# Patient Record
Sex: MALE | Race: WHITE | NOT HISPANIC OR LATINO | Employment: OTHER | URBAN - METROPOLITAN AREA
[De-identification: names, ages, dates, MRNs, and addresses within clinical notes are randomized per-mention and may not be internally consistent; named-entity substitution may affect disease eponyms.]

---

## 2018-07-12 ENCOUNTER — DOCUMENTATION ONLY (OUTPATIENT)
Dept: CARDIOLOGY | Facility: CLINIC | Age: 68
End: 2018-07-12

## 2018-07-12 DIAGNOSIS — T82.03XA PARAVALVULAR LEAK OF PROSTHETIC HEART VALVE, INITIAL ENCOUNTER: ICD-10-CM

## 2018-07-12 NOTE — ASSESSMENT & PLAN NOTE
TRACY from Dr. Santoro [place] reviewed. Large anterior PVL present anteriorly in close proximity to LVOT. Will schedule for PVL closure and clear LVOT prior to release.

## 2018-07-16 DIAGNOSIS — T82.03XA PARAVALVULAR LEAK OF PROSTHETIC HEART VALVE, INITIAL ENCOUNTER: Primary | ICD-10-CM

## 2018-07-17 NOTE — PROGRESS NOTES
Subjective:    Patient ID:  Yusuf Mcdonald is a 67 y.o. y.o. male who presents for evaluation for Paravalvular leak with Mechanical MR. PMhx of Severe MR d/t restriction of posterior leaflet s/p Mechanical MVR/MAZE/GEE excision '2015 ([facility]) on Coumadin, NICMP LVef 40% (Tachycardia mediated), Obesity, Hypertension, HLD, Permanent Afib s/p DCCV 2014 (Dr. Casillas). Who presents for evalution of paravalvular leak from his MVR. Mr. Mcdonald was seen in cardiology for routine follow up, and was found to have PVL on TRACY. Prior to his MVR and Post MVR, he has had no HF symptoms, no Chest pain, SOB, N/V/D, PND, Orthopnea, LE edema, no urinary or bowel complaint, Denies any lightheadedness and dizziness. Down 10 lbs intentionally. Patient is an , very active, denies any smoking, only social EtOH. Has trip planned to [facility] 8/4/2018 hunting vacation with family.     Coumadin 1.5mg x6 days then 1mg x1 day a week, most recent INR 3.1.    Follows up with Dr. Larsen (Primary Cardiology), Completed TRACY 7/6/18 with / Fe ROSE at [facility], summary Bileaflet mechanical MV with moderate to severe anterior perivalvular mitral regurgitation. Mild to moderate dilated right ventricular cavity with reduced systolic function. Mild Right and severe Left atrial enlargement. Left atrial appendage appearance with surgical exclusion w/o residual communication.       HPI    Review of Systems   Constitutional: Negative for chills, fever, malaise/fatigue and weight loss.   Respiratory: Negative for cough, sputum production and shortness of breath.    Cardiovascular: Negative for chest pain, palpitations, orthopnea, claudication, leg swelling and PND.   Gastrointestinal: Negative for abdominal pain, diarrhea, nausea and vomiting.   Genitourinary: Negative for dysuria and urgency.        Objective:     BP (!) 167/110 (BP Location: Right arm, Patient Position: Sitting, BP Method:  "Large (Automatic))   Pulse 88   Ht 5' 8.5" (1.74 m)   Wt 99.7 kg (219 lb 12.8 oz)   SpO2 95%   BMI 32.93 kg/m²     Physical Exam   Constitutional: He is oriented to person, place, and time. No distress.   HENT:   Head: Normocephalic and atraumatic.   Neck: Normal range of motion. Neck supple. JVD present.   Cardiovascular: Normal rate, regular rhythm and intact distal pulses.  Exam reveals no gallop and no friction rub.    Murmur heard.   Systolic murmur is present with a grade of 3/6   Crisp mechanical click   Pulmonary/Chest: Effort normal and breath sounds normal. No respiratory distress. He has no wheezes. He has no rales.   Abdominal: Soft. Bowel sounds are normal. He exhibits no distension. There is no tenderness.   Musculoskeletal: Normal range of motion.   Neurological: He is alert and oriented to person, place, and time.   Skin: Skin is warm and dry. He is not diaphoretic. No erythema.   Nursing note and vitals reviewed.      Labs:     Lab Results   Component Value Date     12/30/2011    K 3.7 12/30/2011     12/30/2011    CO2 32 (H) 12/30/2011    BUN 16 12/30/2011    CREATININE 1.0 12/30/2011    ANIONGAP 9.0 12/30/2011     Lab Results   Component Value Date    HGBA1C 5.2 12/30/2011     No results found for: BNP, BNPTRIAGEBLO    Lab Results   Component Value Date    WBC 7.81 12/30/2011    HGB 16.0 12/30/2011    HCT 47.5 12/30/2011     12/30/2011    GRAN 4.6 12/30/2011    GRAN 58.9 12/30/2011     Lab Results   Component Value Date    CHOL 219 (H) 12/30/2011    HDL 39 (L) 12/30/2011    LDLCALC 136.0 12/30/2011    TRIG 219 (H) 12/30/2011       Meds     Current Outpatient Prescriptions:     acetaminophen (TYLENOL) 500 MG tablet, Take 500 mg by mouth., Disp: , Rfl:     ascorbic acid, vitamin C, (VITAMIN C) 500 MG tablet, Take 500 mg by mouth., Disp: , Rfl:     aspirin 81 MG Chew, Take 81 mg by mouth., Disp: , Rfl:     cetirizine (ZYRTEC) 10 MG tablet, Take 10 mg by mouth., Disp: , Rfl: "     coenzyme Q10 100 mg capsule, Take 100 mg by mouth., Disp: , Rfl:     digoxin (LANOXIN) 250 mcg tablet, , Disp: , Rfl:     diltiaZEM (CARDIZEM CD) 120 MG Cp24, Take 120 mg by mouth., Disp: , Rfl:     fish,bora,flax oils-om3,6,9no1 1,200 mg Cap, Take 1,200 mg by mouth., Disp: , Rfl:     fluticasone (FLONASE) 50 mcg/actuation nasal spray, 50 Bottles by Nasal route., Disp: , Rfl:     furosemide (LASIX) 40 MG tablet, , Disp: , Rfl:     JANTOVEN 5 mg tablet, , Disp: , Rfl:     lisinopril (PRINIVIL,ZESTRIL) 20 MG tablet, Take 20 mg by mouth., Disp: , Rfl:     metoprolol succinate (TOPROL-XL) 50 MG 24 hr tablet, , Disp: , Rfl:     multivitamin capsule, Take by mouth., Disp: , Rfl:       Assessment & Plan:     Paravalvular leak (prosthetic valve)  Severe MR s/p Mechanical MVR, MAZE and GEE excision 2015.   TRACY images reviewed with Dr. Boyd and Dr. Tejeda  Patient is symptomatic with elevated JVD, LE edema, dyspnea on exertion tested in clinic  Fluoroscopy completed in clinic to further evaluate his  Mechanical MVR which appears to be functioning well.   - Repeat TTE today with structural specific windows   - Cath lab request completed with orderset  - Right femoral access   - Consent in chart   - Routine hemolysis work up with CBC, CMP, INR, Retic, LDH  - Continue Coumadin for now, INR today, he can continue coumadin, no need to bridge perioperative       Abscess of skin of neck  - Need to see PCP / ED, would benefit from I&D with short course of PO ABX  - Cannot precede with the procedure with acute infection       Signed:  Kelly Zapata M.D.  Page # [phone]  Cardiovascular Fellow PGY-IV  [facility]     I have personally taken the history and examined this patient. I have discussed and agree with the resident's findings and plan as documented in the resident's note. Anterior mitral paravalvular leak, NYHA II symptoms, elevated PAP's, chamber enlargement, haptoglobin unknown referred for  PVL closure. Fluoroscopy demonstrated good leaflet mobility. Left posterior neck with abscess. REC: AB's, abscess drainage today, appt made, needs lab, TTE today and schedule procedure post 7 to 10 days of AB's and resolution of drainage. Discussed patient and TRACY with Dr. Boyd.   Yusuf Tejeda

## 2018-07-18 ENCOUNTER — HOSPITAL ENCOUNTER (OUTPATIENT)
Dept: CARDIOLOGY | Facility: CLINIC | Age: 68
Discharge: HOME | End: 2018-07-18
Attending: INTERNAL MEDICINE
Payer: COMMERCIAL

## 2018-07-18 ENCOUNTER — OFFICE VISIT (OUTPATIENT)
Dept: INTERNAL MEDICINE | Facility: CLINIC | Age: 68
End: 2018-07-18
Payer: COMMERCIAL

## 2018-07-18 ENCOUNTER — INITIAL CONSULT (OUTPATIENT)
Dept: CARDIOLOGY | Facility: CLINIC | Age: 68
End: 2018-07-18
Payer: COMMERCIAL

## 2018-07-18 ENCOUNTER — LAB VISIT (OUTPATIENT)
Dept: LAB | Facility: HOSPITAL | Age: 68
End: 2018-07-18
Payer: COMMERCIAL

## 2018-07-18 VITALS
BODY MASS INDEX: 32.44 KG/M2 | HEART RATE: 98 BPM | HEIGHT: 69 IN | DIASTOLIC BLOOD PRESSURE: 75 MMHG | SYSTOLIC BLOOD PRESSURE: 140 MMHG | WEIGHT: 219 LBS

## 2018-07-18 VITALS
HEART RATE: 88 BPM | HEIGHT: 69 IN | OXYGEN SATURATION: 95 % | DIASTOLIC BLOOD PRESSURE: 110 MMHG | WEIGHT: 219.81 LBS | BODY MASS INDEX: 32.56 KG/M2 | SYSTOLIC BLOOD PRESSURE: 167 MMHG

## 2018-07-18 DIAGNOSIS — I10 HYPERTENSION, UNSPECIFIED TYPE: ICD-10-CM

## 2018-07-18 DIAGNOSIS — T82.03XA PARAVALVULAR LEAK OF PROSTHETIC HEART VALVE, INITIAL ENCOUNTER: Primary | ICD-10-CM

## 2018-07-18 DIAGNOSIS — L02.12 BOIL OF NECK: Primary | ICD-10-CM

## 2018-07-18 DIAGNOSIS — I27.20 PULMONARY HYPERTENSION: ICD-10-CM

## 2018-07-18 DIAGNOSIS — T82.03XA PARAVALVULAR LEAK OF PROSTHETIC HEART VALVE, INITIAL ENCOUNTER: ICD-10-CM

## 2018-07-18 DIAGNOSIS — Z79.01 ANTICOAGULATION GOAL OF INR 2.5 TO 3.5: ICD-10-CM

## 2018-07-18 DIAGNOSIS — I48.21 PERMANENT ATRIAL FIBRILLATION: ICD-10-CM

## 2018-07-18 DIAGNOSIS — Z51.81 ANTICOAGULATION GOAL OF INR 2.5 TO 3.5: ICD-10-CM

## 2018-07-18 DIAGNOSIS — L02.11 ABSCESS OF SKIN OF NECK: ICD-10-CM

## 2018-07-18 PROBLEM — I27.22 PULMONARY HYPERTENSION DUE TO MITRAL VALVE DISEASE: Status: ACTIVE | Noted: 2018-07-18

## 2018-07-18 PROBLEM — I05.9 PULMONARY HYPERTENSION DUE TO MITRAL VALVE DISEASE: Status: ACTIVE | Noted: 2018-07-18

## 2018-07-18 PROBLEM — E78.49 OTHER HYPERLIPIDEMIA: Status: ACTIVE | Noted: 2018-06-19

## 2018-07-18 PROBLEM — Z98.890 S/P MVR (MITRAL VALVE REPAIR): Status: ACTIVE | Noted: 2018-05-24

## 2018-07-18 PROBLEM — I48.20 CHRONIC A-FIB: Status: ACTIVE | Noted: 2018-05-24

## 2018-07-18 LAB
ALBUMIN SERPL BCP-MCNC: 4 G/DL
ALP SERPL-CCNC: 156 U/L
ALT SERPL W/O P-5'-P-CCNC: 25 U/L
ANION GAP SERPL CALC-SCNC: 10 MMOL/L
AST SERPL-CCNC: 41 U/L
BASOPHILS # BLD AUTO: 0.07 K/UL
BASOPHILS NFR BLD: 0.8 %
BILIRUB SERPL-MCNC: 2.6 MG/DL
BUN SERPL-MCNC: 17 MG/DL
CALCIUM SERPL-MCNC: 9.5 MG/DL
CHLORIDE SERPL-SCNC: 100 MMOL/L
CO2 SERPL-SCNC: 28 MMOL/L
CREAT SERPL-MCNC: 1.3 MG/DL
DIFFERENTIAL METHOD: ABNORMAL
EOSINOPHIL # BLD AUTO: 0.4 K/UL
EOSINOPHIL NFR BLD: 4.2 %
ERYTHROCYTE [DISTWIDTH] IN BLOOD BY AUTOMATED COUNT: 14 %
EST. GFR  (AFRICAN AMERICAN): >60 ML/MIN/1.73 M^2
EST. GFR  (NON AFRICAN AMERICAN): 56.9 ML/MIN/1.73 M^2
GLUCOSE SERPL-MCNC: 121 MG/DL
HCT VFR BLD AUTO: 40.7 %
HGB BLD-MCNC: 13.8 G/DL
IMM GRANULOCYTES # BLD AUTO: 0.02 K/UL
IMM GRANULOCYTES NFR BLD AUTO: 0.2 %
LDH SERPL L TO P-CCNC: 472 U/L
LYMPHOCYTES # BLD AUTO: 1.3 K/UL
LYMPHOCYTES NFR BLD: 14.7 %
MCH RBC QN AUTO: 32.2 PG
MCHC RBC AUTO-ENTMCNC: 33.9 G/DL
MCV RBC AUTO: 95 FL
MONOCYTES # BLD AUTO: 0.7 K/UL
MONOCYTES NFR BLD: 7.2 %
NEUTROPHILS # BLD AUTO: 6.5 K/UL
NEUTROPHILS NFR BLD: 72.9 %
NRBC BLD-RTO: 0 /100 WBC
PLATELET # BLD AUTO: 195 K/UL
PMV BLD AUTO: 12 FL
POTASSIUM SERPL-SCNC: 2.8 MMOL/L
PROT SERPL-MCNC: 7.2 G/DL
RBC # BLD AUTO: 4.29 M/UL
RETICS/RBC NFR AUTO: 1.5 %
SODIUM SERPL-SCNC: 138 MMOL/L
WBC # BLD AUTO: 8.97 K/UL

## 2018-07-18 PROCEDURE — 83615 LACTATE (LD) (LDH) ENZYME: CPT

## 2018-07-18 PROCEDURE — 85025 COMPLETE CBC W/AUTO DIFF WBC: CPT

## 2018-07-18 PROCEDURE — 76000 FLUOROSCOPY <1 HR PHYS/QHP: CPT | Mod: 26,,, | Performed by: INTERNAL MEDICINE

## 2018-07-18 PROCEDURE — 99999 PR PBB SHADOW E&M-EST. PATIENT-LVL III: CPT | Mod: PBBFAC,,, | Performed by: INTERNAL MEDICINE

## 2018-07-18 PROCEDURE — 99201 PR OFFICE/OUTPT VISIT,NEW,LEVL I: CPT | Mod: S$GLB,,, | Performed by: INTERNAL MEDICINE

## 2018-07-18 PROCEDURE — 36415 COLL VENOUS BLD VENIPUNCTURE: CPT

## 2018-07-18 PROCEDURE — 85045 AUTOMATED RETICULOCYTE COUNT: CPT

## 2018-07-18 PROCEDURE — 99999 PR PBB SHADOW E&M-EST. PATIENT-LVL IV: CPT | Mod: PBBFAC,,, | Performed by: INTERNAL MEDICINE

## 2018-07-18 PROCEDURE — 80053 COMPREHEN METABOLIC PANEL: CPT

## 2018-07-18 PROCEDURE — 99205 OFFICE O/P NEW HI 60 MIN: CPT | Mod: S$GLB,,, | Performed by: INTERNAL MEDICINE

## 2018-07-18 PROCEDURE — 93306 TTE W/DOPPLER COMPLETE: CPT | Mod: S$GLB,,, | Performed by: INTERNAL MEDICINE

## 2018-07-18 PROCEDURE — 76000 FLUOROSCOPY <1 HR PHYS/QHP: CPT

## 2018-07-18 RX ORDER — CETIRIZINE HYDROCHLORIDE 10 MG/1
10 TABLET ORAL
COMMUNITY

## 2018-07-18 RX ORDER — WARFARIN SODIUM 5 MG/1
TABLET ORAL
COMMUNITY
Start: 2018-06-04

## 2018-07-18 RX ORDER — METOPROLOL SUCCINATE 50 MG/1
TABLET, EXTENDED RELEASE ORAL
COMMUNITY
Start: 2018-06-04

## 2018-07-18 RX ORDER — ASCORBIC ACID 500 MG
500 TABLET ORAL
COMMUNITY
End: 2018-11-13

## 2018-07-18 RX ORDER — DIGOXIN 250 MCG
TABLET ORAL
COMMUNITY
Start: 2018-06-21

## 2018-07-18 RX ORDER — NAPROXEN SODIUM 220 MG/1
81 TABLET, FILM COATED ORAL
Status: ON HOLD | COMMUNITY
End: 2018-10-11 | Stop reason: HOSPADM

## 2018-07-18 RX ORDER — DILTIAZEM HYDROCHLORIDE 120 MG/1
120 CAPSULE, COATED, EXTENDED RELEASE ORAL 2 TIMES DAILY
COMMUNITY
Start: 2018-05-26

## 2018-07-18 RX ORDER — SODIUM CHLORIDE 9 MG/ML
3 INJECTION, SOLUTION INTRAVENOUS CONTINUOUS
Status: CANCELLED | OUTPATIENT
Start: 2018-07-18 | End: 2018-07-18

## 2018-07-18 RX ORDER — LISINOPRIL 20 MG/1
20 TABLET ORAL
COMMUNITY

## 2018-07-18 RX ORDER — CEPHALEXIN 500 MG/1
500 CAPSULE ORAL EVERY 8 HOURS
Qty: 30 CAPSULE | Refills: 0 | Status: SHIPPED | OUTPATIENT
Start: 2018-07-18 | End: 2018-07-28

## 2018-07-18 RX ORDER — FUROSEMIDE 40 MG/1
TABLET ORAL
Status: ON HOLD | COMMUNITY
Start: 2018-07-12 | End: 2022-01-01

## 2018-07-18 RX ORDER — FLUTICASONE PROPIONATE 50 MCG
50 SPRAY, SUSPENSION (ML) NASAL
COMMUNITY
Start: 2018-06-04

## 2018-07-18 RX ORDER — ACETAMINOPHEN 500 MG
500 TABLET ORAL
COMMUNITY

## 2018-07-18 RX ORDER — EPINEPHRINE 0.22MG
100 AEROSOL WITH ADAPTER (ML) INHALATION
COMMUNITY

## 2018-07-18 RX ORDER — DIPHENHYDRAMINE HCL 25 MG
50 CAPSULE ORAL ONCE
Status: CANCELLED | OUTPATIENT
Start: 2018-07-18 | End: 2018-07-18

## 2018-07-18 NOTE — ASSESSMENT & PLAN NOTE
Severe MR s/p Mechanical MVR, MAZE and GEE excision 2015.   TRACY images reviewed with Dr. Boyd and Dr. Tejeda  Patient is symptomatic with elevated JVD, LE edema, dyspnea on exertion tested in clinic  Fluoroscopy completed in clinic to further evaluate his  Mechanical MVR which appears to be functioning well.   - Repeat TTE today with structural specific windows   - Cath lab request completed with orderset  - Right femoral access   - Consent in chart   - Routine hemolysis work up with CBC, CMP, INR, Retic, LDH  - Continue Coumadin for now, INR today, he can continue coumadin, no need to bridge perioperative

## 2018-07-18 NOTE — PROGRESS NOTES
Yusuf Mcdonald presents today to urgent care for:  Recurrent Skin Infections (back of neck )      Pt. Noted a boil on his posterior left neck 4 days ago.  He hasn't done anything to it but it started draining 2 days ago.  Mild pain and discomfort has been noted.  He is covering with a bandaide. No other associated symptoms.         Past medical, social, family and surgical history was reviewed and updated today as needed. See encounter for details.     Review of Systems   All other systems reviewed and are negative.      Vitals:    07/18/18 1031   BP: (!) 140/75   Pulse: 98   Body mass index is 32.81 kg/m².   Physical Exam   Constitutional: He appears well-developed and well-nourished.   HENT:   Head: Normocephalic and atraumatic.   Neck:       Pulmonary/Chest: Effort normal.   Lymphadenopathy:     He has no cervical adenopathy.   Neurological: He is alert.        Assessment/plan:   1. Boil of neck  - cephALEXin (KEFLEX) 500 MG capsule; Take 1 capsule (500 mg total) by mouth every 8 (eight) hours. for 10 days  Dispense: 30 capsule; Refill: 0  Since this is draining, there should be no reason to have pt. See surgery at this time.  Warm compresses TID or more.  If worsens or fails to improve pt will need to see someone who does proceedures to have this lanced and packed.       Follow-up if symptoms worsen or fail to improve.  All risks and benefits of medications discussed with the patient today in detail. Pt. Voiced understanding and was provided with patient educational materials regarding these medications and encouraged to read this material and call the office with any questions or concerns.

## 2018-07-18 NOTE — ASSESSMENT & PLAN NOTE
- Need to see PCP / ED, would benefit from I&D with short course of PO ABX  - Cannot precede with the procedure with acute infection

## 2018-07-18 NOTE — PATIENT INSTRUCTIONS
Understanding Carbuncles    A carbuncle is a painful boil under the skin. It happens when a group of hair follicles become infected. Follicles are the tiny holes from which hair grows out of your skin.  How to say it  Caprice   What causes carbuncles?  A carbuncle is caused by an infection with the bacteria Staphylococcus aureus. They are common on areas of the body where friction and sweat occur. They usually appear on the back of the neck, back, and thighs. This type of infection can also happen when the skin is injured, such as by a cut or bug bite.  The bacteria that causes carbuncles can spread easily from person to person. People at higher risk for boils are those with diabetes or a weak immune system. Drug users who use needles are also more likely to get them.  Symptoms of carbuncles  A carbuncle starts as a small painful bump. But it can grow quickly. It may become:  · Red  · Swollen  · Tender  Carbuncles may ooze pus. They may also cause fever and a general feeling of illness.  Treatment for carbuncles  A carbuncle may heal on its own in a few weeks. But the pus within it needs to come out first. Treatment options include:  · Warm compress. Putting a warm, wet washcloth on the boil will help the pus drain out. You should never try to pop a boil. That can cause the infection to spread.  · Surgical drainage. If the boil doesnt drain on its own, your healthcare provider may need to cut into it.  · Antibiotics. In some cases, oral antibiotics may be prescribed to fight the infection, especially if the carbuncle returns. You will likely have to take the medicine for 5 to 7 days. You may need to take it longer for a severe case.  · Good hygiene. Proper handwashing can prevent boils from spreading and coming back. Also wash things that have been in contact with the carbuncle in hot water. This includes items such as clothing and towels.  Complications of carbuncles  The main complication of a carbuncle  is the spreading of the infection. The bacteria can infect the heart and bone. It can also lead to septic shock, an infection of the entire body.  When to call your healthcare provider  Call your healthcare provider right away if you have any of these:  · Fever of 100.4°F (38°C) or higher, or as directed  · Redness, swelling, or fluid leaking from a carbuncle that gets worse  · Pain that gets worse  · Symptoms that dont get better, or get worse  · New symptoms   Date Last Reviewed: 5/1/2016 © 2000-2017 [employer]. [address] [place] [address]. All rights reserved. This information is not intended as a substitute for professional medical care. Always follow your healthcare professional's instructions.

## 2018-07-18 NOTE — LETTER
July 18, 2018      Sherif Santoro MD  [address]  [address]  [facility] [address]           [facility]  [address]  [facility] [address]  Phone: [phone]          Patient: Yusuf Mcdonald   MR Number: [id number]   YOB: 1950   Date of Visit: 7/18/2018       Dear Dr. Sherif Santoro:    Thank you for referring Yusuf Mcdonald to me for evaluation. Attached you will find relevant portions of my assessment and plan of care.    If you have questions, please do not hesitate to call me. I look forward to following Yusuf Mcdonald along with you.    Sincerely,    Yusuf Tejeda MD    Enclosure  CC:  No Recipients    If you would like to receive this communication electronically, please contact [email or web address] or [phone] to request more information on [facility] access.    For providers and/or their staff who would like to refer a patient to [facility], please contact us through our one-stop-shop provider referral line, Nedra Pan, at [phone].    If you feel you have received this communication in error or would no longer like to receive these types of communications, please e-mail [email or web address]

## 2018-07-19 ENCOUNTER — DOCUMENTATION ONLY (OUTPATIENT)
Dept: CARDIAC CATH/INVASIVE PROCEDURES | Facility: HOSPITAL | Age: 68
End: 2018-07-19

## 2018-07-19 ENCOUNTER — TELEPHONE (OUTPATIENT)
Dept: CARDIAC CATH/INVASIVE PROCEDURES | Facility: HOSPITAL | Age: 68
End: 2018-07-19

## 2018-07-19 DIAGNOSIS — E87.6 HYPOKALEMIA: ICD-10-CM

## 2018-07-19 RX ORDER — POTASSIUM CHLORIDE 20 MEQ/1
20 TABLET, EXTENDED RELEASE ORAL DAILY
Qty: 30 TABLET | Refills: 0 | Status: ON HOLD | OUTPATIENT
Start: 2018-07-19 | End: 2022-01-01

## 2018-07-23 DIAGNOSIS — E87.6 HYPOKALEMIA: Primary | ICD-10-CM

## 2018-07-24 ENCOUNTER — LAB VISIT (OUTPATIENT)
Dept: LAB | Facility: HOSPITAL | Age: 68
End: 2018-07-24
Attending: STUDENT IN AN ORGANIZED HEALTH CARE EDUCATION/TRAINING PROGRAM
Payer: COMMERCIAL

## 2018-07-24 DIAGNOSIS — T82.03XA PARAVALVULAR LEAK OF PROSTHETIC HEART VALVE, INITIAL ENCOUNTER: ICD-10-CM

## 2018-07-24 DIAGNOSIS — E87.6 HYPOKALEMIA: ICD-10-CM

## 2018-07-24 LAB
AORTIC VALVE REGURGITATION: ABNORMAL
ESTIMATED PA SYSTOLIC PRESSURE: 88.62
MITRAL VALVE REGURGITATION: ABNORMAL
RETIRED EF AND QEF - SEE NOTES: 50 (ref 55–65)
TRICUSPID VALVE REGURGITATION: ABNORMAL

## 2018-07-24 PROCEDURE — 83010 ASSAY OF HAPTOGLOBIN QUANT: CPT

## 2018-07-24 PROCEDURE — 80053 COMPREHEN METABOLIC PANEL: CPT

## 2018-07-24 PROCEDURE — 36415 COLL VENOUS BLD VENIPUNCTURE: CPT | Mod: PO

## 2018-07-25 LAB
ALBUMIN SERPL BCP-MCNC: 4.1 G/DL
ALP SERPL-CCNC: 162 U/L
ALT SERPL W/O P-5'-P-CCNC: 26 U/L
ANION GAP SERPL CALC-SCNC: 10 MMOL/L
AST SERPL-CCNC: 43 U/L
BILIRUB SERPL-MCNC: 1.9 MG/DL
BUN SERPL-MCNC: 20 MG/DL
CALCIUM SERPL-MCNC: 10.2 MG/DL
CHLORIDE SERPL-SCNC: 103 MMOL/L
CO2 SERPL-SCNC: 30 MMOL/L
CREAT SERPL-MCNC: 1.3 MG/DL
EST. GFR  (AFRICAN AMERICAN): >60 ML/MIN/1.73 M^2
EST. GFR  (NON AFRICAN AMERICAN): 56.9 ML/MIN/1.73 M^2
GLUCOSE SERPL-MCNC: 54 MG/DL
HAPTOGLOB SERPL-MCNC: <10 MG/DL
POTASSIUM SERPL-SCNC: 3.6 MMOL/L
PROT SERPL-MCNC: 7.6 G/DL
SODIUM SERPL-SCNC: 143 MMOL/L

## 2018-07-30 ENCOUNTER — TELEPHONE (OUTPATIENT)
Dept: CARDIOLOGY | Facility: CLINIC | Age: 68
End: 2018-07-30

## 2018-07-30 DIAGNOSIS — I05.9 MITRAL VALVE DISEASE: Primary | ICD-10-CM

## 2018-07-30 NOTE — TELEPHONE ENCOUNTER
Patient is scheduled for mitral PVL closure on 8/22/18.  He called stating that he has an infected tooth and needs a root canal bur wants to go hunting in [place] first.  Will set him up to see Dr Tejeda the day before to see if we can proceed.  Instructed per Dr Tejeda to remain on potassium until he sees him the day before.

## 2018-10-09 ENCOUNTER — ANESTHESIA EVENT (OUTPATIENT)
Dept: MEDSURG UNIT | Facility: HOSPITAL | Age: 68
End: 2018-10-09
Payer: COMMERCIAL

## 2018-10-09 ENCOUNTER — OFFICE VISIT (OUTPATIENT)
Dept: CARDIOLOGY | Facility: CLINIC | Age: 68
End: 2018-10-09
Payer: COMMERCIAL

## 2018-10-09 ENCOUNTER — LAB VISIT (OUTPATIENT)
Dept: LAB | Facility: HOSPITAL | Age: 68
End: 2018-10-09
Payer: COMMERCIAL

## 2018-10-09 VITALS
DIASTOLIC BLOOD PRESSURE: 100 MMHG | WEIGHT: 214.75 LBS | HEIGHT: 69 IN | SYSTOLIC BLOOD PRESSURE: 157 MMHG | HEART RATE: 92 BPM | BODY MASS INDEX: 31.81 KG/M2 | OXYGEN SATURATION: 94 %

## 2018-10-09 DIAGNOSIS — I48.20 CHRONIC A-FIB: ICD-10-CM

## 2018-10-09 DIAGNOSIS — Z98.890 S/P MVR (MITRAL VALVE REPAIR): ICD-10-CM

## 2018-10-09 DIAGNOSIS — I10 ESSENTIAL HYPERTENSION: ICD-10-CM

## 2018-10-09 DIAGNOSIS — T82.03XA PARAVALVULAR LEAK OF PROSTHETIC HEART VALVE, INITIAL ENCOUNTER: Primary | ICD-10-CM

## 2018-10-09 DIAGNOSIS — I05.9 MITRAL VALVE DISEASE: ICD-10-CM

## 2018-10-09 LAB
ANION GAP SERPL CALC-SCNC: 10 MMOL/L
APTT BLDCRRT: 31 SEC
BUN SERPL-MCNC: 19 MG/DL
CALCIUM SERPL-MCNC: 9.9 MG/DL
CHLORIDE SERPL-SCNC: 101 MMOL/L
CO2 SERPL-SCNC: 28 MMOL/L
CREAT SERPL-MCNC: 1.3 MG/DL
ERYTHROCYTE [DISTWIDTH] IN BLOOD BY AUTOMATED COUNT: 14.6 %
EST. GFR  (AFRICAN AMERICAN): >60 ML/MIN/1.73 M^2
EST. GFR  (NON AFRICAN AMERICAN): 56.9 ML/MIN/1.73 M^2
GLUCOSE SERPL-MCNC: 87 MG/DL
HCT VFR BLD AUTO: 40.3 %
HGB BLD-MCNC: 12.9 G/DL
INR PPP: 2.4
MCH RBC QN AUTO: 31.2 PG
MCHC RBC AUTO-ENTMCNC: 32 G/DL
MCV RBC AUTO: 97 FL
PLATELET # BLD AUTO: 207 K/UL
PMV BLD AUTO: 12.4 FL
POTASSIUM SERPL-SCNC: 3.3 MMOL/L
PROTHROMBIN TIME: 23.1 SEC
RBC # BLD AUTO: 4.14 M/UL
SODIUM SERPL-SCNC: 139 MMOL/L
WBC # BLD AUTO: 9.85 K/UL

## 2018-10-09 PROCEDURE — 99215 OFFICE O/P EST HI 40 MIN: CPT | Mod: 57,S$GLB,, | Performed by: INTERNAL MEDICINE

## 2018-10-09 PROCEDURE — 80048 BASIC METABOLIC PNL TOTAL CA: CPT

## 2018-10-09 PROCEDURE — 36415 COLL VENOUS BLD VENIPUNCTURE: CPT

## 2018-10-09 PROCEDURE — 85027 COMPLETE CBC AUTOMATED: CPT

## 2018-10-09 PROCEDURE — 1101F PT FALLS ASSESS-DOCD LE1/YR: CPT | Mod: CPTII,S$GLB,, | Performed by: INTERNAL MEDICINE

## 2018-10-09 PROCEDURE — 85610 PROTHROMBIN TIME: CPT

## 2018-10-09 PROCEDURE — 85730 THROMBOPLASTIN TIME PARTIAL: CPT

## 2018-10-09 PROCEDURE — 99999 PR PBB SHADOW E&M-EST. PATIENT-LVL III: CPT | Mod: PBBFAC,,, | Performed by: INTERNAL MEDICINE

## 2018-10-09 NOTE — ASSESSMENT & PLAN NOTE
Severe MR with NYHA Class II symptoms  - plan for paravalvular leak repair 10/10/18 with TRACY and flouro guidance  Mitral Valve Paravalvular Leak Repair  1. Access: R CFA  2. Antithrombotic therapy: continue on warfarin  3. Patient was educated abut the pathophysiology and natural history of severe paravalvular leak and was educated about the treatment options including surgical and paravalvular leak repair. She agrees to be full code for the forseable future and to undergo workup for treatment of severe paravalvular leak.   4. The risks, benefits, and alternatives of paravalvular leak repair were discussed with the patient. All questions were answered and informed consent was obtained. I had a detailed discussion with the patient regarding risk of stroke, MI, bleeding access site complications including limb loss, allergy, kidney failure including dialysis and death.  5. The patient understands the risks and benefits and wishes to go ahead with the procedure.  6. All patient's questions were answered

## 2018-10-09 NOTE — ASSESSMENT & PLAN NOTE
Severe mitral valvular regurgitation per TRACY, plan for repair as above  - continue AC w/ warfarin, INR 2.4

## 2018-10-09 NOTE — H&P (VIEW-ONLY)
Subjective:    Patient ID:  Yusuf Mcdonald is a 67 y.o. y.o. male who presents for evaluation for Paravalvular leak with Mechanical MR. PMhx of Severe MR d/t restriction of posterior leaflet s/p Mechanical MVR/MAZE/GEE excision '2015 ([facility]) on Coumadin, NICMP LVef 40% (Tachycardia mediated), Obesity, Hypertension, HLD, Permanent Afib s/p DCCV 2014 (Dr. Casillas). Who presents for f/u of paravalvular leak from his MVR. Mr. Mcdonald was seen in cardiology for routine follow up, and was found to have PVL on TRACY. Prior to his MVR and Post MVR, he has had no HF symptoms, no Chest pain, SOB, N/V/D, PND, Orthopnea, LE edema, no urinary or bowel complaint, Denies any lightheadedness and dizziness. Down 10 lbs intentionally. Patient is an , very active, denies any smoking, only social EtOH. Has trip planned to [facility] 8/4/2018 hunting vacation with family.  There have been no changes since his last appointment.    Coumadin 1.5mg x6 days then 1mg x1 day a week, most recent INR 3.1.    Follows up with Dr. Larsen (Primary Cardiology), Completed TRACY 7/6/18 with / Fe ROSE at [facility], summary Bileaflet mechanical MV with moderate to severe anterior perivalvular mitral regurgitation. Mild to moderate dilated right ventricular cavity with reduced systolic function. Mild Right and severe Left atrial enlargement. Left atrial appendage appearance with surgical exclusion w/o residual communication.       Review of Systems   Constitutional: Negative for chills, fever, malaise/fatigue and weight loss.   Respiratory: Negative for cough, sputum production and shortness of breath.    Cardiovascular: Positive for dyspnea on exertion. Negative for chest pain, palpitations, orthopnea, claudication, leg swelling and PND.   Gastrointestinal: Negative for abdominal pain, diarrhea, nausea and vomiting.   Genitourinary: Negative for dysuria and urgency.        Objective:     BP  "(!) 157/100 (BP Location: Left arm, Patient Position: Sitting, BP Method: Large (Automatic))   Pulse 92   Ht 5' 8.5" (1.74 m)   Wt 97.4 kg (214 lb 11.7 oz)   SpO2 (!) 94%   BMI 32.17 kg/m²     Physical Exam   Constitutional: He is oriented to person, place, and time. No distress.   HENT:   Head: Normocephalic and atraumatic.   Neck: Normal range of motion. Neck supple. JVD present.   Cardiovascular: Normal rate, regular rhythm and intact distal pulses. Exam reveals no gallop and no friction rub.   Murmur heard.   Systolic murmur is present with a grade of 3/6.  Crisp mechanical click   Pulmonary/Chest: Effort normal and breath sounds normal. No respiratory distress. He has no wheezes. He has no rales.   Abdominal: Soft. Bowel sounds are normal. He exhibits no distension. There is no tenderness.   Musculoskeletal: Normal range of motion.   Neurological: He is alert and oriented to person, place, and time.   Skin: Skin is warm and dry. He is not diaphoretic. No erythema.   Nursing note and vitals reviewed.      Labs:     Lab Results   Component Value Date     07/24/2018    K 3.6 07/24/2018     07/24/2018    CO2 30 (H) 07/24/2018    BUN 20 07/24/2018    CREATININE 1.3 07/24/2018    ANIONGAP 10 07/24/2018     Lab Results   Component Value Date    HGBA1C 5.2 12/30/2011     No results found for: BNP, BNPTRIAGEBLO    Lab Results   Component Value Date    WBC 9.85 10/09/2018    HGB 12.9 (L) 10/09/2018    HCT 40.3 10/09/2018     10/09/2018    GRAN 6.5 07/18/2018    GRAN 72.9 07/18/2018     Lab Results   Component Value Date    CHOL 219 (H) 12/30/2011    HDL 39 (L) 12/30/2011    LDLCALC 136.0 12/30/2011    TRIG 219 (H) 12/30/2011       Meds     Current Outpatient Medications:     acetaminophen (TYLENOL) 500 MG tablet, Take 500 mg by mouth., Disp: , Rfl:     ascorbic acid, vitamin C, (VITAMIN C) 500 MG tablet, Take 500 mg by mouth., Disp: , Rfl:     aspirin 81 MG Chew, Take 81 mg by mouth., Disp: , " Rfl:     cetirizine (ZYRTEC) 10 MG tablet, Take 10 mg by mouth., Disp: , Rfl:     coenzyme Q10 100 mg capsule, Take 100 mg by mouth., Disp: , Rfl:     digoxin (LANOXIN) 250 mcg tablet, , Disp: , Rfl:     diltiaZEM (CARDIZEM CD) 120 MG Cp24, Take 120 mg by mouth., Disp: , Rfl:     fish,bora,flax oils-om3,6,9no1 1,200 mg Cap, Take 1,200 mg by mouth., Disp: , Rfl:     fluticasone (FLONASE) 50 mcg/actuation nasal spray, 50 Bottles by Nasal route., Disp: , Rfl:     furosemide (LASIX) 40 MG tablet, , Disp: , Rfl:     JANTOVEN 5 mg tablet, , Disp: , Rfl:     lisinopril (PRINIVIL,ZESTRIL) 20 MG tablet, Take 20 mg by mouth., Disp: , Rfl:     metoprolol succinate (TOPROL-XL) 50 MG 24 hr tablet, , Disp: , Rfl:     multivitamin capsule, Take by mouth., Disp: , Rfl:     potassium chloride SA (K-DUR,KLOR-CON) 20 MEQ tablet, Take 1 tablet (20 mEq total) by mouth once daily., Disp: 30 tablet, Rfl: 0      Assessment & Plan:     Paravalvular leak (prosthetic valve)  Severe MR with NYHA Class II symptoms  - plan for paravalvular leak repair 10/10/18 with TRACY and flouro guidance  Mitral Valve Paravalvular Leak Repair  1. Access: R CFA  2. Antithrombotic therapy: continue on warfarin  3. Patient was educated abut the pathophysiology and natural history of severe paravalvular leak and was educated about the treatment options including surgical and paravalvular leak repair. She agrees to be full code for the forseable future and to undergo workup for treatment of severe paravalvular leak.   4. The risks, benefits, and alternatives of paravalvular leak repair were discussed with the patient. All questions were answered and informed consent was obtained. I had a detailed discussion with the patient regarding risk of stroke, MI, bleeding access site complications including limb loss, allergy, kidney failure including dialysis and death.  5. The patient understands the risks and benefits and wishes to go ahead with the  procedure.  6. All patient's questions were answered      S/P MVR (mitral valve repair)  Severe mitral valvular regurgitation per TRACY, plan for repair as above  - continue AC w/ warfarin, INR 2.4     Chronic a-fib  Remains in A. Fib per auscultation.  - continue current medical therapy    Essential hypertension  Uncontrolled, goal SBP < 140 mmHg  - Continue current medical therapy, may adjust following procedure      Signed:    Dimas Espinal M.D.  Interventional Cardiology      I have personally taken the history and examined this patient. I have discussed and agree with the resident's findings and plan as documented in the resident's note.    I have personally taken the history and examined this patient. I have discussed and agree with the resident's findings and plan as documented in the resident's note. PVL, posterior in location, reviewed with Mo. Continue anticoagulation and proceed with pvl closure.   Yusuf Tejeda

## 2018-10-09 NOTE — PROGRESS NOTES
Subjective:    Patient ID:  Yusuf Mcdonald is a 67 y.o. y.o. male who presents for evaluation for Paravalvular leak with Mechanical MR. PMhx of Severe MR d/t restriction of posterior leaflet s/p Mechanical MVR/MAZE/GEE excision '2015 ([facility]) on Coumadin, NICMP LVef 40% (Tachycardia mediated), Obesity, Hypertension, HLD, Permanent Afib s/p DCCV 2014 (Dr. Casillas). Who presents for f/u of paravalvular leak from his MVR. Mr. Mcdonald was seen in cardiology for routine follow up, and was found to have PVL on TRACY. Prior to his MVR and Post MVR, he has had no HF symptoms, no Chest pain, SOB, N/V/D, PND, Orthopnea, LE edema, no urinary or bowel complaint, Denies any lightheadedness and dizziness. Down 10 lbs intentionally. Patient is an , very active, denies any smoking, only social EtOH. Has trip planned to [facility] 8/4/2018 hunting vacation with family.  There have been no changes since his last appointment.    Coumadin 1.5mg x6 days then 1mg x1 day a week, most recent INR 3.1.    Follows up with Dr. Larsen (Primary Cardiology), Completed TRACY 7/6/18 with / Fe ROSE at [facility], summary Bileaflet mechanical MV with moderate to severe anterior perivalvular mitral regurgitation. Mild to moderate dilated right ventricular cavity with reduced systolic function. Mild Right and severe Left atrial enlargement. Left atrial appendage appearance with surgical exclusion w/o residual communication.       Review of Systems   Constitutional: Negative for chills, fever, malaise/fatigue and weight loss.   Respiratory: Negative for cough, sputum production and shortness of breath.    Cardiovascular: Positive for dyspnea on exertion. Negative for chest pain, palpitations, orthopnea, claudication, leg swelling and PND.   Gastrointestinal: Negative for abdominal pain, diarrhea, nausea and vomiting.   Genitourinary: Negative for dysuria and urgency.        Objective:     BP  "(!) 157/100 (BP Location: Left arm, Patient Position: Sitting, BP Method: Large (Automatic))   Pulse 92   Ht 5' 8.5" (1.74 m)   Wt 97.4 kg (214 lb 11.7 oz)   SpO2 (!) 94%   BMI 32.17 kg/m²     Physical Exam   Constitutional: He is oriented to person, place, and time. No distress.   HENT:   Head: Normocephalic and atraumatic.   Neck: Normal range of motion. Neck supple. JVD present.   Cardiovascular: Normal rate, regular rhythm and intact distal pulses. Exam reveals no gallop and no friction rub.   Murmur heard.   Systolic murmur is present with a grade of 3/6.  Crisp mechanical click   Pulmonary/Chest: Effort normal and breath sounds normal. No respiratory distress. He has no wheezes. He has no rales.   Abdominal: Soft. Bowel sounds are normal. He exhibits no distension. There is no tenderness.   Musculoskeletal: Normal range of motion.   Neurological: He is alert and oriented to person, place, and time.   Skin: Skin is warm and dry. He is not diaphoretic. No erythema.   Nursing note and vitals reviewed.      Labs:     Lab Results   Component Value Date     07/24/2018    K 3.6 07/24/2018     07/24/2018    CO2 30 (H) 07/24/2018    BUN 20 07/24/2018    CREATININE 1.3 07/24/2018    ANIONGAP 10 07/24/2018     Lab Results   Component Value Date    HGBA1C 5.2 12/30/2011     No results found for: BNP, BNPTRIAGEBLO    Lab Results   Component Value Date    WBC 9.85 10/09/2018    HGB 12.9 (L) 10/09/2018    HCT 40.3 10/09/2018     10/09/2018    GRAN 6.5 07/18/2018    GRAN 72.9 07/18/2018     Lab Results   Component Value Date    CHOL 219 (H) 12/30/2011    HDL 39 (L) 12/30/2011    LDLCALC 136.0 12/30/2011    TRIG 219 (H) 12/30/2011       Meds     Current Outpatient Medications:     acetaminophen (TYLENOL) 500 MG tablet, Take 500 mg by mouth., Disp: , Rfl:     ascorbic acid, vitamin C, (VITAMIN C) 500 MG tablet, Take 500 mg by mouth., Disp: , Rfl:     aspirin 81 MG Chew, Take 81 mg by mouth., Disp: , " Rfl:     cetirizine (ZYRTEC) 10 MG tablet, Take 10 mg by mouth., Disp: , Rfl:     coenzyme Q10 100 mg capsule, Take 100 mg by mouth., Disp: , Rfl:     digoxin (LANOXIN) 250 mcg tablet, , Disp: , Rfl:     diltiaZEM (CARDIZEM CD) 120 MG Cp24, Take 120 mg by mouth., Disp: , Rfl:     fish,bora,flax oils-om3,6,9no1 1,200 mg Cap, Take 1,200 mg by mouth., Disp: , Rfl:     fluticasone (FLONASE) 50 mcg/actuation nasal spray, 50 Bottles by Nasal route., Disp: , Rfl:     furosemide (LASIX) 40 MG tablet, , Disp: , Rfl:     JANTOVEN 5 mg tablet, , Disp: , Rfl:     lisinopril (PRINIVIL,ZESTRIL) 20 MG tablet, Take 20 mg by mouth., Disp: , Rfl:     metoprolol succinate (TOPROL-XL) 50 MG 24 hr tablet, , Disp: , Rfl:     multivitamin capsule, Take by mouth., Disp: , Rfl:     potassium chloride SA (K-DUR,KLOR-CON) 20 MEQ tablet, Take 1 tablet (20 mEq total) by mouth once daily., Disp: 30 tablet, Rfl: 0      Assessment & Plan:     Paravalvular leak (prosthetic valve)  Severe MR with NYHA Class II symptoms  - plan for paravalvular leak repair 10/10/18 with TRACY and flouro guidance  Mitral Valve Paravalvular Leak Repair  1. Access: R CFA  2. Antithrombotic therapy: continue on warfarin  3. Patient was educated abut the pathophysiology and natural history of severe paravalvular leak and was educated about the treatment options including surgical and paravalvular leak repair. She agrees to be full code for the forseable future and to undergo workup for treatment of severe paravalvular leak.   4. The risks, benefits, and alternatives of paravalvular leak repair were discussed with the patient. All questions were answered and informed consent was obtained. I had a detailed discussion with the patient regarding risk of stroke, MI, bleeding access site complications including limb loss, allergy, kidney failure including dialysis and death.  5. The patient understands the risks and benefits and wishes to go ahead with the  procedure.  6. All patient's questions were answered      S/P MVR (mitral valve repair)  Severe mitral valvular regurgitation per TRACY, plan for repair as above  - continue AC w/ warfarin, INR 2.4     Chronic a-fib  Remains in A. Fib per auscultation.  - continue current medical therapy    Essential hypertension  Uncontrolled, goal SBP < 140 mmHg  - Continue current medical therapy, may adjust following procedure      Signed:    Dimas Espinal M.D.  Interventional Cardiology      I have personally taken the history and examined this patient. I have discussed and agree with the resident's findings and plan as documented in the resident's note.    I have personally taken the history and examined this patient. I have discussed and agree with the resident's findings and plan as documented in the resident's note. PVL, posterior in location, reviewed with Mo. Continue anticoagulation and proceed with pvl closure.   Yusuf Tejeda

## 2018-10-09 NOTE — ANESTHESIA PREPROCEDURE EVALUATION
10/09/2018  Pre-operative evaluation for Procedure(s) (LRB):  REPLACEMENT, MITRAL VALVE (Right)    Yusuf Mcdonald is a 67 y.o. male with PMHx of PMhx HTN, HLD,  Afib (s/p DCCV 2014), NICMP LVef 40% (Tachycardia mediated), pulmonary HTN (PA systolic at 89 mmHg) and severe MR (d/t restriction of posterior leaflet s/p Mechanical MVR/MAZE/GEE excision in 2015, on Coumadin) who presents for above procedure 2/2 paravalvular leak from his MVR.    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      Current Outpatient Medications on File Prior to Encounter   Medication Sig Dispense Refill    acetaminophen (TYLENOL) 500 MG tablet Take 500 mg by mouth.      ascorbic acid, vitamin C, (VITAMIN C) 500 MG tablet Take 500 mg by mouth.      aspirin 81 MG Chew Take 81 mg by mouth.      cetirizine (ZYRTEC) 10 MG tablet Take 10 mg by mouth.      coenzyme Q10 100 mg capsule Take 100 mg by mouth.      digoxin (LANOXIN) 250 mcg tablet       diltiaZEM (CARDIZEM CD) 120 MG Cp24 Take 120 mg by mouth.      fish,bora,flax oils-om3,6,9no1 1,200 mg Cap Take 1,200 mg by mouth.      fluticasone (FLONASE) 50 mcg/actuation nasal spray 50 Bottles by Nasal route.      furosemide (LASIX) 40 MG tablet       JANTOVEN 5 mg tablet       lisinopril (PRINIVIL,ZESTRIL) 20 MG tablet Take 20 mg by mouth.      metoprolol succinate (TOPROL-XL) 50 MG 24 hr tablet       multivitamin  "capsule Take by mouth.         No past surgical history on file.    Social History     Socioeconomic History    Marital status: Single     Spouse name: Not on file    Number of children: Not on file    Years of education: Not on file    Highest education level: Not on file   Social Needs    Financial resource strain: Not on file    Food insecurity - worry: Not on file    Food insecurity - inability: Not on file    Transportation needs - medical: Not on file    Transportation needs - non-medical: Not on file   Occupational History    Not on file   Tobacco Use    Smoking status: Never Smoker    Smokeless tobacco: Never Used   Substance and Sexual Activity    Alcohol use: Yes     Alcohol/week: 1.2 oz     Types: 1 Glasses of wine, 1 Shots of liquor per week     Comment: occ.    Drug use: Not on file    Sexual activity: Not on file   Other Topics Concern    Not on file   Social History Narrative    Not on file         Vital Signs Range (Last 24H):  Pulse:  [92]   BP: (157-185)/(100-109)   SpO2:  [94 %]       CBC:   Recent Labs      10/09/18   0855   WBC  9.85   RBC  4.14*   HGB  12.9*   HCT  40.3   PLT  207   MCV  97   MCH  31.2*   MCHC  32.0       CMP:   Recent Labs      10/09/18   0855   NA  139   K  3.3*   CL  101   CO2  28   BUN  19   CREATININE  1.3   GLU  87   CALCIUM  9.9       INR  Recent Labs      10/09/18   0855   INR  2.4*   APTT  31.0       Last 3 sets of Vitals    Vitals - 1 value per visit 7/18/2018 10/9/2018 10/10/2018   SYSTOLIC 140 157 144   DIASTOLIC 75 100 88   PULSE 98 92 78   TEMPERATURE - - 98.3   RESPIRATIONS - - 16   SPO2 - 94 93   Weight (lb) 219 214.73 207   Weight (kg) 99.338 97.4 93.895   HEIGHT 5' 8.5" 5' 8.5" 5' 8.5"   BODY MASS INDEX 32.81 32.17 31.02   VISIT REPORT - - -   Pain Score  4 0 -         Diagnostic Studies:    2D Echo:  July 2018  CONCLUSIONS     1 - Low normal to mildly depressed left ventricular systolic function (EF 50-55%).     2 - Right ventricular " enlargement with moderately depressed systolic function.     3 - Mildly enlarged ascending aorta.     4 - Biatrial enlargement.     5 - No wall motion abnormalities.     6 - Pulmonary hypertension. The estimated PA systolic pressure is 89 mmHg.     7 - Moderate aortic regurgitation.     8 - Bileaflet Mitral valve prosthesis.     9 - Severe paravalvular mitral regurgitation.     10 - Moderate to severe tricuspid regurgitation.     11 - Moderate pulmonic regurgitation.     12 - Increased central venous pressure.       Anesthesia Evaluation    I have reviewed the Patient Summary Reports.     I have reviewed the Medications.     Review of Systems  Anesthesia Hx:  No problems with previous Anesthesia  History of prior surgery of interest to airway management or planning: Denies Family Hx of Anesthesia complications.   Denies Personal Hx of Anesthesia complications.   Hematology/Oncology:  Hematology Normal   Oncology Normal     EENT/Dental:EENT/Dental Normal   Cardiovascular:   Hypertension Valvular problems/Murmurs, MR Dysrhythmias atrial fibrillation CHF hyperlipidemia    Pulmonary:  Pulmonary Normal    Renal/:  Renal/ Normal     Hepatic/GI:  Hepatic/GI Normal    Neurological:  Neurology Normal    Endocrine:  Endocrine Normal        Physical Exam  General:  Well nourished, Obesity    Airway/Jaw/Neck:  Airway Findings: Mouth Opening: Normal Tongue: Normal  General Airway Assessment: Adult  Mallampati: III  Improves to II with phonation.     Eyes/Ears/Nose:  EYES/EARS/NOSE FINDINGS: Normal    Chest/Lungs:  Chest/Lungs Findings: Clear to auscultation     Heart/Vascular:  Heart Findings: Rate: Normal        Mental Status:  Mental Status Findings:  Cooperative, Alert and Oriented         Anesthesia Plan  Type of Anesthesia, risks & benefits discussed:  Anesthesia Type:  general, MAC  Patient's Preference: same   Intra-op Monitoring Plan: standard ASA monitors and arterial line  Intra-op Monitoring Plan Comments:   Post  Op Pain Control Plan: per primary service following discharge from PACU and IV/PO Opioids PRN  Post Op Pain Control Plan Comments:   Induction:   IV  Beta Blocker:  Patient is on a Beta-Blocker and has received one dose within the past 24 hours (No further documentation required).       Informed Consent: Patient understands risks and agrees with Anesthesia plan.  Questions answered. Anesthesia consent signed with patient.  ASA Score: 4     Day of Surgery Review of History & Physical:    H&P update referred to the provider.         Ready For Surgery From Anesthesia Perspective.

## 2018-10-09 NOTE — ASSESSMENT & PLAN NOTE
Uncontrolled, goal SBP < 140 mmHg  - Continue current medical therapy, may adjust following procedure

## 2018-10-10 ENCOUNTER — ANESTHESIA (OUTPATIENT)
Dept: MEDSURG UNIT | Facility: HOSPITAL | Age: 68
End: 2018-10-10
Payer: COMMERCIAL

## 2018-10-10 ENCOUNTER — HOSPITAL ENCOUNTER (OUTPATIENT)
Facility: HOSPITAL | Age: 68
LOS: 1 days | Discharge: HOME | End: 2018-10-11
Attending: INTERNAL MEDICINE | Admitting: INTERNAL MEDICINE
Payer: COMMERCIAL

## 2018-10-10 DIAGNOSIS — T82.03XA PARAVALVULAR LEAK OF PROSTHETIC HEART VALVE, INITIAL ENCOUNTER: ICD-10-CM

## 2018-10-10 DIAGNOSIS — I05.9 RHEUMATIC MITRAL VALVE DISEASE: ICD-10-CM

## 2018-10-10 DIAGNOSIS — T82.03XA PARAVALVULAR LEAK (PROSTHETIC VALVE): Primary | ICD-10-CM

## 2018-10-10 DIAGNOSIS — Z98.890 S/P MVR (MITRAL VALVE REPAIR): ICD-10-CM

## 2018-10-10 LAB
ABO + RH BLD: NORMAL
ANION GAP SERPL CALC-SCNC: 13 MMOL/L
AORTIC VALVE REGURGITATION: ABNORMAL
BASOPHILS # BLD AUTO: 0.12 K/UL
BASOPHILS NFR BLD: 1.5 %
BLD GP AB SCN CELLS X3 SERPL QL: NORMAL
BUN SERPL-MCNC: 22 MG/DL
CALCIUM SERPL-MCNC: 10 MG/DL
CHLORIDE SERPL-SCNC: 103 MMOL/L
CO2 SERPL-SCNC: 25 MMOL/L
CREAT SERPL-MCNC: 1.3 MG/DL
DIFFERENTIAL METHOD: ABNORMAL
EOSINOPHIL # BLD AUTO: 1.2 K/UL
EOSINOPHIL NFR BLD: 14.1 %
ERYTHROCYTE [DISTWIDTH] IN BLOOD BY AUTOMATED COUNT: 14.7 %
EST. GFR  (AFRICAN AMERICAN): >60 ML/MIN/1.73 M^2
EST. GFR  (NON AFRICAN AMERICAN): 56.9 ML/MIN/1.73 M^2
ESTIMATED PA SYSTOLIC PRESSURE: 5.2
GLUCOSE SERPL-MCNC: 96 MG/DL
HCT VFR BLD AUTO: 40 %
HGB BLD-MCNC: 12.9 G/DL
IMM GRANULOCYTES # BLD AUTO: 0.02 K/UL
IMM GRANULOCYTES NFR BLD AUTO: 0.2 %
INR PPP: 2.7
LYMPHOCYTES # BLD AUTO: 1.4 K/UL
LYMPHOCYTES NFR BLD: 16.9 %
MCH RBC QN AUTO: 31.2 PG
MCHC RBC AUTO-ENTMCNC: 32.3 G/DL
MCV RBC AUTO: 97 FL
MONOCYTES # BLD AUTO: 0.8 K/UL
MONOCYTES NFR BLD: 9.4 %
NEUTROPHILS # BLD AUTO: 4.7 K/UL
NEUTROPHILS NFR BLD: 57.9 %
NRBC BLD-RTO: 0 /100 WBC
PLATELET # BLD AUTO: 232 K/UL
PMV BLD AUTO: 12.1 FL
POTASSIUM SERPL-SCNC: 3.1 MMOL/L
PROTHROMBIN TIME: 26.4 SEC
RBC # BLD AUTO: 4.13 M/UL
SODIUM SERPL-SCNC: 141 MMOL/L
TRICUSPID VALVE REGURGITATION: ABNORMAL
WBC # BLD AUTO: 8.16 K/UL

## 2018-10-10 PROCEDURE — 36415 COLL VENOUS BLD VENIPUNCTURE: CPT

## 2018-10-10 PROCEDURE — 25000003 PHARM REV CODE 250: Performed by: STUDENT IN AN ORGANIZED HEALTH CARE EDUCATION/TRAINING PROGRAM

## 2018-10-10 PROCEDURE — 37000008 HC ANESTHESIA 1ST 15 MINUTES: Performed by: INTERNAL MEDICINE

## 2018-10-10 PROCEDURE — 93320 DOPPLER ECHO COMPLETE: CPT

## 2018-10-10 PROCEDURE — 86850 RBC ANTIBODY SCREEN: CPT

## 2018-10-10 PROCEDURE — G0269 OCCLUSIVE DEVICE IN VEIN ART: HCPCS

## 2018-10-10 PROCEDURE — 93355 ECHO TRANSESOPHAGEAL (TEE): CPT | Mod: ,,, | Performed by: INTERNAL MEDICINE

## 2018-10-10 PROCEDURE — 27000221 HC OXYGEN, UP TO 24 HOURS

## 2018-10-10 PROCEDURE — 92987 REVISION OF MITRAL VALVE: CPT | Mod: 58,,, | Performed by: INTERNAL MEDICINE

## 2018-10-10 PROCEDURE — 92987 REVISION OF MITRAL VALVE: CPT

## 2018-10-10 PROCEDURE — 63600175 PHARM REV CODE 636 W HCPCS: Performed by: STUDENT IN AN ORGANIZED HEALTH CARE EDUCATION/TRAINING PROGRAM

## 2018-10-10 PROCEDURE — 27000191 HC C-V MONITORING

## 2018-10-10 PROCEDURE — 37000009 HC ANESTHESIA EA ADD 15 MINS: Performed by: INTERNAL MEDICINE

## 2018-10-10 PROCEDURE — A4216 STERILE WATER/SALINE, 10 ML: HCPCS | Performed by: STUDENT IN AN ORGANIZED HEALTH CARE EDUCATION/TRAINING PROGRAM

## 2018-10-10 PROCEDURE — D9220A PRA ANESTHESIA: Mod: ,,, | Performed by: ANESTHESIOLOGY

## 2018-10-10 PROCEDURE — 25000003 PHARM REV CODE 250

## 2018-10-10 PROCEDURE — 80048 BASIC METABOLIC PNL TOTAL CA: CPT

## 2018-10-10 PROCEDURE — 36620 INSERTION CATHETER ARTERY: CPT | Mod: 59,,, | Performed by: ANESTHESIOLOGY

## 2018-10-10 PROCEDURE — 85610 PROTHROMBIN TIME: CPT

## 2018-10-10 PROCEDURE — 93452 LEFT HRT CATH W/VENTRCLGRPHY: CPT | Mod: 26,58,51, | Performed by: INTERNAL MEDICINE

## 2018-10-10 PROCEDURE — 85025 COMPLETE CBC W/AUTO DIFF WBC: CPT

## 2018-10-10 PROCEDURE — 63600175 PHARM REV CODE 636 W HCPCS

## 2018-10-10 RX ORDER — CEFAZOLIN SODIUM 1 G/3ML
2 INJECTION, POWDER, FOR SOLUTION INTRAMUSCULAR; INTRAVENOUS
Status: COMPLETED | OUTPATIENT
Start: 2018-10-10 | End: 2018-10-11

## 2018-10-10 RX ORDER — DIGOXIN 250 MCG
0.25 TABLET ORAL DAILY
Status: DISCONTINUED | OUTPATIENT
Start: 2018-10-10 | End: 2018-10-10

## 2018-10-10 RX ORDER — CEFAZOLIN SODIUM 1 G/3ML
INJECTION, POWDER, FOR SOLUTION INTRAMUSCULAR; INTRAVENOUS
Status: DISCONTINUED | OUTPATIENT
Start: 2018-10-10 | End: 2018-10-10

## 2018-10-10 RX ORDER — SODIUM CHLORIDE 0.9 % (FLUSH) 0.9 %
3 SYRINGE (ML) INJECTION
Status: DISCONTINUED | OUTPATIENT
Start: 2018-10-10 | End: 2018-10-11 | Stop reason: HOSPADM

## 2018-10-10 RX ORDER — HYDROMORPHONE HYDROCHLORIDE 1 MG/ML
0.2 INJECTION, SOLUTION INTRAMUSCULAR; INTRAVENOUS; SUBCUTANEOUS EVERY 5 MIN PRN
Status: DISCONTINUED | OUTPATIENT
Start: 2018-10-10 | End: 2018-10-11 | Stop reason: HOSPADM

## 2018-10-10 RX ORDER — NAPROXEN SODIUM 220 MG/1
81 TABLET, FILM COATED ORAL DAILY
Status: DISCONTINUED | OUTPATIENT
Start: 2018-10-10 | End: 2018-10-10

## 2018-10-10 RX ORDER — DIGOXIN 250 MCG
0.25 TABLET ORAL NIGHTLY
Status: DISCONTINUED | OUTPATIENT
Start: 2018-10-10 | End: 2018-10-11 | Stop reason: HOSPADM

## 2018-10-10 RX ORDER — CLOPIDOGREL 300 MG/1
300 TABLET, FILM COATED ORAL ONCE
Status: COMPLETED | OUTPATIENT
Start: 2018-10-10 | End: 2018-10-10

## 2018-10-10 RX ORDER — SODIUM CHLORIDE 9 MG/ML
3 INJECTION, SOLUTION INTRAVENOUS CONTINUOUS
Status: ACTIVE | OUTPATIENT
Start: 2018-10-10 | End: 2018-10-10

## 2018-10-10 RX ORDER — DILTIAZEM HYDROCHLORIDE 120 MG/1
120 CAPSULE, COATED, EXTENDED RELEASE ORAL NIGHTLY
Status: DISCONTINUED | OUTPATIENT
Start: 2018-10-10 | End: 2018-10-11 | Stop reason: HOSPADM

## 2018-10-10 RX ORDER — LISINOPRIL 5 MG/1
20 TABLET ORAL DAILY
Status: DISCONTINUED | OUTPATIENT
Start: 2018-10-10 | End: 2018-10-11 | Stop reason: HOSPADM

## 2018-10-10 RX ORDER — SODIUM CHLORIDE 9 MG/ML
INJECTION, SOLUTION INTRAVENOUS CONTINUOUS PRN
Status: DISCONTINUED | OUTPATIENT
Start: 2018-10-10 | End: 2018-10-10

## 2018-10-10 RX ORDER — FENTANYL CITRATE 50 UG/ML
INJECTION, SOLUTION INTRAMUSCULAR; INTRAVENOUS
Status: DISCONTINUED | OUTPATIENT
Start: 2018-10-10 | End: 2018-10-10

## 2018-10-10 RX ORDER — POTASSIUM CHLORIDE 20 MEQ/1
40 TABLET, EXTENDED RELEASE ORAL ONCE
Status: COMPLETED | OUTPATIENT
Start: 2018-10-10 | End: 2018-10-10

## 2018-10-10 RX ORDER — MIDAZOLAM HYDROCHLORIDE 1 MG/ML
INJECTION, SOLUTION INTRAMUSCULAR; INTRAVENOUS
Status: DISCONTINUED | OUTPATIENT
Start: 2018-10-10 | End: 2018-10-10

## 2018-10-10 RX ORDER — PROPOFOL 10 MG/ML
VIAL (ML) INTRAVENOUS
Status: DISCONTINUED | OUTPATIENT
Start: 2018-10-10 | End: 2018-10-10

## 2018-10-10 RX ORDER — DILTIAZEM HYDROCHLORIDE 120 MG/1
120 CAPSULE, COATED, EXTENDED RELEASE ORAL DAILY
Status: DISCONTINUED | OUTPATIENT
Start: 2018-10-10 | End: 2018-10-10

## 2018-10-10 RX ORDER — WARFARIN SODIUM 5 MG/1
5 TABLET ORAL DAILY
Status: DISCONTINUED | OUTPATIENT
Start: 2018-10-10 | End: 2018-10-11 | Stop reason: HOSPADM

## 2018-10-10 RX ORDER — DEXMEDETOMIDINE HYDROCHLORIDE 4 UG/ML
INJECTION, SOLUTION INTRAVENOUS
Status: DISPENSED
Start: 2018-10-10 | End: 2018-10-10

## 2018-10-10 RX ORDER — DIPHENHYDRAMINE HCL 50 MG
50 CAPSULE ORAL ONCE
Status: COMPLETED | OUTPATIENT
Start: 2018-10-10 | End: 2018-10-10

## 2018-10-10 RX ORDER — HEPARIN SODIUM 1000 [USP'U]/ML
INJECTION, SOLUTION INTRAVENOUS; SUBCUTANEOUS
Status: DISCONTINUED | OUTPATIENT
Start: 2018-10-10 | End: 2018-10-10

## 2018-10-10 RX ORDER — ACETAMINOPHEN 325 MG/1
650 TABLET ORAL EVERY 6 HOURS PRN
Status: DISCONTINUED | OUTPATIENT
Start: 2018-10-10 | End: 2018-10-11 | Stop reason: HOSPADM

## 2018-10-10 RX ADMIN — ACETAMINOPHEN 650 MG: 325 TABLET ORAL at 05:10

## 2018-10-10 RX ADMIN — SODIUM CHLORIDE, SODIUM GLUCONATE, SODIUM ACETATE, POTASSIUM CHLORIDE, MAGNESIUM CHLORIDE, SODIUM PHOSPHATE, DIBASIC, AND POTASSIUM PHOSPHATE: .53; .5; .37; .037; .03; .012; .00082 INJECTION, SOLUTION INTRAVENOUS at 08:10

## 2018-10-10 RX ADMIN — DIPHENHYDRAMINE HYDROCHLORIDE 50 MG: 50 CAPSULE ORAL at 06:10

## 2018-10-10 RX ADMIN — PROPOFOL 30 MG: 10 INJECTION, EMULSION INTRAVENOUS at 08:10

## 2018-10-10 RX ADMIN — POTASSIUM CHLORIDE 40 MEQ: 1500 TABLET, EXTENDED RELEASE ORAL at 11:10

## 2018-10-10 RX ADMIN — SODIUM CHLORIDE: 0.9 INJECTION, SOLUTION INTRAVENOUS at 08:10

## 2018-10-10 RX ADMIN — CLOPIDOGREL BISULFATE 300 MG: 300 TABLET, FILM COATED ORAL at 11:10

## 2018-10-10 RX ADMIN — PROPOFOL 20 MG: 10 INJECTION, EMULSION INTRAVENOUS at 08:10

## 2018-10-10 RX ADMIN — HEPARIN SODIUM 8000 UNITS: 1000 INJECTION INTRAVENOUS; SUBCUTANEOUS at 08:10

## 2018-10-10 RX ADMIN — MIDAZOLAM 2 MG: 1 INJECTION INTRAMUSCULAR; INTRAVENOUS at 08:10

## 2018-10-10 RX ADMIN — REMIFENTANIL HYDROCHLORIDE 0.05 MCG/KG/MIN: 1 INJECTION, POWDER, LYOPHILIZED, FOR SOLUTION INTRAVENOUS at 08:10

## 2018-10-10 RX ADMIN — FENTANYL CITRATE 50 MCG: 50 INJECTION, SOLUTION INTRAMUSCULAR; INTRAVENOUS at 08:10

## 2018-10-10 RX ADMIN — WARFARIN SODIUM 5 MG: 5 TABLET ORAL at 04:10

## 2018-10-10 RX ADMIN — DEXMEDETOMIDINE HYDROCHLORIDE 1 MCG/KG/HR: 100 INJECTION, SOLUTION, CONCENTRATE INTRAVENOUS at 08:10

## 2018-10-10 RX ADMIN — CEFAZOLIN 2 G: 330 INJECTION, POWDER, FOR SOLUTION INTRAMUSCULAR; INTRAVENOUS at 08:10

## 2018-10-10 RX ADMIN — SODIUM CHLORIDE 3 ML/KG/HR: 0.9 INJECTION, SOLUTION INTRAVENOUS at 06:10

## 2018-10-10 RX ADMIN — CEFAZOLIN 2 G: 1 INJECTION, POWDER, FOR SOLUTION INTRAMUSCULAR; INTRAVENOUS at 04:10

## 2018-10-10 NOTE — TRANSFER OF CARE
"Anesthesia Transfer of Care Note    Patient: Yusuf Mcdonald    Procedure(s) Performed: Procedure(s) (LRB):  REPLACEMENT, MITRAL VALVE (Right)    Patient location: PACU    Anesthesia Type: MAC    Transport from OR: Transported from OR on 6-10 L/min O2 by face mask with adequate spontaneous ventilation    Post pain: adequate analgesia    Post assessment: no apparent anesthetic complications    Post vital signs: stable    Level of consciousness: awake and alert    Nausea/Vomiting: no nausea/vomiting    Complications: none    Transfer of care protocol was followed      Last vitals:   Visit Vitals  BP (!) 144/88 (BP Location: Left arm, Patient Position: Lying)   Pulse 78   Temp 36.8 °C (98.3 °F) (Oral)   Resp 16   Ht 5' 8.5" (1.74 m)   Wt 93.9 kg (207 lb)   SpO2 (!) 93%   BMI 31.02 kg/m²     "

## 2018-10-10 NOTE — CONSULTS
[facility]  Cardiology  Consult Note    Patient Name: Yusuf Mcdonald  MRN: [id number]  Admission Date: 10/10/2018  Hospital Length of Stay: 0 days  Code Status: No Order   Attending Provider: Yusuf Tejeda MD   Consulting Provider: Stuart Magallon MD  Primary Care Physician: Primary Doctor No  Principal Problem:Paravalvular leak (prosthetic valve)    Patient information was obtained from patient, past medical records and ER records.       HPI:       TRANSESOPHAGEAL ECHOCARDIOGRAPHY   PRE-PROCEDURE NOTE    10/10/2018    HPI:     Yusuf Mcdonald is a 67 y.o. man with PMHx of Mechanical MVR 2/2 severe MR (with resection of the posterior leaflet), s/p MAZE, NICM 40%, HTN, HLD, afib here for outpatient PVL closure with Dr. Boyd and Dr. Tejeda today. Patient's Mechanical replacement occurred at [place]. Patient had a TRACY done at OSH, which showed an anterior paravavular leak of the bileaflet MV. Most recent 2D echo showed LVEF of 50-55%, moderately reduced right ventricular function, biatrial enlargement, moderate AR, moderate-severe TR and Moderate [place]. MALLY was 164. The patient is currently on warfarin    Dysphagia or odynophagia:  No  Liver Disease, esophageal disease, or known varices:  No  Upper GI Bleeding: No  Snoring:  Yes  Sleep Apnea:  No  Prior neck surgery or radiation:  No  History of anesthetic difficulties:  No  Family history of anesthetic difficulties:  No  Last oral intake:  12 hours ago  Able to move neck in all directions:  Yes    Mallampati: 2  ASA: 3              Past Medical History:   Diagnosis Date    A-fib     Hypertension        Past Surgical History:   Procedure Laterality Date    CARDIOVERSION      MITRAL VALVE REPLACEMENT  2015    mechanical       Review of patient's allergies indicates:   Allergen Reactions    Neomycin-bacitracnzn-polymyxnb     Neosporin (neomycin-polymyx) Rash       No current facility-administered medications on file prior to  encounter.      Current Outpatient Medications on File Prior to Encounter   Medication Sig    ascorbic acid, vitamin C, (VITAMIN C) 500 MG tablet Take 500 mg by mouth.    aspirin 81 MG Chew Take 81 mg by mouth.    cetirizine (ZYRTEC) 10 MG tablet Take 10 mg by mouth.    coenzyme Q10 100 mg capsule Take 100 mg by mouth.    digoxin (LANOXIN) 250 mcg tablet     diltiaZEM (CARDIZEM CD) 120 MG Cp24 Take 120 mg by mouth.    fish,bora,flax oils-om3,6,9no1 1,200 mg Cap Take 1,200 mg by mouth.    fluticasone (FLONASE) 50 mcg/actuation nasal spray 50 Bottles by Nasal route.    furosemide (LASIX) 40 MG tablet     lisinopril (PRINIVIL,ZESTRIL) 20 MG tablet Take 20 mg by mouth.    metoprolol succinate (TOPROL-XL) 50 MG 24 hr tablet     multivitamin capsule Take by mouth.    acetaminophen (TYLENOL) 500 MG tablet Take 500 mg by mouth.    JANTOVEN 5 mg tablet      Family History     None        Tobacco Use    Smoking status: Never Smoker    Smokeless tobacco: Never Used   Substance and Sexual Activity    Alcohol use: Yes     Alcohol/week: 1.2 oz     Types: 1 Glasses of wine, 1 Shots of liquor per week     Comment: occ.    Drug use: No    Sexual activity: Not on file     Review of Systems   All other systems reviewed and are negative.    Objective:     Vital Signs (Most Recent):  Temp: 98.3 °F (36.8 °C) (10/10/18 0615)  Pulse: 78 (10/10/18 0615)  Resp: 16 (10/10/18 0615)  BP: (!) 144/88 (10/10/18 0621)  SpO2: (!) 93 % (10/10/18 0615) Vital Signs (24h Range):  Temp:  [98.3 °F (36.8 °C)] 98.3 °F (36.8 °C)  Pulse:  [78-92] 78  Resp:  [16] 16  SpO2:  [93 %-94 %] 93 %  BP: (144-185)/() 144/88     Weight: 93.9 kg (207 lb)  Body mass index is 31.02 kg/m².    SpO2: (!) 93 %  O2 Device (Oxygen Therapy): room air    No intake or output data in the 24 hours ending 10/10/18 0722    Lines/Drains/Airways     Peripheral Intravenous Line                 Peripheral IV - Single Lumen 10/10/18 0620 Left Antecubital less than 1  day         Peripheral IV - Single Lumen 10/10/18 0620 Left Forearm less than 1 day                Physical Exam   Constitutional: He is oriented to person, place, and time. He appears well-developed and well-nourished.   HENT:   Head: Normocephalic and atraumatic.   Eyes: EOM are normal. Pupils are equal, round, and reactive to light.   Neck: Normal range of motion. Neck supple. No JVD present.   Cardiovascular: Normal rate and intact distal pulses.   Murmur (3/6 holosystolic mumur heard loudest in the mid-clavicular region) heard.  afib   Pulmonary/Chest: Effort normal and breath sounds normal.   Abdominal: Soft. Bowel sounds are normal.   Musculoskeletal: Normal range of motion. He exhibits no edema.   Neurological: He is alert and oriented to person, place, and time.       Significant Labs:   CMP   Recent Labs   Lab  10/09/18   0855  10/10/18   0555   NA  139  141   K  3.3*  3.1*   CL  101  103   CO2  28  25   GLU  87  96   BUN  19  22   CREATININE  1.3  1.3   CALCIUM  9.9  10.0   ANIONGAP  10  13   ESTGFRAFRICA  >60.0  >60.0   EGFRNONAA  56.9*  56.9*   , CBC   Recent Labs   Lab  10/09/18   0855  10/10/18   0555   WBC  9.85  8.16   HGB  12.9*  12.9*   HCT  40.3  40.0   PLT  207  232    and All pertinent lab results from the last 24 hours have been reviewed.    Significant Imaging: Echocardiogram:   2D echo with color flow doppler:   Results for orders placed or performed during the hospital encounter of 07/18/18   2D Echo w/ Color Flow Doppler   Result Value Ref Range    EF 50 55 - 65    Mitral Valve Regurgitation SEVERE (A)     Aortic Valve Regurgitation MODERATE (A)     Est. PA Systolic Pressure 88.62 (A)     Tricuspid Valve Regurgitation MODERATE TO SEVERE (A)     and X-Ray: CXR: X-Ray Chest 1 View (CXR): No results found for this visit on 10/10/18.    Assessment and Plan:     * Paravalvular leak (prosthetic valve)    PLAN:  1. TRACY for evaluation of Paravalvular leak pre-operatively and assistance with  closing PVL.     -The risks, benefits & alternatives of the procedure were explained to the patient.    -The risks of transesophageal echo include but are not limited to:  Dental trauma, esophageal trauma/perforation, bleeding, laryngospasm/brochospasm, aspiration, sore throat/hoarseness, & dislodgement of the endotracheal tube/nasogastric tube (where applicable).    -The risks of moderate sedation include hypotension, respiratory depression, arrhythmias, bronchospasm, & death.    -Informed consent was obtained & the patient is agreeable to proceed with the procedure.    I will discuss with the attending physician. Attending addendum is to follow.     Further recommendations per attending addendum    Stuart Magallon MD, PGY-5  Cardiology Fellow            VTE Risk Mitigation (From admission, onward)    None          Stuart Magallon MD  Cardiology   [facility]

## 2018-10-10 NOTE — NURSING TRANSFER
Nursing Transfer Note      10/10/2018     Transfer To: ep pacu 3 to 315    Transfer via stretcher    Transfer with cardiac monitoring, 2l nc     Transported by aftab,rhina    Medicines sent: none. Needs all po meds if not taken this am    Chart send with patient: Yes    Notified: daughter    Patient reassessed at: 10/10/18 1030. Next due 1100    Upon arrival to floor: cardiac monitor applied, patient oriented to room, call bell in reach and bed in lowest position

## 2018-10-10 NOTE — PLAN OF CARE
Problem: Patient Care Overview  Goal: Plan of Care Review  Outcome: Ongoing (interventions implemented as appropriate)  Pt transferred from recovery via stretcher.  Vss.  r groin site remains natasha.  0 bleed,  0 hematoma.  Post op orders and assessment initiated.  Pt aao, tolerating po.  Pt in no acute distress and verbalizes no complaints. Will continue to monitor.  Call bell within reach.

## 2018-10-10 NOTE — HPI
TRANSESOPHAGEAL ECHOCARDIOGRAPHY   PRE-PROCEDURE NOTE    10/10/2018    HPI:     Yusuf Mcdonald is a 67 y.o. man with PMHx of Mechanical MVR 2/2 severe MR (with resection of the posterior leaflet), s/p MAZE, NICM 40%, HTN, HLD, afib here for outpatient PVL closure with Dr. Boyd and Dr. Tejeda today. Patient's Mechanical replacement occurred at [facility]. Patient had a TRACY done at OSH, which showed an anterior paravavular leak of the bileaflet MV. Most recent 2D echo showed LVEF of 50-55%, moderately reduced right ventricular function, biatrial enlargement, moderate AR, moderate-severe TR and Moderate [place]. MALLY was 164. The patient is currently on warfarin    Dysphagia or odynophagia:  No  Liver Disease, esophageal disease, or known varices:  No  Upper GI Bleeding: No  Snoring:  Yes  Sleep Apnea:  No  Prior neck surgery or radiation:  No  History of anesthetic difficulties:  No  Family history of anesthetic difficulties:  No  Last oral intake:  12 hours ago  Able to move neck in all directions:  Yes    Mallampati: 2  ASA: 3

## 2018-10-10 NOTE — PLAN OF CARE
Vss. afib hr noted at 47. No sob or pain noted.  Pt remains on 2l nc sats 94% on room air.  Right groin with guaze/trans. Film noted. No hematoma or drainage. Palpable pulses.  Pt with no discomfort, due to void.  See flowsheet for full assessment. Pt's daughter in waiting room. sscu rn to bring to new room once pt arrival to sscu.  See flowsheet for full assessment.

## 2018-10-10 NOTE — PROGRESS NOTES
Pt's daughter updated over phone by ep pacu rn. Verbalizes understanding. Will be able to see pt in room once pt released from anesthesia.

## 2018-10-10 NOTE — SUBJECTIVE & OBJECTIVE
Past Medical History:   Diagnosis Date    A-fib     Hypertension        Past Surgical History:   Procedure Laterality Date    CARDIOVERSION      MITRAL VALVE REPLACEMENT  2015    mechanical       Review of patient's allergies indicates:   Allergen Reactions    Neomycin-bacitracnzn-polymyxnb     Neosporin (neomycin-polymyx) Rash       No current facility-administered medications on file prior to encounter.      Current Outpatient Medications on File Prior to Encounter   Medication Sig    ascorbic acid, vitamin C, (VITAMIN C) 500 MG tablet Take 500 mg by mouth.    aspirin 81 MG Chew Take 81 mg by mouth.    cetirizine (ZYRTEC) 10 MG tablet Take 10 mg by mouth.    coenzyme Q10 100 mg capsule Take 100 mg by mouth.    digoxin (LANOXIN) 250 mcg tablet     diltiaZEM (CARDIZEM CD) 120 MG Cp24 Take 120 mg by mouth.    fish,bora,flax oils-om3,6,9no1 1,200 mg Cap Take 1,200 mg by mouth.    fluticasone (FLONASE) 50 mcg/actuation nasal spray 50 Bottles by Nasal route.    furosemide (LASIX) 40 MG tablet     lisinopril (PRINIVIL,ZESTRIL) 20 MG tablet Take 20 mg by mouth.    metoprolol succinate (TOPROL-XL) 50 MG 24 hr tablet     multivitamin capsule Take by mouth.    acetaminophen (TYLENOL) 500 MG tablet Take 500 mg by mouth.    JANTOVEN 5 mg tablet      Family History     None        Tobacco Use    Smoking status: Never Smoker    Smokeless tobacco: Never Used   Substance and Sexual Activity    Alcohol use: Yes     Alcohol/week: 1.2 oz     Types: 1 Glasses of wine, 1 Shots of liquor per week     Comment: occ.    Drug use: No    Sexual activity: Not on file     Review of Systems   All other systems reviewed and are negative.    Objective:     Vital Signs (Most Recent):  Temp: 98.3 °F (36.8 °C) (10/10/18 0615)  Pulse: 78 (10/10/18 0615)  Resp: 16 (10/10/18 0615)  BP: (!) 144/88 (10/10/18 0621)  SpO2: (!) 93 % (10/10/18 0615) Vital Signs (24h Range):  Temp:  [98.3 °F (36.8 °C)] 98.3 °F (36.8 °C)  Pulse:   [78-92] 78  Resp:  [16] 16  SpO2:  [93 %-94 %] 93 %  BP: (144-185)/() 144/88     Weight: 93.9 kg (207 lb)  Body mass index is 31.02 kg/m².    SpO2: (!) 93 %  O2 Device (Oxygen Therapy): room air    No intake or output data in the 24 hours ending 10/10/18 0722    Lines/Drains/Airways     Peripheral Intravenous Line                 Peripheral IV - Single Lumen 10/10/18 0620 Left Antecubital less than 1 day         Peripheral IV - Single Lumen 10/10/18 0620 Left Forearm less than 1 day                Physical Exam   Constitutional: He is oriented to person, place, and time. He appears well-developed and well-nourished.   HENT:   Head: Normocephalic and atraumatic.   Eyes: EOM are normal. Pupils are equal, round, and reactive to light.   Neck: Normal range of motion. Neck supple. No JVD present.   Cardiovascular: Normal rate and intact distal pulses.   Murmur (3/6 holosystolic mumur heard loudest in the mid-clavicular region) heard.  afib   Pulmonary/Chest: Effort normal and breath sounds normal.   Abdominal: Soft. Bowel sounds are normal.   Musculoskeletal: Normal range of motion. He exhibits no edema.   Neurological: He is alert and oriented to person, place, and time.       Significant Labs:   CMP   Recent Labs   Lab  10/09/18   0855  10/10/18   0555   NA  139  141   K  3.3*  3.1*   CL  101  103   CO2  28  25   GLU  87  96   BUN  19  22   CREATININE  1.3  1.3   CALCIUM  9.9  10.0   ANIONGAP  10  13   ESTGFRAFRICA  >60.0  >60.0   EGFRNONAA  56.9*  56.9*   , CBC   Recent Labs   Lab  10/09/18   0855  10/10/18   0555   WBC  9.85  8.16   HGB  12.9*  12.9*   HCT  40.3  40.0   PLT  207  232    and All pertinent lab results from the last 24 hours have been reviewed.    Significant Imaging: Echocardiogram:   2D echo with color flow doppler:   Results for orders placed or performed during the hospital encounter of 07/18/18   2D Echo w/ Color Flow Doppler   Result Value Ref Range    EF 50 55 - 65    Mitral Valve  Regurgitation SEVERE (A)     Aortic Valve Regurgitation MODERATE (A)     Est. PA Systolic Pressure 88.62 (A)     Tricuspid Valve Regurgitation MODERATE TO SEVERE (A)     and X-Ray: CXR: X-Ray Chest 1 View (CXR): No results found for this visit on 10/10/18.

## 2018-10-10 NOTE — ASSESSMENT & PLAN NOTE
PLAN:  1. TRACY for evaluation of Paravalvular leak pre-operatively and assistance with closing PVL.     -The risks, benefits & alternatives of the procedure were explained to the patient.    -The risks of transesophageal echo include but are not limited to:  Dental trauma, esophageal trauma/perforation, bleeding, laryngospasm/brochospasm, aspiration, sore throat/hoarseness, & dislodgement of the endotracheal tube/nasogastric tube (where applicable).    -The risks of moderate sedation include hypotension, respiratory depression, arrhythmias, bronchospasm, & death.    -Informed consent was obtained & the patient is agreeable to proceed with the procedure.    I will discuss with the attending physician. Attending addendum is to follow.     Further recommendations per attending addendum    Stuart Magallon MD, PGY-5  Cardiology Fellow

## 2018-10-10 NOTE — INTERVAL H&P NOTE
The patient has been examined and the H&P has been reviewed:    I concur with the findings and no changes have occurred since H&P was written.    Anesthesia/Surgery risks, benefits and alternative options discussed and understood by patient/family.      Active Hospital Problems    Diagnosis  POA    *Paravalvular leak (prosthetic valve) [T82.03XA]  Yes     S/p Mechanical MVR, MAZE and GEE excision 2015        Resolved Hospital Problems   No resolved problems to display.

## 2018-10-10 NOTE — ANESTHESIA POSTPROCEDURE EVALUATION
"Anesthesia Post Evaluation    Patient: Yusuf Mcdonald    Procedure(s) Performed: Procedure(s) (LRB):  REPLACEMENT, MITRAL VALVE (Right)    Final Anesthesia Type: general  Patient location during evaluation: Cath Lab  Patient participation: Yes- Able to Participate  Level of consciousness: awake and alert  Post-procedure vital signs: reviewed and stable  Pain management: adequate  Airway patency: patent  PONV status at discharge: No PONV  Anesthetic complications: no      Cardiovascular status: blood pressure returned to baseline  Respiratory status: unassisted, spontaneous ventilation and room air  Hydration status: euvolemic          Visit Vitals  /69 (BP Location: Right arm, Patient Position: Lying)   Pulse (!) 49   Temp 36.3 °C (97.4 °F) (Oral)   Resp 18   Ht 5' 8.5" (1.74 m)   Wt 93.9 kg (207 lb)   SpO2 (!) 92%   BMI 31.02 kg/m²       Pain/Jeni Score: Pain Assessment Performed: Yes (10/10/2018 11:30 AM)  Presence of Pain: denies (10/10/2018 11:30 AM)  Pain Rating Prior to Med Admin: 0 (10/10/2018 10:30 AM)  Jeni Score: 9 (10/10/2018 10:30 AM)        "

## 2018-10-10 NOTE — ANESTHESIA PROCEDURE NOTES
Arterial    Diagnosis: Intraoperative Hemodynamic Monitoring    Patient location during procedure: done in OR  Procedure start time: 10/10/2018 8:16 AM  Timeout: 10/10/2018 8:15 AM  Procedure end time: 10/10/2018 8:20 AM  Staffing  Anesthesiologist: LAURA Skaggs MD  Resident/CRNA: Camilo Hawley MD  Performed: resident/CRNA   Anesthesiologist was present at the time of the procedure.  Preanesthetic Checklist  Completed: patient identified, site marked, surgical consent, pre-op evaluation, timeout performed, IV checked, risks and benefits discussed, monitors and equipment checked and anesthesia consent givenArterial  Skin Prep: chlorhexidine gluconate  Local Infiltration: lidocaine  Orientation: right  Location: radial  Catheter Size: 20 G  Catheter placement by Anatomical landmarks. Heme positive aspiration all ports.Insertion Attempts: 1  Assessment  Dressing: secured with tape and tegaderm  Patient: Tolerated well

## 2018-10-10 NOTE — PROGRESS NOTES
"    Post Cath Note  Referring Physician: Yusuf Tejeda MD  Procedure: REPLACEMENT, MITRAL VALVE (Right)       Access: Right CFA      See full report for further details    Intervention:   S/p 12mm ABP2 to anterior mitral paravalvular leak    Closure device: Perclosure    Post Cath Exam:   BP (!) 144/88 (BP Location: Left arm, Patient Position: Lying)   Pulse 78   Temp 98.3 °F (36.8 °C) (Oral)   Resp 16   Ht 5' 8.5" (1.74 m)   Wt 93.9 kg (207 lb)   SpO2 (!) 93%   BMI 31.02 kg/m²   No unusual pain, hematoma, thrill or bruit at vascular access site.  Distal pulse present without signs of ischemia.    Recommendations:   - Routine post-cath care  - Load plavix 300mg now  - Continue coumadin  - Continue Plavix 75mg daily x 6 months then can switch back to ASA/coumadin  - For the next 6 months will need SBE prophylaxis for any procedure  - 24 hours of IV antibiotics now    Signed:  Nader Thomas DO  Cardiology Fellow          "

## 2018-10-11 VITALS
HEIGHT: 69 IN | HEART RATE: 74 BPM | WEIGHT: 207 LBS | OXYGEN SATURATION: 92 % | TEMPERATURE: 98 F | BODY MASS INDEX: 30.66 KG/M2 | DIASTOLIC BLOOD PRESSURE: 89 MMHG | SYSTOLIC BLOOD PRESSURE: 190 MMHG | RESPIRATION RATE: 20 BRPM

## 2018-10-11 LAB
POC ACTIVATED CLOTTING TIME K: 169 SEC (ref 74–137)
POC ACTIVATED CLOTTING TIME K: 252 SEC (ref 74–137)
SAMPLE: ABNORMAL
SAMPLE: ABNORMAL

## 2018-10-11 PROCEDURE — 63600175 PHARM REV CODE 636 W HCPCS: Performed by: STUDENT IN AN ORGANIZED HEALTH CARE EDUCATION/TRAINING PROGRAM

## 2018-10-11 PROCEDURE — 25000003 PHARM REV CODE 250

## 2018-10-11 RX ORDER — CLOPIDOGREL BISULFATE 75 MG/1
75 TABLET ORAL DAILY
Status: DISCONTINUED | OUTPATIENT
Start: 2018-10-11 | End: 2018-10-11 | Stop reason: HOSPADM

## 2018-10-11 RX ORDER — CLOPIDOGREL BISULFATE 75 MG/1
75 TABLET ORAL DAILY
Qty: 30 TABLET | Refills: 5 | Status: SHIPPED | OUTPATIENT
Start: 2018-10-11 | End: 2019-04-10 | Stop reason: SDUPTHER

## 2018-10-11 RX ADMIN — ACETAMINOPHEN 650 MG: 325 TABLET ORAL at 12:10

## 2018-10-11 RX ADMIN — CEFAZOLIN 2 G: 1 INJECTION, POWDER, FOR SOLUTION INTRAMUSCULAR; INTRAVENOUS at 12:10

## 2018-10-11 NOTE — PLAN OF CARE
Problem: Patient Care Overview  Goal: Plan of Care Review  Outcome: Ongoing (interventions implemented as appropriate)  AM assessment completed at this time. NAD noted. Pt resting quietly in bed. Family at bedside. Patient denies needs or complaints. Right groin drsg cdi. Will continue to monitor.

## 2018-10-11 NOTE — PROGRESS NOTES
Patient with heart rate in the high 40's to low 50's Afib on telemetry. Diltiazem and digoxin doses due tonight. Notified Dr. Gustafson. Order received to hold both meds tonight and monitor.

## 2018-10-11 NOTE — NURSING
Patient discharged per MD orders. Instructions given on medications, wound care, activity, signs of infection, when to call MD, and follow up appointments. Pt verbalized understanding.  Patient AAOx3, VSS, no c/o pain or discomfort at this time. Telemetry and PIV removed. Patient awaiting transport for discharge.

## 2018-10-11 NOTE — DISCHARGE SUMMARY
Discharge Summary  Interventional Cardiology      Admit Date: 10/10/2018    Discharge Date:  10/11/2018    Attending Physician: Yusuf Tejeda MD    Discharge Physician: Nader Thomas DO    Principal Diagnoses: Paravalvular leak (prosthetic valve)    Indication for Admission: PVL closure    Discharged Condition: Good    Hospital Course:   Patient presented for outpatient closure of PVL on mechanical mitral valve. Patient tolerated procedure well and was on IV antibiotics for 24 hours after. Remainder of hospital course was uncomplicated.    Outpatient Plan:  - Follow-up appointment as scheduled with Dr. Tejeda  - Medication changes/ additions include: Start Plavix 75mg daily for 6 months   - SBE ppx for 6 months post procedure    Diet: Cardiac diet    Activity: Activity instructions provided, Wound care instructions provided    Disposition: Home or Self Care    Discharge Medications:      Medication List      START taking these medications    clopidogrel 75 mg tablet  Commonly known as:  PLAVIX  Take 1 tablet (75 mg total) by mouth once daily.        CONTINUE taking these medications    acetaminophen 500 MG tablet  Commonly known as:  TYLENOL     ascorbic acid (vitamin C) 500 MG tablet  Commonly known as:  VITAMIN C     cetirizine 10 MG tablet  Commonly known as:  ZYRTEC     coenzyme Q10 100 mg capsule     digoxin 250 mcg tablet  Commonly known as:  LANOXIN     diltiaZEM 120 MG Cp24  Commonly known as:  CARDIZEM CD     fish,bora,flax oils-om3,6,9no1 1,200 mg Cap     fluticasone 50 mcg/actuation nasal spray  Commonly known as:  FLONASE     furosemide 40 MG tablet  Commonly known as:  LASIX     JANTOVEN 5 MG tablet  Generic drug:  warfarin     lisinopril 20 MG tablet  Commonly known as:  PRINIVIL,ZESTRIL     metoprolol succinate 50 MG 24 hr tablet  Commonly known as:  TOPROL-XL     multivitamin capsule     potassium chloride SA 20 MEQ tablet  Commonly known as:  K-DUR,KLOR-CON  Take 1 tablet (20 mEq total) by mouth  once daily.        STOP taking these medications    aspirin 81 MG Chew           Where to Get Your Medications      These medications were sent to [id number] - [place] - [address]  [address] [place] [address]    Phone:  [phone]   · clopidogrel 75 mg tablet       Follow Up:  Follow-up Information     Yusuf Tejeda MD In 2 months.    Specialties:  Cardiology, INTERVENTIONAL CARDIOLOGY  Contact information:  [facility] [address]  [facility] [address] [phone]

## 2018-10-11 NOTE — NURSING
Dr Thomas notified of /89. Dr thomas states ok for discharge with current medications. No new orders.

## 2018-10-11 NOTE — DISCHARGE INSTRUCTIONS
1. Do not strain or lift anything greater than 5 lb for 1 week.  2. Do not drive or operate any dangerous machinery for 24 hours.   3. Keep the dressing on, clean, and dry for 24 hours.   4. After 24 hours, the dressing may be removed and a shower is allowed.   5. Clean the area with mild soap and water and then cover it with a bandage.   6. Once the skin has healed, bathing in a tub or swimming is allowed.   7. Inspect the groin site daily and report to the physician any swelling at the site that   cannot be controlled with manual pressure for 10 minutes, unusual pain at the   access site or affected extremity, unusual swelling at the access site, or signs or   symptoms of infection such as redness, pain, or fever.     Call 911 if you have:   Bleeding from the puncture site that you cannot stop by doing the following:   Relax and lie down right away. Keep your leg flat and apply firm pressure to the site using your fingers and a gauze pad. Keep the pressure on for 20 minutes. Continue this until the bleeding stops. This may take awhile. When bleeding stops, cover the site with a sterile bandage and keep your leg still as much as possible.

## 2018-10-11 NOTE — PLAN OF CARE
"10/10/2018  8:57 PM    Paged due to patient being bradycardic, without symptoms    /73   Pulse (!) 50   Temp 97.4 °F (36.3 °C) (Oral)   Resp 16   Ht 5' 8.5" (1.74 m)   Wt 93.9 kg (207 lb)   SpO2 96%   BMI 31.02 kg/m²     Assessment/Plan:  Anesthesia-associated bradycardia -- will hold tonight's dose of diltiazem and digoxin and monitor closely.    Discussed with interventional fellow.    Shaq Gustafson   "

## 2018-11-13 ENCOUNTER — OFFICE VISIT (OUTPATIENT)
Dept: CARDIOLOGY | Facility: CLINIC | Age: 68
End: 2018-11-13
Payer: COMMERCIAL

## 2018-11-13 ENCOUNTER — HOSPITAL ENCOUNTER (OUTPATIENT)
Dept: CARDIOLOGY | Facility: CLINIC | Age: 68
Discharge: HOME | End: 2018-11-13
Attending: STUDENT IN AN ORGANIZED HEALTH CARE EDUCATION/TRAINING PROGRAM
Payer: COMMERCIAL

## 2018-11-13 VITALS
BODY MASS INDEX: 31.37 KG/M2 | HEART RATE: 78 BPM | DIASTOLIC BLOOD PRESSURE: 98 MMHG | HEIGHT: 69 IN | WEIGHT: 207 LBS | WEIGHT: 213.88 LBS | SYSTOLIC BLOOD PRESSURE: 190 MMHG | HEIGHT: 68 IN | HEART RATE: 87 BPM | BODY MASS INDEX: 31.68 KG/M2 | SYSTOLIC BLOOD PRESSURE: 160 MMHG | OXYGEN SATURATION: 93 % | DIASTOLIC BLOOD PRESSURE: 89 MMHG

## 2018-11-13 DIAGNOSIS — T82.03XD PARAVALVULAR LEAK OF PROSTHETIC HEART VALVE, SUBSEQUENT ENCOUNTER: Primary | ICD-10-CM

## 2018-11-13 DIAGNOSIS — T82.03XA PARAVALVULAR LEAK (PROSTHETIC VALVE): ICD-10-CM

## 2018-11-13 DIAGNOSIS — Z98.890 S/P MVR (MITRAL VALVE REPAIR): ICD-10-CM

## 2018-11-13 LAB
ASCENDING AORTA: 4.2 CM
AV MEAN GRADIENT: 4.43 MMHG
AV PEAK GRADIENT: 9.86 MMHG
AV VALVE AREA: 3.14 CM2
BSA FOR ECHO PROCEDURE: 2.12 M2
CV ECHO LV RWT: 0.43 CM
DOP CALC AO PEAK VEL: 1.57 M/S
DOP CALC AO VTI: 24.12 CM
DOP CALC LVOT AREA: 5.81 CM2
DOP CALC LVOT DIAMETER: 2.72 CM
DOP CALC LVOT STROKE VOLUME: 75.79 CM3
DOP CALCLVOT PEAK VEL VTI: 13.05 CM
ECHO LV POSTERIOR WALL: 1.3 CM (ref 0.6–1.1)
FRACTIONAL SHORTENING: 25 % (ref 28–44)
INTERVENTRICULAR SEPTUM: 1.12 CM (ref 0.6–1.1)
LA MAJOR: 6.76 CM
LA MINOR: 6.98 CM
LA WIDTH: 5.98 CM
LEFT ATRIUM SIZE: 6.43 CM
LEFT ATRIUM VOLUME INDEX: 105.9 ML/M2
LEFT ATRIUM VOLUME: 224.48 CM3
LEFT INTERNAL DIMENSION IN SYSTOLE: 4.53 CM (ref 2.1–4)
LEFT VENTRICLE DIASTOLIC VOLUME INDEX: 85.99 ML/M2
LEFT VENTRICLE DIASTOLIC VOLUME: 182.29 ML
LEFT VENTRICLE MASS INDEX: 151 G/M2
LEFT VENTRICLE SYSTOLIC VOLUME INDEX: 44.4 ML/M2
LEFT VENTRICLE SYSTOLIC VOLUME: 94.08 ML
LEFT VENTRICULAR INTERNAL DIMENSION IN DIASTOLE: 6.03 CM (ref 3.5–6)
LEFT VENTRICULAR MASS: 320.17 G
PISA TR MAX VEL: 4.3 M/S
RA MAJOR: 5.9 CM
RA PRESSURE: 15 MMHG
RA WIDTH: 4.16 CM
RIGHT VENTRICULAR END-DIASTOLIC DIMENSION: 4.43 CM
RV TISSUE DOPPLER FREE WALL SYSTOLIC VELOCITY 1 (APICAL 4 CHAMBER VIEW): 7.77 M/S
SINUS: 4.14 CM
STJ: 4 CM
TDI LATERAL: 0.09
TDI SEPTAL: 0.09
TDI: 0.09
TR MAX PG: 73.96 MMHG
TRICUSPID ANNULAR PLANE SYSTOLIC EXCURSION: 1.15 CM
TV REST PULMONARY ARTERY PRESSURE: 88.96 MMHG

## 2018-11-13 PROCEDURE — 99024 POSTOP FOLLOW-UP VISIT: CPT | Mod: S$GLB,,, | Performed by: INTERNAL MEDICINE

## 2018-11-13 PROCEDURE — 99999 PR PBB SHADOW E&M-EST. PATIENT-LVL IV: CPT | Mod: PBBFAC,,, | Performed by: INTERNAL MEDICINE

## 2018-11-13 PROCEDURE — 93306 TTE W/DOPPLER COMPLETE: CPT | Mod: S$GLB,,, | Performed by: INTERNAL MEDICINE

## 2018-11-13 PROCEDURE — 1101F PT FALLS ASSESS-DOCD LE1/YR: CPT | Mod: CPTII,S$GLB,, | Performed by: INTERNAL MEDICINE

## 2018-11-13 RX ORDER — NAPROXEN SODIUM 220 MG/1
81 TABLET, FILM COATED ORAL
COMMUNITY
End: 2019-05-14

## 2018-11-13 RX ORDER — ENOXAPARIN SODIUM 100 MG/ML
INJECTION SUBCUTANEOUS
COMMUNITY
Start: 2018-08-16 | End: 2019-05-14

## 2018-11-13 RX ORDER — DOFETILIDE 0.25 MG/1
250 CAPSULE ORAL
COMMUNITY
End: 2019-05-14

## 2018-11-13 RX ORDER — ASCORBATE CALCIUM 500 MG
500 TABLET ORAL
COMMUNITY
End: 2019-05-14

## 2018-11-13 NOTE — Clinical Note
Plan has changed from what I told you and the patient as the labs have now returned. Pleas schedule patient to follow up with me in 6 months. Kellieas

## 2018-11-13 NOTE — PROGRESS NOTES
Subjective:   Patient ID:  Yusuf Mcdonald is a 68 y.o. male who presents as a follow after a PVL repair.      HPI: Yusuf Mcdonald is a 68 y.o. y.o. male with permanent AF, obesity, HTN, HLD, NICM, severe MR s/p mechanical MVR/MAZE/GEE excision (2015 at [facility] in [place]) complicated by PVL who presents as a follow up after PVL closure on 10/10/18.     Prior to his MVR and Post MVR, he has had no HF symptoms, chest pain, SOB, N/V/D, PND, orthopnea, LE edema, LHC or LOC. He has been taking warfarin for his mechanical MV. Follows up with Dr. Larsen (primary cardiologst). He had a TRACY on 7/6/18 with Dr. Fe ROSE at [facility] which had shown bileaflet mechanical MV with moderate to severe anterior paravalvular mitral regurgitation. Mild to moderate dilated right ventricular cavity with reduced systolic function, mild right and severe left atrial enlargement. Left atrial appendage surgical exclusion w/o residual communication. Due to hemodynamic derangements related to the PVL he underwent closure on 10/10/18. Since his procedure he feels well and reports improved endurance with physical activity. He is exercising (weight lifting, cardio with high intensity training, fencing). Denies smoking or recreational drug use. Drinks alcohol socially.     Had a TTE done today which showed mild-moderate MR. Details shown below. Compliant with his medications. Did not have labs drawn to check for hemolysis.    TTE (11/13/18):  · The left ventricle cavity is mildly dilated.  · Left ventricle ejection fraction is mildly decreased at 45%  · Left atrium is severely dilated.  · Right atrium is moderately dilated.  · There is a mechanical mitral valve prosthesis. presence of amplatzer plug to close Pravalvular leak. There is mild to moderate MR seen, but given severe pulmonary HTN suspect there is greater MR that is seen (limited by shadowing, consider TRACY if clinically indicated). MV VTI/LVOT VTI>  3.5 is also suggestive of significant regurgitation.  · Mild-to-moderate mitral regurgitation- possibly paravalvular.  · Moderate aortic regurgitation.  · Mild tricuspid regurgitation.  · Moderate tricuspid stenosis.  · No pericardial effusion.  · Elevated central venous pressure (15 mm Hg).  · The estimated PA systolic pressure is 88.96 mm Hg  · Pulmonary hypertension present.  · Right ventricular cavity size is mildly dilated.  · Left ventricular diastolic dysfunction.  · RV systolic function is low normal.  · Pulmonic valve shows trace regurgitation.    TRACY (10/10/18):  CONCLUSIONS     1 - Left atrial appendage is absent or surgically clipped.     2 - Left atrium is enlarged with spontaneous echo contrast.     3 - Biatrial enlargement.     4 - Normal left ventricular systolic function.     5 - Mild to moderate aortic regurgitation.     6 - Moderate tricuspid regurgitation.     7 - Upper limit of normal ascending aorta.     8 - Mitral valve prosthesis.     9 - Anterior mitral PVL successfully closed with 12mm  2 plug and  small residual PVL.       Past Medical History:   Diagnosis Date    A-fib     Hypertension        Past Surgical History:   Procedure Laterality Date    CARDIOVERSION      MITRAL VALVE REPLACEMENT  2015    mechanical    MITRAL VALVE REPLACEMENT Right 10/10/2018    Procedure: REPLACEMENT, MITRAL VALVE;  Surgeon: Yusuf Tejeda MD;  Location: [facility] CATH LAB;  Service: Cardiology;  Laterality: Right;    REPLACEMENT, MITRAL VALVE Right 10/10/2018    Performed by Yusuf Tejeda MD at [facility] CATH LAB       Social History     Socioeconomic History    Marital status: Single     Spouse name: None    Number of children: None    Years of education: None    Highest education level: None   Social Needs    Financial resource strain: None    Food insecurity - worry: None    Food insecurity - inability: None    Transportation needs - medical: None    Transportation needs - non-medical: None    Occupational History    None   Tobacco Use    Smoking status: Never Smoker    Smokeless tobacco: Never Used   Substance and Sexual Activity    Alcohol use: Yes     Alcohol/week: 1.2 oz     Types: 1 Glasses of wine, 1 Shots of liquor per week     Comment: occ.    Drug use: No    Sexual activity: None   Other Topics Concern    None   Social History Narrative    None       History reviewed. No pertinent family history.       Medication List           Accurate as of 11/13/18 12:24 PM. If you have any questions, ask your nurse or doctor.               CONTINUE taking these medications    acetaminophen 500 MG tablet  Commonly known as:  TYLENOL     ascorbate calcium 500 mg Tab     aspirin 81 MG Chew     cetirizine 10 MG tablet  Commonly known as:  ZYRTEC     clopidogrel 75 mg tablet  Commonly known as:  PLAVIX  Take 1 tablet (75 mg total) by mouth once daily.     coenzyme Q10 100 mg capsule     digoxin 250 mcg tablet  Commonly known as:  LANOXIN     diltiaZEM 120 MG Cp24  Commonly known as:  CARDIZEM CD     dofetilide 250 MCG Cap  Commonly known as:  TIKOSYN     fish,bora,flax oils-om3,6,9no1 1,200 mg Cap     fluticasone 50 mcg/actuation nasal spray  Commonly known as:  FLONASE     furosemide 40 MG tablet  Commonly known as:  LASIX     JANTOVEN 5 MG tablet  Generic drug:  warfarin     lisinopril 20 MG tablet  Commonly known as:  PRINIVIL,ZESTRIL     LOVENOX 100 mg/mL Syrg  Generic drug:  enoxaparin     metoprolol succinate 50 MG 24 hr tablet  Commonly known as:  TOPROL-XL     multivitamin capsule     potassium chloride SA 20 MEQ tablet  Commonly known as:  K-DUR,KLOR-CON  Take 1 tablet (20 mEq total) by mouth once daily.        STOP taking these medications    ascorbic acid (vitamin C) 500 MG tablet  Commonly known as:  VITAMIN C  Stopped by:  Yusuf Tejeda MD            ROS  Constitution: Negative for fever, chills, weight loss or gain.   HENT: Negative for sore throat, rhinorrhea, or headache.  Eyes:  "Negative for blurred or double vision.   Cardiovascular: See above  Pulmonary: Negative for SOB   Gastrointestinal: Negative for abdominal pain, nausea, vomiting, or diarrhea.   : Negative for dysuria.   Neurological: Negative for focal weakness or sensory changes.    BP (!) 160/98 (BP Location: Right arm, Patient Position: Sitting, BP Method: Large (Automatic))   Pulse 87   Ht 5' 8.5" (1.74 m)   Wt 97 kg (213 lb 13.5 oz)   SpO2 (!) 93%   BMI 32.04 kg/m²     Objective:   Physical Exam  Constitutional: NAD, conversant  HEENT: Sclera anicteric, PERRLA, EOMI  Neck: No JVD, no carotid bruits  CV: Irregularly irregular at a normal rate, mechanical click audible, normal S1/S2, No Pericardial rub  Pulm: CTAB with no wheezes, rales, or rhonchi  GI: Abdomen soft, NTND, +BS  Extremities: No LE edema, warm and well perfused, No cyanosis, No clubbing  Skin: No ecchymosis, erythema, or ulcers  Psych: AOx3, appropriate affect  Neuro: CNII-XII intact, no focal deficits      Lab Results   Component Value Date     10/10/2018    K 3.1 (L) 10/10/2018     10/10/2018    CO2 25 10/10/2018    BUN 22 10/10/2018    CREATININE 1.3 10/10/2018    GLU 96 10/10/2018    HGBA1C 5.2 12/30/2011    AST 43 (H) 07/24/2018    ALT 26 07/24/2018    ALBUMIN 4.1 07/24/2018    PROT 7.6 07/24/2018    BILITOT 1.9 (H) 07/24/2018    WBC 8.44 11/13/2018    HGB 11.8 (L) 11/13/2018    HCT 36.8 (L) 11/13/2018    MCV 99 (H) 11/13/2018     11/13/2018    INR 2.7 (H) 10/10/2018    TSH 1.552 12/30/2011    CHOL 219 (H) 12/30/2011    HDL 39 (L) 12/30/2011    LDLCALC 136.0 12/30/2011    TRIG 219 (H) 12/30/2011       Assessment:     1. Paravalvular leak of prosthetic heart valve, subsequent encounter    2. S/P MVR (mitral valve repair)        Plan:     Diagnoses and all orders for this visit:    Paravalvular leak (prosthetic valve)  - TTE with significant improvement - mild PVL and mild MR  - s/p paravalvular leak repair 10/10/18  - check labs - " CBC, Retic, LDH, Haptoglobin  - RTC PRN  - F/U with primary cardiologist      Thank you for allowing me to participate in this patient's care. Please do not hesitate to contact me with any questions or concerns.    Rolando Bryant MD  Cardiology Fellow  Pager: [phone]  11/13/2018 12:24 PM    I have personally taken the history and examined this patient. I have discussed and agree with the resident's findings and plan as documented in the resident's note. LDH, retic and haptoglobin are not improved though smx have improved and MR is moderate on ECHO. Will repeat labs and echo in 6 months.   Yusuf Tejeda

## 2018-11-13 NOTE — ASSESSMENT & PLAN NOTE
- TTE with significant improvement - no PVL and mild MR  - s/p paravalvular leak repair 10/10/18  - check labs - CBC, Retic, LDH, Haptoglobin  - RTC PRN  - F/U with primary cardiologist

## 2018-11-16 DIAGNOSIS — T82.03XD PARAVALVULAR LEAK (PROSTHETIC VALVE), SUBSEQUENT ENCOUNTER: Primary | ICD-10-CM

## 2019-04-10 DIAGNOSIS — T82.03XD PARAVALVULAR LEAK (PROSTHETIC VALVE), SUBSEQUENT ENCOUNTER: Primary | ICD-10-CM

## 2019-04-10 RX ORDER — CLOPIDOGREL BISULFATE 75 MG/1
75 TABLET ORAL DAILY
Qty: 30 TABLET | Refills: 12 | Status: SHIPPED | OUTPATIENT
Start: 2019-04-10 | End: 2020-05-03

## 2019-05-14 ENCOUNTER — LAB VISIT (OUTPATIENT)
Dept: LAB | Facility: HOSPITAL | Age: 69
End: 2019-05-14
Attending: INTERNAL MEDICINE
Payer: COMMERCIAL

## 2019-05-14 ENCOUNTER — OFFICE VISIT (OUTPATIENT)
Dept: CARDIOLOGY | Facility: CLINIC | Age: 69
End: 2019-05-14
Payer: COMMERCIAL

## 2019-05-14 VITALS
SYSTOLIC BLOOD PRESSURE: 175 MMHG | BODY MASS INDEX: 31.25 KG/M2 | DIASTOLIC BLOOD PRESSURE: 100 MMHG | OXYGEN SATURATION: 95 % | HEIGHT: 69 IN | WEIGHT: 211 LBS | HEART RATE: 76 BPM

## 2019-05-14 DIAGNOSIS — I10 ESSENTIAL HYPERTENSION: ICD-10-CM

## 2019-05-14 DIAGNOSIS — T82.03XD PARAVALVULAR LEAK OF PROSTHETIC HEART VALVE, SUBSEQUENT ENCOUNTER: Primary | ICD-10-CM

## 2019-05-14 DIAGNOSIS — T82.03XD PARAVALVULAR LEAK (PROSTHETIC VALVE), SUBSEQUENT ENCOUNTER: ICD-10-CM

## 2019-05-14 DIAGNOSIS — Z98.890 S/P MVR (MITRAL VALVE REPAIR): ICD-10-CM

## 2019-05-14 DIAGNOSIS — I48.20 CHRONIC A-FIB: ICD-10-CM

## 2019-05-14 LAB
ALBUMIN SERPL BCP-MCNC: 4.1 G/DL (ref 3.5–5.2)
ALP SERPL-CCNC: 217 U/L (ref 55–135)
ALT SERPL W/O P-5'-P-CCNC: 18 U/L (ref 10–44)
ANION GAP SERPL CALC-SCNC: 11 MMOL/L (ref 8–16)
AST SERPL-CCNC: 48 U/L (ref 10–40)
BILIRUB DIRECT SERPL-MCNC: 1.1 MG/DL (ref 0.1–0.3)
BILIRUB SERPL-MCNC: 2.1 MG/DL (ref 0.1–1)
BUN SERPL-MCNC: 18 MG/DL (ref 8–23)
CALCIUM SERPL-MCNC: 9.9 MG/DL (ref 8.7–10.5)
CHLORIDE SERPL-SCNC: 103 MMOL/L (ref 95–110)
CO2 SERPL-SCNC: 29 MMOL/L (ref 23–29)
CREAT SERPL-MCNC: 1.3 MG/DL (ref 0.5–1.4)
EST. GFR  (AFRICAN AMERICAN): >60 ML/MIN/1.73 M^2
EST. GFR  (NON AFRICAN AMERICAN): 56.5 ML/MIN/1.73 M^2
GLUCOSE SERPL-MCNC: 91 MG/DL (ref 70–110)
HAPTOGLOB SERPL-MCNC: <10 MG/DL (ref 30–250)
LDH SERPL L TO P-CCNC: 898 U/L (ref 110–260)
POTASSIUM SERPL-SCNC: 3.5 MMOL/L (ref 3.5–5.1)
PROT SERPL-MCNC: 7.6 G/DL (ref 6–8.4)
RETICS/RBC NFR AUTO: 1.8 % (ref 0.4–2)
SODIUM SERPL-SCNC: 143 MMOL/L (ref 136–145)

## 2019-05-14 PROCEDURE — 99215 PR OFFICE/OUTPT VISIT, EST, LEVL V, 40-54 MIN: ICD-10-PCS | Mod: S$GLB,,, | Performed by: INTERNAL MEDICINE

## 2019-05-14 PROCEDURE — 99999 PR PBB SHADOW E&M-EST. PATIENT-LVL III: CPT | Mod: PBBFAC,,, | Performed by: INTERNAL MEDICINE

## 2019-05-14 PROCEDURE — 80048 BASIC METABOLIC PNL TOTAL CA: CPT

## 2019-05-14 PROCEDURE — 99215 OFFICE O/P EST HI 40 MIN: CPT | Mod: S$GLB,,, | Performed by: INTERNAL MEDICINE

## 2019-05-14 PROCEDURE — 36415 COLL VENOUS BLD VENIPUNCTURE: CPT

## 2019-05-14 PROCEDURE — 99999 PR PBB SHADOW E&M-EST. PATIENT-LVL III: ICD-10-PCS | Mod: PBBFAC,,, | Performed by: INTERNAL MEDICINE

## 2019-05-14 PROCEDURE — 85045 AUTOMATED RETICULOCYTE COUNT: CPT

## 2019-05-14 PROCEDURE — 83010 ASSAY OF HAPTOGLOBIN QUANT: CPT

## 2019-05-14 PROCEDURE — 83615 LACTATE (LD) (LDH) ENZYME: CPT

## 2019-05-14 PROCEDURE — 3077F PR MOST RECENT SYSTOLIC BLOOD PRESSURE >= 140 MM HG: ICD-10-PCS | Mod: CPTII,S$GLB,, | Performed by: INTERNAL MEDICINE

## 2019-05-14 PROCEDURE — 3080F PR MOST RECENT DIASTOLIC BLOOD PRESSURE >= 90 MM HG: ICD-10-PCS | Mod: CPTII,S$GLB,, | Performed by: INTERNAL MEDICINE

## 2019-05-14 PROCEDURE — 3077F SYST BP >= 140 MM HG: CPT | Mod: CPTII,S$GLB,, | Performed by: INTERNAL MEDICINE

## 2019-05-14 PROCEDURE — 1101F PR PT FALLS ASSESS DOC 0-1 FALLS W/OUT INJ PAST YR: ICD-10-PCS | Mod: CPTII,S$GLB,, | Performed by: INTERNAL MEDICINE

## 2019-05-14 PROCEDURE — 3080F DIAST BP >= 90 MM HG: CPT | Mod: CPTII,S$GLB,, | Performed by: INTERNAL MEDICINE

## 2019-05-14 PROCEDURE — 1101F PT FALLS ASSESS-DOCD LE1/YR: CPT | Mod: CPTII,S$GLB,, | Performed by: INTERNAL MEDICINE

## 2019-05-14 PROCEDURE — 80076 HEPATIC FUNCTION PANEL: CPT

## 2019-05-14 NOTE — PROGRESS NOTES
Interventional Cardiology Clinic Note  Reason for Visit: MVR perivalvular leak s/p closure  Primary Cardiologists: Dr. Santoro and Dr. Willis    HPI:   Mr. Yusuf Mcdonald is a 67 y/o gentleman with a PMHx permanent AF, obesity, HTN, HLD, NICM, severe MR s/p mechanical MVR/MAZE/GEE excision (2015 at [facility] in [place]) complicated by PVL who presents as a follow up after PVL closure on 10/10/18.    Patient was seen 1 month after his PVL closure last year and at the time had markedly improved symptoms with improved functional capacity. His labs in 11/2018 still showed hemolysis. Since then he has continued to show improvement. He had turf toe a couple months ago where he wasn't as active, caused him to become deconditioned. But sicne then he has been exercising again, fences 2-3 times per week and resistance training 2-3 times/week at the gym. He has no functional limitation at this time.    Denies chest pain, SOB, syncope, claudication, PND or orthopnea. Has occasional LE edema. Did not take his B/P pills this AM.    Last saw Dr. Willis in 4/2018.    ROS:    Constitution: Negative for fever, chills, weight loss or gain.   HENT: Negative for sore throat, rhinorrhea, or headache.  Eyes: Negative for blurred or double vision.   Cardiovascular: See above  Pulmonary: Negative for SOB   Gastrointestinal: Negative for abdominal pain, nausea, vomiting, or diarrhea.   : Negative for dysuria.   Neurological: Negative for focal weakness or sensory changes.  PMH:     Past Medical History:   Diagnosis Date    A-fib     Hypertension      Past Surgical History:   Procedure Laterality Date    CARDIOVERSION      MITRAL VALVE REPLACEMENT  2015    mechanical    REPLACEMENT, MITRAL VALVE Right 10/10/2018    Performed by Yusuf Tejeda MD at [facility] CATH LAB     Allergies:     Review of patient's allergies indicates:   Allergen Reactions    Neomycin-bacitracnzn-polymyxnb     Benzalkonium chloride Rash    Neosporin  "(neomycin-polymyx) Rash     Medications:     Current Outpatient Medications on File Prior to Visit   Medication Sig Dispense Refill    acetaminophen (TYLENOL) 500 MG tablet Take 500 mg by mouth.      cetirizine (ZYRTEC) 10 MG tablet Take 10 mg by mouth.      clopidogrel (PLAVIX) 75 mg tablet Take 1 tablet (75 mg total) by mouth once daily. 30 tablet 12    coenzyme Q10 100 mg capsule Take 100 mg by mouth.      digoxin (LANOXIN) 250 mcg tablet       diltiaZEM (CARDIZEM CD) 120 MG Cp24 Take 120 mg by mouth.      fish,bora,flax oils-om3,6,9no1 1,200 mg Cap Take 1,200 mg by mouth.      fluticasone (FLONASE) 50 mcg/actuation nasal spray 50 Bottles by Nasal route.      furosemide (LASIX) 40 MG tablet       JANTOVEN 5 mg tablet       lisinopril (PRINIVIL,ZESTRIL) 20 MG tablet Take 20 mg by mouth.      metoprolol succinate (TOPROL-XL) 50 MG 24 hr tablet       multivitamin capsule Take by mouth.      potassium chloride SA (K-DUR,KLOR-CON) 20 MEQ tablet Take 1 tablet (20 mEq total) by mouth once daily. 30 tablet 0    [DISCONTINUED] ascorbate calcium 500 mg Tab Take 500 mg by mouth.      [DISCONTINUED] aspirin 81 MG Chew Take 81 mg by mouth.      [DISCONTINUED] dofetilide (TIKOSYN) 250 MCG Cap Take 250 mcg by mouth.      [DISCONTINUED] enoxaparin (LOVENOX) 100 mg/mL Syrg Inject 100 mg subcutaneous in abdoman twice daily, rotating injection site       No current facility-administered medications on file prior to visit.      Social History:     Social History     Tobacco Use    Smoking status: Never Smoker    Smokeless tobacco: Never Used   Substance Use Topics    Alcohol use: Yes     Alcohol/week: 1.2 oz     Types: 1 Glasses of wine, 1 Shots of liquor per week     Comment: occ.     Family History:   History reviewed. No pertinent family history.  Physical Exam:   BP (!) 175/100 (BP Location: Left arm, Patient Position: Sitting, BP Method: Large (Automatic))   Pulse 76   Ht 5' 8.5" (1.74 m)   Wt 95.7 kg " (210 lb 15.7 oz)   SpO2 95%   BMI 31.61 kg/m²      Constitutional: NAD, conversant  HEENT: Sclera anicteric, PERRLA, EOMI  Neck: No JVD, no carotid bruits  CV: Irregularly irregular, 2/6 holosystolic murmur heard best at apex, normal S1/S2  Pulm: CTAB, no wheezes, rales, or ronchi  GI: Abdomen soft, NTND, +BS  Extremities: 1+ LE edema, warm and well perfused  Skin: No ecchymosis, erythema, or ulcers  Psych: AOx3, appropriate affect  Neuro: No gross deficits    Labs:     Lab Results   Component Value Date     10/10/2018    K 3.1 (L) 10/10/2018     10/10/2018    CO2 25 10/10/2018    BUN 22 10/10/2018    CREATININE 1.3 10/10/2018    ANIONGAP 13 10/10/2018     Lab Results   Component Value Date    HGBA1C 5.2 12/30/2011     No results found for: BNP, BNPTRIAGEBLO Lab Results   Component Value Date    WBC 8.44 11/13/2018    HGB 11.8 (L) 11/13/2018    HCT 36.8 (L) 11/13/2018     11/13/2018    GRAN 5.9 11/13/2018    GRAN 69.3 11/13/2018     Lab Results   Component Value Date    CHOL 219 (H) 12/30/2011    HDL 39 (L) 12/30/2011    LDLCALC 136.0 12/30/2011    TRIG 219 (H) 12/30/2011            Assessment:     1. Paravalvular leak of prosthetic heart valve, subsequent encounter    2. S/P MVR (mitral valve repair)    3. Chronic a-fib    4. Essential hypertension      Plan:     Paravalvular leak (prosthetic valve)  - Mechanical MVR, MAZE and GEE excision 2015, s/p PVL closure in 10/2018  - Had hemolysis per labs in 11/2018 post-PVL closure, but improvement in symptoms  - Currently still has good functional capacity with continued improvement of symptoms  - Hemolysis labs in process this AM    Chronic a-fib  - On Coumadin for stroke PPx    Essential hypertension  - On lisinopril and Toprol XL  - B/P elevated in clinic today, but did not take HTN meds this AM    Signed:  Junior Lemons MD  Cardiology Fellow, PGY-6  Pager: [phone]  5/14/2019 9:58 AM    I have personally taken the history and examined this  patient. I have discussed and agree with the resident's findings and plan as documented in the resident's note.    Yusuf Tejeda MD

## 2019-05-14 NOTE — ASSESSMENT & PLAN NOTE
- Mechanical MVR, MAZE and GEE excision 2015, s/p PVL closure in 10/2018  - Had hemolysis per labs in 11/2018 post-PVL closure, but improvement in symptoms  - Currently still has good functional capacity with continued improvement of symptoms  - Hemolysis labs in process this AM

## 2020-05-01 DIAGNOSIS — T82.03XD PARAVALVULAR LEAK (PROSTHETIC VALVE), SUBSEQUENT ENCOUNTER: ICD-10-CM

## 2020-05-03 RX ORDER — CLOPIDOGREL BISULFATE 75 MG/1
TABLET ORAL
Qty: 30 TABLET | Refills: 11 | Status: SHIPPED | OUTPATIENT
Start: 2020-05-03 | End: 2021-04-10

## 2021-04-28 ENCOUNTER — PATIENT MESSAGE (OUTPATIENT)
Dept: RESEARCH | Facility: HOSPITAL | Age: 71
End: 2021-04-28

## 2022-01-01 ENCOUNTER — RESEARCH ENCOUNTER (OUTPATIENT)
Dept: RESEARCH | Facility: HOSPITAL | Age: 72
End: 2022-01-01
Payer: COMMERCIAL

## 2022-01-01 ENCOUNTER — DOCUMENTATION ONLY (OUTPATIENT)
Dept: CARDIOLOGY | Facility: CLINIC | Age: 72
End: 2022-01-01
Payer: COMMERCIAL

## 2022-01-01 ENCOUNTER — PATIENT MESSAGE (OUTPATIENT)
Dept: CARDIOLOGY | Facility: CLINIC | Age: 72
End: 2022-01-01
Payer: COMMERCIAL

## 2022-01-01 ENCOUNTER — OFFICE VISIT (OUTPATIENT)
Dept: CARDIOLOGY | Facility: CLINIC | Age: 72
End: 2022-01-01
Payer: COMMERCIAL

## 2022-01-01 ENCOUNTER — ANESTHESIA EVENT (OUTPATIENT)
Dept: MEDSURG UNIT | Facility: HOSPITAL | Age: 72
DRG: 319 | End: 2022-01-01
Payer: COMMERCIAL

## 2022-01-01 ENCOUNTER — ANESTHESIA (OUTPATIENT)
Dept: MEDSURG UNIT | Facility: HOSPITAL | Age: 72
DRG: 319 | End: 2022-01-01
Payer: COMMERCIAL

## 2022-01-01 ENCOUNTER — HOSPITAL ENCOUNTER (INPATIENT)
Facility: HOSPITAL | Age: 72
LOS: 9 days | DRG: 319 | End: 2022-08-19
Attending: INTERNAL MEDICINE | Admitting: INTERNAL MEDICINE
Payer: COMMERCIAL

## 2022-01-01 VITALS
TEMPERATURE: 96 F | HEART RATE: 93 BPM | OXYGEN SATURATION: 80 % | DIASTOLIC BLOOD PRESSURE: 57 MMHG | WEIGHT: 197 LBS | SYSTOLIC BLOOD PRESSURE: 95 MMHG | RESPIRATION RATE: 19 BRPM | HEIGHT: 68 IN | BODY MASS INDEX: 29.86 KG/M2

## 2022-01-01 VITALS
WEIGHT: 203.94 LBS | HEART RATE: 103 BPM | SYSTOLIC BLOOD PRESSURE: 92 MMHG | DIASTOLIC BLOOD PRESSURE: 60 MMHG | BODY MASS INDEX: 30.21 KG/M2 | OXYGEN SATURATION: 97 % | HEIGHT: 69 IN

## 2022-01-01 DIAGNOSIS — Z98.890 STATUS POST LEFT HEART CATHETERIZATION: ICD-10-CM

## 2022-01-01 DIAGNOSIS — I27.22 PULMONARY HYPERTENSION DUE TO MITRAL VALVE DISEASE: ICD-10-CM

## 2022-01-01 DIAGNOSIS — I10 ESSENTIAL HYPERTENSION: ICD-10-CM

## 2022-01-01 DIAGNOSIS — T14.8XXA BRUISE: ICD-10-CM

## 2022-01-01 DIAGNOSIS — T82.03XD PARAVALVULAR LEAK OF PROSTHETIC HEART VALVE, SUBSEQUENT ENCOUNTER: Primary | ICD-10-CM

## 2022-01-01 DIAGNOSIS — N18.4 CKD (CHRONIC KIDNEY DISEASE) STAGE 4, GFR 15-29 ML/MIN: ICD-10-CM

## 2022-01-01 DIAGNOSIS — T82.03XA PARAVALVULAR LEAK OF PROSTHETIC HEART VALVE, INITIAL ENCOUNTER: Primary | ICD-10-CM

## 2022-01-01 DIAGNOSIS — E78.49 OTHER HYPERLIPIDEMIA: ICD-10-CM

## 2022-01-01 DIAGNOSIS — Z95.4 S/P MITRAL VALVE REPLACEMENT WITH METALLIC VALVE: ICD-10-CM

## 2022-01-01 DIAGNOSIS — I34.0 SEVERE MITRAL REGURGITATION: ICD-10-CM

## 2022-01-01 DIAGNOSIS — Z98.890 S/P MVR (MITRAL VALVE REPAIR): ICD-10-CM

## 2022-01-01 DIAGNOSIS — N17.9 AKI (ACUTE KIDNEY INJURY): ICD-10-CM

## 2022-01-01 DIAGNOSIS — I05.9 PULMONARY HYPERTENSION DUE TO MITRAL VALVE DISEASE: ICD-10-CM

## 2022-01-01 DIAGNOSIS — R57.9 SHOCK: ICD-10-CM

## 2022-01-01 DIAGNOSIS — Z91.89 AT RISK FOR LONG QT SYNDROME: ICD-10-CM

## 2022-01-01 DIAGNOSIS — T82.03XA PARAVALVULAR LEAK (PROSTHETIC VALVE): ICD-10-CM

## 2022-01-01 DIAGNOSIS — T82.03XA PARAVALVULAR LEAK OF PROSTHETIC HEART VALVE, INITIAL ENCOUNTER: ICD-10-CM

## 2022-01-01 DIAGNOSIS — I48.20 CHRONIC A-FIB: ICD-10-CM

## 2022-01-01 LAB
ABO + RH BLD: NORMAL
ABO + RH BLD: NORMAL
ACANTHOCYTES BLD QL SMEAR: PRESENT
ACANTHOCYTES BLD QL SMEAR: PRESENT
ALBUMIN SERPL BCP-MCNC: 1.1 G/DL (ref 3.5–5.2)
ALBUMIN SERPL BCP-MCNC: 1.1 G/DL (ref 3.5–5.2)
ALBUMIN SERPL BCP-MCNC: 1.2 G/DL (ref 3.5–5.2)
ALBUMIN SERPL BCP-MCNC: 1.3 G/DL (ref 3.5–5.2)
ALBUMIN SERPL BCP-MCNC: 1.4 G/DL (ref 3.5–5.2)
ALBUMIN SERPL BCP-MCNC: 1.5 G/DL (ref 3.5–5.2)
ALBUMIN SERPL BCP-MCNC: 1.6 G/DL (ref 3.5–5.2)
ALLENS TEST: ABNORMAL
ALLENS TEST: NORMAL
ALLENS TEST: NORMAL
ALP SERPL-CCNC: 100 U/L (ref 55–135)
ALP SERPL-CCNC: 101 U/L (ref 55–135)
ALP SERPL-CCNC: 101 U/L (ref 55–135)
ALP SERPL-CCNC: 114 U/L (ref 55–135)
ALP SERPL-CCNC: 116 U/L (ref 55–135)
ALP SERPL-CCNC: 125 U/L (ref 55–135)
ALP SERPL-CCNC: 142 U/L (ref 55–135)
ALP SERPL-CCNC: 148 U/L (ref 55–135)
ALP SERPL-CCNC: 153 U/L (ref 55–135)
ALP SERPL-CCNC: 154 U/L (ref 55–135)
ALP SERPL-CCNC: 164 U/L (ref 55–135)
ALP SERPL-CCNC: 176 U/L (ref 55–135)
ALT SERPL W/O P-5'-P-CCNC: 10 U/L (ref 10–44)
ALT SERPL W/O P-5'-P-CCNC: 11 U/L (ref 10–44)
ALT SERPL W/O P-5'-P-CCNC: 12 U/L (ref 10–44)
ALT SERPL W/O P-5'-P-CCNC: 14 U/L (ref 10–44)
ALT SERPL W/O P-5'-P-CCNC: 16 U/L (ref 10–44)
ALT SERPL W/O P-5'-P-CCNC: 19 U/L (ref 10–44)
ALT SERPL W/O P-5'-P-CCNC: 22 U/L (ref 10–44)
ALT SERPL W/O P-5'-P-CCNC: 35 U/L (ref 10–44)
ALT SERPL W/O P-5'-P-CCNC: 40 U/L (ref 10–44)
ALT SERPL W/O P-5'-P-CCNC: 9 U/L (ref 10–44)
AMORPH CRY UR QL COMP ASSIST: ABNORMAL
ANION GAP SERPL CALC-SCNC: 10 MMOL/L (ref 8–16)
ANION GAP SERPL CALC-SCNC: 11 MMOL/L (ref 8–16)
ANION GAP SERPL CALC-SCNC: 12 MMOL/L (ref 8–16)
ANION GAP SERPL CALC-SCNC: 13 MMOL/L (ref 8–16)
ANION GAP SERPL CALC-SCNC: 14 MMOL/L (ref 8–16)
ANION GAP SERPL CALC-SCNC: 17 MMOL/L (ref 8–16)
ANION GAP SERPL CALC-SCNC: 23 MMOL/L (ref 8–16)
ANION GAP SERPL CALC-SCNC: 7 MMOL/L (ref 8–16)
ANION GAP SERPL CALC-SCNC: 9 MMOL/L (ref 8–16)
ANISOCYTOSIS BLD QL SMEAR: ABNORMAL
ANISOCYTOSIS BLD QL SMEAR: SLIGHT
APTT BLDCRRT: 30 SEC (ref 21–32)
APTT BLDCRRT: 31.3 SEC (ref 21–32)
APTT BLDCRRT: 35.2 SEC (ref 21–32)
APTT BLDCRRT: 37.2 SEC (ref 21–32)
APTT BLDCRRT: 38.5 SEC (ref 21–32)
APTT BLDCRRT: 38.6 SEC (ref 21–32)
APTT BLDCRRT: 40.5 SEC (ref 21–32)
APTT BLDCRRT: 41.3 SEC (ref 21–32)
APTT BLDCRRT: 43.9 SEC (ref 21–32)
APTT BLDCRRT: 46.1 SEC (ref 21–32)
APTT BLDCRRT: 46.2 SEC (ref 21–32)
APTT BLDCRRT: 47.4 SEC (ref 21–32)
APTT BLDCRRT: 52.1 SEC (ref 21–32)
APTT BLDCRRT: 55.5 SEC (ref 21–32)
APTT BLDCRRT: 61.8 SEC (ref 21–32)
APTT BLDCRRT: 61.9 SEC (ref 21–32)
APTT BLDCRRT: >150 SEC (ref 21–32)
APTT BLDCRRT: >150 SEC (ref 21–32)
APTT BLDCRRT: NORMAL SEC (ref 21–32)
ASCENDING AORTA: 3.6 CM
AST SERPL-CCNC: 142 U/L (ref 10–40)
AST SERPL-CCNC: 169 U/L (ref 10–40)
AST SERPL-CCNC: 171 U/L (ref 10–40)
AST SERPL-CCNC: 174 U/L (ref 10–40)
AST SERPL-CCNC: 180 U/L (ref 10–40)
AST SERPL-CCNC: 185 U/L (ref 10–40)
AST SERPL-CCNC: 185 U/L (ref 10–40)
AST SERPL-CCNC: 213 U/L (ref 10–40)
AST SERPL-CCNC: 229 U/L (ref 10–40)
AST SERPL-CCNC: 50 U/L (ref 10–40)
AST SERPL-CCNC: 75 U/L (ref 10–40)
AST SERPL-CCNC: ABNORMAL U/L (ref 10–40)
AV INDEX (PROSTH): 0.7
AV MEAN GRADIENT: 4 MMHG
AV PEAK GRADIENT: 8 MMHG
AV VALVE AREA: 2.81 CM2
AV VELOCITY RATIO: 0.61
BACTERIA #/AREA URNS AUTO: ABNORMAL /HPF
BACTERIA BLD CULT: NORMAL
BACTERIA SPEC AEROBE CULT: NORMAL
BACTERIA SPEC AEROBE CULT: NORMAL
BASO STIPL BLD QL SMEAR: ABNORMAL
BASOPHILS # BLD AUTO: 0.02 K/UL (ref 0–0.2)
BASOPHILS # BLD AUTO: 0.03 K/UL (ref 0–0.2)
BASOPHILS # BLD AUTO: 0.04 K/UL (ref 0–0.2)
BASOPHILS # BLD AUTO: 0.06 K/UL (ref 0–0.2)
BASOPHILS # BLD AUTO: 0.08 K/UL (ref 0–0.2)
BASOPHILS # BLD AUTO: 0.08 K/UL (ref 0–0.2)
BASOPHILS # BLD AUTO: 0.12 K/UL (ref 0–0.2)
BASOPHILS # BLD AUTO: ABNORMAL K/UL (ref 0–0.2)
BASOPHILS NFR BLD: 0 % (ref 0–1.9)
BASOPHILS NFR BLD: 0.1 % (ref 0–1.9)
BASOPHILS NFR BLD: 0.2 % (ref 0–1.9)
BASOPHILS NFR BLD: 0.3 % (ref 0–1.9)
BASOPHILS NFR BLD: 0.4 % (ref 0–1.9)
BASOPHILS NFR BLD: 0.5 % (ref 0–1.9)
BASOPHILS NFR BLD: 0.5 % (ref 0–1.9)
BASOPHILS NFR BLD: 1 % (ref 0–1.9)
BILIRUB SERPL-MCNC: 0.8 MG/DL (ref 0.1–1)
BILIRUB SERPL-MCNC: 1.2 MG/DL (ref 0.1–1)
BILIRUB SERPL-MCNC: 1.4 MG/DL (ref 0.1–1)
BILIRUB SERPL-MCNC: 1.5 MG/DL (ref 0.1–1)
BILIRUB SERPL-MCNC: 1.6 MG/DL (ref 0.1–1)
BILIRUB SERPL-MCNC: 1.8 MG/DL (ref 0.1–1)
BILIRUB SERPL-MCNC: 2.2 MG/DL (ref 0.1–1)
BILIRUB SERPL-MCNC: 2.9 MG/DL (ref 0.1–1)
BILIRUB SERPL-MCNC: 3.3 MG/DL (ref 0.1–1)
BILIRUB SERPL-MCNC: 3.7 MG/DL (ref 0.1–1)
BILIRUB SERPL-MCNC: 4.5 MG/DL (ref 0.1–1)
BILIRUB SERPL-MCNC: 5.1 MG/DL (ref 0.1–1)
BILIRUB UR QL STRIP: NEGATIVE
BLD GP AB SCN CELLS X3 SERPL QL: NORMAL
BLD GP AB SCN CELLS X3 SERPL QL: NORMAL
BLD PROD TYP BPU: NORMAL
BLD PROD TYP BPU: NORMAL
BLOOD UNIT EXPIRATION DATE: NORMAL
BLOOD UNIT EXPIRATION DATE: NORMAL
BLOOD UNIT TYPE CODE: 5100
BLOOD UNIT TYPE CODE: 5100
BLOOD UNIT TYPE: NORMAL
BLOOD UNIT TYPE: NORMAL
BSA FOR ECHO PROCEDURE: 2.07 M2
BSA FOR ECHO PROCEDURE: 2.07 M2
BUN SERPL-MCNC: 100 MG/DL (ref 8–23)
BUN SERPL-MCNC: 104 MG/DL (ref 8–23)
BUN SERPL-MCNC: 114 MG/DL (ref 8–23)
BUN SERPL-MCNC: 116 MG/DL (ref 8–23)
BUN SERPL-MCNC: 17 MG/DL (ref 8–23)
BUN SERPL-MCNC: 24 MG/DL (ref 8–23)
BUN SERPL-MCNC: 26 MG/DL (ref 8–23)
BUN SERPL-MCNC: 26 MG/DL (ref 8–23)
BUN SERPL-MCNC: 28 MG/DL (ref 8–23)
BUN SERPL-MCNC: 31 MG/DL (ref 8–23)
BUN SERPL-MCNC: 34 MG/DL (ref 8–23)
BUN SERPL-MCNC: 39 MG/DL (ref 8–23)
BUN SERPL-MCNC: 39 MG/DL (ref 8–23)
BUN SERPL-MCNC: 41 MG/DL (ref 8–23)
BUN SERPL-MCNC: 62 MG/DL (ref 8–23)
BUN SERPL-MCNC: 67 MG/DL (ref 8–23)
BUN SERPL-MCNC: 85 MG/DL (ref 8–23)
BUN SERPL-MCNC: 86 MG/DL (ref 8–23)
BUN SERPL-MCNC: 86 MG/DL (ref 8–23)
BUN SERPL-MCNC: 93 MG/DL (ref 8–23)
BUN SERPL-MCNC: 94 MG/DL (ref 8–23)
BUN SERPL-MCNC: 97 MG/DL (ref 8–23)
BURR CELLS BLD QL SMEAR: ABNORMAL
C DIFF GDH STL QL: NEGATIVE
C DIFF TOX A+B STL QL IA: NEGATIVE
CA-I BLDV-SCNC: 0.98 MMOL/L (ref 1.06–1.42)
CA-I BLDV-SCNC: 1.07 MMOL/L (ref 1.06–1.42)
CALCIUM SERPL-MCNC: 6.7 MG/DL (ref 8.7–10.5)
CALCIUM SERPL-MCNC: 7 MG/DL (ref 8.7–10.5)
CALCIUM SERPL-MCNC: 7 MG/DL (ref 8.7–10.5)
CALCIUM SERPL-MCNC: 7.1 MG/DL (ref 8.7–10.5)
CALCIUM SERPL-MCNC: 7.3 MG/DL (ref 8.7–10.5)
CALCIUM SERPL-MCNC: 7.3 MG/DL (ref 8.7–10.5)
CALCIUM SERPL-MCNC: 7.4 MG/DL (ref 8.7–10.5)
CALCIUM SERPL-MCNC: 7.4 MG/DL (ref 8.7–10.5)
CALCIUM SERPL-MCNC: 7.5 MG/DL (ref 8.7–10.5)
CALCIUM SERPL-MCNC: 7.6 MG/DL (ref 8.7–10.5)
CALCIUM SERPL-MCNC: 7.8 MG/DL (ref 8.7–10.5)
CALCIUM SERPL-MCNC: 7.9 MG/DL (ref 8.7–10.5)
CALCIUM SERPL-MCNC: 7.9 MG/DL (ref 8.7–10.5)
CALCIUM SERPL-MCNC: 8 MG/DL (ref 8.7–10.5)
CALCIUM SERPL-MCNC: 8.1 MG/DL (ref 8.7–10.5)
CALCIUM SERPL-MCNC: 8.2 MG/DL (ref 8.7–10.5)
CALCIUM SERPL-MCNC: 8.3 MG/DL (ref 8.7–10.5)
CALCIUM SERPL-MCNC: 8.6 MG/DL (ref 8.7–10.5)
CHLORIDE SERPL-SCNC: 100 MMOL/L (ref 95–110)
CHLORIDE SERPL-SCNC: 100 MMOL/L (ref 95–110)
CHLORIDE SERPL-SCNC: 101 MMOL/L (ref 95–110)
CHLORIDE SERPL-SCNC: 102 MMOL/L (ref 95–110)
CHLORIDE SERPL-SCNC: 103 MMOL/L (ref 95–110)
CHLORIDE SERPL-SCNC: 104 MMOL/L (ref 95–110)
CHLORIDE SERPL-SCNC: 92 MMOL/L (ref 95–110)
CHLORIDE SERPL-SCNC: 92 MMOL/L (ref 95–110)
CHLORIDE SERPL-SCNC: 93 MMOL/L (ref 95–110)
CHLORIDE SERPL-SCNC: 93 MMOL/L (ref 95–110)
CHLORIDE SERPL-SCNC: 94 MMOL/L (ref 95–110)
CHLORIDE SERPL-SCNC: 95 MMOL/L (ref 95–110)
CHLORIDE SERPL-SCNC: 96 MMOL/L (ref 95–110)
CHLORIDE SERPL-SCNC: 96 MMOL/L (ref 95–110)
CHLORIDE SERPL-SCNC: 98 MMOL/L (ref 95–110)
CK SERPL-CCNC: 1064 U/L (ref 20–200)
CLARITY UR REFRACT.AUTO: ABNORMAL
CO2 SERPL-SCNC: 11 MMOL/L (ref 23–29)
CO2 SERPL-SCNC: 15 MMOL/L (ref 23–29)
CO2 SERPL-SCNC: 16 MMOL/L (ref 23–29)
CO2 SERPL-SCNC: 17 MMOL/L (ref 23–29)
CO2 SERPL-SCNC: 17 MMOL/L (ref 23–29)
CO2 SERPL-SCNC: 18 MMOL/L (ref 23–29)
CO2 SERPL-SCNC: 19 MMOL/L (ref 23–29)
CO2 SERPL-SCNC: 20 MMOL/L (ref 23–29)
CO2 SERPL-SCNC: 21 MMOL/L (ref 23–29)
CO2 SERPL-SCNC: 21 MMOL/L (ref 23–29)
CO2 SERPL-SCNC: 22 MMOL/L (ref 23–29)
CO2 SERPL-SCNC: 9 MMOL/L (ref 23–29)
CODING SYSTEM: NORMAL
CODING SYSTEM: NORMAL
COLOR UR AUTO: ABNORMAL
CREAT SERPL-MCNC: 1 MG/DL (ref 0.5–1.4)
CREAT SERPL-MCNC: 1.5 MG/DL (ref 0.5–1.4)
CREAT SERPL-MCNC: 1.5 MG/DL (ref 0.5–1.4)
CREAT SERPL-MCNC: 1.7 MG/DL (ref 0.5–1.4)
CREAT SERPL-MCNC: 1.7 MG/DL (ref 0.5–1.4)
CREAT SERPL-MCNC: 1.8 MG/DL (ref 0.5–1.4)
CREAT SERPL-MCNC: 2.2 MG/DL (ref 0.5–1.4)
CREAT SERPL-MCNC: 2.3 MG/DL (ref 0.5–1.4)
CREAT SERPL-MCNC: 2.3 MG/DL (ref 0.5–1.4)
CREAT SERPL-MCNC: 2.5 MG/DL (ref 0.5–1.4)
CREAT SERPL-MCNC: 2.7 MG/DL (ref 0.5–1.4)
CREAT SERPL-MCNC: 3.1 MG/DL (ref 0.5–1.4)
CREAT SERPL-MCNC: 3.2 MG/DL (ref 0.5–1.4)
CREAT SERPL-MCNC: 3.5 MG/DL (ref 0.5–1.4)
CREAT SERPL-MCNC: 3.5 MG/DL (ref 0.5–1.4)
CREAT SERPL-MCNC: 3.9 MG/DL (ref 0.5–1.4)
CREAT SERPL-MCNC: 4.2 MG/DL (ref 0.5–1.4)
CREAT SERPL-MCNC: 4.2 MG/DL (ref 0.5–1.4)
CREAT SERPL-MCNC: 4.3 MG/DL (ref 0.5–1.4)
CREAT SERPL-MCNC: 4.4 MG/DL (ref 0.5–1.4)
CREAT UR-MCNC: 67 MG/DL (ref 23–375)
CV ECHO LV RWT: 0.29 CM
CV ECHO LV RWT: 0.48 CM
DACRYOCYTES BLD QL SMEAR: ABNORMAL
DACRYOCYTES BLD QL SMEAR: ABNORMAL
DELSYS: ABNORMAL
DELSYS: NORMAL
DELSYS: NORMAL
DIFFERENTIAL METHOD: ABNORMAL
DISPENSE STATUS: NORMAL
DISPENSE STATUS: NORMAL
DOHLE BOD BLD QL SMEAR: PRESENT
DOP CALC AO PEAK VEL: 1.45 M/S
DOP CALC AO VTI: 16.69 CM
DOP CALC LVOT AREA: 4 CM2
DOP CALC LVOT DIAMETER: 2.26 CM
DOP CALC LVOT PEAK VEL: 0.89 M/S
DOP CALC LVOT STROKE VOLUME: 46.83 CM3
DOP CALC MV VTI: 34.91 CM
DOP CALC MV VTI: 41.97 CM
DOP CALC RVOT PEAK VEL: 0.69 M/S
DOP CALC RVOT VTI: 7.52 CM
DOP CALCLVOT PEAK VEL VTI: 11.68 CM
E WAVE DECELERATION TIME: 224.02 MSEC
E WAVE DECELERATION TIME: 409.69 MSEC
E/A RATIO: 1.38
E/E' RATIO: 19.44 M/S
ECHO LV POSTERIOR WALL: 0.82 CM (ref 0.6–1.1)
ECHO LV POSTERIOR WALL: 0.96 CM (ref 0.6–1.1)
EJECTION FRACTION: 45 %
EJECTION FRACTION: 55 %
EOSINOPHIL # BLD AUTO: 0 K/UL (ref 0–0.5)
EOSINOPHIL # BLD AUTO: 0.1 K/UL (ref 0–0.5)
EOSINOPHIL # BLD AUTO: ABNORMAL K/UL (ref 0–0.5)
EOSINOPHIL NFR BLD: 0 % (ref 0–8)
EOSINOPHIL NFR BLD: 0.1 % (ref 0–8)
EOSINOPHIL NFR BLD: 0.1 % (ref 0–8)
EOSINOPHIL NFR BLD: 0.2 % (ref 0–8)
EOSINOPHIL NFR BLD: 0.6 % (ref 0–8)
EOSINOPHIL NFR BLD: 0.6 % (ref 0–8)
EOSINOPHIL NFR BLD: 1 % (ref 0–8)
EP: 5
ERYTHROCYTE [DISTWIDTH] IN BLOOD BY AUTOMATED COUNT: 13.9 % (ref 11.5–14.5)
ERYTHROCYTE [DISTWIDTH] IN BLOOD BY AUTOMATED COUNT: 13.9 % (ref 11.5–14.5)
ERYTHROCYTE [DISTWIDTH] IN BLOOD BY AUTOMATED COUNT: 14.2 % (ref 11.5–14.5)
ERYTHROCYTE [DISTWIDTH] IN BLOOD BY AUTOMATED COUNT: 14.3 % (ref 11.5–14.5)
ERYTHROCYTE [DISTWIDTH] IN BLOOD BY AUTOMATED COUNT: 14.4 % (ref 11.5–14.5)
ERYTHROCYTE [DISTWIDTH] IN BLOOD BY AUTOMATED COUNT: 14.5 % (ref 11.5–14.5)
ERYTHROCYTE [DISTWIDTH] IN BLOOD BY AUTOMATED COUNT: 16.5 % (ref 11.5–14.5)
ERYTHROCYTE [DISTWIDTH] IN BLOOD BY AUTOMATED COUNT: 16.5 % (ref 11.5–14.5)
ERYTHROCYTE [DISTWIDTH] IN BLOOD BY AUTOMATED COUNT: 17.8 % (ref 11.5–14.5)
ERYTHROCYTE [DISTWIDTH] IN BLOOD BY AUTOMATED COUNT: 18.5 % (ref 11.5–14.5)
ERYTHROCYTE [DISTWIDTH] IN BLOOD BY AUTOMATED COUNT: 18.6 % (ref 11.5–14.5)
ERYTHROCYTE [DISTWIDTH] IN BLOOD BY AUTOMATED COUNT: 19.2 % (ref 11.5–14.5)
ERYTHROCYTE [DISTWIDTH] IN BLOOD BY AUTOMATED COUNT: 21.6 % (ref 11.5–14.5)
ERYTHROCYTE [DISTWIDTH] IN BLOOD BY AUTOMATED COUNT: 26.4 % (ref 11.5–14.5)
ERYTHROCYTE [DISTWIDTH] IN BLOOD BY AUTOMATED COUNT: 29.2 % (ref 11.5–14.5)
ERYTHROCYTE [DISTWIDTH] IN BLOOD BY AUTOMATED COUNT: 29.6 % (ref 11.5–14.5)
ERYTHROCYTE [DISTWIDTH] IN BLOOD BY AUTOMATED COUNT: 35 % (ref 11.5–14.5)
ERYTHROCYTE [DISTWIDTH] IN BLOOD BY AUTOMATED COUNT: 36 % (ref 11.5–14.5)
ERYTHROCYTE [SEDIMENTATION RATE] IN BLOOD BY WESTERGREN METHOD: 15 MM/H
ERYTHROCYTE [SEDIMENTATION RATE] IN BLOOD BY WESTERGREN METHOD: 20 MM/H
ERYTHROCYTE [SEDIMENTATION RATE] IN BLOOD BY WESTERGREN METHOD: 26 MM/H
ERYTHROCYTE [SEDIMENTATION RATE] IN BLOOD BY WESTERGREN METHOD: 30 MM/H
EST. GFR  (NO RACE VARIABLE): 13.7 ML/MIN/1.73 M^2
EST. GFR  (NO RACE VARIABLE): 14.1 ML/MIN/1.73 M^2
EST. GFR  (NO RACE VARIABLE): 14.5 ML/MIN/1.73 M^2
EST. GFR  (NO RACE VARIABLE): 14.5 ML/MIN/1.73 M^2
EST. GFR  (NO RACE VARIABLE): 15.8 ML/MIN/1.73 M^2
EST. GFR  (NO RACE VARIABLE): 18 ML/MIN/1.73 M^2
EST. GFR  (NO RACE VARIABLE): 18 ML/MIN/1.73 M^2
EST. GFR  (NO RACE VARIABLE): 20.1 ML/MIN/1.73 M^2
EST. GFR  (NO RACE VARIABLE): 20.8 ML/MIN/1.73 M^2
EST. GFR  (NO RACE VARIABLE): 24.6 ML/MIN/1.73 M^2
EST. GFR  (NO RACE VARIABLE): 27 ML/MIN/1.73 M^2
EST. GFR  (NO RACE VARIABLE): 29.8 ML/MIN/1.73 M^2
EST. GFR  (NO RACE VARIABLE): 29.8 ML/MIN/1.73 M^2
EST. GFR  (NO RACE VARIABLE): 31.4 ML/MIN/1.73 M^2
EST. GFR  (NO RACE VARIABLE): 40 ML/MIN/1.73 M^2
EST. GFR  (NO RACE VARIABLE): 42.8 ML/MIN/1.73 M^2
EST. GFR  (NO RACE VARIABLE): 42.8 ML/MIN/1.73 M^2
EST. GFR  (NO RACE VARIABLE): 49.8 ML/MIN/1.73 M^2
EST. GFR  (NO RACE VARIABLE): 49.8 ML/MIN/1.73 M^2
EST. GFR  (NO RACE VARIABLE): >60 ML/MIN/1.73 M^2
FIBRINOGEN PPP-MCNC: 339 MG/DL (ref 182–400)
FIBRINOGEN PPP-MCNC: 395 MG/DL (ref 182–400)
FIO2: 10
FIO2: 100
FIO2: 40
FIO2: 60
FIO2: 80
FLOW: 7
FRACTIONAL SHORTENING: 33 % (ref 28–44)
FRACTIONAL SHORTENING: 34 % (ref 28–44)
GIANT PLATELETS BLD QL SMEAR: PRESENT
GLUCOSE SERPL-MCNC: 108 MG/DL (ref 70–110)
GLUCOSE SERPL-MCNC: 120 MG/DL (ref 70–110)
GLUCOSE SERPL-MCNC: 132 MG/DL (ref 70–110)
GLUCOSE SERPL-MCNC: 133 MG/DL (ref 70–110)
GLUCOSE SERPL-MCNC: 134 MG/DL (ref 70–110)
GLUCOSE SERPL-MCNC: 135 MG/DL (ref 70–110)
GLUCOSE SERPL-MCNC: 137 MG/DL (ref 70–110)
GLUCOSE SERPL-MCNC: 141 MG/DL (ref 70–110)
GLUCOSE SERPL-MCNC: 141 MG/DL (ref 70–110)
GLUCOSE SERPL-MCNC: 144 MG/DL (ref 70–110)
GLUCOSE SERPL-MCNC: 145 MG/DL (ref 70–110)
GLUCOSE SERPL-MCNC: 146 MG/DL (ref 70–110)
GLUCOSE SERPL-MCNC: 147 MG/DL (ref 70–110)
GLUCOSE SERPL-MCNC: 149 MG/DL (ref 70–110)
GLUCOSE SERPL-MCNC: 153 MG/DL (ref 70–110)
GLUCOSE SERPL-MCNC: 155 MG/DL (ref 70–110)
GLUCOSE SERPL-MCNC: 160 MG/DL (ref 70–110)
GLUCOSE SERPL-MCNC: 202 MG/DL (ref 70–110)
GLUCOSE SERPL-MCNC: 223 MG/DL (ref 70–110)
GLUCOSE SERPL-MCNC: 249 MG/DL (ref 70–110)
GLUCOSE UR QL STRIP: NEGATIVE
GRAM STN SPEC: NORMAL
HAPTOGLOB SERPL-MCNC: <10 MG/DL (ref 30–250)
HCO3 UR-SCNC: 12.1 MMOL/L (ref 24–28)
HCO3 UR-SCNC: 12.4 MMOL/L (ref 24–28)
HCO3 UR-SCNC: 12.4 MMOL/L (ref 24–28)
HCO3 UR-SCNC: 13 MMOL/L (ref 24–28)
HCO3 UR-SCNC: 13.4 MMOL/L (ref 24–28)
HCO3 UR-SCNC: 13.5 MMOL/L (ref 24–28)
HCO3 UR-SCNC: 14.1 MMOL/L (ref 24–28)
HCO3 UR-SCNC: 16.4 MMOL/L (ref 24–28)
HCO3 UR-SCNC: 20.3 MMOL/L (ref 24–28)
HCO3 UR-SCNC: 20.7 MMOL/L (ref 24–28)
HCO3 UR-SCNC: 20.7 MMOL/L (ref 24–28)
HCO3 UR-SCNC: 20.8 MMOL/L (ref 24–28)
HCO3 UR-SCNC: 20.9 MMOL/L (ref 24–28)
HCO3 UR-SCNC: 21 MMOL/L (ref 24–28)
HCO3 UR-SCNC: 21.1 MMOL/L (ref 24–28)
HCO3 UR-SCNC: 21.2 MMOL/L (ref 24–28)
HCO3 UR-SCNC: 21.4 MMOL/L (ref 24–28)
HCO3 UR-SCNC: 21.4 MMOL/L (ref 24–28)
HCO3 UR-SCNC: 21.5 MMOL/L (ref 24–28)
HCO3 UR-SCNC: 22.1 MMOL/L (ref 24–28)
HCO3 UR-SCNC: 22.1 MMOL/L (ref 24–28)
HCO3 UR-SCNC: 22.4 MMOL/L (ref 24–28)
HCO3 UR-SCNC: 22.9 MMOL/L (ref 24–28)
HCO3 UR-SCNC: 22.9 MMOL/L (ref 24–28)
HCO3 UR-SCNC: 23.1 MMOL/L (ref 24–28)
HCO3 UR-SCNC: 23.2 MMOL/L (ref 24–28)
HCO3 UR-SCNC: 23.2 MMOL/L (ref 24–28)
HCO3 UR-SCNC: 23.4 MMOL/L (ref 24–28)
HCO3 UR-SCNC: 23.5 MMOL/L (ref 24–28)
HCO3 UR-SCNC: 23.7 MMOL/L (ref 24–28)
HCO3 UR-SCNC: 23.7 MMOL/L (ref 24–28)
HCO3 UR-SCNC: 23.8 MMOL/L (ref 24–28)
HCO3 UR-SCNC: 23.8 MMOL/L (ref 24–28)
HCO3 UR-SCNC: 24.2 MMOL/L (ref 24–28)
HCO3 UR-SCNC: 24.3 MMOL/L (ref 24–28)
HCO3 UR-SCNC: 24.3 MMOL/L (ref 24–28)
HCO3 UR-SCNC: 24.4 MMOL/L (ref 24–28)
HCO3 UR-SCNC: 24.8 MMOL/L (ref 24–28)
HCO3 UR-SCNC: 9.1 MMOL/L (ref 24–28)
HCT VFR BLD AUTO: 22.7 % (ref 40–54)
HCT VFR BLD AUTO: 23 % (ref 40–54)
HCT VFR BLD AUTO: 23.5 % (ref 40–54)
HCT VFR BLD AUTO: 25.1 % (ref 40–54)
HCT VFR BLD AUTO: 25.2 % (ref 40–54)
HCT VFR BLD AUTO: 25.2 % (ref 40–54)
HCT VFR BLD AUTO: 25.5 % (ref 40–54)
HCT VFR BLD AUTO: 25.8 % (ref 40–54)
HCT VFR BLD AUTO: 27.3 % (ref 40–54)
HCT VFR BLD AUTO: 27.3 % (ref 40–54)
HCT VFR BLD AUTO: 28.2 % (ref 40–54)
HCT VFR BLD AUTO: 28.2 % (ref 40–54)
HCT VFR BLD AUTO: 31.2 % (ref 40–54)
HCT VFR BLD AUTO: 32.6 % (ref 40–54)
HCT VFR BLD AUTO: 33.8 % (ref 40–54)
HCT VFR BLD AUTO: 34 % (ref 40–54)
HCT VFR BLD AUTO: 34.1 % (ref 40–54)
HCT VFR BLD AUTO: 34.2 % (ref 40–54)
HCT VFR BLD CALC: 24 %PCV (ref 36–54)
HCT VFR BLD CALC: 25 %PCV (ref 36–54)
HCT VFR BLD CALC: 31 %PCV (ref 36–54)
HGB BLD-MCNC: 10.8 G/DL (ref 14–18)
HGB BLD-MCNC: 11 G/DL (ref 14–18)
HGB BLD-MCNC: 11.2 G/DL (ref 14–18)
HGB BLD-MCNC: 11.3 G/DL (ref 14–18)
HGB BLD-MCNC: 11.4 G/DL (ref 14–18)
HGB BLD-MCNC: 11.4 G/DL (ref 14–18)
HGB BLD-MCNC: 7.6 G/DL (ref 14–18)
HGB BLD-MCNC: 7.9 G/DL (ref 14–18)
HGB BLD-MCNC: 7.9 G/DL (ref 14–18)
HGB BLD-MCNC: 8.3 G/DL (ref 14–18)
HGB BLD-MCNC: 8.4 G/DL (ref 14–18)
HGB BLD-MCNC: 8.6 G/DL (ref 14–18)
HGB BLD-MCNC: 9.1 G/DL (ref 14–18)
HGB BLD-MCNC: 9.3 G/DL (ref 14–18)
HGB BLD-MCNC: 9.7 G/DL (ref 14–18)
HGB BLD-MCNC: 9.7 G/DL (ref 14–18)
HGB BLD-MCNC: 9.9 G/DL (ref 14–18)
HGB BLD-MCNC: 9.9 G/DL (ref 14–18)
HGB UR QL STRIP: ABNORMAL
HOWELL-JOLLY BOD BLD QL SMEAR: ABNORMAL
HR MV ECHO: 133 BPM
HYALINE CASTS UR QL AUTO: 0 /LPF
HYPOCHROMIA BLD QL SMEAR: ABNORMAL
IMM GRANULOCYTES # BLD AUTO: 0.08 K/UL (ref 0–0.04)
IMM GRANULOCYTES # BLD AUTO: 0.09 K/UL (ref 0–0.04)
IMM GRANULOCYTES # BLD AUTO: 0.11 K/UL (ref 0–0.04)
IMM GRANULOCYTES # BLD AUTO: 0.22 K/UL (ref 0–0.04)
IMM GRANULOCYTES # BLD AUTO: 0.24 K/UL (ref 0–0.04)
IMM GRANULOCYTES # BLD AUTO: 0.28 K/UL (ref 0–0.04)
IMM GRANULOCYTES # BLD AUTO: 0.32 K/UL (ref 0–0.04)
IMM GRANULOCYTES # BLD AUTO: 0.32 K/UL (ref 0–0.04)
IMM GRANULOCYTES # BLD AUTO: 0.8 K/UL (ref 0–0.04)
IMM GRANULOCYTES # BLD AUTO: 1.08 K/UL (ref 0–0.04)
IMM GRANULOCYTES # BLD AUTO: 1.18 K/UL (ref 0–0.04)
IMM GRANULOCYTES # BLD AUTO: 1.19 K/UL (ref 0–0.04)
IMM GRANULOCYTES # BLD AUTO: ABNORMAL K/UL (ref 0–0.04)
IMM GRANULOCYTES NFR BLD AUTO: 0.4 % (ref 0–0.5)
IMM GRANULOCYTES NFR BLD AUTO: 0.5 % (ref 0–0.5)
IMM GRANULOCYTES NFR BLD AUTO: 0.6 % (ref 0–0.5)
IMM GRANULOCYTES NFR BLD AUTO: 1 % (ref 0–0.5)
IMM GRANULOCYTES NFR BLD AUTO: 1.1 % (ref 0–0.5)
IMM GRANULOCYTES NFR BLD AUTO: 1.2 % (ref 0–0.5)
IMM GRANULOCYTES NFR BLD AUTO: 1.3 % (ref 0–0.5)
IMM GRANULOCYTES NFR BLD AUTO: 1.3 % (ref 0–0.5)
IMM GRANULOCYTES NFR BLD AUTO: 3.1 % (ref 0–0.5)
IMM GRANULOCYTES NFR BLD AUTO: 3.5 % (ref 0–0.5)
IMM GRANULOCYTES NFR BLD AUTO: 4.2 % (ref 0–0.5)
IMM GRANULOCYTES NFR BLD AUTO: 4.3 % (ref 0–0.5)
IMM GRANULOCYTES NFR BLD AUTO: ABNORMAL % (ref 0–0.5)
INR PPP: 1.1 (ref 0.8–1.2)
INR PPP: 1.1 (ref 0.8–1.2)
INR PPP: 1.2 (ref 0.8–1.2)
INR PPP: 1.2 (ref 0.8–1.2)
INR PPP: 1.3 (ref 0.8–1.2)
INR PPP: 1.4 (ref 0.8–1.2)
INR PPP: 1.4 (ref 0.8–1.2)
INR PPP: 1.8 (ref 0.8–1.2)
INR PPP: 2 (ref 0.8–1.2)
INR PPP: NORMAL (ref 0.8–1.2)
INTERVENTRICULAR SEPTUM: 0.87 CM (ref 0.6–1.1)
INTERVENTRICULAR SEPTUM: 1.05 CM (ref 0.6–1.1)
IP: 10
KETONES UR QL STRIP: NEGATIVE
LA MAJOR: 8.99 CM
LA MINOR: 9.1 CM
LA WIDTH: 5.17 CM
LACTATE SERPL-SCNC: 2.6 MMOL/L (ref 0.5–2.2)
LACTATE SERPL-SCNC: >12 MMOL/L (ref 0.5–2.2)
LDH SERPL L TO P-CCNC: 0.35 MMOL/L (ref 0.36–1.25)
LDH SERPL L TO P-CCNC: 0.74 MMOL/L (ref 0.36–1.25)
LDH SERPL L TO P-CCNC: 10.64 MMOL/L (ref 0.36–1.25)
LDH SERPL L TO P-CCNC: 11.13 MMOL/L (ref 0.5–2.2)
LDH SERPL L TO P-CCNC: 11.57 MMOL/L (ref 0.5–2.2)
LDH SERPL L TO P-CCNC: 2.07 MMOL/L (ref 0.5–2.2)
LDH SERPL L TO P-CCNC: 2.45 MMOL/L (ref 0.36–1.25)
LDH SERPL L TO P-CCNC: 2.6 MMOL/L (ref 0.5–2.2)
LDH SERPL L TO P-CCNC: 2780 U/L (ref 110–260)
LDH SERPL L TO P-CCNC: 3.17 MMOL/L (ref 0.5–2.2)
LDH SERPL L TO P-CCNC: 3.49 MMOL/L (ref 0.5–2.2)
LDH SERPL L TO P-CCNC: 3.87 MMOL/L (ref 0.5–2.2)
LDH SERPL L TO P-CCNC: 3.92 MMOL/L (ref 0.5–2.2)
LDH SERPL L TO P-CCNC: 3061 U/L (ref 110–260)
LDH SERPL L TO P-CCNC: 3537 U/L (ref 110–260)
LDH SERPL L TO P-CCNC: 4.09 MMOL/L (ref 0.5–2.2)
LDH SERPL L TO P-CCNC: 516 U/L (ref 110–260)
LEFT ATRIUM SIZE: 6.4 CM
LEFT ATRIUM SIZE: 6.78 CM
LEFT ATRIUM VOLUME INDEX MOD: 77.8 ML/M2
LEFT ATRIUM VOLUME INDEX: 125.3 ML/M2
LEFT ATRIUM VOLUME MOD: 157.89 CM3
LEFT ATRIUM VOLUME: 254.38 CM3
LEFT INTERNAL DIMENSION IN SYSTOLE: 2.65 CM (ref 2.1–4)
LEFT INTERNAL DIMENSION IN SYSTOLE: 3.71 CM (ref 2.1–4)
LEFT VENTRICLE DIASTOLIC VOLUME INDEX: 33.75 ML/M2
LEFT VENTRICLE DIASTOLIC VOLUME INDEX: 76.87 ML/M2
LEFT VENTRICLE DIASTOLIC VOLUME: 156.04 ML
LEFT VENTRICLE DIASTOLIC VOLUME: 68.52 ML
LEFT VENTRICLE MASS INDEX: 62 G/M2
LEFT VENTRICLE MASS INDEX: 88 G/M2
LEFT VENTRICLE SYSTOLIC VOLUME INDEX: 12.7 ML/M2
LEFT VENTRICLE SYSTOLIC VOLUME INDEX: 28.8 ML/M2
LEFT VENTRICLE SYSTOLIC VOLUME: 25.77 ML
LEFT VENTRICLE SYSTOLIC VOLUME: 58.52 ML
LEFT VENTRICULAR INTERNAL DIMENSION IN DIASTOLE: 3.96 CM (ref 3.5–6)
LEFT VENTRICULAR INTERNAL DIMENSION IN DIASTOLE: 5.64 CM (ref 3.5–6)
LEFT VENTRICULAR MASS: 125.96 G
LEFT VENTRICULAR MASS: 179 G
LEUKOCYTE ESTERASE UR QL STRIP: ABNORMAL
LV LATERAL E/E' RATIO: 13.46 M/S
LV SEPTAL E/E' RATIO: 35 M/S
LYMPHOCYTES # BLD AUTO: 0.2 K/UL (ref 1–4.8)
LYMPHOCYTES # BLD AUTO: 0.3 K/UL (ref 1–4.8)
LYMPHOCYTES # BLD AUTO: 0.4 K/UL (ref 1–4.8)
LYMPHOCYTES # BLD AUTO: 0.6 K/UL (ref 1–4.8)
LYMPHOCYTES # BLD AUTO: 0.8 K/UL (ref 1–4.8)
LYMPHOCYTES # BLD AUTO: 0.8 K/UL (ref 1–4.8)
LYMPHOCYTES # BLD AUTO: ABNORMAL K/UL (ref 1–4.8)
LYMPHOCYTES NFR BLD: 0 % (ref 18–48)
LYMPHOCYTES NFR BLD: 1 % (ref 18–48)
LYMPHOCYTES NFR BLD: 1.1 % (ref 18–48)
LYMPHOCYTES NFR BLD: 1.2 % (ref 18–48)
LYMPHOCYTES NFR BLD: 1.3 % (ref 18–48)
LYMPHOCYTES NFR BLD: 1.4 % (ref 18–48)
LYMPHOCYTES NFR BLD: 1.4 % (ref 18–48)
LYMPHOCYTES NFR BLD: 1.8 % (ref 18–48)
LYMPHOCYTES NFR BLD: 1.9 % (ref 18–48)
LYMPHOCYTES NFR BLD: 2 % (ref 18–48)
LYMPHOCYTES NFR BLD: 2.4 % (ref 18–48)
LYMPHOCYTES NFR BLD: 3 % (ref 18–48)
LYMPHOCYTES NFR BLD: 7 % (ref 18–48)
MAGNESIUM SERPL-MCNC: 1.8 MG/DL (ref 1.6–2.6)
MAGNESIUM SERPL-MCNC: 1.9 MG/DL (ref 1.6–2.6)
MAGNESIUM SERPL-MCNC: 2 MG/DL (ref 1.6–2.6)
MAGNESIUM SERPL-MCNC: 2.1 MG/DL (ref 1.6–2.6)
MAGNESIUM SERPL-MCNC: 2.2 MG/DL (ref 1.6–2.6)
MAGNESIUM SERPL-MCNC: 2.3 MG/DL (ref 1.6–2.6)
MAGNESIUM SERPL-MCNC: 2.3 MG/DL (ref 1.6–2.6)
MAGNESIUM SERPL-MCNC: 2.4 MG/DL (ref 1.6–2.6)
MAP: 7
MCH RBC QN AUTO: 28.8 PG (ref 27–31)
MCH RBC QN AUTO: 29.2 PG (ref 27–31)
MCH RBC QN AUTO: 29.2 PG (ref 27–31)
MCH RBC QN AUTO: 29.9 PG (ref 27–31)
MCH RBC QN AUTO: 29.9 PG (ref 27–31)
MCH RBC QN AUTO: 30.1 PG (ref 27–31)
MCH RBC QN AUTO: 30.2 PG (ref 27–31)
MCH RBC QN AUTO: 30.4 PG (ref 27–31)
MCH RBC QN AUTO: 30.9 PG (ref 27–31)
MCH RBC QN AUTO: 31.3 PG (ref 27–31)
MCH RBC QN AUTO: 31.3 PG (ref 27–31)
MCH RBC QN AUTO: 31.4 PG (ref 27–31)
MCH RBC QN AUTO: 31.5 PG (ref 27–31)
MCH RBC QN AUTO: 31.8 PG (ref 27–31)
MCH RBC QN AUTO: 31.9 PG (ref 27–31)
MCH RBC QN AUTO: 31.9 PG (ref 27–31)
MCH RBC QN AUTO: 32 PG (ref 27–31)
MCH RBC QN AUTO: 32.5 PG (ref 27–31)
MCHC RBC AUTO-ENTMCNC: 32.3 G/DL (ref 32–36)
MCHC RBC AUTO-ENTMCNC: 32.9 G/DL (ref 32–36)
MCHC RBC AUTO-ENTMCNC: 32.9 G/DL (ref 32–36)
MCHC RBC AUTO-ENTMCNC: 33.3 G/DL (ref 32–36)
MCHC RBC AUTO-ENTMCNC: 33.3 G/DL (ref 32–36)
MCHC RBC AUTO-ENTMCNC: 33.4 G/DL (ref 32–36)
MCHC RBC AUTO-ENTMCNC: 33.4 G/DL (ref 32–36)
MCHC RBC AUTO-ENTMCNC: 33.5 G/DL (ref 32–36)
MCHC RBC AUTO-ENTMCNC: 33.7 G/DL (ref 32–36)
MCHC RBC AUTO-ENTMCNC: 34.3 G/DL (ref 32–36)
MCHC RBC AUTO-ENTMCNC: 34.6 G/DL (ref 32–36)
MCHC RBC AUTO-ENTMCNC: 34.8 G/DL (ref 32–36)
MCHC RBC AUTO-ENTMCNC: 35.1 G/DL (ref 32–36)
MCHC RBC AUTO-ENTMCNC: 35.1 G/DL (ref 32–36)
MCHC RBC AUTO-ENTMCNC: 35.5 G/DL (ref 32–36)
MCHC RBC AUTO-ENTMCNC: 35.5 G/DL (ref 32–36)
MCHC RBC AUTO-ENTMCNC: 36.1 G/DL (ref 32–36)
MCHC RBC AUTO-ENTMCNC: 36.5 G/DL (ref 32–36)
MCV RBC AUTO: 83 FL (ref 82–98)
MCV RBC AUTO: 83 FL (ref 82–98)
MCV RBC AUTO: 85 FL (ref 82–98)
MCV RBC AUTO: 87 FL (ref 82–98)
MCV RBC AUTO: 88 FL (ref 82–98)
MCV RBC AUTO: 89 FL (ref 82–98)
MCV RBC AUTO: 93 FL (ref 82–98)
MCV RBC AUTO: 93 FL (ref 82–98)
MCV RBC AUTO: 94 FL (ref 82–98)
MCV RBC AUTO: 95 FL (ref 82–98)
MCV RBC AUTO: 96 FL (ref 82–98)
MCV RBC AUTO: 96 FL (ref 82–98)
MCV RBC AUTO: 97 FL (ref 82–98)
METAMYELOCYTES NFR BLD MANUAL: 2 %
METAMYELOCYTES NFR BLD MANUAL: 3 %
METAMYELOCYTES NFR BLD MANUAL: 4 %
METHEMOGLOBIN: 1.9 % (ref 0–3)
METHEMOGLOBIN: 1.9 % (ref 0–3)
MICROSCOPIC COMMENT: ABNORMAL
MIN VOL: 10.4
MIN VOL: 10.5
MIN VOL: 11.7
MIN VOL: 13.1
MODE: ABNORMAL
MODE: NORMAL
MODE: NORMAL
MONOCYTES # BLD AUTO: 1.4 K/UL (ref 0.3–1)
MONOCYTES # BLD AUTO: 1.6 K/UL (ref 0.3–1)
MONOCYTES # BLD AUTO: 1.8 K/UL (ref 0.3–1)
MONOCYTES # BLD AUTO: 1.9 K/UL (ref 0.3–1)
MONOCYTES # BLD AUTO: 2.1 K/UL (ref 0.3–1)
MONOCYTES # BLD AUTO: 2.4 K/UL (ref 0.3–1)
MONOCYTES # BLD AUTO: 2.4 K/UL (ref 0.3–1)
MONOCYTES # BLD AUTO: 2.5 K/UL (ref 0.3–1)
MONOCYTES # BLD AUTO: 3.1 K/UL (ref 0.3–1)
MONOCYTES # BLD AUTO: 3.2 K/UL (ref 0.3–1)
MONOCYTES # BLD AUTO: 3.3 K/UL (ref 0.3–1)
MONOCYTES # BLD AUTO: 3.3 K/UL (ref 0.3–1)
MONOCYTES # BLD AUTO: ABNORMAL K/UL (ref 0.3–1)
MONOCYTES NFR BLD: 10 % (ref 4–15)
MONOCYTES NFR BLD: 10.2 % (ref 4–15)
MONOCYTES NFR BLD: 13.2 % (ref 4–15)
MONOCYTES NFR BLD: 17.2 % (ref 4–15)
MONOCYTES NFR BLD: 4 % (ref 4–15)
MONOCYTES NFR BLD: 5 % (ref 4–15)
MONOCYTES NFR BLD: 5 % (ref 4–15)
MONOCYTES NFR BLD: 6 % (ref 4–15)
MONOCYTES NFR BLD: 6.5 % (ref 4–15)
MONOCYTES NFR BLD: 6.6 % (ref 4–15)
MONOCYTES NFR BLD: 8 % (ref 4–15)
MONOCYTES NFR BLD: 8.2 % (ref 4–15)
MONOCYTES NFR BLD: 8.6 % (ref 4–15)
MONOCYTES NFR BLD: 8.6 % (ref 4–15)
MONOCYTES NFR BLD: 9.1 % (ref 4–15)
MONOCYTES NFR BLD: 9.7 % (ref 4–15)
MV MEAN GRADIENT: 1 MMHG
MV MEAN GRADIENT: 9 MMHG
MV PEAK A VEL: 1.27 M/S
MV PEAK E VEL: 1.75 M/S
MV PEAK GRADIENT: 19 MMHG
MV PEAK GRADIENT: 24 MMHG
MV STENOSIS PRESSURE HALF TIME: 118.81 MS
MV STENOSIS PRESSURE HALF TIME: 64.97 MS
MV VALVE AREA BY CONTINUITY EQUATION: 1.12 CM2
MV VALVE AREA P 1/2 METHOD: 1.85 CM2
MV VALVE AREA P 1/2 METHOD: 3.39 CM2
MYELOCYTES NFR BLD MANUAL: 1 %
MYELOCYTES NFR BLD MANUAL: 2 %
MYELOCYTES NFR BLD MANUAL: 3 %
MYELOCYTES NFR BLD MANUAL: 6 %
MYELOCYTES NFR BLD MANUAL: 6 %
NEUTROPHILS # BLD AUTO: 14.6 K/UL (ref 1.8–7.7)
NEUTROPHILS # BLD AUTO: 15.4 K/UL (ref 1.8–7.7)
NEUTROPHILS # BLD AUTO: 16.6 K/UL (ref 1.8–7.7)
NEUTROPHILS # BLD AUTO: 19.8 K/UL (ref 1.8–7.7)
NEUTROPHILS # BLD AUTO: 20.1 K/UL (ref 1.8–7.7)
NEUTROPHILS # BLD AUTO: 20.3 K/UL (ref 1.8–7.7)
NEUTROPHILS # BLD AUTO: 20.3 K/UL (ref 1.8–7.7)
NEUTROPHILS # BLD AUTO: 21.2 K/UL (ref 1.8–7.7)
NEUTROPHILS # BLD AUTO: 22.4 K/UL (ref 1.8–7.7)
NEUTROPHILS # BLD AUTO: 23.3 K/UL (ref 1.8–7.7)
NEUTROPHILS # BLD AUTO: 24 K/UL (ref 1.8–7.7)
NEUTROPHILS # BLD AUTO: 26.2 K/UL (ref 1.8–7.7)
NEUTROPHILS NFR BLD: 63 % (ref 38–73)
NEUTROPHILS NFR BLD: 71 % (ref 38–73)
NEUTROPHILS NFR BLD: 80.3 % (ref 38–73)
NEUTROPHILS NFR BLD: 81.4 % (ref 38–73)
NEUTROPHILS NFR BLD: 81.4 % (ref 38–73)
NEUTROPHILS NFR BLD: 83 % (ref 38–73)
NEUTROPHILS NFR BLD: 84 % (ref 38–73)
NEUTROPHILS NFR BLD: 84.4 % (ref 38–73)
NEUTROPHILS NFR BLD: 84.7 % (ref 38–73)
NEUTROPHILS NFR BLD: 85 % (ref 38–73)
NEUTROPHILS NFR BLD: 85.1 % (ref 38–73)
NEUTROPHILS NFR BLD: 85.6 % (ref 38–73)
NEUTROPHILS NFR BLD: 87 % (ref 38–73)
NEUTROPHILS NFR BLD: 88.5 % (ref 38–73)
NEUTROPHILS NFR BLD: 88.9 % (ref 38–73)
NEUTROPHILS NFR BLD: 89.5 % (ref 38–73)
NEUTROPHILS NFR BLD: 90.5 % (ref 38–73)
NEUTROPHILS NFR BLD: 95 % (ref 38–73)
NEUTS BAND NFR BLD MANUAL: 11 %
NEUTS BAND NFR BLD MANUAL: 2 %
NEUTS BAND NFR BLD MANUAL: 2 %
NEUTS BAND NFR BLD MANUAL: 4 %
NEUTS BAND NFR BLD MANUAL: 9 %
NITRITE UR QL STRIP: NEGATIVE
NRBC BLD-RTO: 0 /100 WBC
NRBC BLD-RTO: 1 /100 WBC
NRBC BLD-RTO: 19 /100 WBC
NRBC BLD-RTO: 2 /100 WBC
NRBC BLD-RTO: 23 /100 WBC
NRBC BLD-RTO: 39 /100 WBC
NRBC BLD-RTO: 4 /100 WBC
NRBC BLD-RTO: 40 /100 WBC
NRBC BLD-RTO: 8 /100 WBC
OB PNL STL: NEGATIVE
OVALOCYTES BLD QL SMEAR: ABNORMAL
PAPPENHEIMER BOD BLD QL SMEAR: PRESENT
PATH REV BLD -IMP: NORMAL
PCO2 BLDA: 15.6 MMHG (ref 35–45)
PCO2 BLDA: 26.3 MMHG (ref 35–45)
PCO2 BLDA: 30.1 MMHG (ref 35–45)
PCO2 BLDA: 30.3 MMHG (ref 35–45)
PCO2 BLDA: 30.8 MMHG (ref 35–45)
PCO2 BLDA: 30.9 MMHG (ref 35–45)
PCO2 BLDA: 31 MMHG (ref 35–45)
PCO2 BLDA: 31 MMHG (ref 35–45)
PCO2 BLDA: 32 MMHG (ref 35–45)
PCO2 BLDA: 32.8 MMHG (ref 35–45)
PCO2 BLDA: 32.8 MMHG (ref 35–45)
PCO2 BLDA: 33.1 MMHG (ref 35–45)
PCO2 BLDA: 33.9 MMHG (ref 35–45)
PCO2 BLDA: 34 MMHG (ref 35–45)
PCO2 BLDA: 34.4 MMHG (ref 35–45)
PCO2 BLDA: 34.6 MMHG (ref 35–45)
PCO2 BLDA: 35.2 MMHG (ref 35–45)
PCO2 BLDA: 35.4 MMHG (ref 35–45)
PCO2 BLDA: 35.5 MMHG (ref 35–45)
PCO2 BLDA: 35.6 MMHG (ref 35–45)
PCO2 BLDA: 35.7 MMHG (ref 35–45)
PCO2 BLDA: 35.9 MMHG (ref 35–45)
PCO2 BLDA: 36.4 MMHG (ref 35–45)
PCO2 BLDA: 36.5 MMHG (ref 35–45)
PCO2 BLDA: 36.6 MMHG (ref 35–45)
PCO2 BLDA: 37.2 MMHG (ref 35–45)
PCO2 BLDA: 37.5 MMHG (ref 35–45)
PCO2 BLDA: 39.2 MMHG (ref 35–45)
PCO2 BLDA: 39.3 MMHG (ref 35–45)
PCO2 BLDA: 40 MMHG (ref 35–45)
PCO2 BLDA: 40.6 MMHG (ref 35–45)
PCO2 BLDA: 41.7 MMHG (ref 35–45)
PCO2 BLDA: 41.8 MMHG (ref 35–45)
PCO2 BLDA: 44.4 MMHG (ref 35–45)
PCO2 BLDA: 46 MMHG (ref 35–45)
PCO2 BLDA: 46.5 MMHG (ref 35–45)
PCO2 BLDA: 46.9 MMHG (ref 35–45)
PCO2 BLDA: 48.7 MMHG (ref 35–45)
PCO2 BLDA: 50.8 MMHG (ref 35–45)
PCO2 BLDA: 52.3 MMHG (ref 35–45)
PCO2 BLDA: 52.3 MMHG (ref 35–45)
PEEP: 5
PEEP: 8
PH SMN: 7.11 [PH] (ref 7.35–7.45)
PH SMN: 7.15 [PH] (ref 7.35–7.45)
PH SMN: 7.15 [PH] (ref 7.35–7.45)
PH SMN: 7.18 [PH] (ref 7.35–7.45)
PH SMN: 7.2 [PH] (ref 7.35–7.45)
PH SMN: 7.21 [PH] (ref 7.35–7.45)
PH SMN: 7.24 [PH] (ref 7.35–7.45)
PH SMN: 7.26 [PH] (ref 7.35–7.45)
PH SMN: 7.27 [PH] (ref 7.35–7.45)
PH SMN: 7.31 [PH] (ref 7.35–7.45)
PH SMN: 7.32 [PH] (ref 7.35–7.45)
PH SMN: 7.33 [PH] (ref 7.35–7.45)
PH SMN: 7.33 [PH] (ref 7.35–7.45)
PH SMN: 7.36 [PH] (ref 7.35–7.45)
PH SMN: 7.36 [PH] (ref 7.35–7.45)
PH SMN: 7.37 [PH] (ref 7.35–7.45)
PH SMN: 7.38 [PH] (ref 7.35–7.45)
PH SMN: 7.38 [PH] (ref 7.35–7.45)
PH SMN: 7.39 [PH] (ref 7.35–7.45)
PH SMN: 7.4 [PH] (ref 7.35–7.45)
PH SMN: 7.41 [PH] (ref 7.35–7.45)
PH SMN: 7.43 [PH] (ref 7.35–7.45)
PH SMN: 7.44 [PH] (ref 7.35–7.45)
PH SMN: 7.45 [PH] (ref 7.35–7.45)
PH SMN: 7.46 [PH] (ref 7.35–7.45)
PH SMN: 7.49 [PH] (ref 7.35–7.45)
PH UR STRIP: 6 [PH] (ref 5–8)
PHOSPHATE SERPL-MCNC: 2.1 MG/DL (ref 2.7–4.5)
PHOSPHATE SERPL-MCNC: 2.4 MG/DL (ref 2.7–4.5)
PHOSPHATE SERPL-MCNC: 2.5 MG/DL (ref 2.7–4.5)
PHOSPHATE SERPL-MCNC: 2.7 MG/DL (ref 2.7–4.5)
PHOSPHATE SERPL-MCNC: 3.6 MG/DL (ref 2.7–4.5)
PHOSPHATE SERPL-MCNC: 4 MG/DL (ref 2.7–4.5)
PHOSPHATE SERPL-MCNC: 4 MG/DL (ref 2.7–4.5)
PHOSPHATE SERPL-MCNC: 4.1 MG/DL (ref 2.7–4.5)
PHOSPHATE SERPL-MCNC: 4.2 MG/DL (ref 2.7–4.5)
PHOSPHATE SERPL-MCNC: 4.6 MG/DL (ref 2.7–4.5)
PHOSPHATE SERPL-MCNC: 5 MG/DL (ref 2.7–4.5)
PHOSPHATE SERPL-MCNC: 5.4 MG/DL (ref 2.7–4.5)
PHOSPHATE SERPL-MCNC: 5.6 MG/DL (ref 2.7–4.5)
PHOSPHATE SERPL-MCNC: 5.7 MG/DL (ref 2.7–4.5)
PHOSPHATE SERPL-MCNC: 5.7 MG/DL (ref 2.7–4.5)
PHOSPHATE SERPL-MCNC: 6.2 MG/DL (ref 2.7–4.5)
PHOSPHATE SERPL-MCNC: 6.2 MG/DL (ref 2.7–4.5)
PHOSPHATE SERPL-MCNC: 6.5 MG/DL (ref 2.7–4.5)
PHOSPHATE SERPL-MCNC: 9.4 MG/DL (ref 2.7–4.5)
PIP: 16
PIP: 19
PISA MRMAX VEL: 0.04 M/S
PISA TR MAX VEL: 3.99 M/S
PLATELET # BLD AUTO: 115 K/UL (ref 150–450)
PLATELET # BLD AUTO: 135 K/UL (ref 150–450)
PLATELET # BLD AUTO: 162 K/UL (ref 150–450)
PLATELET # BLD AUTO: 189 K/UL (ref 150–450)
PLATELET # BLD AUTO: 191 K/UL (ref 150–450)
PLATELET # BLD AUTO: 216 K/UL (ref 150–450)
PLATELET # BLD AUTO: 226 K/UL (ref 150–450)
PLATELET # BLD AUTO: 226 K/UL (ref 150–450)
PLATELET # BLD AUTO: 283 K/UL (ref 150–450)
PLATELET # BLD AUTO: 285 K/UL (ref 150–450)
PLATELET # BLD AUTO: 312 K/UL (ref 150–450)
PLATELET # BLD AUTO: 328 K/UL (ref 150–450)
PLATELET # BLD AUTO: 333 K/UL (ref 150–450)
PLATELET # BLD AUTO: 349 K/UL (ref 150–450)
PLATELET # BLD AUTO: 81 K/UL (ref 150–450)
PLATELET # BLD AUTO: 87 K/UL (ref 150–450)
PLATELET # BLD AUTO: 92 K/UL (ref 150–450)
PLATELET # BLD AUTO: 93 K/UL (ref 150–450)
PLATELET BLD QL SMEAR: ABNORMAL
PMV BLD AUTO: 12.4 FL (ref 9.2–12.9)
PMV BLD AUTO: 12.5 FL (ref 9.2–12.9)
PMV BLD AUTO: 12.6 FL (ref 9.2–12.9)
PMV BLD AUTO: 12.8 FL (ref 9.2–12.9)
PMV BLD AUTO: 12.8 FL (ref 9.2–12.9)
PMV BLD AUTO: 13.3 FL (ref 9.2–12.9)
PMV BLD AUTO: ABNORMAL FL (ref 9.2–12.9)
PO2 BLDA: 100 MMHG (ref 80–100)
PO2 BLDA: 123 MMHG (ref 80–100)
PO2 BLDA: 178 MMHG (ref 80–100)
PO2 BLDA: 186 MMHG (ref 80–100)
PO2 BLDA: 193 MMHG (ref 80–100)
PO2 BLDA: 21 MMHG (ref 40–60)
PO2 BLDA: 215 MMHG (ref 80–100)
PO2 BLDA: 26 MMHG (ref 40–60)
PO2 BLDA: 26 MMHG (ref 40–60)
PO2 BLDA: 27 MMHG (ref 40–60)
PO2 BLDA: 27 MMHG (ref 40–60)
PO2 BLDA: 28 MMHG (ref 40–60)
PO2 BLDA: 29 MMHG (ref 40–60)
PO2 BLDA: 29 MMHG (ref 40–60)
PO2 BLDA: 31 MMHG (ref 40–60)
PO2 BLDA: 32 MMHG (ref 40–60)
PO2 BLDA: 33 MMHG (ref 40–60)
PO2 BLDA: 34 MMHG (ref 40–60)
PO2 BLDA: 35 MMHG (ref 40–60)
PO2 BLDA: 36 MMHG (ref 40–60)
PO2 BLDA: 37 MMHG (ref 40–60)
PO2 BLDA: 38 MMHG (ref 40–60)
PO2 BLDA: 39 MMHG (ref 40–60)
PO2 BLDA: 39 MMHG (ref 40–60)
PO2 BLDA: 415 MMHG (ref 80–100)
PO2 BLDA: 42 MMHG (ref 80–100)
PO2 BLDA: 45 MMHG (ref 40–60)
PO2 BLDA: 45 MMHG (ref 40–60)
PO2 BLDA: 47 MMHG (ref 40–60)
PO2 BLDA: 49 MMHG (ref 40–60)
PO2 BLDA: 50 MMHG (ref 40–60)
PO2 BLDA: 53 MMHG (ref 40–60)
PO2 BLDA: 78 MMHG (ref 80–100)
PO2 BLDA: 82 MMHG (ref 80–100)
PO2 BLDA: 83 MMHG (ref 80–100)
PO2 BLDA: 92 MMHG (ref 80–100)
PO2 BLDA: 98 MMHG (ref 80–100)
POC BE: -1 MMOL/L
POC BE: -13 MMOL/L
POC BE: -15 MMOL/L
POC BE: -15 MMOL/L
POC BE: -16 MMOL/L
POC BE: -2 MMOL/L
POC BE: -3 MMOL/L
POC BE: -4 MMOL/L
POC BE: -5 MMOL/L
POC BE: -5 MMOL/L
POC BE: -8 MMOL/L
POC BE: 0 MMOL/L
POC BE: 1 MMOL/L
POC IONIZED CALCIUM: 1.19 MMOL/L (ref 1.06–1.42)
POC IONIZED CALCIUM: 1.21 MMOL/L (ref 1.06–1.42)
POC IONIZED CALCIUM: 1.25 MMOL/L (ref 1.06–1.42)
POC SATURATED O2: 100 % (ref 95–100)
POC SATURATED O2: 24 % (ref 95–100)
POC SATURATED O2: 38 % (ref 95–100)
POC SATURATED O2: 50 % (ref 95–100)
POC SATURATED O2: 51 % (ref 95–100)
POC SATURATED O2: 52 % (ref 95–100)
POC SATURATED O2: 53 % (ref 95–100)
POC SATURATED O2: 55 % (ref 95–100)
POC SATURATED O2: 56 % (ref 95–100)
POC SATURATED O2: 56 % (ref 95–100)
POC SATURATED O2: 59 % (ref 95–100)
POC SATURATED O2: 60 % (ref 95–100)
POC SATURATED O2: 60 % (ref 95–100)
POC SATURATED O2: 61 % (ref 95–100)
POC SATURATED O2: 64 % (ref 95–100)
POC SATURATED O2: 65 % (ref 95–100)
POC SATURATED O2: 68 % (ref 95–100)
POC SATURATED O2: 69 % (ref 95–100)
POC SATURATED O2: 69 % (ref 95–100)
POC SATURATED O2: 71 % (ref 95–100)
POC SATURATED O2: 73 % (ref 95–100)
POC SATURATED O2: 74 % (ref 95–100)
POC SATURATED O2: 75 % (ref 95–100)
POC SATURATED O2: 77 % (ref 95–100)
POC SATURATED O2: 79 % (ref 95–100)
POC SATURATED O2: 96 % (ref 95–100)
POC SATURATED O2: 96 % (ref 95–100)
POC SATURATED O2: 97 % (ref 95–100)
POC SATURATED O2: 98 % (ref 95–100)
POC SATURATED O2: 99 % (ref 95–100)
POC SATURATED O2: 99 % (ref 95–100)
POC TCO2: 10 MMOL/L (ref 23–27)
POC TCO2: 13 MMOL/L (ref 23–27)
POC TCO2: 13 MMOL/L (ref 23–27)
POC TCO2: 13 MMOL/L (ref 24–29)
POC TCO2: 14 MMOL/L (ref 23–27)
POC TCO2: 15 MMOL/L (ref 24–29)
POC TCO2: 17 MMOL/L (ref 23–27)
POC TCO2: 21 MMOL/L (ref 24–29)
POC TCO2: 22 MMOL/L (ref 23–27)
POC TCO2: 22 MMOL/L (ref 24–29)
POC TCO2: 23 MMOL/L (ref 23–27)
POC TCO2: 23 MMOL/L (ref 24–29)
POC TCO2: 23 MMOL/L (ref 24–29)
POC TCO2: 24 MMOL/L (ref 23–27)
POC TCO2: 24 MMOL/L (ref 24–29)
POC TCO2: 25 MMOL/L (ref 23–27)
POC TCO2: 25 MMOL/L (ref 24–29)
POC TCO2: 26 MMOL/L (ref 24–29)
POC TCO2: 26 MMOL/L (ref 24–29)
POCT GLUCOSE: 129 MG/DL (ref 70–110)
POCT GLUCOSE: 130 MG/DL (ref 70–110)
POCT GLUCOSE: 144 MG/DL (ref 70–110)
POCT GLUCOSE: 148 MG/DL (ref 70–110)
POCT GLUCOSE: 149 MG/DL (ref 70–110)
POCT GLUCOSE: 149 MG/DL (ref 70–110)
POCT GLUCOSE: 152 MG/DL (ref 70–110)
POCT GLUCOSE: 153 MG/DL (ref 70–110)
POCT GLUCOSE: 154 MG/DL (ref 70–110)
POCT GLUCOSE: 156 MG/DL (ref 70–110)
POCT GLUCOSE: 158 MG/DL (ref 70–110)
POCT GLUCOSE: 163 MG/DL (ref 70–110)
POCT GLUCOSE: 165 MG/DL (ref 70–110)
POCT GLUCOSE: 167 MG/DL (ref 70–110)
POCT GLUCOSE: 187 MG/DL (ref 70–110)
POCT GLUCOSE: 189 MG/DL (ref 70–110)
POCT GLUCOSE: 204 MG/DL (ref 70–110)
POCT GLUCOSE: 226 MG/DL (ref 70–110)
POIKILOCYTOSIS BLD QL SMEAR: ABNORMAL
POIKILOCYTOSIS BLD QL SMEAR: SLIGHT
POLYCHROMASIA BLD QL SMEAR: ABNORMAL
POTASSIUM BLD-SCNC: 3.4 MMOL/L (ref 3.5–5.1)
POTASSIUM BLD-SCNC: 4.6 MMOL/L (ref 3.5–5.1)
POTASSIUM BLD-SCNC: 4.7 MMOL/L (ref 3.5–5.1)
POTASSIUM SERPL-SCNC: 3.6 MMOL/L (ref 3.5–5.1)
POTASSIUM SERPL-SCNC: 3.7 MMOL/L (ref 3.5–5.1)
POTASSIUM SERPL-SCNC: 4 MMOL/L (ref 3.5–5.1)
POTASSIUM SERPL-SCNC: 4.3 MMOL/L (ref 3.5–5.1)
POTASSIUM SERPL-SCNC: 4.4 MMOL/L (ref 3.5–5.1)
POTASSIUM SERPL-SCNC: 4.5 MMOL/L (ref 3.5–5.1)
POTASSIUM SERPL-SCNC: 4.5 MMOL/L (ref 3.5–5.1)
POTASSIUM SERPL-SCNC: 4.6 MMOL/L (ref 3.5–5.1)
POTASSIUM SERPL-SCNC: 4.7 MMOL/L (ref 3.5–5.1)
POTASSIUM SERPL-SCNC: 4.8 MMOL/L (ref 3.5–5.1)
POTASSIUM SERPL-SCNC: 5.1 MMOL/L (ref 3.5–5.1)
PROCALCITONIN SERPL IA-MCNC: 5 NG/ML
PROMYELOCYTES NFR BLD MANUAL: 1 %
PROMYELOCYTES NFR BLD MANUAL: 1 %
PROT SERPL-MCNC: 3.3 G/DL (ref 6–8.4)
PROT SERPL-MCNC: 3.4 G/DL (ref 6–8.4)
PROT SERPL-MCNC: 3.8 G/DL (ref 6–8.4)
PROT SERPL-MCNC: 3.8 G/DL (ref 6–8.4)
PROT SERPL-MCNC: 3.9 G/DL (ref 6–8.4)
PROT SERPL-MCNC: 4 G/DL (ref 6–8.4)
PROT SERPL-MCNC: 4 G/DL (ref 6–8.4)
PROT SERPL-MCNC: 4.1 G/DL (ref 6–8.4)
PROT SERPL-MCNC: 4.1 G/DL (ref 6–8.4)
PROT SERPL-MCNC: 4.2 G/DL (ref 6–8.4)
PROT SERPL-MCNC: 4.2 G/DL (ref 6–8.4)
PROT SERPL-MCNC: 4.6 G/DL (ref 6–8.4)
PROT UR QL STRIP: ABNORMAL
PROTHROMBIN TIME: 11.8 SEC (ref 9–12.5)
PROTHROMBIN TIME: 11.9 SEC (ref 9–12.5)
PROTHROMBIN TIME: 11.9 SEC (ref 9–12.5)
PROTHROMBIN TIME: 12.9 SEC (ref 9–12.5)
PROTHROMBIN TIME: 13.5 SEC (ref 9–12.5)
PROTHROMBIN TIME: 13.9 SEC (ref 9–12.5)
PROTHROMBIN TIME: 14.2 SEC (ref 9–12.5)
PROTHROMBIN TIME: 18.1 SEC (ref 9–12.5)
PROTHROMBIN TIME: 20.3 SEC (ref 9–12.5)
PROTHROMBIN TIME: NORMAL SEC (ref 9–12.5)
PS: 5
PV MEAN GRADIENT: 0.9 MMHG
PV PEAK VELOCITY: 1.06 CM/S
RA MAJOR: 7.19 CM
RA PRESSURE: 15 MMHG
RA WIDTH: 7.11 CM
RBC # BLD AUTO: 2.54 M/UL (ref 4.6–6.2)
RBC # BLD AUTO: 2.56 M/UL (ref 4.6–6.2)
RBC # BLD AUTO: 2.62 M/UL (ref 4.6–6.2)
RBC # BLD AUTO: 2.88 M/UL (ref 4.6–6.2)
RBC # BLD AUTO: 2.88 M/UL (ref 4.6–6.2)
RBC # BLD AUTO: 2.95 M/UL (ref 4.6–6.2)
RBC # BLD AUTO: 3.04 M/UL (ref 4.6–6.2)
RBC # BLD AUTO: 3.06 M/UL (ref 4.6–6.2)
RBC # BLD AUTO: 3.08 M/UL (ref 4.6–6.2)
RBC # BLD AUTO: 3.16 M/UL (ref 4.6–6.2)
RBC # BLD AUTO: 3.16 M/UL (ref 4.6–6.2)
RBC # BLD AUTO: 3.22 M/UL (ref 4.6–6.2)
RBC # BLD AUTO: 3.32 M/UL (ref 4.6–6.2)
RBC # BLD AUTO: 3.5 M/UL (ref 4.6–6.2)
RBC # BLD AUTO: 3.51 M/UL (ref 4.6–6.2)
RBC # BLD AUTO: 3.53 M/UL (ref 4.6–6.2)
RBC # BLD AUTO: 3.57 M/UL (ref 4.6–6.2)
RBC # BLD AUTO: 3.59 M/UL (ref 4.6–6.2)
RBC #/AREA URNS AUTO: 18 /HPF (ref 0–4)
RETICS/RBC NFR AUTO: 2.9 % (ref 0.4–2)
RETICS/RBC NFR AUTO: 3.9 % (ref 0.4–2)
RIGHT ATRIAL AREA: 36 CM2
RIGHT VENTRICULAR END-DIASTOLIC DIMENSION: 6.36 CM
SAMPLE: ABNORMAL
SAMPLE: NORMAL
SAMPLE: NORMAL
SARS-COV-2 RDRP RESP QL NAA+PROBE: NEGATIVE
SCHISTOCYTES BLD QL SMEAR: ABNORMAL
SCHISTOCYTES BLD QL SMEAR: PRESENT
SINUS: 4.22 CM
SITE: ABNORMAL
SITE: NORMAL
SITE: NORMAL
SMUDGE CELLS BLD QL SMEAR: PRESENT
SODIUM BLD-SCNC: 125 MMOL/L (ref 136–145)
SODIUM BLD-SCNC: 126 MMOL/L (ref 136–145)
SODIUM BLD-SCNC: 131 MMOL/L (ref 136–145)
SODIUM SERPL-SCNC: 122 MMOL/L (ref 136–145)
SODIUM SERPL-SCNC: 123 MMOL/L (ref 136–145)
SODIUM SERPL-SCNC: 124 MMOL/L (ref 136–145)
SODIUM SERPL-SCNC: 125 MMOL/L (ref 136–145)
SODIUM SERPL-SCNC: 126 MMOL/L (ref 136–145)
SODIUM SERPL-SCNC: 128 MMOL/L (ref 136–145)
SODIUM SERPL-SCNC: 129 MMOL/L (ref 136–145)
SODIUM SERPL-SCNC: 130 MMOL/L (ref 136–145)
SODIUM SERPL-SCNC: 130 MMOL/L (ref 136–145)
SODIUM SERPL-SCNC: 131 MMOL/L (ref 136–145)
SODIUM SERPL-SCNC: 132 MMOL/L (ref 136–145)
SODIUM SERPL-SCNC: 132 MMOL/L (ref 136–145)
SODIUM SERPL-SCNC: 133 MMOL/L (ref 136–145)
SODIUM SERPL-SCNC: 133 MMOL/L (ref 136–145)
SODIUM UR-SCNC: 16 MMOL/L (ref 20–250)
SP GR UR STRIP: 1.01 (ref 1–1.03)
SP02: 97
SPHEROCYTES BLD QL SMEAR: ABNORMAL
SPONT RATE: 16
SPONT RATE: 20
STJ: 3.66 CM
TDI LATERAL: 0.13 M/S
TDI SEPTAL: 0.05 M/S
TDI: 0.09 M/S
TOXIC GRANULES BLD QL SMEAR: PRESENT
TR MAX PG: 64 MMHG
TRANS ERYTHROCYTES VOL PATIENT: NORMAL ML
TRANS ERYTHROCYTES VOL PATIENT: NORMAL ML
TRICUSPID ANNULAR PLANE SYSTOLIC EXCURSION: 0.86 CM
TV REST PULMONARY ARTERY PRESSURE: 79 MMHG
URN SPEC COLLECT METH UR: ABNORMAL
UUN UR-MCNC: 450 MG/DL (ref 140–1050)
VANCOMYCIN SERPL-MCNC: 18.4 UG/ML
VANCOMYCIN SERPL-MCNC: 20 UG/ML
VANCOMYCIN SERPL-MCNC: 21.5 UG/ML
VANCOMYCIN SERPL-MCNC: 26.3 UG/ML
VOL: 615
VT: 375
VT: 378
VT: 500
VT: 535
WBC # BLD AUTO: 18.11 K/UL (ref 3.9–12.7)
WBC # BLD AUTO: 18.22 K/UL (ref 3.9–12.7)
WBC # BLD AUTO: 18.74 K/UL (ref 3.9–12.7)
WBC # BLD AUTO: 22.15 K/UL (ref 3.9–12.7)
WBC # BLD AUTO: 22.25 K/UL (ref 3.9–12.7)
WBC # BLD AUTO: 23.72 K/UL (ref 3.9–12.7)
WBC # BLD AUTO: 24.95 K/UL (ref 3.9–12.7)
WBC # BLD AUTO: 24.95 K/UL (ref 3.9–12.7)
WBC # BLD AUTO: 25.19 K/UL (ref 3.9–12.7)
WBC # BLD AUTO: 25.71 K/UL (ref 3.9–12.7)
WBC # BLD AUTO: 27.34 K/UL (ref 3.9–12.7)
WBC # BLD AUTO: 28.03 K/UL (ref 3.9–12.7)
WBC # BLD AUTO: 29.24 K/UL (ref 3.9–12.7)
WBC # BLD AUTO: 30.96 K/UL (ref 3.9–12.7)
WBC # BLD AUTO: 38.86 K/UL (ref 3.9–12.7)
WBC # BLD AUTO: 39.95 K/UL (ref 3.9–12.7)
WBC # BLD AUTO: 40.63 K/UL (ref 3.9–12.7)
WBC # BLD AUTO: 43.65 K/UL (ref 3.9–12.7)
WBC #/AREA URNS AUTO: 0 /HPF (ref 0–5)
WBC TOXIC VACUOLES BLD QL SMEAR: PRESENT

## 2022-01-01 PROCEDURE — 20000000 HC ICU ROOM

## 2022-01-01 PROCEDURE — 80069 RENAL FUNCTION PANEL: CPT | Mod: 91 | Performed by: INTERNAL MEDICINE

## 2022-01-01 PROCEDURE — 87449 NOS EACH ORGANISM AG IA: CPT | Performed by: STUDENT IN AN ORGANIZED HEALTH CARE EDUCATION/TRAINING PROGRAM

## 2022-01-01 PROCEDURE — 84295 ASSAY OF SERUM SODIUM: CPT

## 2022-01-01 PROCEDURE — 99499 NO LOS: ICD-10-PCS | Mod: ,,, | Performed by: INTERNAL MEDICINE

## 2022-01-01 PROCEDURE — 63600175 PHARM REV CODE 636 W HCPCS: Performed by: INTERNAL MEDICINE

## 2022-01-01 PROCEDURE — 83735 ASSAY OF MAGNESIUM: CPT | Mod: 91 | Performed by: INTERNAL MEDICINE

## 2022-01-01 PROCEDURE — 1159F MED LIST DOCD IN RCRD: CPT | Mod: CPTII,S$GLB,, | Performed by: INTERNAL MEDICINE

## 2022-01-01 PROCEDURE — 85730 THROMBOPLASTIN TIME PARTIAL: CPT | Performed by: STUDENT IN AN ORGANIZED HEALTH CARE EDUCATION/TRAINING PROGRAM

## 2022-01-01 PROCEDURE — 25000003 PHARM REV CODE 250: Performed by: INTERNAL MEDICINE

## 2022-01-01 PROCEDURE — 63600175 PHARM REV CODE 636 W HCPCS: Performed by: STUDENT IN AN ORGANIZED HEALTH CARE EDUCATION/TRAINING PROGRAM

## 2022-01-01 PROCEDURE — 94761 N-INVAS EAR/PLS OXIMETRY MLT: CPT

## 2022-01-01 PROCEDURE — 85027 COMPLETE CBC AUTOMATED: CPT | Performed by: STUDENT IN AN ORGANIZED HEALTH CARE EDUCATION/TRAINING PROGRAM

## 2022-01-01 PROCEDURE — 85045 AUTOMATED RETICULOCYTE COUNT: CPT | Performed by: INTERNAL MEDICINE

## 2022-01-01 PROCEDURE — 80202 ASSAY OF VANCOMYCIN: CPT | Performed by: INTERNAL MEDICINE

## 2022-01-01 PROCEDURE — C1894 INTRO/SHEATH, NON-LASER: HCPCS | Performed by: INTERNAL MEDICINE

## 2022-01-01 PROCEDURE — 93590 PERQ TRANSCATH CLS MITRAL: CPT | Performed by: INTERNAL MEDICINE

## 2022-01-01 PROCEDURE — 25000003 PHARM REV CODE 250: Performed by: STUDENT IN AN ORGANIZED HEALTH CARE EDUCATION/TRAINING PROGRAM

## 2022-01-01 PROCEDURE — 37799 UNLISTED PX VASCULAR SURGERY: CPT

## 2022-01-01 PROCEDURE — 84100 ASSAY OF PHOSPHORUS: CPT | Performed by: STUDENT IN AN ORGANIZED HEALTH CARE EDUCATION/TRAINING PROGRAM

## 2022-01-01 PROCEDURE — 84100 ASSAY OF PHOSPHORUS: CPT | Mod: 91 | Performed by: INTERNAL MEDICINE

## 2022-01-01 PROCEDURE — 27000221 HC OXYGEN, UP TO 24 HOURS

## 2022-01-01 PROCEDURE — 85060 BLOOD SMEAR INTERPRETATION: CPT | Mod: ,,, | Performed by: PATHOLOGY

## 2022-01-01 PROCEDURE — 99233 PR SUBSEQUENT HOSPITAL CARE,LEVL III: ICD-10-PCS | Mod: ,,, | Performed by: INTERNAL MEDICINE

## 2022-01-01 PROCEDURE — 90945 PR DIALYSIS, NOT HEMO, 1 EVAL: ICD-10-PCS | Mod: ,,, | Performed by: INTERNAL MEDICINE

## 2022-01-01 PROCEDURE — 85060 PATHOLOGIST REVIEW: ICD-10-PCS | Mod: ,,, | Performed by: PATHOLOGY

## 2022-01-01 PROCEDURE — 1125F PR PAIN SEVERITY QUANTIFIED, PAIN PRESENT: ICD-10-PCS | Mod: CPTII,S$GLB,, | Performed by: INTERNAL MEDICINE

## 2022-01-01 PROCEDURE — C2617 STENT, NON-COR, TEM W/O DEL: HCPCS | Performed by: INTERNAL MEDICINE

## 2022-01-01 PROCEDURE — 83735 ASSAY OF MAGNESIUM: CPT | Performed by: STUDENT IN AN ORGANIZED HEALTH CARE EDUCATION/TRAINING PROGRAM

## 2022-01-01 PROCEDURE — 99900026 HC AIRWAY MAINTENANCE (STAT)

## 2022-01-01 PROCEDURE — 37000008 HC ANESTHESIA 1ST 15 MINUTES: Performed by: INTERNAL MEDICINE

## 2022-01-01 PROCEDURE — 25000003 PHARM REV CODE 250

## 2022-01-01 PROCEDURE — 80053 COMPREHEN METABOLIC PANEL: CPT | Performed by: STUDENT IN AN ORGANIZED HEALTH CARE EDUCATION/TRAINING PROGRAM

## 2022-01-01 PROCEDURE — 63600175 PHARM REV CODE 636 W HCPCS

## 2022-01-01 PROCEDURE — C1766 INTRO/SHEATH,STRBLE,NON-PEEL: HCPCS | Performed by: INTERNAL MEDICINE

## 2022-01-01 PROCEDURE — 4010F ACE/ARB THERAPY RXD/TAKEN: CPT | Mod: CPTII,S$GLB,, | Performed by: INTERNAL MEDICINE

## 2022-01-01 PROCEDURE — 82330 ASSAY OF CALCIUM: CPT | Performed by: INTERNAL MEDICINE

## 2022-01-01 PROCEDURE — 1159F PR MEDICATION LIST DOCUMENTED IN MEDICAL RECORD: ICD-10-PCS | Mod: CPTII,95,, | Performed by: INTERNAL MEDICINE

## 2022-01-01 PROCEDURE — C1769 GUIDE WIRE: HCPCS | Performed by: INTERNAL MEDICINE

## 2022-01-01 PROCEDURE — 83735 ASSAY OF MAGNESIUM: CPT | Mod: 91 | Performed by: STUDENT IN AN ORGANIZED HEALTH CARE EDUCATION/TRAINING PROGRAM

## 2022-01-01 PROCEDURE — 84300 ASSAY OF URINE SODIUM: CPT | Performed by: STUDENT IN AN ORGANIZED HEALTH CARE EDUCATION/TRAINING PROGRAM

## 2022-01-01 PROCEDURE — 99222 PR INITIAL HOSPITAL CARE,LEVL II: ICD-10-PCS | Mod: ,,, | Performed by: EMERGENCY MEDICINE

## 2022-01-01 PROCEDURE — 86850 RBC ANTIBODY SCREEN: CPT | Performed by: INTERNAL MEDICINE

## 2022-01-01 PROCEDURE — 99233 SBSQ HOSP IP/OBS HIGH 50: CPT | Mod: ,,, | Performed by: INTERNAL MEDICINE

## 2022-01-01 PROCEDURE — 83615 LACTATE (LD) (LDH) ENZYME: CPT | Performed by: INTERNAL MEDICINE

## 2022-01-01 PROCEDURE — 99900017 HC EXTUBATION W/PARAMETERS (STAT)

## 2022-01-01 PROCEDURE — 99223 PR INITIAL HOSPITAL CARE,LEVL III: ICD-10-PCS | Mod: ,,, | Performed by: PHYSICIAN ASSISTANT

## 2022-01-01 PROCEDURE — 82803 BLOOD GASES ANY COMBINATION: CPT

## 2022-01-01 PROCEDURE — 99900035 HC TECH TIME PER 15 MIN (STAT)

## 2022-01-01 PROCEDURE — 86901 BLOOD TYPING SEROLOGIC RH(D): CPT | Performed by: INTERNAL MEDICINE

## 2022-01-01 PROCEDURE — 85730 THROMBOPLASTIN TIME PARTIAL: CPT | Mod: 91 | Performed by: INTERNAL MEDICINE

## 2022-01-01 PROCEDURE — 63600367 HC NITRIC OXIDE PER HOUR

## 2022-01-01 PROCEDURE — 85025 COMPLETE CBC W/AUTO DIFF WBC: CPT | Performed by: STUDENT IN AN ORGANIZED HEALTH CARE EDUCATION/TRAINING PROGRAM

## 2022-01-01 PROCEDURE — 85347 COAGULATION TIME ACTIVATED: CPT

## 2022-01-01 PROCEDURE — 85014 HEMATOCRIT: CPT

## 2022-01-01 PROCEDURE — 90945 DIALYSIS ONE EVALUATION: CPT

## 2022-01-01 PROCEDURE — 27200966 HC CLOSED SUCTION SYSTEM

## 2022-01-01 PROCEDURE — 93590 PERQ TRANSCATH CLS MITRAL: CPT | Mod: ,,, | Performed by: INTERNAL MEDICINE

## 2022-01-01 PROCEDURE — 94002 VENT MGMT INPAT INIT DAY: CPT

## 2022-01-01 PROCEDURE — P9021 RED BLOOD CELLS UNIT: HCPCS | Mod: BL | Performed by: INTERNAL MEDICINE

## 2022-01-01 PROCEDURE — 99223 PR INITIAL HOSPITAL CARE,LEVL III: ICD-10-PCS | Mod: ,,, | Performed by: INTERNAL MEDICINE

## 2022-01-01 PROCEDURE — 94003 VENT MGMT INPAT SUBQ DAY: CPT

## 2022-01-01 PROCEDURE — 4010F PR ACE/ARB THEARPY RXD/TAKEN: ICD-10-PCS | Mod: CPTII,S$GLB,, | Performed by: INTERNAL MEDICINE

## 2022-01-01 PROCEDURE — 99291 PR CRITICAL CARE, E/M 30-74 MINUTES: ICD-10-PCS | Mod: ,,, | Performed by: INTERNAL MEDICINE

## 2022-01-01 PROCEDURE — 63600175 PHARM REV CODE 636 W HCPCS: Mod: JG

## 2022-01-01 PROCEDURE — 80100008 HC CRRT DAILY MAINTENANCE

## 2022-01-01 PROCEDURE — 84132 ASSAY OF SERUM POTASSIUM: CPT

## 2022-01-01 PROCEDURE — 3008F PR BODY MASS INDEX (BMI) DOCUMENTED: ICD-10-PCS | Mod: CPTII,S$GLB,, | Performed by: INTERNAL MEDICINE

## 2022-01-01 PROCEDURE — 82565 ASSAY OF CREATININE: CPT

## 2022-01-01 PROCEDURE — 83605 ASSAY OF LACTIC ACID: CPT

## 2022-01-01 PROCEDURE — 85610 PROTHROMBIN TIME: CPT | Performed by: STUDENT IN AN ORGANIZED HEALTH CARE EDUCATION/TRAINING PROGRAM

## 2022-01-01 PROCEDURE — 99232 PR SUBSEQUENT HOSPITAL CARE,LEVL II: ICD-10-PCS | Mod: ,,, | Performed by: HOSPITALIST

## 2022-01-01 PROCEDURE — 99222 1ST HOSP IP/OBS MODERATE 55: CPT | Mod: ,,, | Performed by: EMERGENCY MEDICINE

## 2022-01-01 PROCEDURE — 93010 ELECTROCARDIOGRAM REPORT: CPT | Mod: ,,, | Performed by: INTERNAL MEDICINE

## 2022-01-01 PROCEDURE — 85025 COMPLETE CBC W/AUTO DIFF WBC: CPT | Performed by: INTERNAL MEDICINE

## 2022-01-01 PROCEDURE — 99204 OFFICE O/P NEW MOD 45 MIN: CPT | Mod: 95,,, | Performed by: INTERNAL MEDICINE

## 2022-01-01 PROCEDURE — 99238 PR HOSPITAL DISCHARGE DAY,<30 MIN: ICD-10-PCS | Mod: ,,, | Performed by: INTERNAL MEDICINE

## 2022-01-01 PROCEDURE — 87070 CULTURE OTHR SPECIMN AEROBIC: CPT | Performed by: STUDENT IN AN ORGANIZED HEALTH CARE EDUCATION/TRAINING PROGRAM

## 2022-01-01 PROCEDURE — 36620 INSERTION CATHETER ARTERY: CPT | Mod: 59,,, | Performed by: STUDENT IN AN ORGANIZED HEALTH CARE EDUCATION/TRAINING PROGRAM

## 2022-01-01 PROCEDURE — 85025 COMPLETE CBC W/AUTO DIFF WBC: CPT | Mod: 91 | Performed by: INTERNAL MEDICINE

## 2022-01-01 PROCEDURE — 90945 DIALYSIS ONE EVALUATION: CPT | Mod: ,,, | Performed by: INTERNAL MEDICINE

## 2022-01-01 PROCEDURE — 99238 HOSP IP/OBS DSCHRG MGMT 30/<: CPT | Mod: ,,, | Performed by: INTERNAL MEDICINE

## 2022-01-01 PROCEDURE — 85007 BL SMEAR W/DIFF WBC COUNT: CPT | Mod: 91 | Performed by: INTERNAL MEDICINE

## 2022-01-01 PROCEDURE — P9047 ALBUMIN (HUMAN), 25%, 50ML: HCPCS | Mod: JG

## 2022-01-01 PROCEDURE — 82330 ASSAY OF CALCIUM: CPT

## 2022-01-01 PROCEDURE — 99223 1ST HOSP IP/OBS HIGH 75: CPT | Mod: ,,, | Performed by: INTERNAL MEDICINE

## 2022-01-01 PROCEDURE — 4010F ACE/ARB THERAPY RXD/TAKEN: CPT | Mod: CPTII,95,, | Performed by: INTERNAL MEDICINE

## 2022-01-01 PROCEDURE — 99499 NO LOS: ICD-10-PCS | Mod: ,,, | Performed by: PEDIATRICS

## 2022-01-01 PROCEDURE — 1160F RVW MEDS BY RX/DR IN RCRD: CPT | Mod: CPTII,95,, | Performed by: INTERNAL MEDICINE

## 2022-01-01 PROCEDURE — C1773 RET DEV, INSERTABLE: HCPCS | Performed by: INTERNAL MEDICINE

## 2022-01-01 PROCEDURE — 85730 THROMBOPLASTIN TIME PARTIAL: CPT | Performed by: INTERNAL MEDICINE

## 2022-01-01 PROCEDURE — 83735 ASSAY OF MAGNESIUM: CPT | Performed by: INTERNAL MEDICINE

## 2022-01-01 PROCEDURE — 99232 SBSQ HOSP IP/OBS MODERATE 35: CPT | Mod: ,,, | Performed by: HOSPITALIST

## 2022-01-01 PROCEDURE — 36430 TRANSFUSION BLD/BLD COMPNT: CPT

## 2022-01-01 PROCEDURE — 94660 CPAP INITIATION&MGMT: CPT

## 2022-01-01 PROCEDURE — 80048 BASIC METABOLIC PNL TOTAL CA: CPT | Mod: XB | Performed by: INTERNAL MEDICINE

## 2022-01-01 PROCEDURE — 85007 BL SMEAR W/DIFF WBC COUNT: CPT | Mod: 91 | Performed by: STUDENT IN AN ORGANIZED HEALTH CARE EDUCATION/TRAINING PROGRAM

## 2022-01-01 PROCEDURE — 3078F DIAST BP <80 MM HG: CPT | Mod: CPTII,S$GLB,, | Performed by: INTERNAL MEDICINE

## 2022-01-01 PROCEDURE — 85045 AUTOMATED RETICULOCYTE COUNT: CPT | Performed by: STUDENT IN AN ORGANIZED HEALTH CARE EDUCATION/TRAINING PROGRAM

## 2022-01-01 PROCEDURE — U0002 COVID-19 LAB TEST NON-CDC: HCPCS | Performed by: STUDENT IN AN ORGANIZED HEALTH CARE EDUCATION/TRAINING PROGRAM

## 2022-01-01 PROCEDURE — 31500 INSERT EMERGENCY AIRWAY: CPT

## 2022-01-01 PROCEDURE — 27000190 HC CPAP FULL FACE MASK W/VALVE

## 2022-01-01 PROCEDURE — 1160F RVW MEDS BY RX/DR IN RCRD: CPT | Mod: CPTII,S$GLB,, | Performed by: INTERNAL MEDICINE

## 2022-01-01 PROCEDURE — 99291 CRITICAL CARE FIRST HOUR: CPT | Mod: ,,, | Performed by: INTERNAL MEDICINE

## 2022-01-01 PROCEDURE — 82550 ASSAY OF CK (CPK): CPT | Performed by: STUDENT IN AN ORGANIZED HEALTH CARE EDUCATION/TRAINING PROGRAM

## 2022-01-01 PROCEDURE — 82800 BLOOD PH: CPT

## 2022-01-01 PROCEDURE — 99214 OFFICE O/P EST MOD 30 MIN: CPT | Mod: S$GLB,,, | Performed by: INTERNAL MEDICINE

## 2022-01-01 PROCEDURE — 94150 VITAL CAPACITY TEST: CPT

## 2022-01-01 PROCEDURE — 51702 INSERT TEMP BLADDER CATH: CPT

## 2022-01-01 PROCEDURE — 83050 HGB METHEMOGLOBIN QUAN: CPT

## 2022-01-01 PROCEDURE — D9220A PRA ANESTHESIA: ICD-10-PCS | Mod: ,,, | Performed by: STUDENT IN AN ORGANIZED HEALTH CARE EDUCATION/TRAINING PROGRAM

## 2022-01-01 PROCEDURE — 99223 1ST HOSP IP/OBS HIGH 75: CPT | Mod: ,,, | Performed by: PHYSICIAN ASSISTANT

## 2022-01-01 PROCEDURE — 80069 RENAL FUNCTION PANEL: CPT | Performed by: INTERNAL MEDICINE

## 2022-01-01 PROCEDURE — 37000009 HC ANESTHESIA EA ADD 15 MINS: Performed by: INTERNAL MEDICINE

## 2022-01-01 PROCEDURE — 80053 COMPREHEN METABOLIC PANEL: CPT | Performed by: INTERNAL MEDICINE

## 2022-01-01 PROCEDURE — 1159F MED LIST DOCD IN RCRD: CPT | Mod: CPTII,95,, | Performed by: INTERNAL MEDICINE

## 2022-01-01 PROCEDURE — 85027 COMPLETE CBC AUTOMATED: CPT | Mod: 91 | Performed by: INTERNAL MEDICINE

## 2022-01-01 PROCEDURE — 80053 COMPREHEN METABOLIC PANEL: CPT | Mod: 91 | Performed by: STUDENT IN AN ORGANIZED HEALTH CARE EDUCATION/TRAINING PROGRAM

## 2022-01-01 PROCEDURE — 80053 COMPREHEN METABOLIC PANEL: CPT | Mod: 91 | Performed by: INTERNAL MEDICINE

## 2022-01-01 PROCEDURE — 99223 1ST HOSP IP/OBS HIGH 75: CPT | Mod: ,,, | Performed by: NURSE PRACTITIONER

## 2022-01-01 PROCEDURE — C1760 CLOSURE DEV, VASC: HCPCS | Performed by: INTERNAL MEDICINE

## 2022-01-01 PROCEDURE — 3008F BODY MASS INDEX DOCD: CPT | Mod: CPTII,S$GLB,, | Performed by: INTERNAL MEDICINE

## 2022-01-01 PROCEDURE — C1817 SEPTAL DEFECT IMP SYS: HCPCS | Performed by: INTERNAL MEDICINE

## 2022-01-01 PROCEDURE — 99214 PR OFFICE/OUTPT VISIT, EST, LEVL IV, 30-39 MIN: ICD-10-PCS | Mod: S$GLB,,, | Performed by: INTERNAL MEDICINE

## 2022-01-01 PROCEDURE — 87205 SMEAR GRAM STAIN: CPT | Performed by: STUDENT IN AN ORGANIZED HEALTH CARE EDUCATION/TRAINING PROGRAM

## 2022-01-01 PROCEDURE — 93005 ELECTROCARDIOGRAM TRACING: CPT

## 2022-01-01 PROCEDURE — 85610 PROTHROMBIN TIME: CPT | Mod: 91 | Performed by: INTERNAL MEDICINE

## 2022-01-01 PROCEDURE — 84100 ASSAY OF PHOSPHORUS: CPT | Mod: 91 | Performed by: STUDENT IN AN ORGANIZED HEALTH CARE EDUCATION/TRAINING PROGRAM

## 2022-01-01 PROCEDURE — 87040 BLOOD CULTURE FOR BACTERIA: CPT | Mod: 59 | Performed by: INTERNAL MEDICINE

## 2022-01-01 PROCEDURE — 82570 ASSAY OF URINE CREATININE: CPT | Performed by: STUDENT IN AN ORGANIZED HEALTH CARE EDUCATION/TRAINING PROGRAM

## 2022-01-01 PROCEDURE — 85384 FIBRINOGEN ACTIVITY: CPT | Performed by: INTERNAL MEDICINE

## 2022-01-01 PROCEDURE — 3078F PR MOST RECENT DIASTOLIC BLOOD PRESSURE < 80 MM HG: ICD-10-PCS | Mod: CPTII,S$GLB,, | Performed by: INTERNAL MEDICINE

## 2022-01-01 PROCEDURE — 99999 PR PBB SHADOW E&M-EST. PATIENT-LVL IV: CPT | Mod: PBBFAC,,, | Performed by: INTERNAL MEDICINE

## 2022-01-01 PROCEDURE — 3074F PR MOST RECENT SYSTOLIC BLOOD PRESSURE < 130 MM HG: ICD-10-PCS | Mod: CPTII,S$GLB,, | Performed by: INTERNAL MEDICINE

## 2022-01-01 PROCEDURE — 85007 BL SMEAR W/DIFF WBC COUNT: CPT | Performed by: STUDENT IN AN ORGANIZED HEALTH CARE EDUCATION/TRAINING PROGRAM

## 2022-01-01 PROCEDURE — 99223 PR INITIAL HOSPITAL CARE,LEVL III: ICD-10-PCS | Mod: ,,, | Performed by: NURSE PRACTITIONER

## 2022-01-01 PROCEDURE — 1160F PR REVIEW ALL MEDS BY PRESCRIBER/CLIN PHARMACIST DOCUMENTED: ICD-10-PCS | Mod: CPTII,S$GLB,, | Performed by: INTERNAL MEDICINE

## 2022-01-01 PROCEDURE — 27000207 HC ISOLATION

## 2022-01-01 PROCEDURE — 99999 PR PBB SHADOW E&M-EST. PATIENT-LVL IV: ICD-10-PCS | Mod: PBBFAC,,, | Performed by: INTERNAL MEDICINE

## 2022-01-01 PROCEDURE — 87040 BLOOD CULTURE FOR BACTERIA: CPT | Mod: 59 | Performed by: STUDENT IN AN ORGANIZED HEALTH CARE EDUCATION/TRAINING PROGRAM

## 2022-01-01 PROCEDURE — 83010 ASSAY OF HAPTOGLOBIN QUANT: CPT | Performed by: INTERNAL MEDICINE

## 2022-01-01 PROCEDURE — 86920 COMPATIBILITY TEST SPIN: CPT | Performed by: INTERNAL MEDICINE

## 2022-01-01 PROCEDURE — 25000003 PHARM REV CODE 250: Performed by: NURSE PRACTITIONER

## 2022-01-01 PROCEDURE — 3074F SYST BP LT 130 MM HG: CPT | Mod: CPTII,S$GLB,, | Performed by: INTERNAL MEDICINE

## 2022-01-01 PROCEDURE — 99499 UNLISTED E&M SERVICE: CPT | Mod: ,,, | Performed by: PEDIATRICS

## 2022-01-01 PROCEDURE — 81001 URINALYSIS AUTO W/SCOPE: CPT | Performed by: INTERNAL MEDICINE

## 2022-01-01 PROCEDURE — 80202 ASSAY OF VANCOMYCIN: CPT | Mod: 91 | Performed by: INTERNAL MEDICINE

## 2022-01-01 PROCEDURE — 84145 PROCALCITONIN (PCT): CPT | Performed by: STUDENT IN AN ORGANIZED HEALTH CARE EDUCATION/TRAINING PROGRAM

## 2022-01-01 PROCEDURE — 85027 COMPLETE CBC AUTOMATED: CPT | Mod: 91 | Performed by: STUDENT IN AN ORGANIZED HEALTH CARE EDUCATION/TRAINING PROGRAM

## 2022-01-01 PROCEDURE — 93590: ICD-10-PCS | Mod: ,,, | Performed by: INTERNAL MEDICINE

## 2022-01-01 PROCEDURE — 36415 COLL VENOUS BLD VENIPUNCTURE: CPT | Performed by: INTERNAL MEDICINE

## 2022-01-01 PROCEDURE — 83605 ASSAY OF LACTIC ACID: CPT | Performed by: INTERNAL MEDICINE

## 2022-01-01 PROCEDURE — 85025 COMPLETE CBC W/AUTO DIFF WBC: CPT | Mod: 91 | Performed by: STUDENT IN AN ORGANIZED HEALTH CARE EDUCATION/TRAINING PROGRAM

## 2022-01-01 PROCEDURE — 84100 ASSAY OF PHOSPHORUS: CPT | Performed by: INTERNAL MEDICINE

## 2022-01-01 PROCEDURE — 27000200 HC HIGH FLOW DEL DISP CIRCUIT

## 2022-01-01 PROCEDURE — 82272 OCCULT BLD FECES 1-3 TESTS: CPT | Performed by: INTERNAL MEDICINE

## 2022-01-01 PROCEDURE — D9220A PRA ANESTHESIA: Mod: ,,, | Performed by: STUDENT IN AN ORGANIZED HEALTH CARE EDUCATION/TRAINING PROGRAM

## 2022-01-01 PROCEDURE — 99499 UNLISTED E&M SERVICE: CPT | Mod: ,,, | Performed by: INTERNAL MEDICINE

## 2022-01-01 PROCEDURE — 1159F PR MEDICATION LIST DOCUMENTED IN MEDICAL RECORD: ICD-10-PCS | Mod: CPTII,S$GLB,, | Performed by: INTERNAL MEDICINE

## 2022-01-01 PROCEDURE — 36620 ARTERIAL: ICD-10-PCS | Mod: 59,,, | Performed by: STUDENT IN AN ORGANIZED HEALTH CARE EDUCATION/TRAINING PROGRAM

## 2022-01-01 PROCEDURE — 1125F AMNT PAIN NOTED PAIN PRSNT: CPT | Mod: CPTII,S$GLB,, | Performed by: INTERNAL MEDICINE

## 2022-01-01 PROCEDURE — 4010F PR ACE/ARB THEARPY RXD/TAKEN: ICD-10-PCS | Mod: CPTII,95,, | Performed by: INTERNAL MEDICINE

## 2022-01-01 PROCEDURE — 36556 INSERT NON-TUNNEL CV CATH: CPT

## 2022-01-01 PROCEDURE — 86900 BLOOD TYPING SEROLOGIC ABO: CPT | Performed by: INTERNAL MEDICINE

## 2022-01-01 PROCEDURE — 93010 EKG 12-LEAD: ICD-10-PCS | Mod: ,,, | Performed by: INTERNAL MEDICINE

## 2022-01-01 PROCEDURE — 1160F PR REVIEW ALL MEDS BY PRESCRIBER/CLIN PHARMACIST DOCUMENTED: ICD-10-PCS | Mod: CPTII,95,, | Performed by: INTERNAL MEDICINE

## 2022-01-01 PROCEDURE — C1887 CATHETER, GUIDING: HCPCS | Performed by: INTERNAL MEDICINE

## 2022-01-01 PROCEDURE — 27201423 OPTIME MED/SURG SUP & DEVICES STERILE SUPPLY: Performed by: INTERNAL MEDICINE

## 2022-01-01 PROCEDURE — 83605 ASSAY OF LACTIC ACID: CPT | Performed by: STUDENT IN AN ORGANIZED HEALTH CARE EDUCATION/TRAINING PROGRAM

## 2022-01-01 PROCEDURE — 27000191 HC C-V MONITORING

## 2022-01-01 PROCEDURE — 99204 PR OFFICE/OUTPT VISIT, NEW, LEVL IV, 45-59 MIN: ICD-10-PCS | Mod: 95,,, | Performed by: INTERNAL MEDICINE

## 2022-01-01 PROCEDURE — 84540 ASSAY OF URINE/UREA-N: CPT | Performed by: STUDENT IN AN ORGANIZED HEALTH CARE EDUCATION/TRAINING PROGRAM

## 2022-01-01 DEVICE — MUSCULAR VSD OCCLUDER
Type: IMPLANTABLE DEVICE | Site: HEART | Status: FUNCTIONAL
Brand: AMPLATZER™

## 2022-01-01 RX ORDER — DILTIAZEM HYDROCHLORIDE 120 MG/1
120 CAPSULE, COATED, EXTENDED RELEASE ORAL 2 TIMES DAILY
Status: DISCONTINUED | OUTPATIENT
Start: 2022-01-01 | End: 2022-01-01

## 2022-01-01 RX ORDER — MAGNESIUM SULFATE HEPTAHYDRATE 40 MG/ML
2 INJECTION, SOLUTION INTRAVENOUS
Status: DISCONTINUED | OUTPATIENT
Start: 2022-01-01 | End: 2022-01-01

## 2022-01-01 RX ORDER — SODIUM CHLORIDE 9 MG/ML
INJECTION, SOLUTION INTRAVENOUS CONTINUOUS
Status: DISCONTINUED | OUTPATIENT
Start: 2022-01-01 | End: 2022-01-01 | Stop reason: HOSPADM

## 2022-01-01 RX ORDER — FAMOTIDINE 20 MG/1
20 TABLET, FILM COATED ORAL DAILY
Status: DISCONTINUED | OUTPATIENT
Start: 2022-08-21 | End: 2022-01-01 | Stop reason: HOSPADM

## 2022-01-01 RX ORDER — FLUDROCORTISONE ACETATE 0.1 MG/1
100 TABLET ORAL DAILY
Status: DISCONTINUED | OUTPATIENT
Start: 2022-01-01 | End: 2022-01-01 | Stop reason: HOSPADM

## 2022-01-01 RX ORDER — CLOPIDOGREL BISULFATE 75 MG/1
75 TABLET ORAL DAILY
Status: DISCONTINUED | OUTPATIENT
Start: 2022-01-01 | End: 2022-01-01 | Stop reason: HOSPADM

## 2022-01-01 RX ORDER — MORPHINE SULFATE 2 MG/ML
1 INJECTION, SOLUTION INTRAMUSCULAR; INTRAVENOUS EVERY 30 MIN PRN
Status: DISCONTINUED | OUTPATIENT
Start: 2022-01-01 | End: 2022-01-01 | Stop reason: HOSPADM

## 2022-01-01 RX ORDER — PROPOFOL 10 MG/ML
INJECTION, EMULSION INTRAVENOUS
Status: DISCONTINUED
Start: 2022-01-01 | End: 2022-01-01 | Stop reason: HOSPADM

## 2022-01-01 RX ORDER — INDOMETHACIN 25 MG/1
50 CAPSULE ORAL ONCE
Status: COMPLETED | OUTPATIENT
Start: 2022-01-01 | End: 2022-01-01

## 2022-01-01 RX ORDER — ROCURONIUM BROMIDE 10 MG/ML
INJECTION, SOLUTION INTRAVENOUS
Status: COMPLETED
Start: 2022-01-01 | End: 2022-01-01

## 2022-01-01 RX ORDER — NOREPINEPHRINE BITARTRATE/D5W 4MG/250ML
PLASTIC BAG, INJECTION (ML) INTRAVENOUS
Status: COMPLETED
Start: 2022-01-01 | End: 2022-01-01

## 2022-01-01 RX ORDER — DIPHENHYDRAMINE HYDROCHLORIDE 50 MG/ML
25 INJECTION INTRAMUSCULAR; INTRAVENOUS ONCE
Status: COMPLETED | OUTPATIENT
Start: 2022-01-01 | End: 2022-01-01

## 2022-01-01 RX ORDER — HYDROCODONE BITARTRATE AND ACETAMINOPHEN 500; 5 MG/1; MG/1
TABLET ORAL
Status: CANCELLED | OUTPATIENT
Start: 2022-01-01

## 2022-01-01 RX ORDER — MAGNESIUM SULFATE HEPTAHYDRATE 40 MG/ML
2 INJECTION, SOLUTION INTRAVENOUS
Status: DISPENSED | OUTPATIENT
Start: 2022-01-01 | End: 2022-01-01

## 2022-01-01 RX ORDER — HEPARIN SOD,PORCINE/0.9 % NACL 1000/500ML
INTRAVENOUS SOLUTION INTRAVENOUS
Status: DISCONTINUED | OUTPATIENT
Start: 2022-01-01 | End: 2022-01-01 | Stop reason: HOSPADM

## 2022-01-01 RX ORDER — ACETAMINOPHEN 325 MG/1
650 TABLET ORAL EVERY 4 HOURS PRN
Status: DISCONTINUED | OUTPATIENT
Start: 2022-01-01 | End: 2022-01-01

## 2022-01-01 RX ORDER — ONDANSETRON 2 MG/ML
INJECTION INTRAMUSCULAR; INTRAVENOUS
Status: DISCONTINUED | OUTPATIENT
Start: 2022-01-01 | End: 2022-01-01

## 2022-01-01 RX ORDER — NOREPINEPHRINE BITARTRATE/D5W 8 MG/250ML
0-3 PLASTIC BAG, INJECTION (ML) INTRAVENOUS CONTINUOUS
Status: DISCONTINUED | OUTPATIENT
Start: 2022-01-01 | End: 2022-01-01

## 2022-01-01 RX ORDER — FAMOTIDINE 20 MG/1
20 TABLET, FILM COATED ORAL DAILY
Status: DISCONTINUED | OUTPATIENT
Start: 2022-01-01 | End: 2022-01-01

## 2022-01-01 RX ORDER — VASOPRESSIN 20 [USP'U]/ML
INJECTION, SOLUTION INTRAMUSCULAR; SUBCUTANEOUS
Status: DISCONTINUED | OUTPATIENT
Start: 2022-01-01 | End: 2022-01-01

## 2022-01-01 RX ORDER — ALBUMIN HUMAN 250 G/1000ML
25 SOLUTION INTRAVENOUS ONCE
Status: COMPLETED | OUTPATIENT
Start: 2022-01-01 | End: 2022-01-01

## 2022-01-01 RX ORDER — DIVALPROEX SODIUM 125 MG/1
125 CAPSULE, COATED PELLETS ORAL NIGHTLY PRN
Status: DISCONTINUED | OUTPATIENT
Start: 2022-01-01 | End: 2022-01-01 | Stop reason: HOSPADM

## 2022-01-01 RX ORDER — SODIUM CHLORIDE 9 MG/ML
INJECTION, SOLUTION INTRAVENOUS CONTINUOUS
Status: DISCONTINUED | OUTPATIENT
Start: 2022-01-01 | End: 2022-01-01

## 2022-01-01 RX ORDER — EPINEPHRINE 0.1 MG/ML
INJECTION INTRAVENOUS CODE/TRAUMA/SEDATION MEDICATION
Status: COMPLETED | OUTPATIENT
Start: 2022-01-01 | End: 2022-01-01

## 2022-01-01 RX ORDER — EPINEPHRINE 1 MG/ML
INJECTION, SOLUTION INTRACARDIAC; INTRAMUSCULAR; INTRAVENOUS; SUBCUTANEOUS
Status: DISPENSED
Start: 2022-01-01 | End: 2022-01-01

## 2022-01-01 RX ORDER — MAGNESIUM SULFATE HEPTAHYDRATE 40 MG/ML
2 INJECTION, SOLUTION INTRAVENOUS
Status: ACTIVE | OUTPATIENT
Start: 2022-01-01 | End: 2022-01-01

## 2022-01-01 RX ORDER — PROPOFOL 10 MG/ML
VIAL (ML) INTRAVENOUS
Status: DISCONTINUED | OUTPATIENT
Start: 2022-01-01 | End: 2022-01-01

## 2022-01-01 RX ORDER — MAGNESIUM SULFATE HEPTAHYDRATE 40 MG/ML
2 INJECTION, SOLUTION INTRAVENOUS ONCE
Status: COMPLETED | OUTPATIENT
Start: 2022-01-01 | End: 2022-01-01

## 2022-01-01 RX ORDER — HYDROXYZINE HYDROCHLORIDE 10 MG/1
10 TABLET, FILM COATED ORAL 2 TIMES DAILY PRN
Status: DISCONTINUED | OUTPATIENT
Start: 2022-01-01 | End: 2022-01-01 | Stop reason: HOSPADM

## 2022-01-01 RX ORDER — ONDANSETRON 8 MG/1
8 TABLET, ORALLY DISINTEGRATING ORAL EVERY 8 HOURS PRN
Status: DISCONTINUED | OUTPATIENT
Start: 2022-01-01 | End: 2022-01-01

## 2022-01-01 RX ORDER — CLOPIDOGREL BISULFATE 75 MG/1
75 TABLET ORAL DAILY
Status: DISCONTINUED | OUTPATIENT
Start: 2022-01-01 | End: 2022-01-01

## 2022-01-01 RX ORDER — SODIUM BICARBONATE 1 MEQ/ML
SYRINGE (ML) INTRAVENOUS CODE/TRAUMA/SEDATION MEDICATION
Status: COMPLETED | OUTPATIENT
Start: 2022-01-01 | End: 2022-01-01

## 2022-01-01 RX ORDER — HYDROCODONE BITARTRATE AND ACETAMINOPHEN 500; 5 MG/1; MG/1
TABLET ORAL CONTINUOUS
Status: DISCONTINUED | OUTPATIENT
Start: 2022-01-01 | End: 2022-01-01 | Stop reason: HOSPADM

## 2022-01-01 RX ORDER — POTASSIUM CHLORIDE 14.9 MG/ML
20 INJECTION INTRAVENOUS ONCE
Status: COMPLETED | OUTPATIENT
Start: 2022-01-01 | End: 2022-01-01

## 2022-01-01 RX ORDER — DEXMEDETOMIDINE HYDROCHLORIDE 4 UG/ML
0-1.4 INJECTION, SOLUTION INTRAVENOUS CONTINUOUS
Status: DISCONTINUED | OUTPATIENT
Start: 2022-01-01 | End: 2022-01-01

## 2022-01-01 RX ORDER — KETAMINE HCL IN 0.9 % NACL 50 MG/5 ML
SYRINGE (ML) INTRAVENOUS
Status: DISCONTINUED | OUTPATIENT
Start: 2022-01-01 | End: 2022-01-01

## 2022-01-01 RX ORDER — LIDOCAINE HYDROCHLORIDE 20 MG/ML
INJECTION, SOLUTION EPIDURAL; INFILTRATION; INTRACAUDAL; PERINEURAL
Status: DISCONTINUED | OUTPATIENT
Start: 2022-01-01 | End: 2022-01-01

## 2022-01-01 RX ORDER — CETIRIZINE HYDROCHLORIDE 10 MG/1
10 TABLET ORAL NIGHTLY
Status: DISCONTINUED | OUTPATIENT
Start: 2022-01-01 | End: 2022-01-01

## 2022-01-01 RX ORDER — PROTAMINE SULFATE 10 MG/ML
INJECTION, SOLUTION INTRAVENOUS
Status: DISCONTINUED | OUTPATIENT
Start: 2022-01-01 | End: 2022-01-01

## 2022-01-01 RX ORDER — HYDROCODONE BITARTRATE AND ACETAMINOPHEN 500; 5 MG/1; MG/1
TABLET ORAL
Status: DISCONTINUED | OUTPATIENT
Start: 2022-01-01 | End: 2022-01-01 | Stop reason: HOSPADM

## 2022-01-01 RX ORDER — TALC
6 POWDER (GRAM) TOPICAL ONCE
Status: COMPLETED | OUTPATIENT
Start: 2022-01-01 | End: 2022-01-01

## 2022-01-01 RX ORDER — TORSEMIDE 20 MG/1
20 TABLET ORAL 2 TIMES DAILY
COMMUNITY
Start: 2022-01-01

## 2022-01-01 RX ORDER — SODIUM CHLORIDE 0.9 % (FLUSH) 0.9 %
10 SYRINGE (ML) INJECTION
Status: DISCONTINUED | OUTPATIENT
Start: 2022-01-01 | End: 2022-01-01 | Stop reason: HOSPADM

## 2022-01-01 RX ORDER — CLOPIDOGREL BISULFATE 75 MG/1
75 TABLET ORAL ONCE
Status: COMPLETED | OUTPATIENT
Start: 2022-01-01 | End: 2022-01-01

## 2022-01-01 RX ORDER — DEXAMETHASONE SODIUM PHOSPHATE 4 MG/ML
INJECTION, SOLUTION INTRA-ARTICULAR; INTRALESIONAL; INTRAMUSCULAR; INTRAVENOUS; SOFT TISSUE
Status: DISCONTINUED | OUTPATIENT
Start: 2022-01-01 | End: 2022-01-01

## 2022-01-01 RX ORDER — MEROPENEM AND SODIUM CHLORIDE 1 G/50ML
1 INJECTION, SOLUTION INTRAVENOUS
Status: DISCONTINUED | OUTPATIENT
Start: 2022-01-01 | End: 2022-01-01

## 2022-01-01 RX ORDER — HYDROCODONE BITARTRATE AND ACETAMINOPHEN 500; 5 MG/1; MG/1
TABLET ORAL CONTINUOUS
Status: DISCONTINUED | OUTPATIENT
Start: 2022-01-01 | End: 2022-01-01

## 2022-01-01 RX ORDER — FUROSEMIDE 10 MG/ML
120 INJECTION INTRAMUSCULAR; INTRAVENOUS ONCE
Status: COMPLETED | OUTPATIENT
Start: 2022-01-01 | End: 2022-01-01

## 2022-01-01 RX ORDER — SODIUM BICARBONATE 42 MG/ML
50 INJECTION, SOLUTION INTRAVENOUS ONCE
Status: DISCONTINUED | OUTPATIENT
Start: 2022-01-01 | End: 2022-01-01

## 2022-01-01 RX ORDER — CALCIUM CHLORIDE INJECTION 100 MG/ML
INJECTION, SOLUTION INTRAVENOUS
Status: DISCONTINUED | OUTPATIENT
Start: 2022-01-01 | End: 2022-01-01

## 2022-01-01 RX ORDER — HEPARIN SODIUM,PORCINE/D5W 25000/250
0-40 INTRAVENOUS SOLUTION INTRAVENOUS CONTINUOUS
Status: DISCONTINUED | OUTPATIENT
Start: 2022-01-01 | End: 2022-01-01 | Stop reason: HOSPADM

## 2022-01-01 RX ORDER — INDOMETHACIN 25 MG/1
CAPSULE ORAL
Status: COMPLETED
Start: 2022-01-01 | End: 2022-01-01

## 2022-01-01 RX ORDER — ACETAMINOPHEN 325 MG/1
650 TABLET ORAL ONCE
Status: DISCONTINUED | OUTPATIENT
Start: 2022-01-01 | End: 2022-01-01

## 2022-01-01 RX ORDER — FUROSEMIDE 10 MG/ML
80 INJECTION INTRAMUSCULAR; INTRAVENOUS ONCE
Status: DISCONTINUED | OUTPATIENT
Start: 2022-01-01 | End: 2022-01-01

## 2022-01-01 RX ORDER — ROCURONIUM BROMIDE 10 MG/ML
INJECTION, SOLUTION INTRAVENOUS
Status: DISCONTINUED | OUTPATIENT
Start: 2022-01-01 | End: 2022-01-01

## 2022-01-01 RX ORDER — VASOPRESSIN 20 [USP'U]/ML
INJECTION, SOLUTION INTRAMUSCULAR; SUBCUTANEOUS
Status: DISPENSED
Start: 2022-01-01 | End: 2022-01-01

## 2022-01-01 RX ORDER — CLOPIDOGREL BISULFATE 75 MG/1
75 TABLET ORAL ONCE
Status: DISCONTINUED | OUTPATIENT
Start: 2022-01-01 | End: 2022-01-01

## 2022-01-01 RX ORDER — ACETAMINOPHEN 325 MG/1
650 TABLET ORAL EVERY 4 HOURS PRN
Status: DISCONTINUED | OUTPATIENT
Start: 2022-01-01 | End: 2022-01-01 | Stop reason: HOSPADM

## 2022-01-01 RX ORDER — CETIRIZINE HYDROCHLORIDE 10 MG/1
10 TABLET ORAL ONCE
Status: COMPLETED | OUTPATIENT
Start: 2022-01-01 | End: 2022-01-01

## 2022-01-01 RX ORDER — FUROSEMIDE 10 MG/ML
60 INJECTION INTRAMUSCULAR; INTRAVENOUS ONCE
Status: COMPLETED | OUTPATIENT
Start: 2022-01-01 | End: 2022-01-01

## 2022-01-01 RX ORDER — FUROSEMIDE 10 MG/ML
120 INJECTION INTRAMUSCULAR; INTRAVENOUS ONCE
Status: DISCONTINUED | OUTPATIENT
Start: 2022-01-01 | End: 2022-01-01

## 2022-01-01 RX ORDER — FENTANYL CITRATE 50 UG/ML
INJECTION, SOLUTION INTRAMUSCULAR; INTRAVENOUS
Status: DISCONTINUED | OUTPATIENT
Start: 2022-01-01 | End: 2022-01-01

## 2022-01-01 RX ORDER — WARFARIN SODIUM 5 MG/1
5 TABLET ORAL DAILY
COMMUNITY

## 2022-01-01 RX ORDER — DEXMEDETOMIDINE HYDROCHLORIDE 4 UG/ML
INJECTION, SOLUTION INTRAVENOUS
Status: COMPLETED
Start: 2022-01-01 | End: 2022-01-01

## 2022-01-01 RX ORDER — PROPOFOL 10 MG/ML
0-50 INJECTION, EMULSION INTRAVENOUS CONTINUOUS
Status: DISCONTINUED | OUTPATIENT
Start: 2022-01-01 | End: 2022-01-01

## 2022-01-01 RX ORDER — BALSAM PERU/CASTOR OIL
OINTMENT (GRAM) TOPICAL 2 TIMES DAILY
Status: DISCONTINUED | OUTPATIENT
Start: 2022-01-01 | End: 2022-01-01 | Stop reason: HOSPADM

## 2022-01-01 RX ORDER — METOPROLOL SUCCINATE 50 MG/1
50 TABLET, EXTENDED RELEASE ORAL DAILY
Status: DISCONTINUED | OUTPATIENT
Start: 2022-01-01 | End: 2022-01-01

## 2022-01-01 RX ORDER — FLUDROCORTISONE ACETATE 0.1 MG/1
100 TABLET ORAL DAILY
Status: DISCONTINUED | OUTPATIENT
Start: 2022-01-01 | End: 2022-01-01

## 2022-01-01 RX ORDER — FENTANYL CITRATE 50 UG/ML
INJECTION, SOLUTION INTRAMUSCULAR; INTRAVENOUS
Status: COMPLETED
Start: 2022-01-01 | End: 2022-01-01

## 2022-01-01 RX ORDER — FENTANYL CITRATE 50 UG/ML
50 INJECTION, SOLUTION INTRAMUSCULAR; INTRAVENOUS
Status: ACTIVE | OUTPATIENT
Start: 2022-01-01 | End: 2022-01-01

## 2022-01-01 RX ORDER — HYDROCODONE BITARTRATE AND ACETAMINOPHEN 500; 5 MG/1; MG/1
TABLET ORAL CONTINUOUS
Status: ACTIVE | OUTPATIENT
Start: 2022-01-01 | End: 2022-01-01

## 2022-01-01 RX ORDER — FENTANYL CITRATE-0.9 % NACL/PF 10 MCG/ML
0-250 PLASTIC BAG, INJECTION (ML) INTRAVENOUS CONTINUOUS
Status: DISCONTINUED | OUTPATIENT
Start: 2022-01-01 | End: 2022-01-01

## 2022-01-01 RX ORDER — HEPARIN SODIUM 1000 [USP'U]/ML
INJECTION, SOLUTION INTRAVENOUS; SUBCUTANEOUS
Status: DISCONTINUED | OUTPATIENT
Start: 2022-01-01 | End: 2022-01-01

## 2022-01-01 RX ORDER — PANTOPRAZOLE SODIUM 40 MG/1
40 TABLET, DELAYED RELEASE ORAL
Status: DISCONTINUED | OUTPATIENT
Start: 2022-01-01 | End: 2022-01-01 | Stop reason: HOSPADM

## 2022-01-01 RX ORDER — CEFEPIME HYDROCHLORIDE 1 G/50ML
2 INJECTION, SOLUTION INTRAVENOUS
Status: DISCONTINUED | OUTPATIENT
Start: 2022-01-01 | End: 2022-01-01

## 2022-01-01 RX ORDER — DIVALPROEX SODIUM 125 MG/1
125 CAPSULE, COATED PELLETS ORAL NIGHTLY
Status: DISCONTINUED | OUTPATIENT
Start: 2022-01-01 | End: 2022-01-01

## 2022-01-01 RX ORDER — FENTANYL CITRATE 50 UG/ML
50 INJECTION, SOLUTION INTRAMUSCULAR; INTRAVENOUS
Status: DISCONTINUED | OUTPATIENT
Start: 2022-01-01 | End: 2022-01-01

## 2022-01-01 RX ORDER — FENTANYL CITRATE-0.9 % NACL/PF 10 MCG/ML
0-250 PLASTIC BAG, INJECTION (ML) INTRAVENOUS CONTINUOUS
Status: DISCONTINUED | OUTPATIENT
Start: 2022-01-01 | End: 2022-01-01 | Stop reason: HOSPADM

## 2022-01-01 RX ORDER — INDOMETHACIN 25 MG/1
150 CAPSULE ORAL ONCE
Status: COMPLETED | OUTPATIENT
Start: 2022-01-01 | End: 2022-01-01

## 2022-01-01 RX ORDER — MEROPENEM AND SODIUM CHLORIDE 1 G/50ML
1 INJECTION, SOLUTION INTRAVENOUS
Status: DISCONTINUED | OUTPATIENT
Start: 2022-01-01 | End: 2022-01-01 | Stop reason: HOSPADM

## 2022-01-01 RX ORDER — CEFAZOLIN SODIUM/WATER 2 G/20 ML
SYRINGE (ML) INTRAVENOUS
Status: DISCONTINUED | OUTPATIENT
Start: 2022-01-01 | End: 2022-01-01

## 2022-01-01 RX ORDER — HYDROXYZINE HYDROCHLORIDE 25 MG/1
25 TABLET, FILM COATED ORAL 3 TIMES DAILY PRN
Status: DISCONTINUED | OUTPATIENT
Start: 2022-01-01 | End: 2022-01-01

## 2022-01-01 RX ORDER — ETOMIDATE 2 MG/ML
INJECTION INTRAVENOUS
Status: COMPLETED
Start: 2022-01-01 | End: 2022-01-01

## 2022-01-01 RX ORDER — FUROSEMIDE 10 MG/ML
INJECTION INTRAMUSCULAR; INTRAVENOUS
Status: DISCONTINUED | OUTPATIENT
Start: 2022-01-01 | End: 2022-01-01

## 2022-01-01 RX ORDER — MAGNESIUM SULFATE HEPTAHYDRATE 40 MG/ML
2 INJECTION, SOLUTION INTRAVENOUS
Status: DISCONTINUED | OUTPATIENT
Start: 2022-01-01 | End: 2022-01-01 | Stop reason: HOSPADM

## 2022-01-01 RX ORDER — PHENYLEPHRINE HCL IN 0.9% NACL 1 MG/10 ML
SYRINGE (ML) INTRAVENOUS
Status: DISPENSED
Start: 2022-01-01 | End: 2022-01-01

## 2022-01-01 RX ORDER — DIPHENHYDRAMINE HCL 50 MG
50 CAPSULE ORAL ONCE
Status: COMPLETED | OUTPATIENT
Start: 2022-01-01 | End: 2022-01-01

## 2022-01-01 RX ORDER — PROPOFOL 10 MG/ML
INJECTION, EMULSION INTRAVENOUS
Status: COMPLETED
Start: 2022-01-01 | End: 2022-01-01

## 2022-01-01 RX ORDER — DIPHENHYDRAMINE HCL 50 MG
50 CAPSULE ORAL ONCE
Status: DISCONTINUED | OUTPATIENT
Start: 2022-01-01 | End: 2022-01-01 | Stop reason: HOSPADM

## 2022-01-01 RX ORDER — SUCCINYLCHOLINE CHLORIDE 20 MG/ML
INJECTION INTRAMUSCULAR; INTRAVENOUS
Status: DISCONTINUED
Start: 2022-01-01 | End: 2022-01-01 | Stop reason: HOSPADM

## 2022-01-01 RX ORDER — SUCCINYLCHOLINE CHLORIDE 20 MG/ML
INJECTION INTRAMUSCULAR; INTRAVENOUS
Status: DISCONTINUED | OUTPATIENT
Start: 2022-01-01 | End: 2022-01-01

## 2022-01-01 RX ADMIN — DILTIAZEM HYDROCHLORIDE 120 MG: 120 CAPSULE, COATED, EXTENDED RELEASE ORAL at 06:08

## 2022-01-01 RX ADMIN — Medication 0.12 MCG/KG/MIN: at 11:08

## 2022-01-01 RX ADMIN — VASOPRESSIN 0.08 UNITS/MIN: 20 INJECTION INTRAVENOUS at 12:08

## 2022-01-01 RX ADMIN — HYDROCORTISONE SODIUM SUCCINATE 100 MG: 100 INJECTION, POWDER, FOR SOLUTION INTRAMUSCULAR; INTRAVENOUS at 03:08

## 2022-01-01 RX ADMIN — ROCURONIUM BROMIDE 20 MG: 10 INJECTION, SOLUTION INTRAVENOUS at 03:08

## 2022-01-01 RX ADMIN — VASOPRESSIN 0.08 UNITS/MIN: 20 INJECTION INTRAVENOUS at 10:08

## 2022-01-01 RX ADMIN — SODIUM CHLORIDE: 9 INJECTION, SOLUTION INTRAVENOUS at 04:08

## 2022-01-01 RX ADMIN — SODIUM CHLORIDE: 0.9 INJECTION, SOLUTION INTRAVENOUS at 03:08

## 2022-01-01 RX ADMIN — VASOPRESSIN 2 UNITS: 20 INJECTION INTRAVENOUS at 10:08

## 2022-01-01 RX ADMIN — AMIODARONE HYDROCHLORIDE 1 MG/MIN: 1.8 INJECTION, SOLUTION INTRAVENOUS at 05:08

## 2022-01-01 RX ADMIN — DIPHENHYDRAMINE HYDROCHLORIDE 25 MG: 50 INJECTION INTRAMUSCULAR; INTRAVENOUS at 12:08

## 2022-01-01 RX ADMIN — Medication 0.16 MCG/KG/MIN: at 07:08

## 2022-01-01 RX ADMIN — FAMOTIDINE 20 MG: 20 TABLET ORAL at 08:08

## 2022-01-01 RX ADMIN — Medication 0.18 MCG/KG/MIN: at 12:08

## 2022-01-01 RX ADMIN — QUETIAPINE FUMARATE 12.5 MG: 25 TABLET, FILM COATED ORAL at 10:08

## 2022-01-01 RX ADMIN — HYDROCORTISONE SODIUM SUCCINATE 100 MG: 100 INJECTION, POWDER, FOR SOLUTION INTRAMUSCULAR; INTRAVENOUS at 06:08

## 2022-01-01 RX ADMIN — VASOPRESSIN 2 UNITS: 20 INJECTION INTRAVENOUS at 03:08

## 2022-01-01 RX ADMIN — MEROPENEM 2 G: 1 INJECTION INTRAVENOUS at 11:08

## 2022-01-01 RX ADMIN — ROCURONIUM BROMIDE 50 MG: 10 INJECTION, SOLUTION INTRAVENOUS at 11:08

## 2022-01-01 RX ADMIN — Medication 2 G: at 11:08

## 2022-01-01 RX ADMIN — Medication 0.18 MCG/KG/MIN: at 05:08

## 2022-01-01 RX ADMIN — FENTANYL CITRATE 50 MCG: 50 INJECTION INTRAMUSCULAR; INTRAVENOUS at 06:08

## 2022-01-01 RX ADMIN — FUROSEMIDE 40 MG: 10 INJECTION, SOLUTION INTRAMUSCULAR; INTRAVENOUS at 04:08

## 2022-01-01 RX ADMIN — HYDROCORTISONE SODIUM SUCCINATE 100 MG: 100 INJECTION, POWDER, FOR SOLUTION INTRAMUSCULAR; INTRAVENOUS at 05:08

## 2022-01-01 RX ADMIN — VASOPRESSIN 0.08 UNITS/MIN: 20 INJECTION INTRAVENOUS at 04:08

## 2022-01-01 RX ADMIN — Medication: at 08:08

## 2022-01-01 RX ADMIN — VASOPRESSIN 0.08 UNITS/MIN: 20 INJECTION INTRAVENOUS at 01:08

## 2022-01-01 RX ADMIN — FAMOTIDINE 20 MG: 20 TABLET ORAL at 09:08

## 2022-01-01 RX ADMIN — HYDROCORTISONE SODIUM SUCCINATE 100 MG: 100 INJECTION, POWDER, FOR SOLUTION INTRAMUSCULAR; INTRAVENOUS at 09:08

## 2022-01-01 RX ADMIN — VASOPRESSIN 0.08 UNITS/MIN: 20 INJECTION INTRAVENOUS at 11:08

## 2022-01-01 RX ADMIN — HYDROXYZINE HYDROCHLORIDE 10 MG: 10 TABLET ORAL at 05:08

## 2022-01-01 RX ADMIN — FLUDROCORTISONE ACETATE 100 MCG: 0.1 TABLET ORAL at 09:08

## 2022-01-01 RX ADMIN — DAPTOMYCIN 715 MG: 350 INJECTION, POWDER, LYOPHILIZED, FOR SOLUTION INTRAVENOUS at 08:08

## 2022-01-01 RX ADMIN — VASOPRESSIN 0.08 UNITS/MIN: 20 INJECTION INTRAVENOUS at 06:08

## 2022-01-01 RX ADMIN — Medication 0.16 MCG/KG/MIN: at 11:08

## 2022-01-01 RX ADMIN — AMIODARONE HYDROCHLORIDE 1 MG/MIN: 1.8 INJECTION, SOLUTION INTRAVENOUS at 04:08

## 2022-01-01 RX ADMIN — Medication 0.18 MCG/KG/MIN: at 08:08

## 2022-01-01 RX ADMIN — FENTANYL CITRATE 50 MCG: 50 INJECTION, SOLUTION INTRAMUSCULAR; INTRAVENOUS at 10:08

## 2022-01-01 RX ADMIN — AMIODARONE HYDROCHLORIDE 1 MG/MIN: 1.8 INJECTION, SOLUTION INTRAVENOUS at 01:08

## 2022-01-01 RX ADMIN — PROPOFOL 15 MCG/KG/MIN: 10 INJECTION, EMULSION INTRAVENOUS at 05:08

## 2022-01-01 RX ADMIN — PROTAMINE SULFATE 50 MG: 10 INJECTION, SOLUTION INTRAVENOUS at 04:08

## 2022-01-01 RX ADMIN — AMIODARONE HYDROCHLORIDE 0.5 MG/MIN: 1.8 INJECTION, SOLUTION INTRAVENOUS at 02:08

## 2022-01-01 RX ADMIN — Medication 2 G: at 03:08

## 2022-01-01 RX ADMIN — FUROSEMIDE 200 MG: 10 INJECTION, SOLUTION INTRAMUSCULAR; INTRAVENOUS at 12:08

## 2022-01-01 RX ADMIN — EPINEPHRINE 0.02 MCG/KG/MIN: 1 INJECTION INTRAMUSCULAR; INTRAVENOUS; SUBCUTANEOUS at 10:08

## 2022-01-01 RX ADMIN — MEROPENEM AND SODIUM CHLORIDE 1 G: 1 INJECTION, SOLUTION INTRAVENOUS at 01:08

## 2022-01-01 RX ADMIN — HEPARIN SODIUM 8000 UNITS: 1000 INJECTION INTRAVENOUS; SUBCUTANEOUS at 11:08

## 2022-01-01 RX ADMIN — PROPOFOL 15 MCG/KG/MIN: 10 INJECTION, EMULSION INTRAVENOUS at 01:08

## 2022-01-01 RX ADMIN — VASOPRESSIN 0.08 UNITS/MIN: 20 INJECTION INTRAVENOUS at 03:08

## 2022-01-01 RX ADMIN — DAPTOMYCIN 715 MG: 350 INJECTION, POWDER, LYOPHILIZED, FOR SOLUTION INTRAVENOUS at 07:08

## 2022-01-01 RX ADMIN — MEROPENEM 2 G: 1 INJECTION INTRAVENOUS at 01:08

## 2022-01-01 RX ADMIN — Medication 0.14 MCG/KG/MIN: at 03:08

## 2022-01-01 RX ADMIN — DEXMEDETOMIDINE HYDROCHLORIDE 1.3 MCG/KG/HR: 4 INJECTION INTRAVENOUS at 02:08

## 2022-01-01 RX ADMIN — AMIODARONE HYDROCHLORIDE 0.5 MG/MIN: 1.8 INJECTION, SOLUTION INTRAVENOUS at 10:08

## 2022-01-01 RX ADMIN — HEPARIN SODIUM 2000 UNITS: 1000 INJECTION INTRAVENOUS; SUBCUTANEOUS at 03:08

## 2022-01-01 RX ADMIN — Medication 50 MG: at 10:08

## 2022-01-01 RX ADMIN — FAMOTIDINE 20 MG: 20 TABLET ORAL at 10:08

## 2022-01-01 RX ADMIN — MEROPENEM 2 G: 1 INJECTION INTRAVENOUS at 12:08

## 2022-01-01 RX ADMIN — VASOPRESSIN 2 UNITS: 20 INJECTION INTRAVENOUS at 01:08

## 2022-01-01 RX ADMIN — SODIUM CHLORIDE: 0.9 INJECTION, SOLUTION INTRAVENOUS at 02:08

## 2022-01-01 RX ADMIN — PROPOFOL 20 MCG/KG/MIN: 10 INJECTION, EMULSION INTRAVENOUS at 04:08

## 2022-01-01 RX ADMIN — NOREPINEPHRINE BITARTRATE 0.04 MCG/KG/MIN: 8 INJECTION, SOLUTION INTRAVENOUS at 06:08

## 2022-01-01 RX ADMIN — NITROGLYCERIN 0.5 INCH: 20 OINTMENT TOPICAL at 02:08

## 2022-01-01 RX ADMIN — DEXMEDETOMIDINE HYDROCHLORIDE 1.4 MCG/KG/HR: 4 INJECTION INTRAVENOUS at 08:08

## 2022-01-01 RX ADMIN — HEPARIN SODIUM 19 UNITS/KG/HR: 5000 INJECTION INTRAVENOUS; SUBCUTANEOUS at 07:08

## 2022-01-01 RX ADMIN — Medication: at 09:08

## 2022-01-01 RX ADMIN — SODIUM CHLORIDE: 9 INJECTION, SOLUTION INTRAVENOUS at 01:08

## 2022-01-01 RX ADMIN — FUROSEMIDE 120 MG: 10 INJECTION, SOLUTION INTRAMUSCULAR; INTRAVENOUS at 07:08

## 2022-01-01 RX ADMIN — AMIODARONE HYDROCHLORIDE 1 MG/MIN: 1.8 INJECTION, SOLUTION INTRAVENOUS at 03:08

## 2022-01-01 RX ADMIN — HEPARIN SODIUM 14 UNITS/KG/HR: 5000 INJECTION INTRAVENOUS; SUBCUTANEOUS at 04:08

## 2022-01-01 RX ADMIN — SODIUM PHOSPHATE, MONOBASIC, MONOHYDRATE 30 MMOL: 276; 142 INJECTION, SOLUTION INTRAVENOUS at 10:08

## 2022-01-01 RX ADMIN — NOREPINEPHRINE BITARTRATE 4 MG: 4 INJECTION, SOLUTION INTRAVENOUS at 05:08

## 2022-01-01 RX ADMIN — MEROPENEM AND SODIUM CHLORIDE 1 G: 1 INJECTION, SOLUTION INTRAVENOUS at 12:08

## 2022-01-01 RX ADMIN — Medication 0.22 MCG/KG/MIN: at 03:08

## 2022-01-01 RX ADMIN — Medication 0.16 MCG/KG/MIN: at 06:08

## 2022-01-01 RX ADMIN — CALCIUM CHLORIDE INJECTION 0.5 G: 100 INJECTION, SOLUTION INTRAVENOUS at 03:08

## 2022-01-01 RX ADMIN — SODIUM CHLORIDE: 0.9 INJECTION, SOLUTION INTRAVENOUS at 09:08

## 2022-01-01 RX ADMIN — HYDROCORTISONE SODIUM SUCCINATE 100 MG: 100 INJECTION, POWDER, FOR SOLUTION INTRAMUSCULAR; INTRAVENOUS at 10:08

## 2022-01-01 RX ADMIN — GLYCOPYRROLATE 0.2 MG: 0.2 INJECTION, SOLUTION INTRAMUSCULAR; INTRAVITREAL at 10:08

## 2022-01-01 RX ADMIN — AMIODARONE HYDROCHLORIDE 150 MG: 1.5 INJECTION, SOLUTION INTRAVENOUS at 02:08

## 2022-01-01 RX ADMIN — ACETAMINOPHEN 650 MG: 325 TABLET ORAL at 11:08

## 2022-01-01 RX ADMIN — Medication 0.18 MCG/KG/MIN: at 01:08

## 2022-01-01 RX ADMIN — Medication 100 MCG/HR: at 04:08

## 2022-01-01 RX ADMIN — HYDROCORTISONE SODIUM SUCCINATE 100 MG: 100 INJECTION, POWDER, FOR SOLUTION INTRAMUSCULAR; INTRAVENOUS at 11:08

## 2022-01-01 RX ADMIN — SODIUM CHLORIDE 500 ML: 0.9 INJECTION, SOLUTION INTRAVENOUS at 08:08

## 2022-01-01 RX ADMIN — EPINEPHRINE 0.06 MCG/KG/MIN: 1 INJECTION INTRAMUSCULAR; INTRAVENOUS; SUBCUTANEOUS at 07:08

## 2022-01-01 RX ADMIN — VASOPRESSIN 0.08 UNITS/MIN: 20 INJECTION INTRAVENOUS at 08:08

## 2022-01-01 RX ADMIN — AMIODARONE HYDROCHLORIDE 0.5 MG/MIN: 1.8 INJECTION, SOLUTION INTRAVENOUS at 05:08

## 2022-01-01 RX ADMIN — LIDOCAINE HYDROCHLORIDE 100 MG: 20 INJECTION, SOLUTION EPIDURAL; INFILTRATION; INTRACAUDAL; PERINEURAL at 03:08

## 2022-01-01 RX ADMIN — FLUDROCORTISONE ACETATE 100 MCG: 0.1 TABLET ORAL at 10:08

## 2022-01-01 RX ADMIN — GUAIFENESIN AND DEXTROMETHORPHAN HYDROBROMIDE 1 TABLET: 600; 30 TABLET, EXTENDED RELEASE ORAL at 10:08

## 2022-01-01 RX ADMIN — SODIUM CHLORIDE: 0.9 INJECTION, SOLUTION INTRAVENOUS at 10:08

## 2022-01-01 RX ADMIN — SODIUM BICARBONATE 150 MEQ: 84 INJECTION, SOLUTION INTRAVENOUS at 09:08

## 2022-01-01 RX ADMIN — HEPARIN SODIUM 14 UNITS/KG/HR: 5000 INJECTION INTRAVENOUS; SUBCUTANEOUS at 05:08

## 2022-01-01 RX ADMIN — AMIODARONE HYDROCHLORIDE 0.5 MG/MIN: 1.8 INJECTION, SOLUTION INTRAVENOUS at 06:08

## 2022-01-01 RX ADMIN — AMIODARONE HYDROCHLORIDE 0.5 MG/MIN: 1.8 INJECTION, SOLUTION INTRAVENOUS at 04:08

## 2022-01-01 RX ADMIN — HYDROCORTISONE SODIUM SUCCINATE 100 MG: 100 INJECTION, POWDER, FOR SOLUTION INTRAMUSCULAR; INTRAVENOUS at 01:08

## 2022-01-01 RX ADMIN — AMIODARONE HYDROCHLORIDE 1 MG/MIN: 50 INJECTION, SOLUTION INTRAVENOUS at 02:08

## 2022-01-01 RX ADMIN — SODIUM CHLORIDE: 0.9 INJECTION, SOLUTION INTRAVENOUS at 12:08

## 2022-01-01 RX ADMIN — AMIODARONE HYDROCHLORIDE 0.5 MG/MIN: 1.8 INJECTION, SOLUTION INTRAVENOUS at 11:08

## 2022-01-01 RX ADMIN — CLOPIDOGREL 75 MG: 75 TABLET, FILM COATED ORAL at 09:08

## 2022-01-01 RX ADMIN — AMIODARONE HYDROCHLORIDE 0.5 MG/MIN: 1.8 INJECTION, SOLUTION INTRAVENOUS at 08:08

## 2022-01-01 RX ADMIN — SODIUM CHLORIDE 200 ML/HR: 0.9 INJECTION, SOLUTION INTRAVENOUS at 01:08

## 2022-01-01 RX ADMIN — FENTANYL CITRATE 50 MCG: 50 INJECTION, SOLUTION INTRAMUSCULAR; INTRAVENOUS at 04:08

## 2022-01-01 RX ADMIN — Medication 0.1 MCG/KG/MIN: at 01:08

## 2022-01-01 RX ADMIN — Medication 0.16 MCG/KG/MIN: at 02:08

## 2022-01-01 RX ADMIN — FUROSEMIDE 200 MG: 10 INJECTION, SOLUTION INTRAMUSCULAR; INTRAVENOUS at 10:08

## 2022-01-01 RX ADMIN — NOREPINEPHRINE BITARTRATE 0.02 MCG/KG/MIN: 1 INJECTION, SOLUTION, CONCENTRATE INTRAVENOUS at 07:08

## 2022-01-01 RX ADMIN — DEXMEDETOMIDINE HYDROCHLORIDE 1.3 MCG/KG/HR: 4 INJECTION INTRAVENOUS at 11:08

## 2022-01-01 RX ADMIN — AZITHROMYCIN MONOHYDRATE 500 MG: 500 INJECTION, POWDER, LYOPHILIZED, FOR SOLUTION INTRAVENOUS at 02:08

## 2022-01-01 RX ADMIN — VASOPRESSIN 2 UNITS: 20 INJECTION INTRAVENOUS at 11:08

## 2022-01-01 RX ADMIN — CEFEPIME HYDROCHLORIDE 2 G: 2 INJECTION, SOLUTION INTRAVENOUS at 09:08

## 2022-01-01 RX ADMIN — PROPOFOL 70 MG: 10 INJECTION, EMULSION INTRAVENOUS at 10:08

## 2022-01-01 RX ADMIN — QUETIAPINE FUMARATE 12.5 MG: 25 TABLET, FILM COATED ORAL at 09:08

## 2022-01-01 RX ADMIN — HEPARIN SODIUM 19 UNITS/KG/HR: 5000 INJECTION INTRAVENOUS; SUBCUTANEOUS at 04:08

## 2022-01-01 RX ADMIN — DEXMEDETOMIDINE HYDROCHLORIDE 1.2 MCG/KG/HR: 4 INJECTION INTRAVENOUS at 01:08

## 2022-01-01 RX ADMIN — DEXMEDETOMIDINE HYDROCHLORIDE 1.4 MCG/KG/HR: 4 INJECTION INTRAVENOUS at 11:08

## 2022-01-01 RX ADMIN — POTASSIUM CHLORIDE 20 MEQ: 14.9 INJECTION, SOLUTION INTRAVENOUS at 08:08

## 2022-01-01 RX ADMIN — SUCCINYLCHOLINE CHLORIDE 120 MG: 20 INJECTION, SOLUTION INTRAMUSCULAR; INTRAVENOUS at 10:08

## 2022-01-01 RX ADMIN — SODIUM CHLORIDE, SODIUM GLUCONATE, SODIUM ACETATE, POTASSIUM CHLORIDE, MAGNESIUM CHLORIDE, SODIUM PHOSPHATE, DIBASIC, AND POTASSIUM PHOSPHATE: .53; .5; .37; .037; .03; .012; .00082 INJECTION, SOLUTION INTRAVENOUS at 10:08

## 2022-01-01 RX ADMIN — NITROGLYCERIN 0.5 INCH: 20 OINTMENT TOPICAL at 05:08

## 2022-01-01 RX ADMIN — PANTOPRAZOLE SODIUM 40 MG: 40 TABLET, DELAYED RELEASE ORAL at 03:08

## 2022-01-01 RX ADMIN — HEPARIN SODIUM 2000 UNITS: 1000 INJECTION INTRAVENOUS; SUBCUTANEOUS at 01:08

## 2022-01-01 RX ADMIN — FLUDROCORTISONE ACETATE 100 MCG: 0.1 TABLET ORAL at 08:08

## 2022-01-01 RX ADMIN — Medication 0.09 MCG/KG/MIN: at 07:08

## 2022-01-01 RX ADMIN — AMIODARONE HYDROCHLORIDE 1 MG/MIN: 1.8 INJECTION, SOLUTION INTRAVENOUS at 07:08

## 2022-01-01 RX ADMIN — Medication 0.26 MCG/KG/MIN: at 05:08

## 2022-01-01 RX ADMIN — MEROPENEM AND SODIUM CHLORIDE 1 G: 1 INJECTION, SOLUTION INTRAVENOUS at 11:08

## 2022-01-01 RX ADMIN — CLOPIDOGREL 75 MG: 75 TABLET, FILM COATED ORAL at 08:08

## 2022-01-01 RX ADMIN — HYDROXYZINE HYDROCHLORIDE 25 MG: 25 TABLET, FILM COATED ORAL at 11:08

## 2022-01-01 RX ADMIN — ACETAMINOPHEN 650 MG: 325 TABLET ORAL at 02:08

## 2022-01-01 RX ADMIN — Medication 0.09 MCG/KG/MIN: at 06:08

## 2022-01-01 RX ADMIN — CLOPIDOGREL 75 MG: 75 TABLET, FILM COATED ORAL at 10:08

## 2022-01-01 RX ADMIN — PROPOFOL 20 MCG/KG/MIN: 10 INJECTION, EMULSION INTRAVENOUS at 03:08

## 2022-01-01 RX ADMIN — VASOPRESSIN 0.08 UNITS/MIN: 20 INJECTION INTRAVENOUS at 07:08

## 2022-01-01 RX ADMIN — SODIUM BICARBONATE 50 MEQ: 84 INJECTION INTRAVENOUS at 06:08

## 2022-01-01 RX ADMIN — FENTANYL CITRATE 50 MCG: 50 INJECTION, SOLUTION INTRAMUSCULAR; INTRAVENOUS at 06:08

## 2022-01-01 RX ADMIN — VASOPRESSIN 0.08 UNITS/MIN: 20 INJECTION INTRAVENOUS at 09:08

## 2022-01-01 RX ADMIN — ROCURONIUM BROMIDE 50 MG: 10 INJECTION INTRAVENOUS at 06:08

## 2022-01-01 RX ADMIN — AZITHROMYCIN MONOHYDRATE 500 MG: 500 INJECTION, POWDER, LYOPHILIZED, FOR SOLUTION INTRAVENOUS at 01:08

## 2022-01-01 RX ADMIN — SODIUM CHLORIDE: 0.9 INJECTION, SOLUTION INTRAVENOUS at 11:08

## 2022-01-01 RX ADMIN — CETIRIZINE HYDROCHLORIDE 10 MG: 10 TABLET, FILM COATED ORAL at 10:08

## 2022-01-01 RX ADMIN — Medication 0.12 MCG/KG/MIN: at 07:08

## 2022-01-01 RX ADMIN — FUROSEMIDE 60 MG: 10 INJECTION, SOLUTION INTRAMUSCULAR; INTRAVENOUS at 01:08

## 2022-01-01 RX ADMIN — VASOPRESSIN 0.08 UNITS/MIN: 20 INJECTION INTRAVENOUS at 05:08

## 2022-01-01 RX ADMIN — Medication 6 MG: at 02:08

## 2022-01-01 RX ADMIN — FLUDROCORTISONE ACETATE 100 MCG: 0.1 TABLET ORAL at 06:08

## 2022-01-01 RX ADMIN — VASOPRESSIN 0.04 UNITS/MIN: 20 INJECTION INTRAVENOUS at 10:08

## 2022-01-01 RX ADMIN — INDOMETHACIN 150 MEQ: 25 CAPSULE ORAL at 09:08

## 2022-01-01 RX ADMIN — EPINEPHRINE 0.02 MCG/KG/MIN: 1 INJECTION INTRAMUSCULAR; INTRAVENOUS; SUBCUTANEOUS at 08:08

## 2022-01-01 RX ADMIN — LIDOCAINE HYDROCHLORIDE 100 MG: 20 INJECTION, SOLUTION EPIDURAL; INFILTRATION; INTRACAUDAL; PERINEURAL at 10:08

## 2022-01-01 RX ADMIN — Medication 0.16 MCG/KG/MIN: at 08:08

## 2022-01-01 RX ADMIN — ONDANSETRON 4 MG: 2 INJECTION INTRAMUSCULAR; INTRAVENOUS at 12:08

## 2022-01-01 RX ADMIN — DEXAMETHASONE SODIUM PHOSPHATE 4 MG: 4 INJECTION, SOLUTION INTRAMUSCULAR; INTRAVENOUS at 12:08

## 2022-01-01 RX ADMIN — VASOPRESSIN 0.04 UNITS/MIN: 20 INJECTION INTRAVENOUS at 07:08

## 2022-01-01 RX ADMIN — PROPOFOL 8 MCG/KG/MIN: 10 INJECTION, EMULSION INTRAVENOUS at 07:08

## 2022-01-01 RX ADMIN — DEXMEDETOMIDINE HYDROCHLORIDE 1.4 MCG/KG/HR: 4 INJECTION INTRAVENOUS at 10:08

## 2022-01-01 RX ADMIN — NOREPINEPHRINE BITARTRATE 0.27 MCG/KG/MIN: 1 INJECTION, SOLUTION, CONCENTRATE INTRAVENOUS at 04:08

## 2022-01-01 RX ADMIN — NITROGLYCERIN 0.5 INCH: 20 OINTMENT TOPICAL at 12:08

## 2022-01-01 RX ADMIN — SODIUM CHLORIDE 500 ML: 0.9 INJECTION, SOLUTION INTRAVENOUS at 06:08

## 2022-01-01 RX ADMIN — CALCIUM CHLORIDE INJECTION 1 G: 100 INJECTION, SOLUTION INTRAVENOUS at 10:08

## 2022-01-01 RX ADMIN — HEPARIN SODIUM 12 UNITS/KG/HR: 5000 INJECTION INTRAVENOUS; SUBCUTANEOUS at 11:08

## 2022-01-01 RX ADMIN — SODIUM CHLORIDE: 9 INJECTION, SOLUTION INTRAVENOUS at 10:08

## 2022-01-01 RX ADMIN — SODIUM CHLORIDE: 0.9 INJECTION, SOLUTION INTRAVENOUS at 08:08

## 2022-01-01 RX ADMIN — GUAIFENESIN AND DEXTROMETHORPHAN HYDROBROMIDE 1 TABLET: 600; 30 TABLET, EXTENDED RELEASE ORAL at 08:08

## 2022-01-01 RX ADMIN — EPINEPHRINE 1 MG: 0.1 INJECTION, SOLUTION ENDOTRACHEAL; INTRACARDIAC; INTRAVENOUS at 04:08

## 2022-01-01 RX ADMIN — ETOMIDATE 10 MG: 2 INJECTION INTRAVENOUS at 04:08

## 2022-01-01 RX ADMIN — DIPHENHYDRAMINE HYDROCHLORIDE 50 MG: 50 CAPSULE ORAL at 08:08

## 2022-01-01 RX ADMIN — HEPARIN SODIUM 12 UNITS/KG/HR: 5000 INJECTION INTRAVENOUS; SUBCUTANEOUS at 09:08

## 2022-01-01 RX ADMIN — VASOPRESSIN 0.04 UNITS/MIN: 20 INJECTION INTRAVENOUS at 06:08

## 2022-01-01 RX ADMIN — CEFEPIME HYDROCHLORIDE 2 G: 2 INJECTION, SOLUTION INTRAVENOUS at 10:08

## 2022-01-01 RX ADMIN — NITROGLYCERIN 0.5 INCH: 20 OINTMENT TOPICAL at 08:08

## 2022-01-01 RX ADMIN — CALCIUM CHLORIDE INJECTION 0.5 G: 100 INJECTION, SOLUTION INTRAVENOUS at 02:08

## 2022-01-01 RX ADMIN — Medication 0.1 MCG/KG/MIN: at 02:08

## 2022-01-01 RX ADMIN — HEPARIN SODIUM 19 UNITS/KG/HR: 5000 INJECTION INTRAVENOUS; SUBCUTANEOUS at 10:08

## 2022-01-01 RX ADMIN — PANTOPRAZOLE SODIUM 40 MG: 40 TABLET, DELAYED RELEASE ORAL at 06:08

## 2022-01-01 RX ADMIN — SODIUM BICARBONATE 50 MEQ: 84 INJECTION, SOLUTION INTRAVENOUS at 04:08

## 2022-01-01 RX ADMIN — VANCOMYCIN HYDROCHLORIDE 1250 MG: 1.25 INJECTION, POWDER, LYOPHILIZED, FOR SOLUTION INTRAVENOUS at 02:08

## 2022-01-01 RX ADMIN — HEPARIN SODIUM 17 UNITS/KG/HR: 5000 INJECTION INTRAVENOUS; SUBCUTANEOUS at 01:08

## 2022-01-01 RX ADMIN — DIVALPROEX SODIUM 125 MG: 125 CAPSULE, COATED PELLETS ORAL at 05:08

## 2022-01-01 RX ADMIN — LIDOCAINE HYDROCHLORIDE 50 MG: 20 INJECTION, SOLUTION EPIDURAL; INFILTRATION; INTRACAUDAL; PERINEURAL at 02:08

## 2022-01-01 RX ADMIN — DEXMEDETOMIDINE HYDROCHLORIDE 0.6 MCG/KG/HR: 4 INJECTION, SOLUTION INTRAVENOUS at 06:08

## 2022-01-01 RX ADMIN — DEXMEDETOMIDINE HYDROCHLORIDE 0.6 MCG/KG/HR: 4 INJECTION INTRAVENOUS at 07:08

## 2022-01-01 RX ADMIN — HEPARIN SODIUM 19 UNITS/KG/HR: 5000 INJECTION INTRAVENOUS; SUBCUTANEOUS at 03:08

## 2022-01-01 RX ADMIN — Medication 0.16 MCG/KG/MIN: at 05:08

## 2022-01-01 RX ADMIN — FUROSEMIDE 60 MG: 10 INJECTION, SOLUTION INTRAMUSCULAR; INTRAVENOUS at 08:08

## 2022-01-01 RX ADMIN — VANCOMYCIN HYDROCHLORIDE 500 MG: 500 INJECTION, POWDER, LYOPHILIZED, FOR SOLUTION INTRAVENOUS at 03:08

## 2022-01-01 RX ADMIN — VANCOMYCIN HYDROCHLORIDE 1750 MG: 500 INJECTION, POWDER, LYOPHILIZED, FOR SOLUTION INTRAVENOUS at 09:08

## 2022-01-01 RX ADMIN — AMIODARONE HYDROCHLORIDE 150 MG: 1.5 INJECTION, SOLUTION INTRAVENOUS at 04:08

## 2022-01-01 RX ADMIN — ROCURONIUM BROMIDE 20 MG: 10 INJECTION, SOLUTION INTRAVENOUS at 12:08

## 2022-01-01 RX ADMIN — VASOPRESSIN 2 UNITS: 20 INJECTION INTRAVENOUS at 02:08

## 2022-01-01 RX ADMIN — Medication 0.22 MCG/KG/MIN: at 10:08

## 2022-01-01 RX ADMIN — AMIODARONE HYDROCHLORIDE 0.5 MG/MIN: 1.8 INJECTION, SOLUTION INTRAVENOUS at 01:08

## 2022-01-01 RX ADMIN — Medication 0.1 MCG/KG/MIN: at 10:08

## 2022-01-01 RX ADMIN — CETIRIZINE HYDROCHLORIDE 10 MG: 10 TABLET, FILM COATED ORAL at 01:08

## 2022-01-01 RX ADMIN — MAGNESIUM SULFATE HEPTAHYDRATE 2 G: 40 INJECTION, SOLUTION INTRAVENOUS at 05:08

## 2022-01-01 RX ADMIN — DEXMEDETOMIDINE HYDROCHLORIDE 1.3 MCG/KG/HR: 4 INJECTION INTRAVENOUS at 07:08

## 2022-01-01 RX ADMIN — VASOPRESSIN 2 UNITS: 20 INJECTION INTRAVENOUS at 12:08

## 2022-01-01 RX ADMIN — NOREPINEPHRINE BITARTRATE 0.82 MCG/KG/MIN: 1 INJECTION, SOLUTION, CONCENTRATE INTRAVENOUS at 09:08

## 2022-01-01 RX ADMIN — HEPARIN SODIUM 2000 UNITS: 1000 INJECTION INTRAVENOUS; SUBCUTANEOUS at 12:08

## 2022-01-01 RX ADMIN — SODIUM CHLORIDE: 0.9 INJECTION, SOLUTION INTRAVENOUS at 07:08

## 2022-01-01 RX ADMIN — ALBUMIN (HUMAN) 25 G: 12.5 SOLUTION INTRAVENOUS at 01:08

## 2022-01-01 RX ADMIN — ROCURONIUM BROMIDE 30 MG: 10 INJECTION, SOLUTION INTRAVENOUS at 01:08

## 2022-01-01 RX ADMIN — DEXMEDETOMIDINE HYDROCHLORIDE 0.6 MCG/KG/HR: 4 INJECTION INTRAVENOUS at 06:08

## 2022-01-01 RX ADMIN — DEXMEDETOMIDINE HYDROCHLORIDE 1.3 MCG/KG/HR: 4 INJECTION INTRAVENOUS at 03:08

## 2022-01-01 RX ADMIN — AMIODARONE HYDROCHLORIDE 1 MG/MIN: 50 INJECTION, SOLUTION INTRAVENOUS at 01:08

## 2022-01-01 RX ADMIN — DEXMEDETOMIDINE HYDROCHLORIDE 1 MCG/KG/HR: 4 INJECTION INTRAVENOUS at 04:08

## 2022-01-01 RX ADMIN — MAGNESIUM SULFATE 2 G: 2 INJECTION INTRAVENOUS at 06:08

## 2022-01-01 RX ADMIN — FENTANYL CITRATE 50 MCG: 50 INJECTION, SOLUTION INTRAMUSCULAR; INTRAVENOUS at 03:08

## 2022-01-01 RX ADMIN — HEPARIN SODIUM 2000 UNITS: 1000 INJECTION INTRAVENOUS; SUBCUTANEOUS at 02:08

## 2022-07-11 NOTE — PROGRESS NOTES
The patient location is: home  The chief complaint leading to consultation is: PVL    Visit type: audiovisual    Face to Face time with patient: 20  45 minutes of total time spent on the encounter, which includes face to face time and non-face to face time preparing to see the patient (eg, review of tests), Obtaining and/or reviewing separately obtained history, Documenting clinical information in the electronic or other health record, Independently interpreting results (not separately reported) and communicating results to the patient/family/caregiver, or Care coordination (not separately reported).         Each patient to whom he or she provides medical services by telemedicine is:  (1) informed of the relationship between the physician and patient and the respective role of any other health care provider with respect to management of the patient; and (2) notified that he or she may decline to receive medical services by telemedicine and may withdraw from such care at any time.    Notes:   Yusuf Mcdonald is a 71 y.o. y.o. male with permanent AF, obesity, HTN, HLD, NICM, severe MR s/p mechanical MVR/MAZE/GEE excision (2015 at [facility] in [place]) referred for PVL closure after failed attempts at closure in [place].     Prior PVL closure in 2018. TRACY reviewed.     Rec:  Labs, clinic visit in person, sign up, Paradigm Trial eval, and schedule procedure.     Yusuf Tejeda

## 2022-07-15 NOTE — PROGRESS NOTES
OUTPATIENT CATHETERIZATION INSTRUCTIONS    You have been scheduled for a procedure in the catheterization lab on Wednesday, August 10, 2022.     Please report to the Cardiology Waiting Area on the Third floor of the hospital and check in at 7:30 AM.   You will then be taken to the SSCU (Short Stay Cardiac Unit) and prepared for your procedure. Please be aware that this is not the time of your procedure but the time you are to arrive. The procedures are scheduled on an hourly basis; however, emergency cases take precedence over all other cases.       1. No solid foods 8 hours prior to your procedure.  You may have clear liquids until the time of your admission which should be 2 hours prior to your procedure.  You are encouraged to drink at least 8 ounces of clear liquids prior to your admission to SSCU.  Patients with gastric emptying issues should be fasting for 6- 8 hours prior to the procedure.  Clear liquids include water, black coffee, clear juices, and performance drinks - no pulp or milk.    2. Heart failure or dialysis patients will be limited to 8 ounces (1 cup) of clear liquids up until 2 hours of the procedure.    3.   You may take your regular morning medications with water. If there are any medications that you should not take you will be instructed to hold them that morning. If you         are diabetic and on Metformin (Glucophage) do not take it the day before, the day of, and for 2 days after your procedure.  4.   If you are on Pradaxa, Eliquis or Coumadin , you can hold them 3 days prior to your procedure.      The procedure will take 1-2 hours to perform. After the procedure, you will return to SSCU on the third floor of the hospital. You will need to lie still (or keep your arm still) for the next 2 to 4 hours to minimize bleeding from the puncture site.  This time is determined by your physician.  Your family may remain in the room with you during this time.       You may be able to be discharged  "home that same afternoon if there is someone to drive you home and there are no complications.  Your doctor will determine, based on your progress, the date and time of your discharge. The results of your procedure will be discussed with you before you are discharged. Any further testing or procedures will be scheduled for you either before you leave or after your discharge..       If you should have any questions, concerns, or need to change the date of your procedure, please call "HEATHER Crocker @ [phone]    Special Instructions:    Continue your current medications.        THE ABOVE INSTRUCTIONS WERE GIVEN TO THE PATIENT VERBALLY AND THEY VERBALIZED UNDERSTANDING.  THEY DO NOT REQUIRE ANY SPECIAL NEEDS AND DO NOT HAVE ANY LEARNING BARRIERS.          Directions for Reporting to Cardiology Waiting Area in the Hospital  If you park in the Parking Garage:  Take elevators to the1st floor of the parking garage.  Continue past the gift shop, coffee shop, and piano.  Take a right and go to the gold elevators. (Elevator B)  Take the elevator to the 3rd floor.  Follow the arrow on the sign on the wall that says Cath Lab Registration/EP Lab Registration.  Follow the long hallway all the way around until you come to a big open area.  This is the registration area.  Check in at Reception Desk.    OR    If family is dropping you off:  Have them drop you off at the front of the Hospital under the green overhang.  Enter through the doors and take a right.  Take the E elevators to the 3rd floor Cardiology Waiting Area.  Check in at the Reception Desk in the waiting room.                "

## 2022-07-20 NOTE — PROGRESS NOTES
PARADIGM  [id number]  PI: Dr. Yusuf Tejeda    Subject: Yusuf Mcdonald    Prescreened subject for the Paradigm trial. Subject DNQ for study; exclusion #2- Subject's PVL's originates from a transcatheter aortic or mitral valve replacement, or from rapid deployment or sutureless surgical valve replacement valves.  Mr. Mcdonald had an MVR repair in 2015, and then a PVL repair in 2018 (AVPII). The PVL repair was a transcatheter repair using the AVPII.     Jessica Hanks/ALICIA

## 2022-08-09 PROBLEM — N17.9 AKI (ACUTE KIDNEY INJURY): Status: ACTIVE | Noted: 2022-01-01

## 2022-08-09 NOTE — ASSESSMENT & PLAN NOTE
Prior mechanical MVR in [place] 2015  Significant PVL on recent TTE and TRACY (in [place])  On Warfarn, but holding for procedure tomorrwo

## 2022-08-09 NOTE — PROGRESS NOTES
Interventional Cardiology Clinic Note  Reason for Visit: PVL (Mitral Valve)    HPI:   Mr Mcdonald is a 70 yo M with PMH MR s/p mechanical MVR 2015 9 (at [facility] in ) c/b PVL s/p MV plug (Dr Tejeda 2018), chronic AF on Warfarin, and HTN presenting for follow-up for MV PVL.    Patient was seen as virtual visit with Dr Tejeda last month. TRACY reviewed at that time and plan made for MV plug given PVL. He continues to have significant fatigue, GEORGE, and leg swelling. No chest pain, palps, orthopnea, PND. Weight has been relatively stable. He continues to take Torsemide BID, Plavix, and Toprol. Not on Lisinopril or Lasix on MAR. On schedule for MV plug for PVL tomorrow with Dr Tejeda.     ROS:    Constitution: Negative for fever, chills, weight loss or gain.   HENT: Negative for sore throat, rhinorrhea, or headache.  Eyes: Negative for blurred or double vision.   Cardiovascular: See above  Pulmonary: Negative for SOB   Gastrointestinal: Negative for abdominal pain, nausea, vomiting, or diarrhea.   : Negative for dysuria.   Neurological: Negative for focal weakness or sensory changes.  PMH:     Past Medical History:   Diagnosis Date    A-fib     Hypertension      Past Surgical History:   Procedure Laterality Date    CARDIOVERSION      MITRAL VALVE REPLACEMENT  2015    mechanical    MITRAL VALVE REPLACEMENT Right 10/10/2018    Procedure: REPLACEMENT, MITRAL VALVE;  Surgeon: Yusuf Tejeda MD;  Location: [facility] CATH LAB;  Service: Cardiology;  Laterality: Right;     Allergies:     Review of patient's allergies indicates:   Allergen Reactions    Neomycin-bacitracnzn-polymyxnb     Benzalkonium chloride Rash    Neosporin (neomycin-polymyx) Rash     Medications:     Current Outpatient Medications on File Prior to Visit   Medication Sig Dispense Refill    acetaminophen (TYLENOL) 500 MG tablet Take 500 mg by mouth.      diltiaZEM (CARDIZEM CD) 120 MG Cp24 Take 120 mg by mouth 2 (two) times a day.      JANTOVEN 5 mg  "tablet       metoprolol succinate (TOPROL-XL) 50 MG 24 hr tablet       torsemide (DEMADEX) 20 MG Tab Take 20 mg by mouth 2 (two) times daily.      cetirizine (ZYRTEC) 10 MG tablet Take 10 mg by mouth.      clopidogreL (PLAVIX) 75 mg tablet TAKE ONE TABLET BY MOUTH EVERY DAY (Patient not taking: Reported on 8/9/2022) 30 tablet 11    coenzyme Q10 100 mg capsule Take 100 mg by mouth.      digoxin (LANOXIN) 250 mcg tablet       fish,bora,flax oils-om3,6,9no1 1,200 mg Cap Take 1,200 mg by mouth.      fluticasone (FLONASE) 50 mcg/actuation nasal spray 50 Bottles by Nasal route.      furosemide (LASIX) 40 MG tablet       lisinopril (PRINIVIL,ZESTRIL) 20 MG tablet Take 20 mg by mouth.      multivitamin capsule Take by mouth.      potassium chloride SA (K-DUR,KLOR-CON) 20 MEQ tablet Take 1 tablet (20 mEq total) by mouth once daily. 30 tablet 0    warfarin (JANTOVEN) 5 MG tablet Take 5 mg by mouth once daily.       No current facility-administered medications on file prior to visit.     Social History:     Social History     Tobacco Use    Smoking status: Never Smoker    Smokeless tobacco: Never Used   Substance Use Topics    Alcohol use: Yes     Alcohol/week: 2.0 standard drinks     Types: 1 Glasses of wine, 1 Shots of liquor per week     Comment: occ.     Family History:   History reviewed. No pertinent family history.  Physical Exam:   BP 92/60 (BP Location: Right arm, Patient Position: Sitting, BP Method: Large (Automatic))   Pulse 103   Ht 5' 8.5" (1.74 m)   Wt 92.5 kg (203 lb 14.8 oz)   SpO2 97%   BMI 30.56 kg/m²        Physical Exam   Constitutional: He does not appear ill. No distress.   HENT:   Mouth/Throat: Mucous membranes are moist.   Cardiovascular: Normal rate, regular rhythm and normal pulses.   Murmur heard.  High-pitched blowing holosystolic murmur is present with a grade of 2/6 at the apex.  Pulses:       Radial pulses are 2+ on the right side and 2+ on the left side.        Femoral " pulses are 2+ on the right side and 2+ on the left side.  Pulmonary/Chest: Effort normal.   Abdominal: Soft.   Musculoskeletal:      Cervical back: Neck supple.      Right lower leg: Edema present.      Left lower leg: Edema present.   Neurological: He is alert.   Skin: Skin is warm.        Labs:     Lab Results   Component Value Date     (L) 08/09/2022    K 4.7 08/09/2022    CL 97 08/09/2022    CO2 26 08/09/2022    BUN 89 (H) 08/09/2022    CREATININE 2.4 (H) 08/09/2022    ANIONGAP 9 08/09/2022     Lab Results   Component Value Date    HGBA1C 5.2 12/30/2011     No results found for: BNP, BNPTRIAGEBLO Lab Results   Component Value Date    WBC 10.29 08/09/2022    HGB 13.2 (L) 08/09/2022    HCT 39.5 (L) 08/09/2022     08/09/2022    GRAN 8.2 (H) 08/09/2022    GRAN 79.4 (H) 08/09/2022     Lab Results   Component Value Date    CHOL 219 (H) 12/30/2011    HDL 39 (L) 12/30/2011    LDLCALC 136.0 12/30/2011    TRIG 219 (H) 12/30/2011          Imaging:   TTE 2018  · The left ventricle cavity is mildly dilated.  · Left ventricle ejection fraction is mildly decreased at 45%  · Left atrium is severely dilated.  · Right atrium is moderately dilated.  · There is a mechanical mitral valve prosthesis. presence of amplatzer plug to close Pravalvular leak. There is mild to moderate MR seen, but given severe pulmonary HTN suspect there is greater MR that is seen (limited by shadowing, consider TRACY if clinically indicated). MV VTI/LVOT VTI> 3.5 is also suggestive of significant regurgitation.  · Mild-to-moderate mitral regurgitation- possibly paravalvular.  · Moderate aortic regurgitation.  · Mild tricuspid regurgitation.  · Moderate stenosis tricuspid stenosis.  · No pericardial effusion.  · Elevated central venous pressure (15 mm Hg).  · The estimated PA systolic pressure is 88.96 mm Hg  · Pulmonary hypertension present.  · Right ventricular cavity size is mildly dilated.  · Left ventricular diastolic dysfunction.  · RV  systolic function is low normal.  · Pulmonic valve shows trace regurgitation.    TRACY reviewed by Dr Tejeda  Assessment:     1. Paravalvular leak of prosthetic heart valve, subsequent encounter     2. Pulmonary hypertension due to mitral valve disease     3. Chronic a-fib     4. Essential hypertension     5. Other hyperlipidemia     6. S/P MVR (mitral valve repair)     7. LJ (acute kidney injury)           Plan:   Paravalvular leak (prosthetic valve)  TRACY from [place] reviewed by Dr Tejeda previously  Plan for TRACY with MV plug/repair given PVL    Essential hypertension  Continue Toprol and Torsemide  Not on Lisinopril    Chronic a-fib  S/p MAZE/GEE ligation 2015 (in [place])  Holding Warfarin for MV repair/plug tomorrow  INR today 2.7    S/P MVR (mitral valve repair)  Prior mechanical MVR in [place] 2015  Significant PVL on recent TTE and TRACY (in [place])  On Warfarn, but holding for procedure tomorrwo    LJ (acute kidney injury)  Cr 2.4 today and baseline closer to 1.3-1.6 on chart review  Continue Torsemide given edema (no orthopnea noted though)  No contrast during PVL repair tomorrow and will use TRACY guidance      Discussed case with Dr Carlton Tejeda MD.    Diego Tracey MD PGY5  Cardiovascular Medicine Fellow  [facility]  Pager: [phone]    Interventional Cardiology Staff  I have personally taken the history and examined this patient. I have discussed and agree with the resident's findings and plan as documented in the resident's note.  71-year-old male who had mitral valve PVL occluded approximately 4 years ago by me.  He has recently extended his paravalvular leak on TRACY performed in [place].  An attempt was made to occlude paravalvular leak but was aborted due to fear of dislodging current device.  Recommend PVL closure as old device is most likely well endothelialized at this 4 year time frame.    Yusuf Tejeda

## 2022-08-09 NOTE — ASSESSMENT & PLAN NOTE
Cr 2.4 today and baseline closer to 1.3-1.6 on chart review  Continue Torsemide given edema (no orthopnea noted though)  No contrast during PVL repair tomorrow and will use TRACY guidance

## 2022-08-09 NOTE — H&P (VIEW-ONLY)
Interventional Cardiology Clinic Note  Reason for Visit: PVL (Mitral Valve)    HPI:   Mr Mcdonald is a 70 yo M with PMH MR s/p mechanical MVR 2015 9 (at [facility] in ) c/b PVL s/p MV plug (Dr Tejeda 2018), chronic AF on Warfarin, and HTN presenting for follow-up for MV PVL.    Patient was seen as virtual visit with Dr Tejeda last month. TRACY reviewed at that time and plan made for MV plug given PVL. He continues to have significant fatigue, GEORGE, and leg swelling. No chest pain, palps, orthopnea, PND. Weight has been relatively stable. He continues to take Torsemide BID, Plavix, and Toprol. Not on Lisinopril or Lasix on MAR. On schedule for MV plug for PVL tomorrow with Dr Tejeda.     ROS:    Constitution: Negative for fever, chills, weight loss or gain.   HENT: Negative for sore throat, rhinorrhea, or headache.  Eyes: Negative for blurred or double vision.   Cardiovascular: See above  Pulmonary: Negative for SOB   Gastrointestinal: Negative for abdominal pain, nausea, vomiting, or diarrhea.   : Negative for dysuria.   Neurological: Negative for focal weakness or sensory changes.  PMH:     Past Medical History:   Diagnosis Date    A-fib     Hypertension      Past Surgical History:   Procedure Laterality Date    CARDIOVERSION      MITRAL VALVE REPLACEMENT  2015    mechanical    MITRAL VALVE REPLACEMENT Right 10/10/2018    Procedure: REPLACEMENT, MITRAL VALVE;  Surgeon: Yusuf Tejeda MD;  Location: [facility] CATH LAB;  Service: Cardiology;  Laterality: Right;     Allergies:     Review of patient's allergies indicates:   Allergen Reactions    Neomycin-bacitracnzn-polymyxnb     Benzalkonium chloride Rash    Neosporin (neomycin-polymyx) Rash     Medications:     Current Outpatient Medications on File Prior to Visit   Medication Sig Dispense Refill    acetaminophen (TYLENOL) 500 MG tablet Take 500 mg by mouth.      diltiaZEM (CARDIZEM CD) 120 MG Cp24 Take 120 mg by mouth 2 (two) times a day.      JANTOVEN 5 mg  "tablet       metoprolol succinate (TOPROL-XL) 50 MG 24 hr tablet       torsemide (DEMADEX) 20 MG Tab Take 20 mg by mouth 2 (two) times daily.      cetirizine (ZYRTEC) 10 MG tablet Take 10 mg by mouth.      clopidogreL (PLAVIX) 75 mg tablet TAKE ONE TABLET BY MOUTH EVERY DAY (Patient not taking: Reported on 8/9/2022) 30 tablet 11    coenzyme Q10 100 mg capsule Take 100 mg by mouth.      digoxin (LANOXIN) 250 mcg tablet       fish,bora,flax oils-om3,6,9no1 1,200 mg Cap Take 1,200 mg by mouth.      fluticasone (FLONASE) 50 mcg/actuation nasal spray 50 Bottles by Nasal route.      furosemide (LASIX) 40 MG tablet       lisinopril (PRINIVIL,ZESTRIL) 20 MG tablet Take 20 mg by mouth.      multivitamin capsule Take by mouth.      potassium chloride SA (K-DUR,KLOR-CON) 20 MEQ tablet Take 1 tablet (20 mEq total) by mouth once daily. 30 tablet 0    warfarin (JANTOVEN) 5 MG tablet Take 5 mg by mouth once daily.       No current facility-administered medications on file prior to visit.     Social History:     Social History     Tobacco Use    Smoking status: Never Smoker    Smokeless tobacco: Never Used   Substance Use Topics    Alcohol use: Yes     Alcohol/week: 2.0 standard drinks     Types: 1 Glasses of wine, 1 Shots of liquor per week     Comment: occ.     Family History:   History reviewed. No pertinent family history.  Physical Exam:   BP 92/60 (BP Location: Right arm, Patient Position: Sitting, BP Method: Large (Automatic))   Pulse 103   Ht 5' 8.5" (1.74 m)   Wt 92.5 kg (203 lb 14.8 oz)   SpO2 97%   BMI 30.56 kg/m²        Physical Exam   Constitutional: He does not appear ill. No distress.   HENT:   Mouth/Throat: Mucous membranes are moist.   Cardiovascular: Normal rate, regular rhythm and normal pulses.   Murmur heard.  High-pitched blowing holosystolic murmur is present with a grade of 2/6 at the apex.  Pulses:       Radial pulses are 2+ on the right side and 2+ on the left side.        Femoral " pulses are 2+ on the right side and 2+ on the left side.  Pulmonary/Chest: Effort normal.   Abdominal: Soft.   Musculoskeletal:      Cervical back: Neck supple.      Right lower leg: Edema present.      Left lower leg: Edema present.   Neurological: He is alert.   Skin: Skin is warm.        Labs:     Lab Results   Component Value Date     (L) 08/09/2022    K 4.7 08/09/2022    CL 97 08/09/2022    CO2 26 08/09/2022    BUN 89 (H) 08/09/2022    CREATININE 2.4 (H) 08/09/2022    ANIONGAP 9 08/09/2022     Lab Results   Component Value Date    HGBA1C 5.2 12/30/2011     No results found for: BNP, BNPTRIAGEBLO Lab Results   Component Value Date    WBC 10.29 08/09/2022    HGB 13.2 (L) 08/09/2022    HCT 39.5 (L) 08/09/2022     08/09/2022    GRAN 8.2 (H) 08/09/2022    GRAN 79.4 (H) 08/09/2022     Lab Results   Component Value Date    CHOL 219 (H) 12/30/2011    HDL 39 (L) 12/30/2011    LDLCALC 136.0 12/30/2011    TRIG 219 (H) 12/30/2011          Imaging:   TTE 2018  · The left ventricle cavity is mildly dilated.  · Left ventricle ejection fraction is mildly decreased at 45%  · Left atrium is severely dilated.  · Right atrium is moderately dilated.  · There is a mechanical mitral valve prosthesis. presence of amplatzer plug to close Pravalvular leak. There is mild to moderate MR seen, but given severe pulmonary HTN suspect there is greater MR that is seen (limited by shadowing, consider TRACY if clinically indicated). MV VTI/LVOT VTI> 3.5 is also suggestive of significant regurgitation.  · Mild-to-moderate mitral regurgitation- possibly paravalvular.  · Moderate aortic regurgitation.  · Mild tricuspid regurgitation.  · Moderate stenosis tricuspid stenosis.  · No pericardial effusion.  · Elevated central venous pressure (15 mm Hg).  · The estimated PA systolic pressure is 88.96 mm Hg  · Pulmonary hypertension present.  · Right ventricular cavity size is mildly dilated.  · Left ventricular diastolic dysfunction.  · RV  systolic function is low normal.  · Pulmonic valve shows trace regurgitation.    TRACY reviewed by Dr Tejeda  Assessment:     1. Paravalvular leak of prosthetic heart valve, subsequent encounter     2. Pulmonary hypertension due to mitral valve disease     3. Chronic a-fib     4. Essential hypertension     5. Other hyperlipidemia     6. S/P MVR (mitral valve repair)     7. LJ (acute kidney injury)           Plan:   Paravalvular leak (prosthetic valve)  TRACY from [place] reviewed by Dr Tejeda previously  Plan for TRACY with MV plug/repair given PVL    Essential hypertension  Continue Toprol and Torsemide  Not on Lisinopril    Chronic a-fib  S/p MAZE/GEE ligation 2015 (in [place])  Holding Warfarin for MV repair/plug tomorrow  INR today 2.7    S/P MVR (mitral valve repair)  Prior mechanical MVR in [place] 2015  Significant PVL on recent TTE and TRACY (in [place])  On Warfarn, but holding for procedure tomorrwo    LJ (acute kidney injury)  Cr 2.4 today and baseline closer to 1.3-1.6 on chart review  Continue Torsemide given edema (no orthopnea noted though)  No contrast during PVL repair tomorrow and will use TRACY guidance      Discussed case with Dr Carlton Tejeda MD.    Diego Tracey MD PGY5  Cardiovascular Medicine Fellow  [facility]  Pager: [phone]    Interventional Cardiology Staff  I have personally taken the history and examined this patient. I have discussed and agree with the resident's findings and plan as documented in the resident's note.  71-year-old male who had mitral valve PVL occluded approximately 4 years ago by me.  He has recently extended his paravalvular leak on TRACY performed in [place].  An attempt was made to occlude paravalvular leak but was aborted due to fear of dislodging current device.  Recommend PVL closure as old device is most likely well endothelialized at this 4 year time frame.    Yusuf Tejeda

## 2022-08-10 PROBLEM — Z95.4 S/P MITRAL VALVE REPLACEMENT WITH METALLIC VALVE: Status: ACTIVE | Noted: 2018-05-24

## 2022-08-10 PROBLEM — N18.4 CKD (CHRONIC KIDNEY DISEASE) STAGE 4, GFR 15-29 ML/MIN: Status: ACTIVE | Noted: 2022-01-01

## 2022-08-10 PROBLEM — I48.91 ON WARFARIN FOR ATRIAL FIBRILLATION: Status: ACTIVE | Noted: 2022-01-01

## 2022-08-10 PROBLEM — Z79.01 ON WARFARIN FOR ATRIAL FIBRILLATION: Status: ACTIVE | Noted: 2022-01-01

## 2022-08-10 NOTE — Clinical Note
The sheath was inserted through the larger sheath in the right femoral artery.  And tip repositioned to LA.

## 2022-08-10 NOTE — ASSESSMENT & PLAN NOTE
Pt with hx of mechanical MVR c/b PVL s/p MV plug in 2018 and now with extension of his paravalvular leak on TRACY. Here today for PLV closure with TRACY guidance  -No absolute contraindications of esophageal stricture, tumor, perforation, laceration,or diverticulum and/or active GI bleed  -The risks, benefits & alternatives of the procedure were explained to the patient.   -The risks of transesophageal echo include but are not limited to:  Dental trauma, esophageal trauma/perforation, bleeding, laryngospasm/brochospasm, aspiration, sore throat/hoarseness, & dislodgement of the endotracheal tube/nasogastric tube (where applicable).    -The risks of ANES monitored sedation include hypotension, respiratory depression, arrhythmias, bronchospasm, & death.    -Informed consent was obtained. The patient is agreeable to proceed with the procedure and all questions and concerns addressed.    Case discussed with an attending in echocardiography lab.    Further recommendations per attending addendum

## 2022-08-10 NOTE — ANESTHESIA PROCEDURE NOTES
Arterial    Diagnosis: Severe MR    Patient location during procedure: done in OR    Staffing  Authorizing Provider: Osman Fitzpatrick MD  Performing Provider: Tea Castellanos MD    Anesthesiologist was present at the time of the procedure.    Preanesthetic Checklist  Completed: patient identified, IV checked, site marked, risks and benefits discussed, surgical consent, monitors and equipment checked, pre-op evaluation, timeout performed and anesthesia consent givenArterial  Skin Prep: chlorhexidine gluconate and isopropyl alcohol  Local Infiltration: lidocaine  Orientation: right  Location: radial    Catheter Size: 20 G  Catheter placement by Ultrasound guidance. Heme positive aspiration all ports.   Vessel Caliber: patent  Needle advanced into vessel with real time Ultrasound guidance.Insertion Attempts: 2  Assessment  Dressing: secured with tape and tegaderm  Patient: Tolerated well

## 2022-08-10 NOTE — EICU
Rounding (Video Assessment):  N/A    Intervention Initiated From:  Bedside    Magan Communicated with Bedside Nurse regarding:  Documentation and time out    Comments: Called to pt's room for documentation and time out prior to central line placement. Dr. Nix at bedside to perform. Time out complete, see bedside procedural flow sheet for details.

## 2022-08-10 NOTE — Clinical Note
The sheath was inserted through the larger sheath in the right femoral artery. (REINSERTED) and tip was repositioned to LA.

## 2022-08-10 NOTE — PLAN OF CARE
Received pt to floor from home accompanied by daughter.  AAO x 4. Denies pain or discomfort. Respirations even and unlabored. No distress noted. Pt stable.  Admit assessment complete. IV x 2 placed.  Pt oriented to room and call bell placed within reach.  Will continue to monitor.

## 2022-08-10 NOTE — PROCEDURES
"Yusuf Mcdonald Jr. is a 71 y.o. male patient.    Temp: 98.4 °F (36.9 °C) (08/10/22 0814)  Pulse: (!) 126 (08/10/22 1745)  Resp: 16 (08/10/22 0814)  BP: 114/81 (08/10/22 1745)  SpO2: (!) 82 % (08/10/22 1745)  Weight: 89.4 kg (197 lb) (08/10/22 0814)  Height: 5' 8.5" (174 cm) (08/10/22 0814)  Mallampati Scale: Class II  ASA Classification: Class 3    Central Line    Date/Time: 8/10/2022 5:30 PM  Performed by: Doron Chavez MD  Authorized by: Doron Chavez MD     Location procedure was performed:  [facility] CARDIAC MEDICAL ICU ([facility])  Assisting Provider:  Benigno Vazquez MD  Pre-operative diagnosis:  Hypotension  Post-operative diagnosis:  Hypotension  Consent Done ?:  Yes  Indications:  Med administration, vascular access and hemodynamic monitoring  Anesthesia:  Local infiltration  Local anesthetic:  Lidocaine 1% without epinephrine  Anesthetic total (ml):  1  Preparation:  Skin prepped with ChloraPrep  Location:  Right internal jugular  Catheter type:  Triple lumen  Catheter size:  7 Fr  Inserted Catheter Length (cm):  16  Ultrasound guidance: Yes    Vessel Caliber:  Medium  Comprressibility:  Normal  Needle advanced into vessel with real time ultrasound guidance.    Guidewire confirmed in vessel.    Steril sheath on probe.    Sterile gel used.  Manometry: Yes    Number of attempts:  1  Securement:  Line sutured, chlorhexidine patch, sterile dressing applied and blood return through all ports  Estimated blood loss (mL):  5  XRay:  Placement verified by x-ray, no pneumothorax on x-ray, tip termination and successful placement  Adverse Events:  None    Please call with questions or concerns.     Dr Shannon was present for the procedure.     Doron Chavez MD  Cardiology PGY5  [facility]  8/10/2022  "

## 2022-08-10 NOTE — Clinical Note
The sheath was inserted through the larger sheath in the right femoral artery.  And tip repositioned to LA

## 2022-08-10 NOTE — TRANSFER OF CARE
"Anesthesia Transfer of Care Note    Patient: Yusuf Mcdonald Jr.    Procedure(s) Performed: Procedure(s) (LRB):  LEAK CLOSURE, PERIVALVULAR (N/A)  ECHOCARDIOGRAM, TRANSESOPHAGEAL (N/A)  Removal, Foreign Body    Patient location: ICU    Anesthesia Type: general    Transport from OR: Transported from OR intubated on 100% O2 by AMBU with assisted ventilation    Post assessment: no apparent anesthetic complications    Vital signs course: on epi and vaso.    Level of consciousness: sedated    Nausea/Vomiting: no nausea/vomiting    Complications: none    Transfer of care protocol was followed      Last vitals:   Visit Vitals  BP 94/64 (BP Location: Left arm, Patient Position: Lying)   Pulse 85   Temp 36.9 °C (98.4 °F) (Temporal)   Resp 16   Ht 5' 8.5" (1.74 m)   Wt 89.4 kg (197 lb)   SpO2 96%   BMI 29.52 kg/m²     "

## 2022-08-10 NOTE — INTERVAL H&P NOTE
The patient has been examined and the H&P has been reviewed:    I concur with the findings and no changes have occurred since H&P was written.    Procedure risks, benefits and alternative options discussed and understood by patient/family.      Patient presenting with daughter this morning for mechanical MV PVL closure. Consented in clinic yesterday and feeling well today. Reviewed TRACY in clinic yesterday and will need TRACY during case today. No additional questions and agreeable to proceed.     --MC PVL Closure + TRACY  - Anti-platelet Therapy: Clopidogrel  - Anticoagulation: Warfarin (holding); INR 2.7 yesterday  - Access: R CFA, L CFA back-up  - Creatinine/CrCl: 2.4 (yesterday)  - Allergies: No shellfish / Iodine allergy  - Pre-Hydration: NS  - Pre-Op Med: Bendaryl 50mg pO   - All patient's questions were answered.  -The risks, benefits and alternatives of the procedure were explained to the patient.   -The risks of coronary angiography include but are not limited to: bleeding, infection, heart rhythm abnormalities, allergic reactions, kidney injury and potential need for dialysis, stroke and death.   - Additionally, pt is aware that non-compliance is likely to result in device clotting with heart attack, heart failure, and/or death  -The risks of moderate sedation include hypotension, respiratory depression, arrhythmias, bronchospasm, and death.   - Informed consent was obtained and the patient is agreeable to proceed with the procedure.      Active Hospital Problems    Diagnosis  POA    LJ (acute kidney injury) [N17.9]  Yes    Paravalvular leak (prosthetic valve) [T82.03XA]  Yes     S/p Mechanical MVR, MAZE and GEE excision 2015        Chronic a-fib [I48.20]  Yes    S/P MVR (mitral valve repair) [Z98.890]  Not Applicable      Resolved Hospital Problems   No resolved problems to display.

## 2022-08-10 NOTE — ASSESSMENT & PLAN NOTE
Cr of 2.4 on admission with baseline of 1.3 prior to admission. Likely prerenal given hypotension postoperatively but also could be cardiorenal given volume status.     Plan:  - Obtain urine electrolytes and creatine.  - Monitor intake/output and daily standing weights.   - Avoid nephrotoxic agents such as NSAIDs, gadolinium and IV radiocontrast.  - Renally dose meds to current GFR.  - Maintain MAP > 65.

## 2022-08-10 NOTE — Clinical Note
The sheath was inserted through the larger sheath in the right femoral vein. (REINSERTED) and tip repositioned to RA.

## 2022-08-10 NOTE — Clinical Note
The sheath was inserted through the larger sheath in the right femoral artery.  And tip repositioned to mitral valve.

## 2022-08-10 NOTE — Clinical Note
The catheter was reinserted into the left atrium.  The 22mm Device was snared and removed from mitral paravalvular leak. Device became lodge in right femoral vein.

## 2022-08-10 NOTE — Clinical Note
The sheath was inserted through the larger sheath in the right femoral artery. And tip was repositioned to LA.

## 2022-08-10 NOTE — SUBJECTIVE & OBJECTIVE
Past Medical History:   Diagnosis Date    A-fib     Anticoagulant long-term use     Hypertension        Past Surgical History:   Procedure Laterality Date    CARDIOVERSION      MITRAL VALVE REPLACEMENT  2015    mechanical    MITRAL VALVE REPLACEMENT Right 10/10/2018    Procedure: REPLACEMENT, MITRAL VALVE;  Surgeon: Yusuf Tejeda MD;  Location: [facility] CATH LAB;  Service: Cardiology;  Laterality: Right;       Review of patient's allergies indicates:   Allergen Reactions    Neomycin-bacitracnzn-polymyxnb     Benzalkonium chloride Rash    Neosporin (neomycin-polymyx) Rash       No current facility-administered medications on file prior to encounter.     Current Outpatient Medications on File Prior to Encounter   Medication Sig    acetaminophen (TYLENOL) 500 MG tablet Take 500 mg by mouth.    cetirizine (ZYRTEC) 10 MG tablet Take 10 mg by mouth.    clopidogreL (PLAVIX) 75 mg tablet TAKE ONE TABLET BY MOUTH EVERY DAY    diltiaZEM (CARDIZEM CD) 120 MG Cp24 Take 120 mg by mouth 2 (two) times a day.    metoprolol succinate (TOPROL-XL) 50 MG 24 hr tablet     coenzyme Q10 100 mg capsule Take 100 mg by mouth.    digoxin (LANOXIN) 250 mcg tablet     fish,bora,flax oils-om3,6,9no1 1,200 mg Cap Take 1,200 mg by mouth.    fluticasone (FLONASE) 50 mcg/actuation nasal spray 50 Bottles by Nasal route.    furosemide (LASIX) 40 MG tablet     JANTOVEN 5 mg tablet     lisinopril (PRINIVIL,ZESTRIL) 20 MG tablet Take 20 mg by mouth.    multivitamin capsule Take by mouth.    potassium chloride SA (K-DUR,KLOR-CON) 20 MEQ tablet Take 1 tablet (20 mEq total) by mouth once daily.     Family History    None       Tobacco Use    Smoking status: Never Smoker    Smokeless tobacco: Never Used   Substance and Sexual Activity    Alcohol use: Yes     Alcohol/week: 2.0 standard drinks     Types: 1 Glasses of wine, 1 Shots of liquor per week     Comment: occ.    Drug use: No    Sexual activity: Not on file     Review of Systems   Constitutional:  Positive for malaise/fatigue. Negative for diaphoresis, weight gain and weight loss.   HENT:  Negative for nosebleeds.    Eyes:  Negative for vision loss in left eye, vision loss in right eye and visual disturbance.   Cardiovascular:  Positive for dyspnea on exertion and leg swelling. Negative for chest pain, claudication, irregular heartbeat, near-syncope, orthopnea, palpitations, paroxysmal nocturnal dyspnea and syncope.   Respiratory:  Positive for shortness of breath. Negative for cough, sleep disturbances due to breathing, snoring and wheezing.    Hematologic/Lymphatic: Negative for bleeding problem. Does not bruise/bleed easily.   Skin:  Negative for poor wound healing and rash.   Musculoskeletal:  Negative for muscle cramps and myalgias.   Gastrointestinal:  Negative for bloating, abdominal pain, diarrhea, heartburn, melena, nausea and vomiting.   Genitourinary:  Negative for hematuria and nocturia.   Neurological:  Negative for brief paralysis, dizziness, headaches, light-headedness, numbness and weakness.   Psychiatric/Behavioral:  Negative for depression.    Allergic/Immunologic: Negative for hives.   Objective:     Vital Signs (Most Recent):  Temp: 98.4 °F (36.9 °C) (08/10/22 0814)  Pulse: 85 (08/10/22 0814)  Resp: 16 (08/10/22 0814)  BP: 94/64 (08/10/22 0815)  SpO2: 96 % (08/10/22 0814) Vital Signs (24h Range):  Temp:  [98.4 °F (36.9 °C)] 98.4 °F (36.9 °C)  Pulse:  [] 85  Resp:  [16] 16  SpO2:  [96 %-97 %] 96 %  BP: (87-94)/(60-64) 94/64     Weight: 89.4 kg (197 lb)  Body mass index is 29.52 kg/m².    SpO2: 96 %       No intake or output data in the 24 hours ending 08/10/22 0841    Lines/Drains/Airways       Peripheral Intravenous Line  Duration                  Peripheral IV - Single Lumen 08/10/22 0812 18 G Right Antecubital <1 day         Peripheral IV - Single Lumen 08/10/22 0812 20 G Left;Posterior Wrist <1 day                    Physical Exam  Vitals and nursing note reviewed.    Constitutional:       Appearance: He is well-developed. He is not diaphoretic.   HENT:      Head: Normocephalic and atraumatic.   Eyes:      Conjunctiva/sclera: Conjunctivae normal.   Neck:      Vascular: No carotid bruit or JVD.   Cardiovascular:      Rate and Rhythm: Normal rate. Rhythm irregular.      Pulses: Normal pulses and intact distal pulses.      Heart sounds: Murmur heard.     No friction rub. No gallop.   Pulmonary:      Effort: Pulmonary effort is normal.      Breath sounds: Normal breath sounds. No rales.   Chest:      Chest wall: No tenderness.   Abdominal:      General: There is no distension.      Palpations: Abdomen is soft. There is no mass.      Tenderness: There is no abdominal tenderness.   Skin:     General: Skin is warm and dry.      Coloration: Skin is not pale.   Neurological:      Mental Status: He is alert and oriented to person, place, and time.       Significant Labs: BMP:   Recent Labs   Lab 08/09/22  1217   GLU 97   *   K 4.7   CL 97   CO2 26   BUN 89*   CREATININE 2.4*   CALCIUM 8.0*   , CBC   Recent Labs   Lab 08/09/22  1217   WBC 10.29   HGB 13.2*   HCT 39.5*      , and INR   Recent Labs   Lab 08/09/22  1217   INR 2.7*

## 2022-08-10 NOTE — ASSESSMENT & PLAN NOTE
Patient is a 72 yo M with chronic AF on warfarin, pHTN, HLD and mechanical MVR c/b PVL s/p MV plug in 2018 and now with extension of his paravalvular leak on TRACY. Here today for PLV closure with TRACY guidance  Patient admitted to CCU for monitoring post procedure.

## 2022-08-10 NOTE — PROGRESS NOTES
Pt arrived to 3084 from cath lab. Pt is intubated on Vaso, Epi, and Precedex on arrival. Pt extremely agitated-Propofol and restraints ordered by Dr Mchugh @ bedside. Emergent Right TLC placed, eICU called and timeout performed, sterile technique maintained throughout procedure per hospital protocol. Temp on arrival 89.8 C-ramsey hugger placed and esophageal probe placed.     Will continue to monitor.

## 2022-08-10 NOTE — Clinical Note
The sheath was inserted through the larger sheath in the right femoral vein.  And tip was repositioned to mitral valve

## 2022-08-10 NOTE — OP NOTE
Brief Operative Note:    : Yusuf Tejeda MD     Referring Physician: Yusfu Tejeda.     All Operators: Surgeon(s):  MD Carlton Cormier MD James S. Jenkins, MD Yokasta Washburn MD Imad Bagh, MD John Dudley, MD Sky Alfredo Jr., MD Diego Tracey MD     Preoperative Diagnosis: Paravalvular leak of prosthetic heart valve, initial encounter [T82.03XA]  Severe mitral regurgitation [I34.0]     Postop Diagnosis: Paravalvular leak of prosthetic heart valve, initial encounter [T82.03XA]  Severe mitral regurgitation [I34.0]    Treatments/Procedures: Procedure(s) (LRB):  LEAK CLOSURE, PERIVALVULAR (N/A)  ECHOCARDIOGRAM, TRANSESOPHAGEAL (N/A)  Removal, Foreign Body    Findings:  Multiple unsuccessful attempts at implanting PVL closure device via R CFA  Successful PVL closure device placed via R CFV and transseptal approach  See catheterization report for full details.    Estimated Blood loss: 20 cc    Specimens removed: No    Recommendations:   - Routine post-cath care as per orders  - Transfer to CICU for further mgmt  - Lasix 80 mg IV x1 and Lasix gtt @ 10 mg/hr  - STAT CBC, lactate in CICU  - Hypotensive, on Epi gtt; wean as tolerated    Diego Tracey

## 2022-08-10 NOTE — HPI
Mr Mcdonald is a 70 yo M with PMH MR s/p mechanical MVR 2015 in [place]) c/b PVL s/p MV plug (Dr Tejeda 2018) on Warfarin, chronic AF s/p GEE ligation, and HTN.  He has recently extended his paravalvular leak on TRACY performed in [place].  An attempt was made to occlude paravalvular leak at outside hospital, but was aborted due to fear of dislodging current device. He continues to have significant fatigue, GEORGE, and leg swelling. He presented for PLV closure with TRACY guidance.  Postprocedure patient was brought to ICU where he was found to be hypotensive and epinephrine was started.  Emergent right IJ triple-lumen catheter was inserted and hemodynamics were obtained which showed high output state with low SVR.  His white count also increased to 23 which was normal in the morning.  He was initially hypothermic with temperature 92° for which active warming was done however later his bike fever 102.2 at 1 am and then again at 2 am.  His creatinine, total bilirubin also went up slightly.  He was later found to be in atrial fibrillation with rapid ventricular rate in the 130s to 140s, home rate control medications were resumed and he was started on amiodarone bolus followed by infusion.  Patient is currently being covered for vancomycin and cefepime for bed very likely looks like a septic shock.         TRACY 6/29/22 ([place])  1.  Severe perivalvular regurgitation of the mechanical mitral valve due   to partial dehiscence   2.  Severely dilated right ventricle with reduced systolic function   3.  Severe pulmonary hypertension with estimated PA pressures of greater   than 90 mmHg     TTE 6/16/22  1. Mildly dilated left ventricle. Normal left ventricular systolic function. LVEF 55 -   65%. Left ventricular diastolic function is indeterminate in this study due to the   presence of mitral valve repair or replacement. Normal wall motion.   2. Moderate to severely dilated right ventricle. Borderline right ventricular systolic    function.   3. A mechanical prosthetic valve is present in the mitral position. Moderate prosthetic   valvular regurgitation is present. Moderate paravalvular mitral regurgitation.   4. Mild to moderate aortic valve regurgitation.   5. Severe tricuspid valve regurgitation. Severe pulmonary hypertension.   6. Mildly dilated aortic root.     Anticoagulant/antiplatelets: Warfarin  ECG: Atrial fibrillation

## 2022-08-10 NOTE — CONSULTS
[facility] Short Stay Cardiac Unit  Cardiology  Consult Note    Patient Name: Yusuf Mcdonald Jr.  MRN: [id number]  Admission Date: 8/10/2022  Hospital Length of Stay: 0 days  Code Status: No Order   Attending Provider: Yusuf Tejeda MD   Consulting Provider: Esther Manley PA-C  Primary Care Physician: Primary Doctor No  Principal Problem:<principal problem not specified>    Patient information was obtained from patient and past medical records.     Consults  Subjective:     Chief Complaint:  Shortness of breath     HPI:   Mr Mcdonald is a 70 yo M with PMH MR s/p mechanical MVR 2015 in [place]) c/b PVL s/p MV plug (Dr Tejeda 2018) on Warfarin, chronic AF s/p GEE ligation, and HTN.  He has recently extended his paravalvular leak on TRACY performed in [place].  An attempt was made to occlude paravalvular leak at outside hospital, but was aborted due to fear of dislodging current device. He continues to have significant fatigue, GEORGE, and leg swelling. He presents today for PLV closure with TRACY guidance.      TRACY 6/29/22 ([place])  1.  Severe perivalvular regurgitation of the mechanical mitral valve due   to partial dehiscence   2.  Severely dilated right ventricle with reduced systolic function   3.  Severe pulmonary hypertension with estimated PA pressures of greater   than 90 mmHg     TTE 6/16/22  1. Mildly dilated left ventricle. Normal left ventricular systolic function. LVEF 55 -   65%. Left ventricular diastolic function is indeterminate in this study due to the   presence of mitral valve repair or replacement. Normal wall motion.   2. Moderate to severely dilated right ventricle. Borderline right ventricular systolic   function.   3. A mechanical prosthetic valve is present in the mitral position. Moderate prosthetic   valvular regurgitation is present. Moderate paravalvular mitral regurgitation.   4. Mild to moderate aortic valve regurgitation.   5. Severe tricuspid valve regurgitation. Severe pulmonary  hypertension.   6. Mildly dilated aortic root.     Anticoagulant/antiplatelets: Warfarin  ECG: Atrial fibrillation     Dysphagia or odynophagia:  No  Liver Disease, esophageal disease, or known varices:  No  Upper GI Bleeding: No  Snoring:  No  Sleep Apnea:  No  Prior neck surgery or radiation:  No  History of anesthetic difficulties:  No  Family history of anesthetic difficulties:  No  Last oral intake: yesterday before midnight  Able to move neck in all directions:  Yes  Platelet count: 357k  INR: 2.7        Past Medical History:   Diagnosis Date    A-fib     Anticoagulant long-term use     Hypertension        Past Surgical History:   Procedure Laterality Date    CARDIOVERSION      MITRAL VALVE REPLACEMENT  2015    mechanical    MITRAL VALVE REPLACEMENT Right 10/10/2018    Procedure: REPLACEMENT, MITRAL VALVE;  Surgeon: Yusuf Tejeda MD;  Location: [facility] CATH LAB;  Service: Cardiology;  Laterality: Right;       Review of patient's allergies indicates:   Allergen Reactions    Neomycin-bacitracnzn-polymyxnb     Benzalkonium chloride Rash    Neosporin (neomycin-polymyx) Rash       No current facility-administered medications on file prior to encounter.     Current Outpatient Medications on File Prior to Encounter   Medication Sig    acetaminophen (TYLENOL) 500 MG tablet Take 500 mg by mouth.    cetirizine (ZYRTEC) 10 MG tablet Take 10 mg by mouth.    clopidogreL (PLAVIX) 75 mg tablet TAKE ONE TABLET BY MOUTH EVERY DAY    diltiaZEM (CARDIZEM CD) 120 MG Cp24 Take 120 mg by mouth 2 (two) times a day.    metoprolol succinate (TOPROL-XL) 50 MG 24 hr tablet     coenzyme Q10 100 mg capsule Take 100 mg by mouth.    digoxin (LANOXIN) 250 mcg tablet     fish,bora,flax oils-om3,6,9no1 1,200 mg Cap Take 1,200 mg by mouth.    fluticasone (FLONASE) 50 mcg/actuation nasal spray 50 Bottles by Nasal route.    furosemide (LASIX) 40 MG tablet     JANTOVEN 5 mg tablet     lisinopril (PRINIVIL,ZESTRIL) 20 MG tablet Take 20 mg by  mouth.    multivitamin capsule Take by mouth.    potassium chloride SA (K-DUR,KLOR-CON) 20 MEQ tablet Take 1 tablet (20 mEq total) by mouth once daily.     Family History    None       Tobacco Use    Smoking status: Never Smoker    Smokeless tobacco: Never Used   Substance and Sexual Activity    Alcohol use: Yes     Alcohol/week: 2.0 standard drinks     Types: 1 Glasses of wine, 1 Shots of liquor per week     Comment: occ.    Drug use: No    Sexual activity: Not on file     Review of Systems   Constitutional: Positive for malaise/fatigue. Negative for diaphoresis, weight gain and weight loss.   HENT:  Negative for nosebleeds.    Eyes:  Negative for vision loss in left eye, vision loss in right eye and visual disturbance.   Cardiovascular:  Positive for dyspnea on exertion and leg swelling. Negative for chest pain, claudication, irregular heartbeat, near-syncope, orthopnea, palpitations, paroxysmal nocturnal dyspnea and syncope.   Respiratory:  Positive for shortness of breath. Negative for cough, sleep disturbances due to breathing, snoring and wheezing.    Hematologic/Lymphatic: Negative for bleeding problem. Does not bruise/bleed easily.   Skin:  Negative for poor wound healing and rash.   Musculoskeletal:  Negative for muscle cramps and myalgias.   Gastrointestinal:  Negative for bloating, abdominal pain, diarrhea, heartburn, melena, nausea and vomiting.   Genitourinary:  Negative for hematuria and nocturia.   Neurological:  Negative for brief paralysis, dizziness, headaches, light-headedness, numbness and weakness.   Psychiatric/Behavioral:  Negative for depression.    Allergic/Immunologic: Negative for hives.   Objective:     Vital Signs (Most Recent):  Temp: 98.4 °F (36.9 °C) (08/10/22 0814)  Pulse: 85 (08/10/22 0814)  Resp: 16 (08/10/22 0814)  BP: 94/64 (08/10/22 0815)  SpO2: 96 % (08/10/22 0814) Vital Signs (24h Range):  Temp:  [98.4 °F (36.9 °C)] 98.4 °F (36.9 °C)  Pulse:  [] 85  Resp:  [16] 16  SpO2:   [96 %-97 %] 96 %  BP: (87-94)/(60-64) 94/64     Weight: 89.4 kg (197 lb)  Body mass index is 29.52 kg/m².    SpO2: 96 %       No intake or output data in the 24 hours ending 08/10/22 0841    Lines/Drains/Airways       Peripheral Intravenous Line  Duration                  Peripheral IV - Single Lumen 08/10/22 0812 18 G Right Antecubital <1 day         Peripheral IV - Single Lumen 08/10/22 0812 20 G Left;Posterior Wrist <1 day                    Physical Exam  Vitals and nursing note reviewed.   Constitutional:       Appearance: He is well-developed. He is not diaphoretic.   HENT:      Head: Normocephalic and atraumatic.   Eyes:      Conjunctiva/sclera: Conjunctivae normal.   Neck:      Vascular: No carotid bruit or JVD.   Cardiovascular:      Rate and Rhythm: Normal rate. Rhythm irregular.      Pulses: Normal pulses and intact distal pulses.      Heart sounds: Murmur heard.     No friction rub. No gallop.   Pulmonary:      Effort: Pulmonary effort is normal.      Breath sounds: Normal breath sounds. No rales.   Chest:      Chest wall: No tenderness.   Abdominal:      General: There is no distension.      Palpations: Abdomen is soft. There is no mass.      Tenderness: There is no abdominal tenderness.   Skin:     General: Skin is warm and dry.      Coloration: Skin is not pale.   Neurological:      Mental Status: He is alert and oriented to person, place, and time.       Significant Labs: BMP:   Recent Labs   Lab 08/09/22  1217   GLU 97   *   K 4.7   CL 97   CO2 26   BUN 89*   CREATININE 2.4*   CALCIUM 8.0*   , CBC   Recent Labs   Lab 08/09/22  1217   WBC 10.29   HGB 13.2*   HCT 39.5*      , and INR   Recent Labs   Lab 08/09/22  1217   INR 2.7*         Assessment and Plan:     Paravalvular leak (prosthetic valve)  Pt with hx of mechanical MVR c/b PVL s/p MV plug in 2018 and now with extension of his paravalvular leak on TRACY. Here today for PLV closure with TRACY guidance  -No absolute contraindications of  esophageal stricture, tumor, perforation, laceration,or diverticulum and/or active GI bleed  -The risks, benefits & alternatives of the procedure were explained to the patient.   -The risks of transesophageal echo include but are not limited to:  Dental trauma, esophageal trauma/perforation, bleeding, laryngospasm/brochospasm, aspiration, sore throat/hoarseness, & dislodgement of the endotracheal tube/nasogastric tube (where applicable).    -The risks of ANES monitored sedation include hypotension, respiratory depression, arrhythmias, bronchospasm, & death.    -Informed consent was obtained. The patient is agreeable to proceed with the procedure and all questions and concerns addressed.    Case discussed with an attending in echocardiography lab.    Further recommendations per attending addendum          VTE Risk Mitigation (From admission, onward)      None            Thank you for your consult. I will sign off. Please contact us if you have any additional questions.    Esther Manley PA-C  Cardiology   [facility] Short Stay Cardiac Unit

## 2022-08-10 NOTE — ANESTHESIA PREPROCEDURE EVALUATION
[facility]  Anesthesia Pre-Operative Evaluation         Patient Name: Yusuf Mcdonald Jr.  YOB: 1950  MRN: [id number]    SUBJECTIVE:     Pre-operative evaluation for Procedure(s) (LRB):  LEAK CLOSURE, PERIVALVULAR (N/A)  ECHOCARDIOGRAM, TRANSESOPHAGEAL (N/A)     08/10/2022    Yusuf Mcdonald Jr. is a 71 y.o. male w/ a significant PMHx of MR s/p mechanical MVR [2015] who has had a persistent perivalvular leak. Attempts at repair unsuccessful at OSH due to fear of dislodging current device.  Patient now presents for the above procedure(s).    Hx significant for HTN, chronic a-fib [on warfarin], severe pulmonary HTN.    TRACY [06/29/22]:   - Mechanical Mitral valve with severe paravalvular regurgitation due to partial dehiscence  - RV severely dilated with mod to severe reduced systolic function  - PA pressure > 90mmHg  - Biatrial enlargement  - Moderate TR    TTE [06/16/22]:  - EF 55%-65%, indeterminate diastolic function  - Mild to mod AI      Prev airway: None documented    Patient Active Problem List   Diagnosis    Paravalvular leak (prosthetic valve)    Pulmonary hypertension due to mitral valve disease    Chronic a-fib    Essential hypertension    Other hyperlipidemia    S/P MVR (mitral valve repair)    Hypokalemia    LJ (acute kidney injury)       Review of patient's allergies indicates:   Allergen Reactions    Neomycin-bacitracnzn-polymyxnb     Benzalkonium chloride Rash    Neosporin (neomycin-polymyx) Rash       Current Inpatient Medications:      No current facility-administered medications on file prior to encounter.     Current Outpatient Medications on File Prior to Encounter   Medication Sig Dispense Refill    acetaminophen (TYLENOL) 500 MG tablet Take 500 mg by mouth.      cetirizine (ZYRTEC) 10 MG tablet Take 10 mg by mouth.      clopidogreL (PLAVIX) 75 mg tablet TAKE ONE TABLET BY MOUTH EVERY DAY (Patient not taking: Reported on 8/9/2022) 30 tablet 11     coenzyme Q10 100 mg capsule Take 100 mg by mouth.      digoxin (LANOXIN) 250 mcg tablet       diltiaZEM (CARDIZEM CD) 120 MG Cp24 Take 120 mg by mouth 2 (two) times a day.      fish,bora,flax oils-om3,6,9no1 1,200 mg Cap Take 1,200 mg by mouth.      fluticasone (FLONASE) 50 mcg/actuation nasal spray 50 Bottles by Nasal route.      furosemide (LASIX) 40 MG tablet       JANTOVEN 5 mg tablet       lisinopril (PRINIVIL,ZESTRIL) 20 MG tablet Take 20 mg by mouth.      metoprolol succinate (TOPROL-XL) 50 MG 24 hr tablet       multivitamin capsule Take by mouth.      potassium chloride SA (K-DUR,KLOR-CON) 20 MEQ tablet Take 1 tablet (20 mEq total) by mouth once daily. 30 tablet 0       Past Surgical History:   Procedure Laterality Date    CARDIOVERSION      MITRAL VALVE REPLACEMENT  2015    mechanical    MITRAL VALVE REPLACEMENT Right 10/10/2018    Procedure: REPLACEMENT, MITRAL VALVE;  Surgeon: Yusuf Tejeda MD;  Location: [facility] CATH LAB;  Service: Cardiology;  Laterality: Right;       OBJECTIVE:     Vital Signs Range (Last 24H):  Pulse:  []   BP: (91-92)/(60)   SpO2:  [97 %]       Significant Labs:  Lab Results   Component Value Date    WBC 10.29 08/09/2022    HGB 13.2 (L) 08/09/2022    HCT 39.5 (L) 08/09/2022     08/09/2022     (L) 08/09/2022    K 4.7 08/09/2022    CL 97 08/09/2022    CREATININE 2.4 (H) 08/09/2022    BUN 89 (H) 08/09/2022    CO2 26 08/09/2022    INR 2.7 (H) 08/09/2022    HGBA1C 5.2 12/30/2011       Diagnostic Studies: No relevant studies.    EKG:   No results found for this or any previous visit.    ASSESSMENT/PLAN:       Pre-op Assessment    I have reviewed the Patient Summary Reports.     I have reviewed the Nursing Notes. I have reviewed the NPO Status.   I have reviewed the Medications.     Review of Systems  Anesthesia Hx:  No problems with previous Anesthesia  Denies Family Hx of Anesthesia complications.   Denies Personal Hx of Anesthesia complications.    Cardiovascular:   Hypertension Valvular problems/Murmurs Dysrhythmias CHF    Pulmonary:   Shortness of breath Pulmonary hypertension   Renal/:   Chronic Renal Disease, CRI        Physical Exam  General: Well nourished, Cooperative, Alert and Oriented    Chest/Lungs:  Normal Respiratory Rate    Heart:  Rate: Normal        Anesthesia Plan  Type of Anesthesia, risks & benefits discussed:    Anesthesia Type: Gen ETT  Intra-op Monitoring Plan: Art Line and Standard ASA Monitors  Post Op Pain Control Plan: multimodal analgesia and IV/PO Opioids PRN  Induction:  IV  Airway Plan: Direct, Post-Induction  Informed Consent: Informed consent signed with the Patient and all parties understand the risks and agree with anesthesia plan.  All questions answered.   ASA Score: 4  Day of Surgery Review of History & Physical: H&P Update referred to the surgeon/provider.    Ready For Surgery From Anesthesia Perspective.     .

## 2022-08-10 NOTE — Clinical Note
The catheter was reinserted into the left atrium. The 14 mm Device was snared and removed from the LA. And snare was removed with device.

## 2022-08-10 NOTE — Clinical Note
The sheath was inserted through the larger sheath in the right femoral vein. (REINSERTED) and tip was repositioned to LA

## 2022-08-10 NOTE — ANESTHESIA PROCEDURE NOTES
Intubation    Date/Time: 8/10/2022 10:51 AM  Performed by: Tea Castellanos MD  Authorized by: Osman Fitzpatrick MD     Intubation:     Induction:  Rapid sequence induction    Intubated:  Postinduction    Mask Ventilation:  N/a    Attempts:  1    Attempted By:  Resident anesthesiologist    Method of Intubation:  Direct    Blade:  Italo 3    Laryngeal View Grade: Grade IIA - cords partially seen      Difficult Airway Encountered?: No      Complications:  None    Airway Device:  Oral endotracheal tube    Airway Device Size:  7.5    Style/Cuff Inflation:  Cuffed    Inflation Amount (mL):  6    Tube secured:  23    Secured at:  The lips    Placement Verified By:  Capnometry    Complicating Factors:  Anterior larynx    Findings Post-Intubation:  BS equal bilateral and atraumatic/condition of teeth unchanged

## 2022-08-10 NOTE — Clinical Note
The catheter was inserted into the left atrium. Previous vascular plug was snared and removed from LA.

## 2022-08-10 NOTE — Clinical Note
The sheath was inserted through the larger sheath in the right femoral artery.  And tip repositioned to mitral valve

## 2022-08-10 NOTE — PROCEDURE NOTE ADDENDUM
Certification of Assistant at Surgery       Surgery Date: 8/10/2022     Participating Surgeons:  Surgeon(s) and Role:  Panel 1:     * Yusuf Tejeda MD - Primary     * Carlton Boyd MD - Assisting     * Sky Alfredo Jr., MD - Assisting     * Nuris Cary MD - Fellow     * Diego Tracey MD - Fellow     * Vaibhav Lofton MD - Fellow     * Sherif Wells MD - Fellow  Panel 2:     * Alexus Bell MD - Primary     * Yokasta Hoyos MD - Fellow    Procedures:  Procedure(s) (LRB):  LEAK CLOSURE, PERIVALVULAR (N/A)  ECHOCARDIOGRAM, TRANSESOPHAGEAL (N/A)  Removal, Foreign Body    Assistant Surgeon's Certification of Necessity:  I understand that section 1842 (b) (6) (d) of the Social Security Act generally prohibits Medicare Part B reasonable charge payment for the services of assistants at surgery in teaching hospitals when qualified residents are available to furnish such services. I certify that the services for which payment is claimed were medically necessary, and that no qualified resident was available to perform the services. I further understand that these services are subject to post-payment review by the Medicare carrier.      Sky Alfredo MD    08/10/2022  4:12 PM

## 2022-08-11 PROBLEM — R31.0 GROSS HEMATURIA: Status: ACTIVE | Noted: 2022-01-01

## 2022-08-11 PROBLEM — R65.21 SEPTIC SHOCK: Status: ACTIVE | Noted: 2022-01-01

## 2022-08-11 PROBLEM — A41.9 SEPTIC SHOCK: Status: ACTIVE | Noted: 2022-01-01

## 2022-08-11 NOTE — CARE UPDATE
Interventional Cardiology Update:    Patient managed overnight in CICU. Febrile to 102 and started on Vanc/Cefepime. CO/CI 10/5 with  as well overnight. BCx NGTD. UA with hematuria and UOP 30 mL/hr overnight. AF with RVR requiring Amio gtt. Now rate controlled with HR  bpm. Lactate peak at 4 and now down-trending.    This morning, patient seen with daughters at bedside. Still on Epi 0.12 and Vaso 0.08. Intubated with FiO2 80%. Hb 11 (13 prior), Cr 2.2 (2.4 prior), INR 2 (2.7 prior), Lactate 2 (peak of 4 overnight). This morning SVO2 68%, CVP 7, CO 6.7, CI 3.7, . Neuro checks stable without focal changes.     Recommendations:  - Start heparin gtt given mechanical MV and subtherapeutic INR  - Continue Plavix for PVL closure device  - Weaning FiO2, trend ABGs for possible SBT later today with hopes of extubating soon  - Check LDH, CK, Haptoglobin for hemolysis given hematuria  - Weaning pressors as tolerated  - Weaning sedation for SATs  - Continue to trend lactate, hemodynamics  - Need to optimize nutrition as much as possible given albumin <2; consider tube feeds    Discussed with CCU service this morning as well.     Greatly appreciate assistance of the CCU service in managing Mr Mcdonald's care. Interventional cardiology will continue to follow.     Diego Tracey MD PGY5  Cardiovascular Medicine Fellow  [facility]  Pager: [phone]

## 2022-08-11 NOTE — PLAN OF CARE
CCU update 2 pm    -high grade temp of 102.2, tylenol given, already on vanc and cefepime, blood cultures obtained    -Afib with RVR, INR 2.0, rate 135-145, EKG obtained, will give amio iv 150x1 followed by gtt    -POC lactic now coming down, 3.1 at this time from 4.0 at 1030 pm

## 2022-08-11 NOTE — SUBJECTIVE & OBJECTIVE
Past Medical History:   Diagnosis Date    A-fib     Anticoagulant long-term use     Hypertension        Past Surgical History:   Procedure Laterality Date    CARDIOVERSION      MITRAL VALVE REPLACEMENT  2015    mechanical    MITRAL VALVE REPLACEMENT Right 10/10/2018    Procedure: REPLACEMENT, MITRAL VALVE;  Surgeon: Yusuf Tejeda MD;  Location: [facility] CATH LAB;  Service: Cardiology;  Laterality: Right;       Review of patient's allergies indicates:   Allergen Reactions    Neomycin-bacitracnzn-polymyxnb     Benzalkonium chloride Rash    Neosporin (neomycin-polymyx) Rash       No current facility-administered medications on file prior to encounter.     Current Outpatient Medications on File Prior to Encounter   Medication Sig    acetaminophen (TYLENOL) 500 MG tablet Take 500 mg by mouth.    cetirizine (ZYRTEC) 10 MG tablet Take 10 mg by mouth.    clopidogreL (PLAVIX) 75 mg tablet TAKE ONE TABLET BY MOUTH EVERY DAY    diltiaZEM (CARDIZEM CD) 120 MG Cp24 Take 120 mg by mouth 2 (two) times a day.    metoprolol succinate (TOPROL-XL) 50 MG 24 hr tablet     coenzyme Q10 100 mg capsule Take 100 mg by mouth.    digoxin (LANOXIN) 250 mcg tablet     fish,bora,flax oils-om3,6,9no1 1,200 mg Cap Take 1,200 mg by mouth.    fluticasone (FLONASE) 50 mcg/actuation nasal spray 50 Bottles by Nasal route.    JANTOVEN 5 mg tablet     lisinopril (PRINIVIL,ZESTRIL) 20 MG tablet Take 20 mg by mouth.    multivitamin capsule Take by mouth.    [DISCONTINUED] furosemide (LASIX) 40 MG tablet      Family History    None       Tobacco Use    Smoking status: Never Smoker    Smokeless tobacco: Never Used   Substance and Sexual Activity    Alcohol use: Yes     Alcohol/week: 2.0 standard drinks     Types: 1 Glasses of wine, 1 Shots of liquor per week     Comment: occ.    Drug use: No    Sexual activity: Not on file     Review of Systems   Reason unable to perform ROS: intubated.   Objective:     Vital Signs (Most Recent):  Temp: (!) 102.2 °F (39 °C)  (08/11/22 0205)  Pulse: (!) 123 (08/11/22 0205)  Resp: (!) 29 (08/11/22 0205)  BP: 93/67 (08/10/22 1901)  SpO2: 99 % (08/11/22 0205)   Vital Signs (24h Range):  Temp:  [88.88 °F (31.6 °C)-102.2 °F (39 °C)] 102.2 °F (39 °C)  Pulse:  [] 123  Resp:  [16-30] 29  SpO2:  [81 %-100 %] 99 %  BP: ()/(54-81) 93/67  Arterial Line BP: ()/(52-74) 97/61     Weight: 89.4 kg (197 lb)  Body mass index is 29.52 kg/m².    SpO2: 99 %  O2 Device (Oxygen Therapy): ventilator      Intake/Output Summary (Last 24 hours) at 8/11/2022 0316  Last data filed at 8/11/2022 0201  Gross per 24 hour   Intake 6414.99 ml   Output 1120 ml   Net 5294.99 ml       Lines/Drains/Airways       Central Venous Catheter Line  Duration             Percutaneous Central Line Insertion/Assessment - Triple Lumen  08/10/22 1722 right internal jugular <1 day              Drain  Duration                  NG/OG Tube 08/10/22 1700 Center mouth <1 day         Urethral Catheter 08/10/22 1718 <1 day              Airway  Duration                  Airway - Non-Surgical 08/10/22 1051 <1 day              Arterial Line  Duration             Arterial Line 08/10/22 1334 Right Radial <1 day              Peripheral Intravenous Line  Duration                  Peripheral IV - Single Lumen 08/10/22 0812 18 G Right Antecubital <1 day         Peripheral IV - Single Lumen 08/10/22 0812 20 G Left;Posterior Wrist <1 day         Peripheral IV - Single Lumen 08/10/22 1700 18 G Left Other <1 day                    Physical Exam  Constitutional:       Comments: Intubated and sedated   HENT:      Head: Normocephalic.      Nose: Nose normal.      Mouth/Throat:      Mouth: Mucous membranes are moist.   Eyes:      Pupils: Pupils are equal, round, and reactive to light.   Cardiovascular:      Rate and Rhythm: Rhythm irregular.      Comments: Afib with RVR in 130s-140s    Pulmonary:      Comments: On vent    Abdominal:      General: Abdomen is flat.      Palpations: Abdomen is soft.    Musculoskeletal:         General: Normal range of motion.   Skin:     General: Skin is warm.   Neurological:      Comments: Intubated and sedated   Psychiatric:      Comments: Intubated and sedated       Significant Labs: ABG:   Recent Labs   Lab 08/10/22  2237 08/11/22  0017 08/11/22  0212   PH 7.261* 7.269* 7.314*   PCO2 50.8* 48.7* 46.9*   HCO3 22.9* 22.4* 23.8*   POCSATURATED 73* 74* 68*   BE -4 -5 -2   , Blood Culture: No results for input(s): LABBLOO in the last 48 hours., BMP:   Recent Labs   Lab 08/09/22  1217 08/10/22  1710 08/10/22  2113   GLU 97 223* 249*   * 133* 133*   K 4.7 3.6 4.0   CL 97 102 101   CO2 26 20* 19*   BUN 89* 85* 86*   CREATININE 2.4* 2.2* 2.3*   CALCIUM 8.0* 8.6* 8.3*   MG  --  2.2 2.2   , CMP   Recent Labs   Lab 08/09/22  1217 08/10/22  1710 08/10/22  2113   * 133* 133*   K 4.7 3.6 4.0   CL 97 102 101   CO2 26 20* 19*   GLU 97 223* 249*   BUN 89* 85* 86*   CREATININE 2.4* 2.2* 2.3*   CALCIUM 8.0* 8.6* 8.3*   PROT 4.2* 3.3* 4.1*   ALBUMIN 1.8* 1.5* 1.5*   BILITOT 0.9 0.8 1.8*   ALKPHOS 190* 153* 154*   AST 59* 50* 75*   ALT 45* 40 35   ANIONGAP 9 11 13   , CBC   Recent Labs   Lab 08/10/22  1710 08/10/22  1716 08/10/22  2113 08/11/22  0013   WBC 23.72*  --  22.15* 22.25*   HGB 11.2*  --  11.4* 11.0*   HCT 34.0*   < > 34.1* 32.6*     --  349 328    < > = values in this interval not displayed.   , and INR   Recent Labs   Lab 08/09/22 1217 08/10/22  1710   INR 2.7* 2.0*       doppler:   Results for orders placed or performed during the hospital encounter of 07/18/18   2D Echo w/ Color Flow Doppler   Result Value Ref Range    EF + QEF 50 55 - 65    Mitral Valve Regurgitation SEVERE (A)     Aortic Valve Regurgitation MODERATE (A)     Est. PA Systolic Pressure 88.62 (A)     Tricuspid Valve Regurgitation MODERATE TO SEVERE (A)     Narrative    Date of Procedure: 07/18/2018        TEST DESCRIPTION   Technical Quality: This is a technically adequate study.     General: The  patient was in an irregularly irregular rhythm throughout the study.     Aorta: The aortic root is normal in size, measuring 3.3 cm at sinotubular junction and 4.2 cm at Sinuses of Valsalva. The proximal ascending aorta is mildly enlarged, measuring 4.0 cm across.     Left Atrium: The left atrial volume index is severely enlarged, measuring 164.64 cc/m2.     Left Ventricle: The left ventricle is normal in size, with an end-diastolic diameter of 5.4 cm, and an end-systolic diameter of 3.7 cm. Wall thickness is upper limit of normal in size, with the septum and the posterior wall each measuring 1.1 cm across.   Relative wall thickness was normal at 0.41, and the LV mass index was increased at 129.9 g/m2 consistent with eccentric left ventricular hypertrophy. There are no regional wall motion abnormalities. Left ventricular systolic function appears low normal   to mildly depressed. Visually estimated ejection fraction is 50-55%. The LV Doppler derived stroke volume equals 63.0 ccs.         Right Atrium: The right atrium is enlarged, measuring 7.6 cm in length and 5.3 cm in width in the apical view.     Right Ventricle: The right ventricle is enlarged measuring 5.9 cm at the base in the apical right ventricle-focused view. Global right ventricular systolic function appears moderately depressed. There is abnormal septal motion consistent with RV pressure   overload. Tricuspid annular plane systolic excursion (TAPSE) is 1.5 cm. Tissue Doppler-derived tricuspid annular peak systolic velocity (S prime) is 9.3 cm/s. The estimated PA systolic pressure is 89 mmHg.     Aortic Valve:  The aortic valve is mildly sclerotic. Additionally, there is moderate aortic regurgitation. There is a pressure half time of 550 msec.     Mitral Valve:  There is a mechanical prosthesis present in the mitral position. The mean gradient obtained across the mitral valve is 8 mmHg. There is severe mitral regurgitation. Mitral prosthesis is partially  dehisced and exhibits rocking motion.    There is large PVL located katherin-laterally.      Tricuspid Valve:  The tricuspid valve is normal in structure. There is moderate to severe tricuspid regurgitation.     Pulmonary Valve:  The pulmonic valve is normal in structure. There is moderate pulmonic regurgitation.     IVC: IVC is enlarged and collapses < 50% with a sniff, suggesting high right atrial pressure of 15 mmHg.     Intracavitary: There is no evidence of pericardial effusion, intracavity mass, thrombi, or vegetation.     Other: If clinically indicated, a TRACY can be performed.         CONCLUSIONS     1 - Low normal to mildly depressed left ventricular systolic function (EF 50-55%).     2 - Right ventricular enlargement with moderately depressed systolic function.     3 - Mildly enlarged ascending aorta.     4 - Biatrial enlargement.     5 - No wall motion abnormalities.     6 - Pulmonary hypertension. The estimated PA systolic pressure is 89 mmHg.     7 - Moderate aortic regurgitation.     8 - Bileaflet Mitral valve prosthesis.     9 - Severe paravalvular mitral regurgitation.     10 - Moderate to severe tricuspid regurgitation.     11 - Moderate pulmonic regurgitation.     12 - Increased central venous pressure.     13 - If clinically indicated, a TRACY can be performed.             This document has been electronically    SIGNED BY: Azeb Mcpherson MD On: 07/24/2018 15:23    This document was originally electronically signed on: 07/18/2018 16:17    This document was last amended on: 07/24/2018 15:22      no

## 2022-08-11 NOTE — CARE UPDATE
CCU update 7 pm    CVP 14  SVO2 79  CI/CO/SVR 5.0/10.4/445    Temp 32.2 via esophageal probe and being warmed  WBC 23 at 5 pm  He did get IV cefazolin 4 g today with decadron 4 mg     Lactate POCT 3.4    Bed side TTE shows IVC 2.2 cm on vent, mild pericardial effusion on subcostal view, no effusion seen on PLAX, PASP 46, MR seen on PLAX    Plan  -IVP lasix 60x1 with IV KCl 20x1  -trend lactic acid q2h  -continue Epi gtt at 0.16 and Vaso gtt 0.04 and maintain MAP>60  -vanc and cefepime (due to low temp, high WBC, low SVR, high output)  -blood cultures x 2  -SVO2, CVP at 9 pm     D/w Dr Shannon

## 2022-08-11 NOTE — ASSESSMENT & PLAN NOTE
-Afib with RVR at 2 am, INR 2.0, rate 135-145, EKG obtained, will give amio iv 150x1 followed by gtt  Resumed home toprol xl and diltiazem so amio can be weaned in AM  holding digoxin for with elevated Cr

## 2022-08-11 NOTE — PROGRESS NOTES
Pharmacokinetic Assessment Follow Up: IV Vancomycin    Vancomycin Regimen Plan:    · Patient received vancomycin 1750mg IV x 1 dose around 21:30h on 08/10/22.  · Obtained a 12 hour post dose level which resulted as 18.4mcg/mL within the desired therapeutic range of 15-20mcg/mL.   · Patient's SCr remains elevated at 2.3mg/dL, patient has a recorded urine output of about 500mL thus far today.   · Redose vancomycin 1250mg (15mg/kg) IV x 1 dose to keep level within target range. Dose around 15:00h to allow for some additional clearance of vancomycin.   · Draw random level tomorrow with AM labs and redose per level and renal function.     Drug levels (last 3 results):  Recent Labs   Lab Result Units 08/11/22  0834   Vancomycin, Random ug/mL 18.4       Pharmacy will continue to follow and monitor vancomycin.    Please contact pharmacy at extension [phone] for questions regarding this assessment.    Thank you for the consult,   Nuris Bird, PharmD, BCPS, BCCP Cardiology Clinical Pharmacy Specialist  EXT [phone]       Patient brief summary:  Yusuf Mcdonald Jr. is a 71 y.o. male initiated on antimicrobial therapy with IV Vancomycin for treatment of sepsis    The patient's current regimen was pulse dosing.     Drug Allergies:   Review of patient's allergies indicates:   Allergen Reactions    Neomycin-bacitracnzn-polymyxnb     Benzalkonium chloride Rash    Neosporin (neomycin-polymyx) Rash       Actual Body Weight:   89.4kg    Renal Function:   Estimated Creatinine Clearance: 32.5 mL/min (A) (based on SCr of 2.3 mg/dL (H)).,     Dialysis Method (if applicable):  NA    CBC (last 72 hours):  Recent Labs   Lab Result Units 08/09/22  1217 08/10/22  1710 08/10/22  2113 08/11/22  0013 08/11/22  0340 08/11/22  0518 08/11/22  0820   WBC K/uL 10.29 23.72* 22.15* 22.25* 18.74* 18.22* 18.11*   Hemoglobin g/dL 13.2* 11.2* 11.4* 11.0* 11.3* 11.4* 10.8*   Hematocrit % 39.5* 34.0* 34.1* 32.6* 33.8* 34.2* 31.2*   Platelets K/uL  357 312 349 328 333 285 283   Gran % % 79.4* 89.5* 90.5* 88.9* 88.5* 84.7* 80.3*   Lymph % % 5.4* 2.4* 1.9* 1.3* 1.1* 1.4* 1.8*   Mono % % 9.1 6.6 6.5 8.6 9.7 13.2 17.2*   Eosinophil % % 4.6 0.0 0.0 0.0 0.0 0.0 0.1   Basophil % % 0.8 0.3 0.1 0.1 0.1 0.2 0.2   Differential Method  Automated Automated Automated Automated Automated Automated Automated       Metabolic Panel (last 72 hours):  Recent Labs   Lab Result Units 08/09/22  1217 08/10/22  1710 08/10/22  2113 08/11/22  0348 08/11/22  0407   Sodium mmol/L 132* 133* 133*  --   --    Sodium, Urine mmol/L  --   --   --   --  16*   Potassium mmol/L 4.7 3.6 4.0  --   --    Chloride mmol/L 97 102 101  --   --    CO2 mmol/L 26 20* 19*  --   --    Glucose mg/dL 97 223* 249*  --   --    Glucose, UA   --   --   --  Negative  --    BUN mg/dL 89* 85* 86*  --   --    Creatinine mg/dL 2.4* 2.2* 2.3*  --   --    Creatinine, Urine mg/dL  --   --   --   --  67.0   Albumin g/dL 1.8* 1.5* 1.5*  --   --    Total Bilirubin mg/dL 0.9 0.8 1.8*  --   --    Alkaline Phosphatase U/L 190* 153* 154*  --   --    AST U/L 59* 50* 75*  --   --    ALT U/L 45* 40 35  --   --    Magnesium mg/dL  --  2.2 2.2  --   --    Phosphorus mg/dL  --  5.0*  --   --   --        Vancomycin Administrations:  vancomycin given in the last 96 hours                   vancomycin 1.75 g in 5 % dextrose 500 mL IVPB (mg) 1,750 mg New Bag 08/10/22 1717                Microbiologic Results:  Microbiology Results (last 7 days)     Procedure Component Value Units Date/Time    Culture, Respiratory with Gram Stain [[id number]] Collected: 08/11/22 0914    Order Status: Completed Specimen: Respiratory from Tracheal Aspirate Updated: 08/11/22 1329     Gram Stain (Respiratory) <10 epithelial cells per low power field.     Gram Stain (Respiratory) Rare WBC's     Gram Stain (Respiratory) No organisms seen    Blood culture [[id number]] Collected: 08/10/22 2000    Order Status: Completed Specimen: Blood from Peripheral, Upper Arm, Left  Updated: 08/11/22 0345     Blood Culture, Routine No Growth to date    Blood culture [[id number]] Collected: 08/10/22 2058    Order Status: Completed Specimen: Blood from Peripheral, Forearm, Left Updated: 08/11/22 0345     Blood Culture, Routine No Growth to date

## 2022-08-11 NOTE — ANESTHESIA POSTPROCEDURE EVALUATION
Anesthesia Post Evaluation    Patient: Yusuf Mcdonald Jr.    Procedure(s) Performed: Procedure(s) (LRB):  LEAK CLOSURE, PERIVALVULAR (N/A)  ECHOCARDIOGRAM, TRANSESOPHAGEAL (N/A)  Removal, Foreign Body    Final Anesthesia Type: general      Patient location during evaluation: ICU  Patient participation: No - Unable to Participate, Sedation  Level of consciousness: sedated  Post-procedure vital signs: reviewed and stable  Pain management: adequate  Airway patency: patent    PONV status at discharge: No PONV  Anesthetic complications: no      Cardiovascular status: hemodynamically stable and stable (On ionotropic support )  Respiratory status: ETT  Hydration status: euvolemic  Follow-up not needed.      Being worked up and covered for septic presentation following PVL closure.     Vitals Value Taken Time   BP 88/58 08/10/22 1946   Temp 38.1 °C (100.58 °F) 08/11/22 0653   Pulse 102 08/11/22 0653   Resp 26 08/11/22 0653   SpO2 98 % 08/11/22 0653   Vitals shown include unvalidated device data.      No case tracking events are documented in the log.      Pain/Jeni Score: Pain Rating Prior to Med Admin: 0 (8/11/2022  2:29 AM)  Jeni Score: 10 (8/10/2022 10:42 AM)

## 2022-08-11 NOTE — PROGRESS NOTES
Pharmacokinetic Initial Assessment: IV Vancomycin    Assessment/Plan:  Will pulse dose patient due to LJ - creatinine improving, currently 2.2 mg/dL  Initiate intravenous vancomycin with loading dose of 1750 mg once with subsequent doses when random concentrations are less than 20 mcg/mL  Desired empiric serum trough concentration is 10 to 20 mcg/mL  Draw vancomycin random level on 8/11 at 2000.  Pharmacy will continue to follow and monitor vancomycin.      Please contact pharmacy at extension [phone] with any questions regarding this assessment.     Thank you for the consult,   Isabel Angel       Patient brief summary:  Yusuf Mcdonald Jr. is a 71 y.o. male initiated on antimicrobial therapy with IV Vancomycin for treatment of suspected bacteremia    Drug Allergies:   Review of patient's allergies indicates:   Allergen Reactions    Neomycin-bacitracnzn-polymyxnb     Benzalkonium chloride Rash    Neosporin (neomycin-polymyx) Rash       Actual Body Weight:   89.4 kg    Renal Function:   Estimated Creatinine Clearance: 34.2 mL/min (A) (based on SCr of 2.2 mg/dL (H)).,     Dialysis Method (if applicable):  N/A    CBC (last 72 hours):  Recent Labs   Lab Result Units 08/09/22  1217 08/10/22  1710   WBC K/uL 10.29 23.72*   Hemoglobin g/dL 13.2* 11.2*   Hematocrit % 39.5* 34.0*   Platelets K/uL 357 312   Gran % % 79.4* 89.5*   Lymph % % 5.4* 2.4*   Mono % % 9.1 6.6   Eosinophil % % 4.6 0.0   Basophil % % 0.8 0.3   Differential Method  Automated Automated       Metabolic Panel (last 72 hours):  Recent Labs   Lab Result Units 08/09/22  1217 08/10/22  1710   Sodium mmol/L 132* 133*   Potassium mmol/L 4.7 3.6   Chloride mmol/L 97 102   CO2 mmol/L 26 20*   Glucose mg/dL 97 223*   BUN mg/dL 89* 85*   Creatinine mg/dL 2.4* 2.2*   Albumin g/dL 1.8* 1.5*   Total Bilirubin mg/dL 0.9 0.8   Alkaline Phosphatase U/L 190* 153*   AST U/L 59* 50*   ALT U/L 45* 40   Magnesium mg/dL  --  2.2   Phosphorus mg/dL  --  5.0*       Drug  levels (last 3 results):  No results for input(s): VANCOMYCINRA, VANCORANDOM, VANCOMYCINPE, VANCOPEAK, VANCOMYCINTR, VANCOTROUGH in the last 72 hours.    Microbiologic Results:  Microbiology Results (last 7 days)     Procedure Component Value Units Date/Time    Blood culture [[id number]] Collected: 08/10/22 2000    Order Status: Sent Specimen: Blood from Peripheral, Upper Arm, Left Updated: 08/10/22 2001    Blood culture [[id number]]     Order Status: Sent Specimen: Blood

## 2022-08-11 NOTE — PLAN OF CARE
[facility]  Initial Discharge Assessment       Primary Care Provider: Primary Doctor No    Admission Diagnosis: Paravalvular leak of prosthetic heart valve, initial encounter [T82.03XA]  Severe mitral regurgitation [I34.0]    Admission Date: 8/10/2022  Expected Discharge Date:     Discharge Barriers Identified: None    Payor: BLUE CROSS BLUE SHIELD / Plan: BCBS BLUE SAVER PPO - HD / Product Type: PPO /     Extended Emergency Contact Information  Primary Emergency Contact: Emperatriz Mcdonald  Address: [address] [address]           [place] [address] [facility]  Home Phone: [phone]  Mobile Phone: [phone]  Relation: Daughter  Secondary Emergency Contact: CHINMAY JUNG  Mobile Phone: [phone]  Relation: Daughter  Preferred language: English   needed? No    Discharge Plan A: Other (TBD)  Discharge Plan B: Skilled Nursing Facility      [id number] [place] [address]  [address] [place] [place] [facility] [address]  Phone: [phone] Fax: [phone]      Initial Assessment (most recent)     Adult Discharge Assessment - 08/11/22 0947        Discharge Assessment    Assessment Type Discharge Planning Assessment     Confirmed/corrected address, phone number and insurance Yes     Confirmed Demographics Correct on Facesheet     Source of Information family     Communicated SCOOTER with patient/caregiver Date not available/Unable to determine     Lives With alone     Do you expect to return to your current living situation? No     Do you have help at home or someone to help you manage your care at home? Yes     Who are your caregiver(s) and their phone number(s)? daughter Emperatriz Mcdonald lives next door     Prior to hospitilization cognitive status: Alert/Oriented     Current cognitive status: Unable to Assess     Walking or Climbing Stairs Difficulty none     Dressing/Bathing Difficulty none     Equipment Currently Used at Home none     Readmission within 30 days? No      Patient currently being followed by outpatient case management? No     Do you currently have service(s) that help you manage your care at home? No     Is the pt/caregiver preference to resume services with current agency No     Do you take prescription medications? Yes     Do you have prescription coverage? Yes     Coverage BCBS     Do you have any problems affording any of your prescribed medications? No     Is the patient taking medications as prescribed? yes     How do you get to doctors appointments? family or friend will provide;car, drives self     Are you on dialysis? No     Do you take coumadin? Yes     Who monitors your labs? monitors at home and calls into coumadin clinic     Discharge Plan A Other   TBD    Discharge Plan B Skilled Nursing Facility     DME Needed Upon Discharge  none     Discharge Plan discussed with: Adult children     Discharge Barriers Identified None               Pt admitted 8/10/2022  7:14 AM    For Paravalvular leak of prosthetic heart valve, initial encounter [T82.03XA]  Severe mitral regurgitation [I34.0]       DPEA completed with pts daughter Nargis Velasco. Pt lives at home alone but daughter Emperatriz Mcdonald lives next door. Pts intubated at time of assessment but per daughter Nargis, pt was independent and able to care for self without any assistance. Plan TBD at time.     Pt is followed cardiologist Dr Cheema.     Pt is monitored at home and called into the coumadin clinic.      ELOY EstradaN, RN   Case Management [phone]

## 2022-08-11 NOTE — CONSULTS
"  [facility] - [facility]  Adult Nutrition  Consult Note    SUMMARY     Recommendations   1. If TF recs warranted, Rec'd Glucerna 1.5 to goal rate @ 60 ml/hr to provide 2160 kcals, 119 g of protein, and 1093 ml of fluid.   2. If pt able to tolerate diet, rec'd diabetic/renal diet- fluid per MD  3. RD following    Goals: Meet 65% of EEN/EPN by RD f/u date  Nutrition Goal Status: goal not met  Communication of RD Recs: other (POC)    Assessment and Plan    Nutrition Problem  Inadequate energy intake    Related to (etiology):   Inability to consume sufficient needs    Signs and Symptoms (as evidenced by):   ~ NPO status     Interventions (treatment strategy):  Collaboration of nutrition care w/ other providers    Nutrition Diagnosis Status:   New        Reason for Assessment  Reason For Assessment: consult  Diagnosis: cardiac disease  Relevant Medical History: HTN, anticogulant  Interdisciplinary Rounds: attended  General Information Comments: RD consulted for wounds. Pt wounds do not meet the requirements for increased protein/energy needs at this time.   Pt intubated/sedated during time of visit. Spoke w/ pt's caregiver during time of visit. Reports that pt's appetite PTA was normal and pt was consuming 2-3 meals per day. Reports UBW of 200 lbs. Weight fluctuations noted, likely d/t fluid. Visual NFPE completed 8/11/22, mild wasting noted, pt does not meet criteria for malnutrition at this time, however at risk if pt does not receive adequate nutrition. RD will monitor. LBM noted-8/9/22  Nutrition Discharge Planning: adequate nutrition intake      Nutrition/Diet History    Spiritual, Cultural Beliefs, Jehovah's witness Practices, Values that Affect Care: no    Anthropometrics    Temp: 100.22 °F (37.9 °C)  Height Method: Stated  Height: 5' 8" (172.7 cm)  Height (inches): 68 in  Weight Method: Stated  Weight: 89.4 kg (197 lb)  Weight (lb): 197 lb  Ideal Body Weight (IBW), Male: 154 lb  % Ideal Body Weight, Male (lb): " 125.48 %  BMI (Calculated): 29.5       Lab/Procedures/Meds    Pertinent Labs Reviewed: reviewed  Pertinent Labs Comments: Sodium 133, BUN 86, Cr 2.3, GFR 29.8, Glucose 249  Pertinent Medications Reviewed: reviewed  Pertinent Medications Comments: -    Estimated/Assessed Needs    Weight Used For Calorie Calculations: 89.4 kg (197 lb 1.5 oz)  Energy Calorie Requirements (kcal): 2174  Energy Need Method: [facility]  Protein Requirements: 107-134 g (1.2-1.5 g/kg)  Weight Used For Protein Calculations: 89.4 kg (197 lb 1.5 oz)        RDA Method (mL): 2174         Nutrition Prescription Ordered    Current Diet Order: NPO status    Evaluation of Received Nutrient/Fluid Intake    I/O: +5.8 L since admit  Energy Calories Required: not meeting needs  Protein Required: not meeting needs  Fluid Required: not meeting needs  Total Fluid Intake (mL/kg): 1 ml or fluid per MD  Tolerance: tolerating  % Intake of Estimated Energy Needs: 0%   Meal Intake: NPO status     Nutrition Risk    Level of Risk/Frequency of Follow-up: low       Monitor and Evaluation    Food and Nutrient Intake: energy intake, food and beverage intake, enteral nutrition intake  Food and Nutrient Adminstration: diet order, enteral and parenteral nutrition administration  Knowledge/Beliefs/Attitudes: food and nutrition knowledge/skill, beliefs and attitudes  Physical Activity and Function: nutrition-related ADLs and IADLs, factors affecting access to physical activity  Anthropometric Measurements: height/length, weight, weight change, body mass index, growth pattern indices/percentile ranks  Biochemical Data, Medical Tests and Procedures: electrolyte and renal panel, gastrointestinal profile, glucose/endocrine profile, inflammatory profile, lipid profile  Nutrition-Focused Physical Findings: overall appearance, extremities, muscles and bones, head and eyes, skin       Nutrition Follow-Up    RD Follow-up?: Yes

## 2022-08-11 NOTE — PROGRESS NOTES
08/11/22 1025   WOCN Assessment   WOCN Total Time (mins) 20   Visit Date 08/11/22   Visit Time 1025   Consult Type New   Intervention assessed;chart review;orders   Teaching on-going        Altered Skin Integrity 08/10/22 1700 Sacral spine Other (comment) Purple or maroon localized area of discolored intact skin or non-intact skin or a blood-filled blister.   Date First Assessed/Time First Assessed: 08/10/22 1700   Altered Skin Integrity Present on Admission: yes  Location: Sacral spine  Is this injury device related?: (c) Other (see comments)  Primary Wound Type: (c) Other (comment)  Description of Altere...   Wound Image    Dressing Appearance Open to air   Drainage Amount None   Appearance Purple   Wound Length (cm) 6 cm   Wound Width (cm) 3 cm   Wound Depth (cm) 0 cm   Wound Volume (cm^3) 0 cm^3   Wound Surface Area (cm^2) 18 cm^2   Dressing Foam   Wound consult performed for lower back/buttocks.  No open area noted.  Recommendation:  Foam drsg for protection, turn every 2 hours.  Tolerated well.  Primary nurse and staff nurse assisted with turn.  Will sign off.  Please re consult if needed.

## 2022-08-11 NOTE — PROGRESS NOTES
Attempted to wean epi infusion patient became hypotensive. Team called to bedside, returned epi to 0.12.  Bedside echo ordered to rule out pericardial effusion. Map 67

## 2022-08-11 NOTE — NURSING
Patient HR sustaining in the 130's. Afib noted on the monitor. Esophageal core temp of 102.2. Physician notified. Orders received.

## 2022-08-11 NOTE — PLAN OF CARE
Recommendations   1. If TF recs warranted, Rec'd Glucerna 1.5 to goal rate @ 60 ml/hr to provide 2160 kcals, 119 g of protein, and 1093 ml of fluid.   2. If pt able to tolerate diet, rec'd diabetic/renal diet- fluid per MD  3. RD following     Goals: Meet 65% of EEN/EPN by RD f/u date  Nutrition Goal Status: goal not met  Communication of RD Recs: other (POC)

## 2022-08-11 NOTE — H&P
[facility]  Cardiology  History and Physical     Patient Name: Yusuf Mcdonald Jr.  MRN: [id number]  Admission Date: 8/10/2022  Code Status: Full Code   Attending Provider: Sacha Shannon MD   Primary Care Physician: Primary Doctor No  Principal Problem:Septic shock    Patient information was obtained from daughters and EMR    Subjective:     Chief Complaint:  Hypotension post procedure    HPI:  Mr Mcdonald is a 72 yo M with PMH MR s/p mechanical MVR 2015 in [place]) c/b PVL s/p MV plug (Dr Tejeda 2018) on Warfarin, chronic AF s/p GEE ligation, and HTN.  He has recently extended his paravalvular leak on TRACY performed in [place].  An attempt was made to occlude paravalvular leak at outside hospital, but was aborted due to fear of dislodging current device. He continues to have significant fatigue, GEORGE, and leg swelling. He presented for PLV closure with TRACY guidance.  Postprocedure patient was brought to ICU where he was found to be hypotensive and epinephrine was started.  Emergent right IJ triple-lumen catheter was inserted and hemodynamics were obtained which showed high output state with low SVR.  His white count also increased to 23 which was normal in the morning.  He was initially hypothermic with temperature 92° for which active warming was done however later his bike fever 102.2 at 1 am and then again at 2 am.  His creatinine, total bilirubin also went up slightly.  He was later found to be in atrial fibrillation with rapid ventricular rate in the 130s to 140s, home rate control medications were resumed and he was started on amiodarone bolus followed by infusion.  Patient is currently being covered for vancomycin and cefepime for bed very likely looks like a septic shock.         TRACY 6/29/22 ([place])  1.  Severe perivalvular regurgitation of the mechanical mitral valve due   to partial dehiscence   2.  Severely dilated right ventricle with reduced systolic function   3.  Severe  pulmonary hypertension with estimated PA pressures of greater   than 90 mmHg     TTE 6/16/22  1. Mildly dilated left ventricle. Normal left ventricular systolic function. LVEF 55 -   65%. Left ventricular diastolic function is indeterminate in this study due to the   presence of mitral valve repair or replacement. Normal wall motion.   2. Moderate to severely dilated right ventricle. Borderline right ventricular systolic   function.   3. A mechanical prosthetic valve is present in the mitral position. Moderate prosthetic   valvular regurgitation is present. Moderate paravalvular mitral regurgitation.   4. Mild to moderate aortic valve regurgitation.   5. Severe tricuspid valve regurgitation. Severe pulmonary hypertension.   6. Mildly dilated aortic root.     Anticoagulant/antiplatelets: Warfarin  ECG: Atrial fibrillation           Past Medical History:   Diagnosis Date    A-fib     Anticoagulant long-term use     Hypertension        Past Surgical History:   Procedure Laterality Date    CARDIOVERSION      MITRAL VALVE REPLACEMENT  2015    mechanical    MITRAL VALVE REPLACEMENT Right 10/10/2018    Procedure: REPLACEMENT, MITRAL VALVE;  Surgeon: Yusuf Tejeda MD;  Location: [facility] CATH LAB;  Service: Cardiology;  Laterality: Right;       Review of patient's allergies indicates:   Allergen Reactions    Neomycin-bacitracnzn-polymyxnb     Benzalkonium chloride Rash    Neosporin (neomycin-polymyx) Rash       No current facility-administered medications on file prior to encounter.     Current Outpatient Medications on File Prior to Encounter   Medication Sig    acetaminophen (TYLENOL) 500 MG tablet Take 500 mg by mouth.    cetirizine (ZYRTEC) 10 MG tablet Take 10 mg by mouth.    clopidogreL (PLAVIX) 75 mg tablet TAKE ONE TABLET BY MOUTH EVERY DAY    diltiaZEM (CARDIZEM CD) 120 MG Cp24 Take 120 mg by mouth 2 (two) times a day.    metoprolol succinate (TOPROL-XL) 50 MG 24 hr tablet     coenzyme Q10 100 mg  capsule Take 100 mg by mouth.    digoxin (LANOXIN) 250 mcg tablet     fish,bora,flax oils-om3,6,9no1 1,200 mg Cap Take 1,200 mg by mouth.    fluticasone (FLONASE) 50 mcg/actuation nasal spray 50 Bottles by Nasal route.    JANTOVEN 5 mg tablet     lisinopril (PRINIVIL,ZESTRIL) 20 MG tablet Take 20 mg by mouth.    multivitamin capsule Take by mouth.    [DISCONTINUED] furosemide (LASIX) 40 MG tablet      Family History    None       Tobacco Use    Smoking status: Never Smoker    Smokeless tobacco: Never Used   Substance and Sexual Activity    Alcohol use: Yes     Alcohol/week: 2.0 standard drinks     Types: 1 Glasses of wine, 1 Shots of liquor per week     Comment: occ.    Drug use: No    Sexual activity: Not on file     Review of Systems   Reason unable to perform ROS: intubated.   Objective:     Vital Signs (Most Recent):  Temp: (!) 102.2 °F (39 °C) (08/11/22 0205)  Pulse: (!) 123 (08/11/22 0205)  Resp: (!) 29 (08/11/22 0205)  BP: 93/67 (08/10/22 1901)  SpO2: 99 % (08/11/22 0205)   Vital Signs (24h Range):  Temp:  [88.88 °F (31.6 °C)-102.2 °F (39 °C)] 102.2 °F (39 °C)  Pulse:  [] 123  Resp:  [16-30] 29  SpO2:  [81 %-100 %] 99 %  BP: ()/(54-81) 93/67  Arterial Line BP: ()/(52-74) 97/61     Weight: 89.4 kg (197 lb)  Body mass index is 29.52 kg/m².    SpO2: 99 %  O2 Device (Oxygen Therapy): ventilator      Intake/Output Summary (Last 24 hours) at 8/11/2022 0316  Last data filed at 8/11/2022 0201  Gross per 24 hour   Intake 6414.99 ml   Output 1120 ml   Net 5294.99 ml       Lines/Drains/Airways       Central Venous Catheter Line  Duration             Percutaneous Central Line Insertion/Assessment - Triple Lumen  08/10/22 1722 right internal jugular <1 day              Drain  Duration                  NG/OG Tube 08/10/22 1700 Center mouth <1 day         Urethral Catheter 08/10/22 1718 <1 day              Airway  Duration                  Airway - Non-Surgical 08/10/22 1051 <1 day               Arterial Line  Duration             Arterial Line 08/10/22 1334 Right Radial <1 day              Peripheral Intravenous Line  Duration                  Peripheral IV - Single Lumen 08/10/22 0812 18 G Right Antecubital <1 day         Peripheral IV - Single Lumen 08/10/22 0812 20 G Left;Posterior Wrist <1 day         Peripheral IV - Single Lumen 08/10/22 1700 18 G Left Other <1 day                    Physical Exam  Constitutional:       Comments: Intubated and sedated   HENT:      Head: Normocephalic.      Nose: Nose normal.      Mouth/Throat:      Mouth: Mucous membranes are moist.   Eyes:      Pupils: Pupils are equal, round, and reactive to light.   Cardiovascular:      Rate and Rhythm: Rhythm irregular.      Comments: Afib with RVR in 130s-140s    Pulmonary:      Comments: On vent    Abdominal:      General: Abdomen is flat.      Palpations: Abdomen is soft.   Musculoskeletal:         General: Normal range of motion.   Skin:     General: Skin is warm.   Neurological:      Comments: Intubated and sedated   Psychiatric:      Comments: Intubated and sedated       Significant Labs: ABG:   Recent Labs   Lab 08/10/22  2237 08/11/22  0017 08/11/22  0212   PH 7.261* 7.269* 7.314*   PCO2 50.8* 48.7* 46.9*   HCO3 22.9* 22.4* 23.8*   POCSATURATED 73* 74* 68*   BE -4 -5 -2   , Blood Culture: No results for input(s): LABBLOO in the last 48 hours., BMP:   Recent Labs   Lab 08/09/22  1217 08/10/22  1710 08/10/22  2113   GLU 97 223* 249*   * 133* 133*   K 4.7 3.6 4.0   CL 97 102 101   CO2 26 20* 19*   BUN 89* 85* 86*   CREATININE 2.4* 2.2* 2.3*   CALCIUM 8.0* 8.6* 8.3*   MG  --  2.2 2.2   , CMP   Recent Labs   Lab 08/09/22  1217 08/10/22  1710 08/10/22  2113   * 133* 133*   K 4.7 3.6 4.0   CL 97 102 101   CO2 26 20* 19*   GLU 97 223* 249*   BUN 89* 85* 86*   CREATININE 2.4* 2.2* 2.3*   CALCIUM 8.0* 8.6* 8.3*   PROT 4.2* 3.3* 4.1*   ALBUMIN 1.8* 1.5* 1.5*   BILITOT 0.9 0.8 1.8*   ALKPHOS 190* 153* 154*   AST 59* 50*  75*   ALT 45* 40 35   ANIONGAP 9 11 13   , CBC   Recent Labs   Lab 08/10/22  1710 08/10/22  1716 08/10/22  2113 08/11/22  0013   WBC 23.72*  --  22.15* 22.25*   HGB 11.2*  --  11.4* 11.0*   HCT 34.0*   < > 34.1* 32.6*     --  349 328    < > = values in this interval not displayed.   , and INR   Recent Labs   Lab 08/09/22  1217 08/10/22  1710   INR 2.7* 2.0*       doppler:   Results for orders placed or performed during the hospital encounter of 07/18/18   2D Echo w/ Color Flow Doppler   Result Value Ref Range    EF + QEF 50 55 - 65    Mitral Valve Regurgitation SEVERE (A)     Aortic Valve Regurgitation MODERATE (A)     Est. PA Systolic Pressure 88.62 (A)     Tricuspid Valve Regurgitation MODERATE TO SEVERE (A)     Narrative    Date of Procedure: 07/18/2018        TEST DESCRIPTION   Technical Quality: This is a technically adequate study.     General: The patient was in an irregularly irregular rhythm throughout the study.     Aorta: The aortic root is normal in size, measuring 3.3 cm at sinotubular junction and 4.2 cm at Sinuses of Valsalva. The proximal ascending aorta is mildly enlarged, measuring 4.0 cm across.     Left Atrium: The left atrial volume index is severely enlarged, measuring 164.64 cc/m2.     Left Ventricle: The left ventricle is normal in size, with an end-diastolic diameter of 5.4 cm, and an end-systolic diameter of 3.7 cm. Wall thickness is upper limit of normal in size, with the septum and the posterior wall each measuring 1.1 cm across.   Relative wall thickness was normal at 0.41, and the LV mass index was increased at 129.9 g/m2 consistent with eccentric left ventricular hypertrophy. There are no regional wall motion abnormalities. Left ventricular systolic function appears low normal   to mildly depressed. Visually estimated ejection fraction is 50-55%. The LV Doppler derived stroke volume equals 63.0 ccs.         Right Atrium: The right atrium is enlarged, measuring 7.6 cm in length  and 5.3 cm in width in the apical view.     Right Ventricle: The right ventricle is enlarged measuring 5.9 cm at the base in the apical right ventricle-focused view. Global right ventricular systolic function appears moderately depressed. There is abnormal septal motion consistent with RV pressure   overload. Tricuspid annular plane systolic excursion (TAPSE) is 1.5 cm. Tissue Doppler-derived tricuspid annular peak systolic velocity (S prime) is 9.3 cm/s. The estimated PA systolic pressure is 89 mmHg.     Aortic Valve:  The aortic valve is mildly sclerotic. Additionally, there is moderate aortic regurgitation. There is a pressure half time of 550 msec.     Mitral Valve:  There is a mechanical prosthesis present in the mitral position. The mean gradient obtained across the mitral valve is 8 mmHg. There is severe mitral regurgitation. Mitral prosthesis is partially dehisced and exhibits rocking motion.    There is large PVL located katherin-laterally.      Tricuspid Valve:  The tricuspid valve is normal in structure. There is moderate to severe tricuspid regurgitation.     Pulmonary Valve:  The pulmonic valve is normal in structure. There is moderate pulmonic regurgitation.     IVC: IVC is enlarged and collapses < 50% with a sniff, suggesting high right atrial pressure of 15 mmHg.     Intracavitary: There is no evidence of pericardial effusion, intracavity mass, thrombi, or vegetation.     Other: If clinically indicated, a TRACY can be performed.         CONCLUSIONS     1 - Low normal to mildly depressed left ventricular systolic function (EF 50-55%).     2 - Right ventricular enlargement with moderately depressed systolic function.     3 - Mildly enlarged ascending aorta.     4 - Biatrial enlargement.     5 - No wall motion abnormalities.     6 - Pulmonary hypertension. The estimated PA systolic pressure is 89 mmHg.     7 - Moderate aortic regurgitation.     8 - Bileaflet Mitral valve prosthesis.     9 - Severe  paravalvular mitral regurgitation.     10 - Moderate to severe tricuspid regurgitation.     11 - Moderate pulmonic regurgitation.     12 - Increased central venous pressure.     13 - If clinically indicated, a TRACY can be performed.             This document has been electronically    SIGNED BY: Azeb Mcpherson MD On: 07/24/2018 15:23    This document was originally electronically signed on: 07/18/2018 16:17    This document was last amended on: 07/24/2018 15:22     Assessment and Plan:     * Septic shock  Temp initially low and later 102.2  White count 23  CI/CO high with low SVR  Likely septic shock  Blood cultures x 2 obtained  Vancomycin and cefepime ordered     Atrial fibrillation with RVR  -Afib with RVR at 2 am, INR 2.0, rate 135-145, EKG obtained, will give amio iv 150x1 followed by gtt  Resumed home toprol xl and diltiazem so amio can be weaned in AM  holding digoxin for with elevated Cr    Paravalvular leak (prosthetic valve)  Patient is a 70 yo M with chronic AF on warfarin, pHTN, HLD and mechanical MVR c/b PVL s/p MV plug in 2018 and now with extension of his paravalvular leak on TRACY. Here today for PLV closure with TRACY guidance  Patient admitted to CCU for monitoring post procedure.             LJ (acute kidney injury)  Cr of 2.4 on admission with baseline of 1.3 prior to admission. Likely prerenal given hypotension postoperatively but also could be cardiorenal given volume status.     Plan:  - Obtain urine electrolytes and creatine.  - Monitor intake/output and daily standing weights.   - Avoid nephrotoxic agents such as NSAIDs, gadolinium and IV radiocontrast.  - Renally dose meds to current GFR.  - Maintain MAP > 65.      Gross hematuria  Hematuria likely from ischemic nephropathy  UA ordered, [facility]        Pelaez Paul Vazquez MD  Cardiology   [facility] - Cardiac Intensive Care

## 2022-08-11 NOTE — PLAN OF CARE
CCU update 9 pm    CVP 13 (from 13 at 7 pm)  SVO2 75 (from 79 at 7 pm)  CI/CO/SVR 4.1/8.6/553    GTTs:  Vaso 0.04 and Epi 0.18    UO: 40 cc in past 1 hour after IVP lasix 60    Lactic acid now 3.9 from 3.4 at 7 pm    Plan  -Increase Vaso to 0.08 due to low SVR, continue Epi  -repeat lactic acid POC at 1030 pm    D/w Dr Shannon  Refer to previous note for more details       Continued Stay Note  [facility]     Patient Name: Ena Upton  MRN: [id number]  Today's Date: 3/17/2022    Admit Date: 3/5/2022     Discharge Plan     Row Name 03/17/22 1042       Plan    Plan [facility] with outpatient HD    Plan Comments Patient has a bed available at [facility] when ready for discharge.  Patient's outpatient HD has been arranged at the [facility], T,T,S at 6:25 am.  Patient will transport to and from dialysis via [facility] arranged by SNF.  [facility]  [phone]  Fax: [phone].    Row Name 03/17/22 0933       Plan    Plan [facility] with outpatient HD    Plan Comments Patient has received outpatient HD chair time at [facility], T,T,S at 6:25 am with start date of Saturday, March 19th.  CYNDY spoke with Fannie, genia for [facility], who is confirming transportation and will advise when they can accept patient.               Discharge Codes    No documentation.                     DUSTIN CampbellW

## 2022-08-11 NOTE — PLAN OF CARE
CICU DAILY GOALS       A: Awake    RASS: Goal - RASS Goal: -2-->light sedation  Actual - RASS (Peace Agitation-Sedation Scale): -3-->moderate sedation   Restraint necessity: Clinical Justification: Removing medical devices, Treatment Interference  B: Breath   SBT: Not attempted   C: Coordinate A & B, analgesics/sedatives   Pain: managed    SAT: Not attempted  D: Delirium   CAM-ICU: Overall CAM-ICU: Negative  E: Early(intubated/ Progressive (non-intubated) Mobility   MOVE Screen: Fail   Activity: Activity Management: Rolling - L1  FAS: Feeding/Nutrition   Diet order: Diet/Nutrition Received: NPO,   Fluid restriction:    T: Thrombus   DVT prophylaxis: VTE Required Core Measure: Provider determined low risk VTE  H: HOB Elevation   Head of Bed (HOB) Positioning: HOB at 15 degrees  U: Ulcer Prophylaxis   GI: yes  G: Glucose control   managed Glycemic Management: blood glucose monitored  S: Skin   Bundle compliance: yes   Bathing/Skin Care: foot care Date: 08/10/2022  B: Bowel Function   no issues   I: Indwelling Catheters   Quiroz necessity:      Urethral Catheter 08/10/22 1718-Reason for Continuing Urinary Catheterization: Critically ill in ICU and requiring hourly monitoring of intake/output   CVC necessity: No   IPAD offered: No  D: De-escalation Antibx   Yes  Plan for the day   CVP- 10,12,10   SVO2 and lactic trended per physician order.    Urinary Output 355 ml. Hematuria noted.    Temp of 102.2. Blood cultures drawn and abx ordered.    Amio gtt initiated due to Afib RVR.    Currently Intubated and sedated.   Family/Goals of care/Code Status   Code Status: Full Code     No acute events throughout day, VS and assessment per flow sheet, patient progressing towards goals as tolerated, plan of care reviewed with Yusuf Mcdonald Jr. and family, all concerns addressed, will continue to monitor.

## 2022-08-11 NOTE — ASSESSMENT & PLAN NOTE
Temp initially low and later 102.2  White count 23  CI/CO high with low SVR  Likely septic shock  Blood cultures x 2 obtained  Vancomycin and cefepime ordered

## 2022-08-12 PROBLEM — D64.9 ANEMIA: Status: ACTIVE | Noted: 2022-01-01

## 2022-08-12 NOTE — ASSESSMENT & PLAN NOTE
Mild residual MR after 10 mm VSD plug placed on TRACY.     - Recommend diuresis for volume overload  - Hopeful for extubation later today and weaning vasopressors  - Continue heparin gtt and Plavix  - Monitor hemolysis labs daily

## 2022-08-12 NOTE — SUBJECTIVE & OBJECTIVE
Interval History: see hospital course.    Review of Systems   Reason unable to perform ROS: intubated.   Objective:     Vital Signs (Most Recent):  Temp: 99.68 °F (37.6 °C) (08/12/22 1221)  Pulse: (!) 122 (08/12/22 1221)  Resp: 18 (08/12/22 1221)  BP: (!) 100/58 (08/12/22 0700)  SpO2: 95 % (08/12/22 1221)   Vital Signs (24h Range):  Temp:  [98.96 °F (37.2 °C)-100.22 °F (37.9 °C)] 99.68 °F (37.6 °C)  Pulse:  [] 122  Resp:  [15-26] 18  SpO2:  [82 %-98 %] 95 %  BP: ()/(58-70) 100/58  Arterial Line BP: ()/(45-82) 106/52     Weight: 89.4 kg (197 lb)  Body mass index is 29.95 kg/m².     SpO2: 95 %  O2 Device (Oxygen Therapy): ventilator      Intake/Output Summary (Last 24 hours) at 8/12/2022 1245  Last data filed at 8/12/2022 1230  Gross per 24 hour   Intake 4684.8 ml   Output 1335 ml   Net 3349.8 ml         Lines/Drains/Airways       Central Venous Catheter Line  Duration             Percutaneous Central Line Insertion/Assessment - Triple Lumen  08/10/22 1722 right internal jugular 1 day              Drain  Duration                  NG/OG Tube 08/10/22 1700 Center mouth 1 day         Urethral Catheter 08/10/22 1718 1 day              Airway  Duration                  Airway - Non-Surgical 08/10/22 1051 2 days              Arterial Line  Duration             Arterial Line 08/10/22 1334 Right Radial 1 day              Peripheral Intravenous Line  Duration                  Peripheral IV - Single Lumen 08/10/22 0812 18 G Right Antecubital 2 days         Peripheral IV - Single Lumen 08/10/22 0812 20 G Left;Posterior Wrist 2 days         Peripheral IV - Single Lumen 08/10/22 1700 18 G Left Other 1 day         Peripheral IV - Single Lumen 08/11/22 1641 18 G Anterior;Distal;Left Upper Arm <1 day                    Physical Exam  Vitals and nursing note reviewed.   Constitutional:       Appearance: He is overweight. He is ill-appearing. He is not toxic-appearing.      Interventions: He is sedated and intubated.       Comments: Intubated and sedated   HENT:      Head: Normocephalic.      Nose: Nose normal.      Mouth/Throat:      Mouth: Mucous membranes are moist.   Eyes:      Pupils: Pupils are equal, round, and reactive to light.   Cardiovascular:      Rate and Rhythm: Tachycardia present. Rhythm irregular.      Comments: Afib with RVR in 130s-140s    Pulmonary:      Effort: He is intubated.      Breath sounds: No wheezing or rales.      Comments: On vent    Abdominal:      General: Abdomen is flat. There is no distension.      Palpations: Abdomen is soft.   Musculoskeletal:         General: Normal range of motion.      Right lower leg: Edema present.      Left lower leg: Edema present.   Skin:     General: Skin is warm.      Coloration: Skin is pale. Skin is not jaundiced.      Findings: Bruising present.   Neurological:      Comments: Intubated and sedated   Psychiatric:      Comments: Intubated and sedated       Significant Labs: CMP   Recent Labs   Lab 08/10/22  2113 08/11/22  1528 08/12/22  0632   * 128* 126*   K 4.0 3.7 4.7    104 98   CO2 19* 17* 19*   * 202* 134*   BUN 86* 86* 94*   CREATININE 2.3* 2.5* 3.1*   CALCIUM 8.3* 6.7* 8.0*   PROT 4.1* 3.4* 4.6*   ALBUMIN 1.5* 1.1* 1.3*   BILITOT 1.8* 1.2* 1.5*   ALKPHOS 154* 101 116   AST 75* 142* SEE COMMENT   ALT 35 19 16   ANIONGAP 13 7* 9     , CBC   Recent Labs   Lab 08/11/22  0518 08/11/22  0820 08/12/22  0753 08/12/22  1132   WBC 18.22* 18.11* 24.95*  24.95*  --    HGB 11.4* 10.8* 9.9*  9.9*  --    HCT 34.2* 31.2* 28.2*  28.2* 25*    283 226  226  --      , INR   Recent Labs   Lab 08/10/22  1710 08/11/22  0820 08/12/22  0321   INR 2.0* 1.8* 1.4*     , and All pertinent lab results from the last 24 hours have been reviewed.    Significant Imaging: Echocardiogram: Transthoracic echo (TTE) complete (Cupid Only):   Results for orders placed or performed during the hospital encounter of 11/13/18   Transthoracic echo (TTE) complete   Result  Value Ref Range    Ascending aorta 4.20 cm    STJ 4.00 cm    AV mean gradient 4.43 mmHg    Ao peak rohan 1.57 m/s    Ao VTI 24.12 cm    IVS 1.12 (A) 0.6 - 1.1 cm    LA size 6.43 cm    Left Atrium Major Axis 6.76 cm    Left Atrium Minor Axis 6.98 cm    LVIDd 6.03 (A) 3.5 - 6.0 cm    LVIDs 4.53 (A) 2.1 - 4.0 cm    LVOT diameter 2.72 cm    LVOT peak VTI 13.05 cm    Posterior Wall 1.30 (A) 0.6 - 1.1 cm    RA Major Axis 5.90 cm    RA Width 4.16 cm    RVDD 4.43 cm    Sinus 4.14 cm    TAPSE 1.15 cm    TR Max Rohan 4.30 m/s    TDI LATERAL 0.09     TDI SEPTAL 0.09     LA WIDTH 5.98 cm    LV Diastolic Volume 182.29 mL    LV Systolic Volume 94.08 mL    RV S' 7.77 m/s    FS 25 %    LA volume 224.48 cm3    LV mass 320.17 g    Left Ventricle Relative Wall Thickness 0.43 cm    AV valve area 3.14 cm2    Mean e' 0.09     LVOT area 5.81 cm2    LVOT stroke volume 75.79 cm3    AV peak gradient 9.86 mmHg    Triscuspid Valve Regurgitation Peak Gradient 73.96 mmHg    BSA 2.12 m2    LV Systolic Volume Index 44.4 mL/m2    LV Diastolic Volume Index 85.99 mL/m2    LA Volume Index 105.9 mL/m2    LV Mass Index 151.0 g/m2    Right Atrial Pressure (from IVC) 15 mmHg    TV rest pulmonary artery pressure 88.96 mmHg    Narrative    · The left ventricle cavity is mildly dilated.  · Left ventricle ejection fraction is mildly decreased at 45%  · Left atrium is severely dilated.  · Right atrium is moderately dilated.  · There is a mechanical mitral valve prosthesis. presence of amplatzer   plug to close Pravalvular leak. There is mild to moderate MR seen, but   given severe pulmonary HTN suspect there is greater MR that is seen   (limited by shadowing, consider TRACY if clinically indicated). MV VTI/LVOT   VTI> 3.5 is also suggestive of significant regurgitation.  · Mild-to-moderate mitral regurgitation- possibly paravalvular.  · Moderate aortic regurgitation.  · Mild tricuspid regurgitation.  · Moderate stenosis tricuspid stenosis.  · No pericardial  effusion.  · Elevated central venous pressure (15 mm Hg).  · The estimated PA systolic pressure is 88.96 mm Hg  · Pulmonary hypertension present.  · Right ventricular cavity size is mildly dilated.  · Left ventricular diastolic dysfunction.  · RV systolic function is low normal.  · Pulmonic valve shows trace regurgitation.

## 2022-08-12 NOTE — PLAN OF CARE
CICU DAILY GOALS       A: Awake    RASS: Goal - RASS Goal: -2-->light sedation  Actual - RASS (Peace Agitation-Sedation Scale): -2-->light sedation   Restraint necessity: Clinical Justification: Removing medical devices  B: Breath   SBT: not attempted  C: Coordinate A & B, analgesics/sedatives   Pain: managed    SAT: Not attempted  D: Delirium   CAM-ICU: Overall CAM-ICU: Positive  E: Early(intubated/ Progressive (non-intubated) Mobility   MOVE Screen: Fail   Activity: Activity Management: Patient unable to perform activities  FAS: Feeding/Nutrition   Diet order: Diet/Nutrition Received: NPO,   Fluid restriction:    T: Thrombus   DVT prophylaxis: VTE Required Core Measure: Provider determined low risk VTE  H: HOB Elevation   Head of Bed (HOB) Positioning: HOB at 20-30 degrees  U: Ulcer Prophylaxis   GI: yes  G: Glucose control   managed Glycemic Management: blood glucose monitored  S: Skin   Bundle compliance: yes   Bathing/Skin Care: bath, partial Date: 08/15/2022  B: Bowel Function   no issues   I: Indwelling Catheters   Quiroz necessity:      Urethral Catheter 08/10/22 1698-Reason for Continuing Urinary Catheterization: Critically ill in ICU and requiring hourly monitoring of intake/output   CVC necessity: No   IPAD offered: No  D: De-escalation Antibx   Yes  Plan for the day   Vent settings- FiO2- 40%, Rate 20, PEEP 5, . No changes made over night. Linens changed.   Family/Goals of care/Code Status   Code Status: Full Code     No acute events throughout day, VS and assessment per flow sheet, patient progressing towards goals as tolerated, plan of care reviewed with Yusuf Mcdonald Jr. and family, all concerns addressed, will continue to monitor.

## 2022-08-12 NOTE — SUBJECTIVE & OBJECTIVE
Interval History: No acute events overnight. Still sedated and intubated on minimal vent settings. On Epi 0.16 and Vaso 0.08. -125 mL/hr overnight, but hematuria still present. Neuro checks stable. Daughters at bedside this morning.     Objective:     Vital Signs (Most Recent):  Temp: 99.86 °F (37.7 °C) (08/12/22 0926)  Pulse: (!) 118 (08/12/22 0926)  Resp: (!) 26 (08/12/22 0926)  BP: (!) 100/58 (08/12/22 0700)  SpO2: 96 % (08/12/22 0926)   Vital Signs (24h Range):  Temp:  [98.96 °F (37.2 °C)-100.94 °F (38.3 °C)] 99.86 °F (37.7 °C)  Pulse:  [] 118  Resp:  [15-43] 26  SpO2:  [82 %-98 %] 96 %  BP: ()/(55-74) 100/58  Arterial Line BP: ()/(3-95) 122/57     Weight: 89.4 kg (197 lb)  Body mass index is 29.95 kg/m².    SpO2: 96 %  O2 Device (Oxygen Therapy): ventilator      Intake/Output Summary (Last 24 hours) at 8/12/2022 0935  Last data filed at 8/12/2022 0929  Gross per 24 hour   Intake 4862.54 ml   Output 1345 ml   Net 3517.54 ml       Lines/Drains/Airways       Central Venous Catheter Line  Duration             Percutaneous Central Line Insertion/Assessment - Triple Lumen  08/10/22 1722 right internal jugular 1 day              Drain  Duration                  NG/OG Tube 08/10/22 1700 Center mouth 1 day         Urethral Catheter 08/10/22 1718 1 day              Airway  Duration                  Airway - Non-Surgical 08/10/22 1051 1 day              Arterial Line  Duration             Arterial Line 08/10/22 1334 Right Radial 1 day              Peripheral Intravenous Line  Duration                  Peripheral IV - Single Lumen 08/10/22 0812 18 G Right Antecubital 2 days         Peripheral IV - Single Lumen 08/10/22 0812 20 G Left;Posterior Wrist 2 days         Peripheral IV - Single Lumen 08/10/22 1700 18 G Left Other 1 day         Peripheral IV - Single Lumen 08/11/22 1641 18 G Anterior;Distal;Left Upper Arm <1 day                    Physical Exam  Constitutional:       Comments: Sedated  elderly male   HENT:      Head: Normocephalic.   Cardiovascular:      Rate and Rhythm: Tachycardia present. Rhythm irregular.      Pulses:           Radial pulses are 2+ on the right side and 2+ on the left side.        Femoral pulses are 2+ on the right side and 2+ on the left side.     Heart sounds: Murmur heard.   Systolic murmur is present.      Comments: S1 mechanical click  Pulmonary:      Comments: Intubated  Abdominal:      Palpations: Abdomen is soft.   Musculoskeletal:      Cervical back: Neck supple.      Right lower leg: Edema present.      Left lower leg: Edema present.      Comments: Cool to touch   Neurological:      Comments: Sedated       Significant Labs: BMP:   Recent Labs   Lab 08/10/22  1710 08/10/22  2113 08/11/22  1528   * 249* 202*   * 133* 128*   K 3.6 4.0 3.7    101 104   CO2 20* 19* 17*   BUN 85* 86* 86*   CREATININE 2.2* 2.3* 2.5*   CALCIUM 8.6* 8.3* 6.7*   MG 2.2 2.2  --    , CMP   Recent Labs   Lab 08/10/22  1710 08/10/22  2113 08/11/22  1528   * 133* 128*   K 3.6 4.0 3.7    101 104   CO2 20* 19* 17*   * 249* 202*   BUN 85* 86* 86*   CREATININE 2.2* 2.3* 2.5*   CALCIUM 8.6* 8.3* 6.7*   PROT 3.3* 4.1* 3.4*   ALBUMIN 1.5* 1.5* 1.1*   BILITOT 0.8 1.8* 1.2*   ALKPHOS 153* 154* 101   AST 50* 75* 142*   ALT 40 35 19   ANIONGAP 11 13 7*   , CBC   Recent Labs   Lab 08/11/22  0518 08/11/22  0820 08/12/22  0753   WBC 18.22* 18.11* 24.95*  24.95*   HGB 11.4* 10.8* 9.9*  9.9*   HCT 34.2* 31.2* 28.2*  28.2*    283 226  226   , INR   Recent Labs   Lab 08/10/22  1710 08/11/22  0820 08/12/22  0321   INR 2.0* 1.8* 1.4*   , Lipid Panel No results for input(s): CHOL, HDL, LDLCALC, TRIG, CHOLHDL in the last 48 hours., and Troponin No results for input(s): TROPONINI in the last 48 hours.    Significant Imaging:   TRACY 8/10/22  Dr. Mcpherson and Dr. Bell present during the procedure  Successful implantation of 10 mm VSD plug after trying several ASD and  VSD plugs (both arotic and transseptal access) with few embolizing to the left atrium and all subsequently retrieved with a snare via transeptal puncture with no pericardial effusion and complication of the procedure  Mild residual MR seen at end of case. Presence of iatrogenic ASD at the end of the procedure.  Preprocedure TRACY  There is a bileaflet mechanical mitral valve prosthesis mean gradient 9 mmHg at  bpm, increased gradients likely result of severe MR. There is severe paravalvular insufficiency present posterir to the prior amplatz plug that appeared unstable with greater movement posteriorly.  The left ventricle is moderately enlarged with low normal systolic function. The estimated ejection fraction is 55%.  There is abnormal septal wall motion consistent with post-operative status.  Moderate right ventricular enlargement with moderately to severely reduced right ventricular systolic function.  Biatrial enlargement.

## 2022-08-12 NOTE — ASSESSMENT & PLAN NOTE
Baseline of 9-11. MCV 89. Concern for chronic hemolytic process given mechanical valve as documented with haptoglobin and LDH altered since replacement in 2018. Stable. Smear reviewed with some fragmented RBCs 5/hpf but PLTs stable and no further concerns for acute hemolytic or consumptive process.    Type and screen  Daily cbc

## 2022-08-12 NOTE — ASSESSMENT & PLAN NOTE
Continue heparin gtt while off warfarin; transition back to warfarin with heparin bridging once extubated and able to take PO.

## 2022-08-12 NOTE — PROGRESS NOTES
[facility] - Cardiac Intensive Care  Cardiology  Progress Note    Patient Name: Yusuf Mcdonald Jr.  MRN: [id number]  Admission Date: 8/10/2022  Hospital Length of Stay: 2 days  Code Status: Full Code   Attending Physician: Mau James MD   Primary Care Physician: Primary Doctor No  Expected Discharge Date:   Principal Problem:Septic shock    Subjective:     Hospital Course:   Patient admitted to CCU for management of shock in the setting of MV paravalvular leak repair completed on 8/10. Patient hypotensive post procedure and CVC placed with hemodynamics concerning for septic shock. Patient was given limited IVF and started on broad spectrum antibiotics with vancomycin and cefepime which was escalated to meropenem and azithromycin given ongoing fevers. CI/CO/SVR 4.1/8.6/553. Lactate improved from 4 down to normal. Patient sedated with propofol, fentanyl and on epinephrine and vasopressin for pressor support. Ventilated at 40% FiO2 with PEEP of 5. LJ likely ischemic ATN vs prerenal given procedure and septic shock.      Interval History: see hospital course.    Review of Systems   Reason unable to perform ROS: intubated.   Objective:     Vital Signs (Most Recent):  Temp: 99.14 °F (37.3 °C) (08/12/22 0736)  Pulse: 109 (08/12/22 0736)  Resp: (!) 23 (08/12/22 0736)  BP: (!) 100/58 (08/12/22 0700)  SpO2: (!) 94 % (08/12/22 0736)   Vital Signs (24h Range):  Temp:  [98.96 °F (37.2 °C)-100.94 °F (38.3 °C)] 99.14 °F (37.3 °C)  Pulse:  [] 109  Resp:  [15-43] 23  SpO2:  [82 %-99 %] 94 %  BP: ()/(55-74) 100/58  Arterial Line BP: ()/(3-95) 110/58     Weight: 89.4 kg (197 lb)  Body mass index is 29.95 kg/m².     SpO2: (!) 94 %  O2 Device (Oxygen Therapy): ventilator      Intake/Output Summary (Last 24 hours) at 8/12/2022 0752  Last data filed at 8/12/2022 0701  Gross per 24 hour   Intake 4860.83 ml   Output 1425 ml   Net 3435.83 ml       Lines/Drains/Airways       Central Venous Catheter Line  Duration              Percutaneous Central Line Insertion/Assessment - Triple Lumen  08/10/22 1722 right internal jugular 1 day              Drain  Duration                  NG/OG Tube 08/10/22 1700 Center mouth 1 day         Urethral Catheter 08/10/22 1718 1 day              Airway  Duration                  Airway - Non-Surgical 08/10/22 1051 1 day              Arterial Line  Duration             Arterial Line 08/10/22 1334 Right Radial 1 day              Peripheral Intravenous Line  Duration                  Peripheral IV - Single Lumen 08/10/22 0812 18 G Right Antecubital 1 day         Peripheral IV - Single Lumen 08/10/22 0812 20 G Left;Posterior Wrist 1 day         Peripheral IV - Single Lumen 08/10/22 1700 18 G Left Other 1 day         Peripheral IV - Single Lumen 08/11/22 1641 18 G Anterior;Distal;Left Upper Arm <1 day                    Physical Exam  Vitals and nursing note reviewed.   Constitutional:       Appearance: He is overweight. He is ill-appearing. He is not toxic-appearing.      Interventions: He is sedated and intubated.      Comments: Intubated and sedated   HENT:      Head: Normocephalic.      Nose: Nose normal.      Mouth/Throat:      Mouth: Mucous membranes are moist.   Eyes:      Pupils: Pupils are equal, round, and reactive to light.   Cardiovascular:      Rate and Rhythm: Tachycardia present. Rhythm irregular.      Comments: Afib with RVR in 130s-140s    Pulmonary:      Effort: He is intubated.      Breath sounds: No wheezing or rales.      Comments: On vent    Abdominal:      General: Abdomen is flat. There is no distension.      Palpations: Abdomen is soft.   Musculoskeletal:         General: Normal range of motion.      Right lower leg: Edema present.      Left lower leg: Edema present.   Skin:     General: Skin is warm.      Coloration: Skin is pale. Skin is not jaundiced.      Findings: Bruising present.   Neurological:      Comments: Intubated and sedated   Psychiatric:      Comments: Intubated and  sedated       Significant Labs: CMP   Recent Labs   Lab 08/10/22  1710 08/10/22  2113 08/11/22  1528   * 133* 128*   K 3.6 4.0 3.7    101 104   CO2 20* 19* 17*   * 249* 202*   BUN 85* 86* 86*   CREATININE 2.2* 2.3* 2.5*   CALCIUM 8.6* 8.3* 6.7*   PROT 3.3* 4.1* 3.4*   ALBUMIN 1.5* 1.5* 1.1*   BILITOT 0.8 1.8* 1.2*   ALKPHOS 153* 154* 101   AST 50* 75* 142*   ALT 40 35 19   ANIONGAP 11 13 7*   , CBC   Recent Labs   Lab 08/11/22  0340 08/11/22  0518 08/11/22  0820   WBC 18.74* 18.22* 18.11*   HGB 11.3* 11.4* 10.8*   HCT 33.8* 34.2* 31.2*    285 283   , INR   Recent Labs   Lab 08/10/22  1710 08/11/22  0820 08/12/22  0321   INR 2.0* 1.8* 1.4*   , and All pertinent lab results from the last 24 hours have been reviewed.    Significant Imaging: Echocardiogram: Transthoracic echo (TTE) complete (Cupid Only):   Results for orders placed or performed during the hospital encounter of 11/13/18   Transthoracic echo (TTE) complete   Result Value Ref Range    Ascending aorta 4.20 cm    STJ 4.00 cm    AV mean gradient 4.43 mmHg    Ao peak rohan 1.57 m/s    Ao VTI 24.12 cm    IVS 1.12 (A) 0.6 - 1.1 cm    LA size 6.43 cm    Left Atrium Major Axis 6.76 cm    Left Atrium Minor Axis 6.98 cm    LVIDd 6.03 (A) 3.5 - 6.0 cm    LVIDs 4.53 (A) 2.1 - 4.0 cm    LVOT diameter 2.72 cm    LVOT peak VTI 13.05 cm    Posterior Wall 1.30 (A) 0.6 - 1.1 cm    RA Major Axis 5.90 cm    RA Width 4.16 cm    RVDD 4.43 cm    Sinus 4.14 cm    TAPSE 1.15 cm    TR Max Rohan 4.30 m/s    TDI LATERAL 0.09     TDI SEPTAL 0.09     LA WIDTH 5.98 cm    LV Diastolic Volume 182.29 mL    LV Systolic Volume 94.08 mL    RV S' 7.77 m/s    FS 25 %    LA volume 224.48 cm3    LV mass 320.17 g    Left Ventricle Relative Wall Thickness 0.43 cm    AV valve area 3.14 cm2    Mean e' 0.09     LVOT area 5.81 cm2    LVOT stroke volume 75.79 cm3    AV peak gradient 9.86 mmHg    Triscuspid Valve Regurgitation Peak Gradient 73.96 mmHg    BSA 2.12 m2    LV Systolic  Volume Index 44.4 mL/m2    LV Diastolic Volume Index 85.99 mL/m2    LA Volume Index 105.9 mL/m2    LV Mass Index 151.0 g/m2    Right Atrial Pressure (from IVC) 15 mmHg    TV rest pulmonary artery pressure 88.96 mmHg    Narrative    · The left ventricle cavity is mildly dilated.  · Left ventricle ejection fraction is mildly decreased at 45%  · Left atrium is severely dilated.  · Right atrium is moderately dilated.  · There is a mechanical mitral valve prosthesis. presence of amplatzer   plug to close Pravalvular leak. There is mild to moderate MR seen, but   given severe pulmonary HTN suspect there is greater MR that is seen   (limited by shadowing, consider TRACY if clinically indicated). MV VTI/LVOT   VTI> 3.5 is also suggestive of significant regurgitation.  · Mild-to-moderate mitral regurgitation- possibly paravalvular.  · Moderate aortic regurgitation.  · Mild tricuspid regurgitation.  · Moderate stenosis tricuspid stenosis.  · No pericardial effusion.  · Elevated central venous pressure (15 mm Hg).  · The estimated PA systolic pressure is 88.96 mm Hg  · Pulmonary hypertension present.  · Right ventricular cavity size is mildly dilated.  · Left ventricular diastolic dysfunction.  · RV systolic function is low normal.  · Pulmonic valve shows trace regurgitation.        Assessment and Plan:     70 yo M with PMH MR s/p mechanical MVR 2015 in [place]) c/b PVL s/p MV plug (Dr Tejeda 2018) on Warfarin, chronic AF s/p GEE ligation, and HTN.  He has recently extended his paravalvular leak on TRACY performed in [place]. He continues to have significant fatigue, GEORGE, and leg swelling. He presented for PLV closure with TRACY guidance.  Postprocedure patient was brought to CCU where he was found to be hypotensive and epinephrine was started.      * Septic shock  Temp initially low and later 102.2 and persistently febrile  White count 23  CI/CO high with low SVR  Likely septic shock   Blood cultures x 2 obtained and negative so  far. UA unremarkable. Respiratory culture also negative  Vancomycin and cefepime ordered which was transitioned to meropenem and azithromycin.   On epi and vaso. Can consider stress dose steroids in setting of 2 pressor shock  If not improved will consider ID consultation and repeat imaging    Severe mitral regurgitation  S/p MVR and paravalvular leak repair    Gross hematuria  Hematuria likely from ischemic nephropathy  UA ordered, wctm    Atrial fibrillation with RVR  -Afib with RVR at 2 am, INR 2.0, rate 135-145, EKG obtained, will give amio iv 150x1 followed by gtt  On Amio gtt and holding home dilt and metoprolol in shock  holding digoxin for with elevated Cr    LJ (acute kidney injury)  Cr of 2.5 on admission with baseline of 1.3 prior to admission. Likely prerenal given hypotension postoperatively but also could be cardiorenal given volume status.     Plan:  - Obtain urine electrolytes and creatine.  - Monitor intake/output and daily standing weights.   - Avoid nephrotoxic agents such as NSAIDs, gadolinium and IV radiocontrast.  - Renally dose meds to current GFR.  - Maintain MAP > 65.      S/P mitral valve replacement with metallic valve  On warfarin at home. Transitioned to heparin gtt and will bridge once stable    Paravalvular leak (prosthetic valve)  Patient is a 70 yo M with chronic AF on warfarin, pHTN, HLD and mechanical MVR c/b PVL s/p MV plug in 2018 and now with extension of his paravalvular leak on TRACY. Here today for PLV closure with TRACY guidance  Patient admitted to CCU for monitoring post procedure.                 VTE Risk Mitigation (From admission, onward)         Ordered     heparin 25,000 units in dextrose 5% (100 units/ml) IV bolus from bag - ADDITIONAL PRN BOLUS - 60 units/kg  As needed (PRN)        Question:  Heparin Infusion Adjustment (DO NOT MODIFY ANSWER)  Answer:  [email or web address]\Images\Pharmacy\HeparinInfusions\heparin LOW INTENSITY nomogram for [facility] [id number]    08/11/22 0737      heparin 25,000 units in dextrose 5% (100 units/ml) IV bolus from bag - ADDITIONAL PRN BOLUS - 30 units/kg  As needed (PRN)        Question:  Heparin Infusion Adjustment (DO NOT MODIFY ANSWER)  Answer:  [email or web address]\epic\Images\Pharmacy\HeparinInfusions\heparin LOW INTENSITY nomogram for [facility] [id number]    08/11/22 0737     heparin 25,000 units in dextrose 5% 250 mL (100 units/mL) infusion LOW INTENSITY nomogram - [facility]  Continuous        Question Answer Comment   Heparin Infusion Adjustment (DO NOT MODIFY ANSWER) [email or web address]\epic\Images\Pharmacy\HeparinInfusions\heparin LOW INTENSITY nomogram for [facility] [id number]    Begin at (in units/kg/hr) 12        08/11/22 0737     IP VTE HIGH RISK PATIENT  Once         08/10/22 1647     Place sequential compression device  Until discontinued         08/10/22 1647                Nicole Mchugh DO  Cardiology  [facility] - Cardiac Intensive Care

## 2022-08-12 NOTE — ASSESSMENT & PLAN NOTE
Temp initially low and later 102.2 and persistently febrile  White count 23  CI/CO high with low SVR  Likely septic shock   Blood cultures x 2 obtained and negative so far. UA unremarkable. Respiratory culture also negative  Vancomycin and cefepime ordered which was transitioned to meropenem and azithromycin.   On epi and vaso. Can consider stress dose steroids in setting of 2 pressor shock  If not improved will consider ID consultation and repeat imaging

## 2022-08-12 NOTE — CARE UPDATE
Hemodynamics Care Update Note     SVO2: 68  CVP: 12  CO: 7.9  CI: 3.8  SVR: 697  UO: 75c/hr    Inotropes/ Vasopressors: Epinephrine 0.12 , vaso 0.08,      Plan:  No changes made          Mare Carey MD  Cardiovascular Disease PGY4  [facility]

## 2022-08-12 NOTE — PROGRESS NOTES
Pharmacokinetic Assessment Follow Up: IV Vancomycin    Vancomycin serum concentration assessment(s):    · The random level was drawn correctly and can be used to guide therapy at this time. The measurement is above the desired definitive target range of 15 to 20 mcg/mL.  · SCR is rising from 2.5 to 3.1mg/dL. Patient has a recorded urine output of 1.38L yesterday, and has a recorded urine output of 200mL thus far today.     Vancomycin Regimen Plan:    · Redraw vancomycin level this evening around 18:00h to assess clearance. Redose per level and renal function.     Drug levels (last 3 results):  Recent Labs   Lab Result Units 08/11/22  0834 08/12/22  0321   Vancomycin, Random ug/mL 18.4 26.3       Pharmacy will continue to follow and monitor vancomycin.    Please contact pharmacy at extension [phone] for questions regarding this assessment.    Thank you for the consult,   Nuris Bird, PharmD, BCPS, BCCP Cardiology Clinical Pharmacy Specialist  [phone]       Patient brief summary:  Yusuf Mcdonald Jr. is a 71 y.o. male initiated on antimicrobial therapy with IV Vancomycin for treatment of bacteremia    The patient's current regimen is pulse dosing    Drug Allergies:   Review of patient's allergies indicates:   Allergen Reactions    Neomycin-bacitracnzn-polymyxnb     Benzalkonium chloride Rash    Neosporin (neomycin-polymyx) Rash       Actual Body Weight:   89.4kg    Renal Function:   Estimated Creatinine Clearance: 24.1 mL/min (A) (based on SCr of 3.1 mg/dL (H)).,     Dialysis Method (if applicable):  N/A    CBC (last 72 hours):  Recent Labs   Lab Result Units 08/09/22  1217 08/10/22  1710 08/10/22  2113 08/11/22  0013 08/11/22  0340 08/11/22  0518 08/11/22  0820 08/12/22  0753   WBC K/uL 10.29 23.72* 22.15* 22.25* 18.74* 18.22* 18.11* 24.95*  24.95*   Hemoglobin g/dL 13.2* 11.2* 11.4* 11.0* 11.3* 11.4* 10.8* 9.9*  9.9*   Hematocrit % 39.5* 34.0* 34.1* 32.6* 33.8* 34.2* 31.2* 28.2*  28.2*    Platelets K/uL 357 312 349 328 333 285 283 226  226   Gran % % 79.4* 89.5* 90.5* 88.9* 88.5* 84.7* 80.3* 81.4*  81.4*   Lymph % % 5.4* 2.4* 1.9* 1.3* 1.1* 1.4* 1.8* 3.0*  3.0*   Mono % % 9.1 6.6 6.5 8.6 9.7 13.2 17.2* 13.2  13.2   Eosinophil % % 4.6 0.0 0.0 0.0 0.0 0.0 0.1 0.6  0.6   Basophil % % 0.8 0.3 0.1 0.1 0.1 0.2 0.2 0.5  0.5   Differential Method  Automated Automated Automated Automated Automated Automated Automated Automated  Automated       Metabolic Panel (last 72 hours):  Recent Labs   Lab Result Units 08/09/22  1217 08/10/22  1710 08/10/22  2113 08/11/22  0348 08/11/22  0407 08/11/22  1528 08/12/22  0632   Sodium mmol/L 132* 133* 133*  --   --  128* 126*   Sodium, Urine mmol/L  --   --   --   --  16*  --   --    Potassium mmol/L 4.7 3.6 4.0  --   --  3.7 4.7   Chloride mmol/L 97 102 101  --   --  104 98   CO2 mmol/L 26 20* 19*  --   --  17* 19*   Glucose mg/dL 97 223* 249*  --   --  202* 134*   Glucose, UA   --   --   --  Negative  --   --   --    BUN mg/dL 89* 85* 86*  --   --  86* 94*   Creatinine mg/dL 2.4* 2.2* 2.3*  --   --  2.5* 3.1*   Creatinine, Urine mg/dL  --   --   --   --  67.0  --   --    Albumin g/dL 1.8* 1.5* 1.5*  --   --  1.1* 1.3*   Total Bilirubin mg/dL 0.9 0.8 1.8*  --   --  1.2* 1.5*   Alkaline Phosphatase U/L 190* 153* 154*  --   --  101 116   AST U/L 59* 50* 75*  --   --  142* SEE COMMENT   ALT U/L 45* 40 35  --   --  19 16   Magnesium mg/dL  --  2.2 2.2  --   --   --  2.0   Phosphorus mg/dL  --  5.0*  --   --   --   --  5.6*       Vancomycin Administrations:  vancomycin given in the last 96 hours                   vancomycin 1.25 g in dextrose 5% 250 mL IVPB (ready to mix) (mg) 1,250 mg New Bag 08/11/22 1416    vancomycin 1.75 g in 5 % dextrose 500 mL IVPB (mg) 1,750 mg New Bag 08/10/22 8997                Microbiologic Results:  Microbiology Results (last 7 days)     Procedure Component Value Units Date/Time    Culture, Respiratory with Gram Stain [[id number]] Collected:  08/11/22 0914    Order Status: Completed Specimen: Respiratory from Tracheal Aspirate Updated: 08/12/22 0812     Respiratory Culture Normal respiratory ronny     Gram Stain (Respiratory) <10 epithelial cells per low power field.     Gram Stain (Respiratory) Rare WBC's     Gram Stain (Respiratory) No organisms seen    Blood culture [[id number]] Collected: 08/10/22 2000    Order Status: Completed Specimen: Blood from Peripheral, Upper Arm, Left Updated: 08/11/22 2212     Blood Culture, Routine No Growth to date      No Growth to date    Blood culture [[id number]] Collected: 08/10/22 2058    Order Status: Completed Specimen: Blood from Peripheral, Forearm, Left Updated: 08/11/22 2212     Blood Culture, Routine No Growth to date      No Growth to date

## 2022-08-12 NOTE — ASSESSMENT & PLAN NOTE
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 Here today for PLV closure with TRACY guidance  Patient admitted to CCU for monitoring post procedure.

## 2022-08-12 NOTE — SUBJECTIVE & OBJECTIVE
Interval History: see hospital course.    Review of Systems   Reason unable to perform ROS: intubated.   Objective:     Vital Signs (Most Recent):  Temp: 99.14 °F (37.3 °C) (08/12/22 0736)  Pulse: 109 (08/12/22 0736)  Resp: (!) 23 (08/12/22 0736)  BP: (!) 100/58 (08/12/22 0700)  SpO2: (!) 94 % (08/12/22 0736)   Vital Signs (24h Range):  Temp:  [98.96 °F (37.2 °C)-100.94 °F (38.3 °C)] 99.14 °F (37.3 °C)  Pulse:  [] 109  Resp:  [15-43] 23  SpO2:  [82 %-99 %] 94 %  BP: ()/(55-74) 100/58  Arterial Line BP: ()/(3-95) 110/58     Weight: 89.4 kg (197 lb)  Body mass index is 29.95 kg/m².     SpO2: (!) 94 %  O2 Device (Oxygen Therapy): ventilator      Intake/Output Summary (Last 24 hours) at 8/12/2022 0752  Last data filed at 8/12/2022 0701  Gross per 24 hour   Intake 4860.83 ml   Output 1425 ml   Net 3435.83 ml       Lines/Drains/Airways       Central Venous Catheter Line  Duration             Percutaneous Central Line Insertion/Assessment - Triple Lumen  08/10/22 1722 right internal jugular 1 day              Drain  Duration                  NG/OG Tube 08/10/22 1700 Center mouth 1 day         Urethral Catheter 08/10/22 1718 1 day              Airway  Duration                  Airway - Non-Surgical 08/10/22 1051 1 day              Arterial Line  Duration             Arterial Line 08/10/22 1334 Right Radial 1 day              Peripheral Intravenous Line  Duration                  Peripheral IV - Single Lumen 08/10/22 0812 18 G Right Antecubital 1 day         Peripheral IV - Single Lumen 08/10/22 0812 20 G Left;Posterior Wrist 1 day         Peripheral IV - Single Lumen 08/10/22 1700 18 G Left Other 1 day         Peripheral IV - Single Lumen 08/11/22 1641 18 G Anterior;Distal;Left Upper Arm <1 day                    Physical Exam  Vitals and nursing note reviewed.   Constitutional:       Appearance: He is overweight. He is ill-appearing. He is not toxic-appearing.      Interventions: He is sedated and  intubated.      Comments: Intubated and sedated   HENT:      Head: Normocephalic.      Nose: Nose normal.      Mouth/Throat:      Mouth: Mucous membranes are moist.   Eyes:      Pupils: Pupils are equal, round, and reactive to light.   Cardiovascular:      Rate and Rhythm: Tachycardia present. Rhythm irregular.      Comments: Afib with RVR in 130s-140s    Pulmonary:      Effort: He is intubated.      Breath sounds: No wheezing or rales.      Comments: On vent    Abdominal:      General: Abdomen is flat. There is no distension.      Palpations: Abdomen is soft.   Musculoskeletal:         General: Normal range of motion.      Right lower leg: Edema present.      Left lower leg: Edema present.   Skin:     General: Skin is warm.      Coloration: Skin is pale. Skin is not jaundiced.      Findings: Bruising present.   Neurological:      Comments: Intubated and sedated   Psychiatric:      Comments: Intubated and sedated       Significant Labs: CMP   Recent Labs   Lab 08/10/22  1710 08/10/22  2113 08/11/22  1528   * 133* 128*   K 3.6 4.0 3.7    101 104   CO2 20* 19* 17*   * 249* 202*   BUN 85* 86* 86*   CREATININE 2.2* 2.3* 2.5*   CALCIUM 8.6* 8.3* 6.7*   PROT 3.3* 4.1* 3.4*   ALBUMIN 1.5* 1.5* 1.1*   BILITOT 0.8 1.8* 1.2*   ALKPHOS 153* 154* 101   AST 50* 75* 142*   ALT 40 35 19   ANIONGAP 11 13 7*   , CBC   Recent Labs   Lab 08/11/22  0340 08/11/22  0518 08/11/22  0820   WBC 18.74* 18.22* 18.11*   HGB 11.3* 11.4* 10.8*   HCT 33.8* 34.2* 31.2*    285 283   , INR   Recent Labs   Lab 08/10/22  1710 08/11/22  0820 08/12/22  0321   INR 2.0* 1.8* 1.4*   , and All pertinent lab results from the last 24 hours have been reviewed.    Significant Imaging: Echocardiogram: Transthoracic echo (TTE) complete (Cupid Only):   Results for orders placed or performed during the hospital encounter of 11/13/18   Transthoracic echo (TTE) complete   Result Value Ref Range    Ascending aorta 4.20 cm    STJ 4.00 cm    AV  mean gradient 4.43 mmHg    Ao peak rohan 1.57 m/s    Ao VTI 24.12 cm    IVS 1.12 (A) 0.6 - 1.1 cm    LA size 6.43 cm    Left Atrium Major Axis 6.76 cm    Left Atrium Minor Axis 6.98 cm    LVIDd 6.03 (A) 3.5 - 6.0 cm    LVIDs 4.53 (A) 2.1 - 4.0 cm    LVOT diameter 2.72 cm    LVOT peak VTI 13.05 cm    Posterior Wall 1.30 (A) 0.6 - 1.1 cm    RA Major Axis 5.90 cm    RA Width 4.16 cm    RVDD 4.43 cm    Sinus 4.14 cm    TAPSE 1.15 cm    TR Max Rohan 4.30 m/s    TDI LATERAL 0.09     TDI SEPTAL 0.09     LA WIDTH 5.98 cm    LV Diastolic Volume 182.29 mL    LV Systolic Volume 94.08 mL    RV S' 7.77 m/s    FS 25 %    LA volume 224.48 cm3    LV mass 320.17 g    Left Ventricle Relative Wall Thickness 0.43 cm    AV valve area 3.14 cm2    Mean e' 0.09     LVOT area 5.81 cm2    LVOT stroke volume 75.79 cm3    AV peak gradient 9.86 mmHg    Triscuspid Valve Regurgitation Peak Gradient 73.96 mmHg    BSA 2.12 m2    LV Systolic Volume Index 44.4 mL/m2    LV Diastolic Volume Index 85.99 mL/m2    LA Volume Index 105.9 mL/m2    LV Mass Index 151.0 g/m2    Right Atrial Pressure (from IVC) 15 mmHg    TV rest pulmonary artery pressure 88.96 mmHg    Narrative    · The left ventricle cavity is mildly dilated.  · Left ventricle ejection fraction is mildly decreased at 45%  · Left atrium is severely dilated.  · Right atrium is moderately dilated.  · There is a mechanical mitral valve prosthesis. presence of amplatzer   plug to close Pravalvular leak. There is mild to moderate MR seen, but   given severe pulmonary HTN suspect there is greater MR that is seen   (limited by shadowing, consider TRACY if clinically indicated). MV VTI/LVOT   VTI> 3.5 is also suggestive of significant regurgitation.  · Mild-to-moderate mitral regurgitation- possibly paravalvular.  · Moderate aortic regurgitation.  · Mild tricuspid regurgitation.  · Moderate stenosis tricuspid stenosis.  · No pericardial effusion.  · Elevated central venous pressure (15 mm Hg).  · The  estimated PA systolic pressure is 88.96 mm Hg  · Pulmonary hypertension present.  · Right ventricular cavity size is mildly dilated.  · Left ventricular diastolic dysfunction.  · RV systolic function is low normal.  · Pulmonic valve shows trace regurgitation.

## 2022-08-12 NOTE — PROGRESS NOTES
[facility] - Cardiac Intensive [facility]  Cardiology  Progress Note    Patient Name: Yusuf Mcdonald Jr.  MRN: [id number]  Admission Date: 8/10/2022  Hospital Length of Stay: 2 days  Code Status: Full Code   Attending Physician: Mau James MD   Primary Care Physician: Primary Doctor No  Expected Discharge Date:   Principal Problem:Septic shock    Subjective:     Hospital Course:   Patient admitted to CCU for management of shock in the setting of MV paravalvular leak repair completed on 8/10. Patient hypotensive post procedure and CVC placed with hemodynamics concerning for septic shock. Patient was given limited IVF and started on broad spectrum antibiotics with vancomycin and cefepime which was escalated to meropenem and azithromycin given ongoing fevers. CI/CO/SVR 4.1/8.6/553. Lactate improved from 4 down to normal. Patient sedated with propofol, fentanyl and on epinephrine and vasopressin for pressor support. Ventilated at 40% FiO2 with PEEP of 5. LJ likely ischemic ATN vs prerenal given procedure and septic shock. Patient also started on stress dose steroids. Plan for SBT/SAT and possible extubation on 8/12/22      Interval History: see hospital course.    Review of Systems   Reason unable to perform ROS: intubated.   Objective:     Vital Signs (Most Recent):  Temp: 99.68 °F (37.6 °C) (08/12/22 1221)  Pulse: (!) 122 (08/12/22 1221)  Resp: 18 (08/12/22 1221)  BP: (!) 100/58 (08/12/22 0700)  SpO2: 95 % (08/12/22 1221)   Vital Signs (24h Range):  Temp:  [98.96 °F (37.2 °C)-100.22 °F (37.9 °C)] 99.68 °F (37.6 °C)  Pulse:  [] 122  Resp:  [15-26] 18  SpO2:  [82 %-98 %] 95 %  BP: ()/(58-70) 100/58  Arterial Line BP: ()/(45-82) 106/52     Weight: 89.4 kg (197 lb)  Body mass index is 29.95 kg/m².     SpO2: 95 %  O2 Device (Oxygen Therapy): ventilator      Intake/Output Summary (Last 24 hours) at 8/12/2022 1245  Last data filed at 8/12/2022 1230  Gross per 24 hour   Intake 4684.8 ml   Output 1335 ml   Net  3349.8 ml         Lines/Drains/Airways       Central Venous Catheter Line  Duration             Percutaneous Central Line Insertion/Assessment - Triple Lumen  08/10/22 1722 right internal jugular 1 day              Drain  Duration                  NG/OG Tube 08/10/22 1700 Center mouth 1 day         Urethral Catheter 08/10/22 1718 1 day              Airway  Duration                  Airway - Non-Surgical 08/10/22 1051 2 days              Arterial Line  Duration             Arterial Line 08/10/22 1334 Right Radial 1 day              Peripheral Intravenous Line  Duration                  Peripheral IV - Single Lumen 08/10/22 0812 18 G Right Antecubital 2 days         Peripheral IV - Single Lumen 08/10/22 0812 20 G Left;Posterior Wrist 2 days         Peripheral IV - Single Lumen 08/10/22 1700 18 G Left Other 1 day         Peripheral IV - Single Lumen 08/11/22 1641 18 G Anterior;Distal;Left Upper Arm <1 day                    Physical Exam  Vitals and nursing note reviewed.   Constitutional:       Appearance: He is overweight. He is ill-appearing. He is not toxic-appearing.      Interventions: He is sedated and intubated.      Comments: Intubated and sedated   HENT:      Head: Normocephalic.      Nose: Nose normal.      Mouth/Throat:      Mouth: Mucous membranes are moist.   Eyes:      Pupils: Pupils are equal, round, and reactive to light.   Cardiovascular:      Rate and Rhythm: Tachycardia present. Rhythm irregular.      Comments: Afib with RVR in 130s-140s    Pulmonary:      Effort: He is intubated.      Breath sounds: No wheezing or rales.      Comments: On vent    Abdominal:      General: Abdomen is flat. There is no distension.      Palpations: Abdomen is soft.   Musculoskeletal:         General: Normal range of motion.      Right lower leg: Edema present.      Left lower leg: Edema present.   Skin:     General: Skin is warm.      Coloration: Skin is pale. Skin is not jaundiced.      Findings: Bruising present.    Neurological:      Comments: Intubated and sedated   Psychiatric:      Comments: Intubated and sedated       Significant Labs: CMP   Recent Labs   Lab 08/10/22  2113 08/11/22  1528 08/12/22  0632   * 128* 126*   K 4.0 3.7 4.7    104 98   CO2 19* 17* 19*   * 202* 134*   BUN 86* 86* 94*   CREATININE 2.3* 2.5* 3.1*   CALCIUM 8.3* 6.7* 8.0*   PROT 4.1* 3.4* 4.6*   ALBUMIN 1.5* 1.1* 1.3*   BILITOT 1.8* 1.2* 1.5*   ALKPHOS 154* 101 116   AST 75* 142* SEE COMMENT   ALT 35 19 16   ANIONGAP 13 7* 9     , CBC   Recent Labs   Lab 08/11/22  0518 08/11/22  0820 08/12/22  0753 08/12/22  1132   WBC 18.22* 18.11* 24.95*  24.95*  --    HGB 11.4* 10.8* 9.9*  9.9*  --    HCT 34.2* 31.2* 28.2*  28.2* 25*    283 226  226  --      , INR   Recent Labs   Lab 08/10/22  1710 08/11/22  0820 08/12/22  0321   INR 2.0* 1.8* 1.4*     , and All pertinent lab results from the last 24 hours have been reviewed.    Significant Imaging: Echocardiogram: Transthoracic echo (TTE) complete (Cupid Only):   Results for orders placed or performed during the hospital encounter of 11/13/18   Transthoracic echo (TTE) complete   Result Value Ref Range    Ascending aorta 4.20 cm    STJ 4.00 cm    AV mean gradient 4.43 mmHg    Ao peak rohan 1.57 m/s    Ao VTI 24.12 cm    IVS 1.12 (A) 0.6 - 1.1 cm    LA size 6.43 cm    Left Atrium Major Axis 6.76 cm    Left Atrium Minor Axis 6.98 cm    LVIDd 6.03 (A) 3.5 - 6.0 cm    LVIDs 4.53 (A) 2.1 - 4.0 cm    LVOT diameter 2.72 cm    LVOT peak VTI 13.05 cm    Posterior Wall 1.30 (A) 0.6 - 1.1 cm    RA Major Axis 5.90 cm    RA Width 4.16 cm    RVDD 4.43 cm    Sinus 4.14 cm    TAPSE 1.15 cm    TR Max Rohan 4.30 m/s    TDI LATERAL 0.09     TDI SEPTAL 0.09     LA WIDTH 5.98 cm    LV Diastolic Volume 182.29 mL    LV Systolic Volume 94.08 mL    RV S' 7.77 m/s    FS 25 %    LA volume 224.48 cm3    LV mass 320.17 g    Left Ventricle Relative Wall Thickness 0.43 cm    AV valve area 3.14 cm2    Mean e' 0.09      LVOT area 5.81 cm2    LVOT stroke volume 75.79 cm3    AV peak gradient 9.86 mmHg    Triscuspid Valve Regurgitation Peak Gradient 73.96 mmHg    BSA 2.12 m2    LV Systolic Volume Index 44.4 mL/m2    LV Diastolic Volume Index 85.99 mL/m2    LA Volume Index 105.9 mL/m2    LV Mass Index 151.0 g/m2    Right Atrial Pressure (from IVC) 15 mmHg    TV rest pulmonary artery pressure 88.96 mmHg    Narrative    · The left ventricle cavity is mildly dilated.  · Left ventricle ejection fraction is mildly decreased at 45%  · Left atrium is severely dilated.  · Right atrium is moderately dilated.  · There is a mechanical mitral valve prosthesis. presence of amplatzer   plug to close Pravalvular leak. There is mild to moderate MR seen, but   given severe pulmonary HTN suspect there is greater MR that is seen   (limited by shadowing, consider TRACY if clinically indicated). MV VTI/LVOT   VTI> 3.5 is also suggestive of significant regurgitation.  · Mild-to-moderate mitral regurgitation- possibly paravalvular.  · Moderate aortic regurgitation.  · Mild tricuspid regurgitation.  · Moderate stenosis tricuspid stenosis.  · No pericardial effusion.  · Elevated central venous pressure (15 mm Hg).  · The estimated PA systolic pressure is 88.96 mm Hg  · Pulmonary hypertension present.  · Right ventricular cavity size is mildly dilated.  · Left ventricular diastolic dysfunction.  · RV systolic function is low normal.  · Pulmonic valve shows trace regurgitation.        Assessment and Plan:       * Septic shock  70 yo M with PMH MR s/p mechanical MVR 2015 in [place]) c/b PVL s/p MV plug (Dr Tejeda 2018) on Warfarin, chronic AF s/p GEE ligation, and HTN.  He has recently extended his paravalvular leak on TRACY performed in [place]. He continues to have significant fatigue, GEORGE, and leg swelling. He presented for PLV closure with TRACY guidance.  Postprocedure patient was brought to CCU where he was found to be hypotensive and epinephrine was started.       Temp initially low and later 102.2 and persistently febrile  White count 23  CI/CO high with low SVR  Likely septic shock   Blood cultures x 2 obtained and negative so far. UA unremarkable. Respiratory culture also negative  Vancomycin, meropenem and azithromycin.   On epi and vaso.   Starting stress dose steroids and will taper if improvement of hemodynamics with extubation  If not improved will consider ID consultation and repeat imaging    Anemia  Baseline of 9-11. MCV 89. Concern for chronic hemolytic process given mechanical valve as documented with haptoglobin and LDH altered since replacement in 2018. Stable. Smear reviewed with some fragmented RBCs 5/hpf but PLTs stable and no further concerns for acute hemolytic or consumptive process.    Type and screen  Daily cbc    Severe mitral regurgitation  S/p MVR and paravalvular leak repair    Shock  See septic shock    Gross hematuria  Hematuria likely from ischemic nephropathy  UA ordered, wctm    Atrial fibrillation with RVR  -Afib with RVR at 2 am, INR 2.0, rate 135-145, EKG obtained, will give amio iv 150x1 followed by gtt  On Amio gtt and holding home dilt and metoprolol in shock  holding digoxin for with elevated Cr    LJ (acute kidney injury)  Cr of 2.5 on admission with baseline of 1.3 prior to admission. Worsening to 3.1 8/12 with BUN of 94.  Likely prerenal given hypotension postoperatively but also could be cardiorenal given volume status. FeUrea demonstrated prerenal etiology. Hematuria likely associated with catheter insertion but could also be from LJ.        Plan:  - Monitor intake/output and daily standing weights.   - Avoid nephrotoxic agents such as NSAIDs, gadolinium and IV radiocontrast.  - Renally dose meds to current GFR.  - Maintain MAP > 65.      S/P mitral valve replacement with metallic valve  On warfarin at home. 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 Here today for PLV closure with TRACY guidance  Patient admitted to CCU for monitoring post procedure.                 VTE Risk Mitigation (From admission, onward)         Ordered     heparin 25,000 units in dextrose 5% (100 units/ml) IV bolus from bag - ADDITIONAL PRN BOLUS - 60 units/kg  As needed (PRN)        Question:  Heparin Infusion Adjustment (DO NOT MODIFY ANSWER)  Answer:  [email or web address]\epic\Images\Pharmacy\HeparinInfusions\heparin LOW INTENSITY nomogram for [facility] [id number]    08/11/22 0737     heparin 25,000 units in dextrose 5% (100 units/ml) IV bolus from bag - ADDITIONAL PRN BOLUS - 30 units/kg  As needed (PRN)        Question:  Heparin Infusion Adjustment (DO NOT MODIFY ANSWER)  Answer:  [email or web address]\epic\Images\Pharmacy\HeparinInfusions\heparin LOW INTENSITY nomogram for [facility] [id number]    08/11/22 0737     heparin 25,000 units in dextrose 5% 250 mL (100 units/mL) infusion LOW INTENSITY nomogram - [facility]  Continuous        Question Answer Comment   Heparin Infusion Adjustment (DO NOT MODIFY ANSWER) [email or web address]\epic\Images\Pharmacy\HeparinInfusions\heparin LOW INTENSITY nomogram for [facility] [id number]    Begin at (in units/kg/hr) 12        08/11/22 0737     IP VTE HIGH RISK PATIENT  Once         08/10/22 1647     Place sequential compression device  Until discontinued         08/10/22 1647                Nicole Mchugh DO  Cardiology  [facility] [address] - Cardiac Intensive Care

## 2022-08-12 NOTE — HOSPITAL COURSE
Patient admitted to CCU for management of shock in the setting of MV paravalvular leak repair completed on 8/10. Patient hypotensive post procedure and CVC placed with hemodynamics concerning for septic shock. Patient was given limited IVF and started on broad spectrum antibiotics with vancomycin and cefepime which was escalated to meropenem and azithromycin given ongoing fevers. CI/CO/SVR 4.1/8.6/553. Lactate improved from 4 down to normal. Patient sedated with propofol, fentanyl and on epinephrine and vasopressin for pressor support. Ventilated at 40% FiO2 with PEEP of 5. LJ likely ischemic ATN vs prerenal given procedure and septic shock. Patient also started on stress dose steroids. Extubated on 8/12/22 to 7 L NC.     Patient with persistent shock concerning for septic in etiology on vancomcyin and meropenem so ID consulted for evalaution. CT demosntrated no identifiable source of infection and cultures NGTD. Patient with worsening oliguria and Cr and nephrology was consulted on 8/14.  Given persistent shock and vasoplegia, will consult [facility] for further eval. Leave-in swan in place. Discussion on going with nephrology to optimize fluid filtration rate. Palliative care following.      Overnight on 08/18-08/19, patient unfortunately coded for 1 minute requiring chest compressions. ROSC achieved, patient was intubated. Pressor requirements increased significantly over the course of 12 hours. During the day shift, revisited goals of care conversation with the family and patient was transitioned to comfort care. Psychosocial support provided. Remained intubated per family request.Withdrawal of drips was performed after all family members had a chance to come and see the patient and say goodbyes. Patient passed within minutes of stopping the support medications. Exam as noted in significant event. Patient was pronounced dead at 1015.

## 2022-08-12 NOTE — ASSESSMENT & PLAN NOTE
Cr of 2.5 on admission with baseline of 1.3 prior to admission. Worsening to 3.1 8/12 with BUN of 94.  Likely prerenal given hypotension postoperatively but also could be cardiorenal given volume status. FeUrea demonstrated prerenal etiology. Hematuria likely associated with catheter insertion but could also be from LJ.        Plan:  - Monitor intake/output and daily standing weights.   - Avoid nephrotoxic agents such as NSAIDs, gadolinium and IV radiocontrast.  - Renally dose meds to current GFR.  - Maintain MAP > 65.

## 2022-08-12 NOTE — PROGRESS NOTES
[facility] - Cardiac Intensive Care  Interventional Cardiology  Progress Note    Patient Name: Yusuf Mcdonald Jr.  MRN: [id number]  Admission Date: 8/10/2022  Hospital Length of Stay: 2 days  Code Status: Full Code   Attending Physician: Mau James MD   Primary Care Physician: Primary Doctor No  Principal Problem:Septic shock    Subjective:     Interval History: No acute events overnight. Still sedated and intubated on minimal vent settings. On Epi 0.16 and Vaso 0.08. -125 mL/hr overnight, but hematuria still present. Neuro checks stable. Daughters at bedside this morning.     Objective:     Vital Signs (Most Recent):  Temp: 99.86 °F (37.7 °C) (08/12/22 0926)  Pulse: (!) 118 (08/12/22 0926)  Resp: (!) 26 (08/12/22 0926)  BP: (!) 100/58 (08/12/22 0700)  SpO2: 96 % (08/12/22 0926)   Vital Signs (24h Range):  Temp:  [98.96 °F (37.2 °C)-100.94 °F (38.3 °C)] 99.86 °F (37.7 °C)  Pulse:  [] 118  Resp:  [15-43] 26  SpO2:  [82 %-98 %] 96 %  BP: ()/(55-74) 100/58  Arterial Line BP: ()/(3-95) 122/57     Weight: 89.4 kg (197 lb)  Body mass index is 29.95 kg/m².    SpO2: 96 %  O2 Device (Oxygen Therapy): ventilator      Intake/Output Summary (Last 24 hours) at 8/12/2022 0935  Last data filed at 8/12/2022 0929  Gross per 24 hour   Intake 4862.54 ml   Output 1345 ml   Net 3517.54 ml       Lines/Drains/Airways       Central Venous Catheter Line  Duration             Percutaneous Central Line Insertion/Assessment - Triple Lumen  08/10/22 1722 right internal jugular 1 day              Drain  Duration                  NG/OG Tube 08/10/22 1700 Center mouth 1 day         Urethral Catheter 08/10/22 1718 1 day              Airway  Duration                  Airway - Non-Surgical 08/10/22 1051 1 day              Arterial Line  Duration             Arterial Line 08/10/22 1334 Right Radial 1 day              Peripheral Intravenous Line  Duration                  Peripheral IV - Single Lumen 08/10/22 0812 18 G Right  Antecubital 2 days         Peripheral IV - Single Lumen 08/10/22 0812 20 G Left;Posterior Wrist 2 days         Peripheral IV - Single Lumen 08/10/22 1700 18 G Left Other 1 day         Peripheral IV - Single Lumen 08/11/22 1641 18 G Anterior;Distal;Left Upper Arm <1 day                    Physical Exam  Constitutional:       Comments: Sedated elderly male   HENT:      Head: Normocephalic.   Cardiovascular:      Rate and Rhythm: Tachycardia present. Rhythm irregular.      Pulses:           Radial pulses are 2+ on the right side and 2+ on the left side.        Femoral pulses are 2+ on the right side and 2+ on the left side.     Heart sounds: Murmur heard.   Systolic murmur is present.      Comments: S1 mechanical click  Pulmonary:      Comments: Intubated  Abdominal:      Palpations: Abdomen is soft.   Musculoskeletal:      Cervical back: Neck supple.      Right lower leg: Edema present.      Left lower leg: Edema present.      Comments: Cool to touch   Neurological:      Comments: Sedated       Significant Labs: BMP:   Recent Labs   Lab 08/10/22  1710 08/10/22  2113 08/11/22  1528   * 249* 202*   * 133* 128*   K 3.6 4.0 3.7    101 104   CO2 20* 19* 17*   BUN 85* 86* 86*   CREATININE 2.2* 2.3* 2.5*   CALCIUM 8.6* 8.3* 6.7*   MG 2.2 2.2  --    , CMP   Recent Labs   Lab 08/10/22  1710 08/10/22  2113 08/11/22  1528   * 133* 128*   K 3.6 4.0 3.7    101 104   CO2 20* 19* 17*   * 249* 202*   BUN 85* 86* 86*   CREATININE 2.2* 2.3* 2.5*   CALCIUM 8.6* 8.3* 6.7*   PROT 3.3* 4.1* 3.4*   ALBUMIN 1.5* 1.5* 1.1*   BILITOT 0.8 1.8* 1.2*   ALKPHOS 153* 154* 101   AST 50* 75* 142*   ALT 40 35 19   ANIONGAP 11 13 7*   , CBC   Recent Labs   Lab 08/11/22  0518 08/11/22  0820 08/12/22  0753   WBC 18.22* 18.11* 24.95*  24.95*   HGB 11.4* 10.8* 9.9*  9.9*   HCT 34.2* 31.2* 28.2*  28.2*    283 226  226   , INR   Recent Labs   Lab 08/10/22  1710 08/11/22  0820 08/12/22  0321   INR 2.0* 1.8*  1.4*   , Lipid Panel No results for input(s): CHOL, HDL, LDLCALC, TRIG, CHOLHDL in the last 48 hours., and Troponin No results for input(s): TROPONINI in the last 48 hours.    Significant Imaging:   TRACY 8/10/22  · Dr. Mcpherson and Dr. Bell present during the procedure  · Successful implantation of 10 mm VSD plug after trying several ASD and VSD plugs (both arotic and transseptal access) with few embolizing to the left atrium and all subsequently retrieved with a snare via transeptal puncture with no pericardial effusion and complication of the procedure  · Mild residual MR seen at end of case. Presence of iatrogenic ASD at the end of the procedure.  · Preprocedure TRACY  · There is a bileaflet mechanical mitral valve prosthesis mean gradient 9 mmHg at  bpm, increased gradients likely result of severe MR. There is severe paravalvular insufficiency present posterir to the prior amplatz plug that appeared unstable with greater movement posteriorly.  · The left ventricle is moderately enlarged with low normal systolic function. The estimated ejection fraction is 55%.  · There is abnormal septal wall motion consistent with post-operative status.  · Moderate right ventricular enlargement with moderately to severely reduced right ventricular systolic function.  · Biatrial enlargement.    Assessment and Plan:     Patient is a 71 y.o. male presenting with:    S/P mitral valve replacement with metallic valve  Continue heparin gtt while off warfarin; transition back to warfarin with heparin bridging once extubated and able to take PO     Paravalvular leak (prosthetic valve)  Mild residual MR after 10 mm VSD plug placed on TRACY.     - Recommend diuresis for volume overload  - Hopeful for extubation later today and weaning vasopressors  - Continue heparin gtt and Plavix  - Monitor hemolysis labs daily        VTE Risk Mitigation (From admission, onward)         Ordered     heparin 25,000 units in dextrose 5% (100  units/ml) IV bolus from bag - ADDITIONAL PRN BOLUS - 60 units/kg  As needed (PRN)        Question:  Heparin Infusion Adjustment (DO NOT MODIFY ANSWER)  Answer:  [email or web address]\epic\Images\Pharmacy\HeparinInfusions\heparin LOW INTENSITY nomogram for [facility] [id number]    08/11/22 0737     heparin 25,000 units in dextrose 5% (100 units/ml) IV bolus from bag - ADDITIONAL PRN BOLUS - 30 units/kg  As needed (PRN)        Question:  Heparin Infusion Adjustment (DO NOT MODIFY ANSWER)  Answer:  [email or web address]\epic\Images\Pharmacy\HeparinInfusions\heparin LOW INTENSITY nomogram for [facility] [id number]    08/11/22 0737     heparin 25,000 units in dextrose 5% 250 mL (100 units/mL) infusion LOW INTENSITY nomogram - [facility]  Continuous        Question Answer Comment   Heparin Infusion Adjustment (DO NOT MODIFY ANSWER) \\[email or web address]\epic\Images\Pharmacy\HeparinInfusions\heparin LOW INTENSITY nomogram for [facility] [id number]    Begin at (in units/kg/hr) 12        08/11/22 0737     IP VTE HIGH RISK PATIENT  Once         08/10/22 1647     Place sequential compression device  Until discontinued         08/10/22 1647              Case discussed with Dr Carlton Tejeda MD.    Diego Tracey MD  Interventional Cardiology  [facility] - Cardiac Intensive Care

## 2022-08-12 NOTE — MEDICAL/APP STUDENT
Yusuf Mcdonald Jr.  [id number]    Hospital day Hospital Day: 3    Reason for admission: Septic shock    Interval History:   Mr. Mcdonald is a 70 yo M with PMHx severe MR, s/p MVR  and PVL revision , permanent AF s/p GEE ligation on home warfarin, HTN, HLD, NICM, admitted to CCU for management of septic shock in the setting of MV paravalvular leak repair 8/10, further complicated by pre-procedural LJ. Post procedure, patient was hypotensive MAPS in 40s. Emergent R CVC placed, revelaing high output low SVR hemodynamics, requiring 0.08 vasopressin and 0.12 epinephrine. Patient was sedated on propofol, fentanyl and precedex.  Hypothermic to 88.9, then spiked fever to 102.2. Blood cultures x2 and respiratory cultures drawn. Patient was started on vancomycin and cefepime, later escalated to meropenem and azithromycin due to ongoing fevers. Lactate improved from 4 to 2.07.  AF with RVR to 130s-140s, started on amio drip. SAT/SBT today, on BiPAP 10/5 FiO2 40%.  LJ likely ischemic ATN vs prerenal given septic shock. Patient also started on stress dose steroids.    ROS:   Unable to assess, patient sedated    Objective:  Temp:  [98.96 °F (37.2 °C)-100.04 °F (37.8 °C)] 99.68 °F (37.6 °C)  Pulse:  [] 143  Resp:  [15-26] 22  SpO2:  [93 %-99 %] 97 %  BP: ()/(58-70) 100/58  Arterial Line BP: ()/(45-59) 95/49      Current Medications:    azithromycin  500 mg Intravenous Q24H    [START ON 2022] clopidogreL  75 mg Per OG tube Daily    [START ON 2022] famotidine  20 mg Per OG tube Daily    fludrocortisone  100 mcg Oral Daily    hydrocortisone sodium succinate  100 mg Intravenous Q8H    meropenem (MERREM) IVPB  2 g Intravenous Q12H     acetaminophen, [] fentaNYL **FOLLOWED BY** fentaNYL, heparin (PORCINE), heparin (PORCINE), ondansetron, sodium chloride 0.9%, Pharmacy to dose Vancomycin consult **AND** vancomycin - pharmacy to dose    Data Review  Labs:    Lab Results   Component Value Date     WBC 24.95 (H) 08/12/2022    WBC 24.95 (H) 08/12/2022    HGB 9.9 (L) 08/12/2022    HGB 9.9 (L) 08/12/2022    HCT 25 (L) 08/12/2022    MCV 89 08/12/2022    MCV 89 08/12/2022     08/12/2022     08/12/2022     Lab Results   Component Value Date    CREATININE 3.1 (H) 08/12/2022    BUN 94 (H) 08/12/2022     (L) 08/12/2022    K 4.7 08/12/2022    CL 98 08/12/2022    CO2 19 (L) 08/12/2022       Microbiology Results (last 7 days)     Procedure Component Value Units Date/Time    Culture, Respiratory with Gram Stain [[id number]] Collected: 08/11/22 0914    Order Status: Completed Specimen: Respiratory from Tracheal Aspirate Updated: 08/12/22 0812     Respiratory Culture Normal respiratory ronny     Gram Stain (Respiratory) <10 epithelial cells per low power field.     Gram Stain (Respiratory) Rare WBC's     Gram Stain (Respiratory) No organisms seen    Blood culture [[id number]] Collected: 08/10/22 2000    Order Status: Completed Specimen: Blood from Peripheral, Upper Arm, Left Updated: 08/11/22 2212     Blood Culture, Routine No Growth to date      No Growth to date    Blood culture [[id number]] Collected: 08/10/22 2058    Order Status: Completed Specimen: Blood from Peripheral, Forearm, Left Updated: 08/11/22 2212     Blood Culture, Routine No Growth to date      No Growth to date          Physical Exam  Constitutional:       Appearance: He is ill-appearing.      Interventions: He is sedated and intubated.      Comments: Sedated, on ventilator   HENT:      Head: Normocephalic.   Eyes:      Pupils: Pupils are equal, round, and reactive to light.   Neck:      Comments: R IJ placed  Cardiovascular:      Rate and Rhythm: Tachycardia present. Rhythm irregular.      Pulses: Normal pulses.   Pulmonary:      Effort: He is intubated.      Comments: On ventilator  Abdominal:      General: Bowel sounds are normal. There is no distension.      Palpations: Abdomen is soft.   Musculoskeletal:      Cervical back: Neck  supple.      Left lower leg: Edema present.   Skin:     General: Skin is warm and dry.      Capillary Refill: Capillary refill takes less than 2 seconds.      Coloration: Skin is pale.   Neurological:      Comments: sedated         Radiology Review:   CXR 8/11/2022  ET tube, NG tube, right central venous catheter and numerous monitoring leads all noted.  Aortic valve replacement and sternal wires.  Heart is enlarged.  Persistent increased opacification bilaterally.  There is increased consolidation at the left base.  Probable layering right pleural effusion.  No pneumothorax.     Assessment:  [unfilled] is a 71 y.o. who presented for PVL repair  was admitted for septic shock, complicated by pre-procedural LJ and post-procedural AF with RVR.    Plan:    Septic Shock  post procedure, hypotensive requiring vasopressin 0.08, epinephrine 0.12. WCC increased to 23 from 10. Lactic acid peaked at 4.0. Hypothermic to 88.9, followed by temperature spike to 102.2. Started on vancomycin and cefepime, escalated to meropenem, vancomycin and azithromycin due to continuing fever.  - blood cultures x2 8/10 NGTD  - resp culture NGTD  - UA unremarkable  - afebrile since antibiotic escalation  - continue antibiotics  - MAPs >65, wean pressor requirements   - starting stress dose steroids, taper with hemodynamic improvement    Sedation  Patient was sedated on propofol, precedex. Has been weaned off propofol. Neurologically intact during SAT/SBT.  - currently on BiPAP 10/5 FiO2 40%, SpO2 97-99%  - continue to wean precedex  - continue to wean BiPAP as tolerated    AF RVR  8/11 AF RVR to 130s-140s, INR 2.0  - given amio iv 150 x1 followed by amio gtt  - currently AF RVR to 110s  - continue amio drip   - on low intensity heparin gtt  - aPTT 46.2 8/12  - holding home diltiazem 120 and metoprolol due to hemodynamics  - holding home digoxin due to LJ    Severe MR  - s/p PVL repair 8/10  - clopidogrel 75mg    LJ  - Cr 2.5 on admission,  baseline 1.3-1.6. Worsening to 3.1 8/12, BUN 94  - Likely prerenal due to hypotension postoperatively, but possibly cardiorenal given volume status.  -  cc/hour overnight, improved with trial fluid bolus 100mL/hr x 5hours  - monitor I/O, daily weights  - avoid nephrotoxic agents, renally dose all medications  - Maintain MAP >65    Hematuria  - urinalysis 8/11 with 3+blood and 18 RBCs  - Reticulocyte count 3.9, increased from 2.9  - fibrinogen 395  - haptoglobin chronically low likely from hemolysis due to mechanical valve    Malnutrition  - patient has been NPO since pre-procedure 8/10  - albumin 1.1 8/12  - start feeds po as tolerated

## 2022-08-12 NOTE — ASSESSMENT & PLAN NOTE
-Afib with RVR at 2 am, INR 2.0, rate 135-145, EKG obtained, will give amio iv 150x1 followed by gtt  On Amio gtt and holding home dilt and metoprolol in shock  holding digoxin for with elevated Cr

## 2022-08-12 NOTE — ASSESSMENT & PLAN NOTE
70 yo M with PMH MR s/p mechanical MVR 2015 in [place]) c/b PVL s/p MV plug (Dr Tejeda 2018) on Warfarin, chronic AF s/p GEE ligation, and HTN.  He has recently extended his paravalvular leak on TRACY performed in [place]. He continues to have significant fatigue, GEORGE, and leg swelling. He presented for PLV closure with TRACY guidance.  Postprocedure patient was brought to CCU where he was found to be hypotensive and epinephrine was started.      Temp initially low and later 102.2 and persistently febrile  White count 23  CI/CO high with low SVR  Likely septic shock   Blood cultures x 2 obtained and negative so far. UA unremarkable. Respiratory culture also negative  Vancomycin, meropenem and azithromycin.   On epi and vaso.   Starting stress dose steroids and will taper if improvement of hemodynamics with extubation  If not improved will consider ID consultation and repeat imaging

## 2022-08-12 NOTE — ASSESSMENT & PLAN NOTE
Cr of 2.5 on admission with baseline of 1.3 prior to admission. Likely prerenal given hypotension postoperatively but also could be cardiorenal given volume status.     Plan:  - Obtain urine electrolytes and creatine.  - Monitor intake/output and daily standing weights.   - Avoid nephrotoxic agents such as NSAIDs, gadolinium and IV radiocontrast.  - Renally dose meds to current GFR.  - Maintain MAP > 65.

## 2022-08-13 PROBLEM — D62 ACUTE BLOOD LOSS ANEMIA: Status: ACTIVE | Noted: 2022-01-01

## 2022-08-13 NOTE — ASSESSMENT & PLAN NOTE
Hgb trend 13 --> 11 -- 9.9 --> 7.9 since admit. Concern for chronic hemolytic process given mechanical valve as documented with haptoglobin and LDH altered since replacement in 2018 vs possible retroperitoneal bleed. Stable. Smear reviewed with some fragmented RBCs 5/hpf but PLTs stable and no further concerns for acute hemolytic or consumptive process.    - Type and screen q 72 hrs  - Daily cbc  - Transfuse RBC 2 unit 08/13/2022, noted decrease in pressor requirements with transfusion with Lasix 120 mg IV x 1   - CT scan abdomen without contrast 08/13/2022  - Keeping aspirin and Plavix for now considering PVL closure, heparin gtt for mechanical MV

## 2022-08-13 NOTE — PROGRESS NOTES
Pharmacokinetic Assessment Follow Up: IV Vancomycin    Vancomycin serum concentration assessment & plan:  · Vancomycin random level resulted at 21.5 mcg/ml which is above target range of 15 - 20 mcg/ml  · Level is 29 hours after dose of 1250mg  · SCr is rising from 2.5 to 3.1mg/dL  · Level decreased from 26.3 -> 21.5 mcg/ml in ~16 hours; demonstrates slow clearance  · No need to redose tonight  · Will obtain level tomorrow at 1200 to assess clearance  · Redose once level < 15 mcg/ml      Drug levels (last 3 results):  Recent Labs   Lab Result Units 08/11/22  0834 08/12/22  0321 08/12/22  1916   Vancomycin, Random ug/mL 18.4 26.3 21.5       Pharmacy will continue to follow and monitor vancomycin.    Please contact pharmacy at extension [phone] for questions regarding this assessment.    Thank you for the consult,   Enriqueta Pulido       Patient brief summary:  Yusuf Mcdonald Jr. is a 71 y.o. male initiated on antimicrobial therapy with IV Vancomycin for treatment of bacteremia    The patient's current regimen is pulse dosing    Drug Allergies:   Review of patient's allergies indicates:   Allergen Reactions    Neomycin-bacitracnzn-polymyxnb     Benzalkonium chloride Rash    Neosporin (neomycin-polymyx) Rash       Actual Body Weight:   89.4 kg    Renal Function:   Estimated Creatinine Clearance: 24.1 mL/min (A) (based on SCr of 3.1 mg/dL (H)).,       CBC (last 72 hours):  Recent Labs   Lab Result Units 08/10/22  1710 08/10/22  2113 08/11/22  0013 08/11/22  0340 08/11/22  0518 08/11/22  0820 08/12/22  0753   WBC K/uL 23.72* 22.15* 22.25* 18.74* 18.22* 18.11* 24.95*  24.95*   Hemoglobin g/dL 11.2* 11.4* 11.0* 11.3* 11.4* 10.8* 9.9*  9.9*   Hematocrit % 34.0* 34.1* 32.6* 33.8* 34.2* 31.2* 28.2*  28.2*   Platelets K/uL 312 349 328 333 285 283 226  226   Gran % % 89.5* 90.5* 88.9* 88.5* 84.7* 80.3* 81.4*  81.4*   Lymph % % 2.4* 1.9* 1.3* 1.1* 1.4* 1.8* 3.0*  3.0*   Mono % % 6.6 6.5 8.6 9.7 13.2 17.2* 13.2  13.2    Eosinophil % % 0.0 0.0 0.0 0.0 0.0 0.1 0.6  0.6   Basophil % % 0.3 0.1 0.1 0.1 0.2 0.2 0.5  0.5   Differential Method  Automated Automated Automated Automated Automated Automated Automated  Automated       Metabolic Panel (last 72 hours):  Recent Labs   Lab Result Units 08/10/22  1710 08/10/22  2113 08/11/22  0348 08/11/22  0407 08/11/22  1528 08/12/22  0632   Sodium mmol/L 133* 133*  --   --  128* 126*   Sodium, Urine mmol/L  --   --   --  16*  --   --    Potassium mmol/L 3.6 4.0  --   --  3.7 4.7   Chloride mmol/L 102 101  --   --  104 98   CO2 mmol/L 20* 19*  --   --  17* 19*   Glucose mg/dL 223* 249*  --   --  202* 134*   Glucose, UA   --   --  Negative  --   --   --    BUN mg/dL 85* 86*  --   --  86* 94*   Creatinine mg/dL 2.2* 2.3*  --   --  2.5* 3.1*   Creatinine, Urine mg/dL  --   --   --  67.0  --   --    Albumin g/dL 1.5* 1.5*  --   --  1.1* 1.3*   Total Bilirubin mg/dL 0.8 1.8*  --   --  1.2* 1.5*   Alkaline Phosphatase U/L 153* 154*  --   --  101 116   AST U/L 50* 75*  --   --  142* SEE COMMENT   ALT U/L 40 35  --   --  19 16   Magnesium mg/dL 2.2 2.2  --   --   --  2.0   Phosphorus mg/dL 5.0*  --   --   --   --  5.6*       Vancomycin Administrations:  vancomycin given in the last 96 hours                   vancomycin 1.25 g in dextrose 5% 250 mL IVPB (ready to mix) (mg) 1,250 mg New Bag 08/11/22 1416    vancomycin 1.75 g in 5 % dextrose 500 mL IVPB (mg) 1,750 mg New Bag 08/10/22 2135                Microbiologic Results:  Microbiology Results (last 7 days)     Procedure Component Value Units Date/Time    Culture, Respiratory with Gram Stain [[id number]] Collected: 08/11/22 0914    Order Status: Completed Specimen: Respiratory from Tracheal Aspirate Updated: 08/12/22 0812     Respiratory Culture Normal respiratory ronny     Gram Stain (Respiratory) <10 epithelial cells per low power field.     Gram Stain (Respiratory) Rare WBC's     Gram Stain (Respiratory) No organisms seen    Blood culture  [[id number]] Collected: 08/10/22 2000    Order Status: Completed Specimen: Blood from Peripheral, Upper Arm, Left Updated: 08/11/22 2212     Blood Culture, Routine No Growth to date      No Growth to date    Blood culture [[id number]] Collected: 08/10/22 2058    Order Status: Completed Specimen: Blood from Peripheral, Forearm, Left Updated: 08/11/22 2212     Blood Culture, Routine No Growth to date      No Growth to date

## 2022-08-13 NOTE — NURSING
cvp's overnight 12, 12, and now 16. Svo2 65, and most recent 64.  Pt with anasarca. Skin weeping. +4 pitting edema to BLE.  pts UOP only 400 ml for shift. Urine remains dark brown.   pts epi at .18 to maintain MAPS >60.  pts HR now sustaining 130's and at times reaching 140's. Dr. Wang notified. Amiodarone bolus completed and infusing at 1mg/min.    Pt remains on 7L HFNC. hgb 7.9

## 2022-08-13 NOTE — SUBJECTIVE & OBJECTIVE
Interval History: Patient was seen this morning, feels fatigued, noted drop in Hgb by 2 pts today. Urine appears dark concerning for hematuria vs hemolysis.     Review of Systems   Constitutional: Negative for chills and fever.   Cardiovascular:  Negative for chest pain, orthopnea and palpitations.   Respiratory:  Negative for cough and shortness of breath.    Gastrointestinal:  Negative for abdominal pain.   Neurological:  Negative for dizziness, headaches and light-headedness.   Psychiatric/Behavioral:  Negative for altered mental status.    Objective:     Vital Signs (Most Recent):  Temp: 99 °F (37.2 °C) (08/13/22 1200)  Pulse: (!) 131 (08/13/22 1200)  Resp: (!) 24 (08/13/22 1200)  BP: (!) 100/58 (08/12/22 0700)  SpO2: 97 % (08/13/22 1200)   Vital Signs (24h Range):  Temp:  [98.6 °F (37 °C)-100 °F (37.8 °C)] 99 °F (37.2 °C)  Pulse:  [107-143] 131  Resp:  [15-30] 24  SpO2:  [95 %-100 %] 97 %  Arterial Line BP: ()/(43-60) 85/53     Weight: 89.4 kg (197 lb)  Body mass index is 29.95 kg/m².     SpO2: 97 %  O2 Device (Oxygen Therapy): High Flow nasal Cannula      Intake/Output Summary (Last 24 hours) at 8/13/2022 1240  Last data filed at 8/13/2022 1200  Gross per 24 hour   Intake 3998 ml   Output 1005 ml   Net 2993 ml       Lines/Drains/Airways       Central Venous Catheter Line  Duration             Percutaneous Central Line Insertion/Assessment - Triple Lumen  08/10/22 1722 right internal jugular 2 days              Drain  Duration                  Urethral Catheter 08/10/22 1718 2 days              Arterial Line  Duration             Arterial Line 08/10/22 1334 Right Radial 2 days              Peripheral Intravenous Line  Duration                  Peripheral IV - Single Lumen 08/10/22 0812 20 G Left;Posterior Wrist 3 days         Peripheral IV - Single Lumen 08/10/22 1700 18 G Left Other 2 days         Peripheral IV - Single Lumen 08/11/22 1641 18 G Anterior;Distal;Left Upper Arm 1 day                     Physical Exam  Vitals and nursing note reviewed.   Constitutional:       General: He is not in acute distress.     Appearance: Normal appearance. He is ill-appearing. He is not toxic-appearing.      Comments: Pale   HENT:      Head: Normocephalic and atraumatic.      Mouth/Throat:      Mouth: Mucous membranes are moist.   Cardiovascular:      Rate and Rhythm: Tachycardia present. Rhythm irregular.      Heart sounds: Murmur (mitral valve systolic click) heard.     No friction rub. No gallop.      Comments: Telemetry afib with RVR  Pulmonary:      Effort: Pulmonary effort is normal. No respiratory distress.      Breath sounds: Normal breath sounds.   Abdominal:      Palpations: Abdomen is soft.   Musculoskeletal:      Right lower leg: No edema.      Left lower leg: No edema.   Skin:     General: Skin is warm.   Neurological:      Mental Status: He is alert and oriented to person, place, and time. Mental status is at baseline.     Significant Labs: BMP:   Recent Labs   Lab 08/11/22  1528 08/12/22  0632 08/13/22  0213   * 134* 146*   * 126* 124*   K 3.7 4.7 4.7    98 96   CO2 17* 19* 18*   BUN 86* 94* 104*   CREATININE 2.5* 3.1* 3.5*   CALCIUM 6.7* 8.0* 7.9*   MG  --  2.0 1.9   , CMP   Recent Labs   Lab 08/11/22  1528 08/12/22  0632 08/13/22  0213   * 126* 124*   K 3.7 4.7 4.7    98 96   CO2 17* 19* 18*   * 134* 146*   BUN 86* 94* 104*   CREATININE 2.5* 3.1* 3.5*   CALCIUM 6.7* 8.0* 7.9*   PROT 3.4* 4.6* 3.8*   ALBUMIN 1.1* 1.3* 1.1*   BILITOT 1.2* 1.5* 1.4*   ALKPHOS 101 116 101   * SEE COMMENT 185*   ALT 19 16 12   ANIONGAP 7* 9 10   , CBC   Recent Labs   Lab 08/12/22  0753 08/12/22  1132 08/13/22  0213   WBC 24.95*  24.95*  --  25.71*   HGB 9.9*  9.9*  --  7.9*   HCT 28.2*  28.2*   < > 22.7*     226  --  216    < > = values in this interval not displayed.   , INR   Recent Labs   Lab 08/12/22  0321 08/13/22  0213   INR 1.4* 1.2   , Lipid Panel No results  for input(s): CHOL, HDL, LDLCALC, TRIG, CHOLHDL in the last 48 hours., Troponin No results for input(s): TROPONINI in the last 48 hours., and All pertinent lab results from the last 24 hours have been reviewed.    Significant Imaging: Echocardiogram: 2D echo with color flow doppler:   Results for orders placed or performed during the hospital encounter of 07/18/18   2D Echo w/ Color Flow Doppler   Result Value Ref Range    EF + QEF 50 55 - 65    Mitral Valve Regurgitation SEVERE (A)     Aortic Valve Regurgitation MODERATE (A)     Est. PA Systolic Pressure 88.62 (A)     Tricuspid Valve Regurgitation MODERATE TO SEVERE (A)     Narrative    Date of Procedure: 07/18/2018        TEST DESCRIPTION   Technical Quality: This is a technically adequate study.     General: The patient was in an irregularly irregular rhythm throughout the study.     Aorta: The aortic root is normal in size, measuring 3.3 cm at sinotubular junction and 4.2 cm at Sinuses of Valsalva. The proximal ascending aorta is mildly enlarged, measuring 4.0 cm across.     Left Atrium: The left atrial volume index is severely enlarged, measuring 164.64 cc/m2.     Left Ventricle: The left ventricle is normal in size, with an end-diastolic diameter of 5.4 cm, and an end-systolic diameter of 3.7 cm. Wall thickness is upper limit of normal in size, with the septum and the posterior wall each measuring 1.1 cm across.   Relative wall thickness was normal at 0.41, and the LV mass index was increased at 129.9 g/m2 consistent with eccentric left ventricular hypertrophy. There are no regional wall motion abnormalities. Left ventricular systolic function appears low normal   to mildly depressed. Visually estimated ejection fraction is 50-55%. The LV Doppler derived stroke volume equals 63.0 ccs.         Right Atrium: The right atrium is enlarged, measuring 7.6 cm in length and 5.3 cm in width in the apical view.     Right Ventricle: The right ventricle is enlarged  measuring 5.9 cm at the base in the apical right ventricle-focused view. Global right ventricular systolic function appears moderately depressed. There is abnormal septal motion consistent with RV pressure   overload. Tricuspid annular plane systolic excursion (TAPSE) is 1.5 cm. Tissue Doppler-derived tricuspid annular peak systolic velocity (S prime) is 9.3 cm/s. The estimated PA systolic pressure is 89 mmHg.     Aortic Valve:  The aortic valve is mildly sclerotic. Additionally, there is moderate aortic regurgitation. There is a pressure half time of 550 msec.     Mitral Valve:  There is a mechanical prosthesis present in the mitral position. The mean gradient obtained across the mitral valve is 8 mmHg. There is severe mitral regurgitation. Mitral prosthesis is partially dehisced and exhibits rocking motion.    There is large PVL located katherin-laterally.      Tricuspid Valve:  The tricuspid valve is normal in structure. There is moderate to severe tricuspid regurgitation.     Pulmonary Valve:  The pulmonic valve is normal in structure. There is moderate pulmonic regurgitation.     IVC: IVC is enlarged and collapses < 50% with a sniff, suggesting high right atrial pressure of 15 mmHg.     Intracavitary: There is no evidence of pericardial effusion, intracavity mass, thrombi, or vegetation.     Other: If clinically indicated, a TRACY can be performed.         CONCLUSIONS     1 - Low normal to mildly depressed left ventricular systolic function (EF 50-55%).     2 - Right ventricular enlargement with moderately depressed systolic function.     3 - Mildly enlarged ascending aorta.     4 - Biatrial enlargement.     5 - No wall motion abnormalities.     6 - Pulmonary hypertension. The estimated PA systolic pressure is 89 mmHg.     7 - Moderate aortic regurgitation.     8 - Bileaflet Mitral valve prosthesis.     9 - Severe paravalvular mitral regurgitation.     10 - Moderate to severe tricuspid regurgitation.     11 -  Moderate pulmonic regurgitation.     12 - Increased central venous pressure.     13 - If clinically indicated, a TRACY can be performed.             This document has been electronically    SIGNED BY: Azeb Mcpherson MD On: 07/24/2018 15:23    This document was originally electronically signed on: 07/18/2018 16:17    This document was last amended on: 07/24/2018 15:22    and Transthoracic echo (TTE) complete (Cupid Only):   Results for orders placed or performed during the hospital encounter of 11/13/18   Transthoracic echo (TTE) complete   Result Value Ref Range    Ascending aorta 4.20 cm    STJ 4.00 cm    AV mean gradient 4.43 mmHg    Ao peak rohan 1.57 m/s    Ao VTI 24.12 cm    IVS 1.12 (A) 0.6 - 1.1 cm    LA size 6.43 cm    Left Atrium Major Axis 6.76 cm    Left Atrium Minor Axis 6.98 cm    LVIDd 6.03 (A) 3.5 - 6.0 cm    LVIDs 4.53 (A) 2.1 - 4.0 cm    LVOT diameter 2.72 cm    LVOT peak VTI 13.05 cm    Posterior Wall 1.30 (A) 0.6 - 1.1 cm    RA Major Axis 5.90 cm    RA Width 4.16 cm    RVDD 4.43 cm    Sinus 4.14 cm    TAPSE 1.15 cm    TR Max Rohan 4.30 m/s    TDI LATERAL 0.09     TDI SEPTAL 0.09     LA WIDTH 5.98 cm    LV Diastolic Volume 182.29 mL    LV Systolic Volume 94.08 mL    RV S' 7.77 m/s    FS 25 %    LA volume 224.48 cm3    LV mass 320.17 g    Left Ventricle Relative Wall Thickness 0.43 cm    AV valve area 3.14 cm2    Mean e' 0.09     LVOT area 5.81 cm2    LVOT stroke volume 75.79 cm3    AV peak gradient 9.86 mmHg    Triscuspid Valve Regurgitation Peak Gradient 73.96 mmHg    BSA 2.12 m2    LV Systolic Volume Index 44.4 mL/m2    LV Diastolic Volume Index 85.99 mL/m2    LA Volume Index 105.9 mL/m2    LV Mass Index 151.0 g/m2    Right Atrial Pressure (from IVC) 15 mmHg    TV rest pulmonary artery pressure 88.96 mmHg    Narrative    · The left ventricle cavity is mildly dilated.  · Left ventricle ejection fraction is mildly decreased at 45%  · Left atrium is severely dilated.  · Right atrium is moderately  dilated.  · There is a mechanical mitral valve prosthesis. presence of amplatzer   plug to close Pravalvular leak. There is mild to moderate MR seen, but   given severe pulmonary HTN suspect there is greater MR that is seen   (limited by shadowing, consider TRACY if clinically indicated). MV VTI/LVOT   VTI> 3.5 is also suggestive of significant regurgitation.  · Mild-to-moderate mitral regurgitation- possibly paravalvular.  · Moderate aortic regurgitation.  · Mild tricuspid regurgitation.  · Moderate stenosis tricuspid stenosis.  · No pericardial effusion.  · Elevated central venous pressure (15 mm Hg).  · The estimated PA systolic pressure is 88.96 mm Hg  · Pulmonary hypertension present.  · Right ventricular cavity size is mildly dilated.  · Left ventricular diastolic dysfunction.  · RV systolic function is low normal.  · Pulmonic valve shows trace regurgitation.

## 2022-08-13 NOTE — ASSESSMENT & PLAN NOTE
72 yo M with PMH MR s/p mechanical MVR 2015 in [place]) c/b PVL s/p MV plug (Dr Tejeda 2018) on Warfarin, chronic AF s/p GEE ligation, and HTN.  He has recently extended his paravalvular leak on TRACY performed in [place]. He continues to have significant fatigue, GEORGE, and leg swelling. He presented for PLV closure with TRACY guidance.  Postprocedure patient was brought to CCU where he was found to be hypotensive and epinephrine was started.      Temp initially low and later 102.2 and persistently febrile  White count 23  CI/CO high with low SVR  Concern for septic shock vs hypovolemic shock from blood loss anemia  Blood cultures x 2 obtained and negative so far. UA unremarkable. Respiratory culture also negative  Vancomycin, meropenem and azithromycin.   On epi and vaso.   Starting stress dose steroids and will taper if improvement of hemodynamics with extubation  If not improved will consider ID consultation and repeat imaging

## 2022-08-13 NOTE — PROGRESS NOTES
Pharmacokinetic Assessment Follow Up: IV Vancomycin    Vancomycin serum concentration assessment & plan:  · Vancomycin random level resulted at 20 mcg/ml which is above target range of 15 - 20 mcg/ml  · Level is 44 hours post dose of 1250mg  · SCr continues to rise  · No need to redose tonight  · Will obtain follow up concentration Monday with AM labs or sooner if RF improves  · Redose once level < 15 mcg/ml      Drug levels (last 3 results):  Recent Labs   Lab Result Units 08/12/22  0321 08/12/22  1916 08/13/22  1019   Vancomycin, Random ug/mL 26.3 21.5 20.0       Pharmacy will continue to follow and monitor vancomycin.    Thank you for the consult,   Nuris Bennett, PharmD, BCPS  Ext. [phone]       Patient brief summary:  Yusuf Mcdonald Jr. is a 71 y.o. male initiated on antimicrobial therapy with IV Vancomycin for treatment of bacteremia    The patient's current regimen is pulse dosing    Drug Allergies:   Review of patient's allergies indicates:   Allergen Reactions    Neomycin-bacitracnzn-polymyxnb     Benzalkonium chloride Rash    Neosporin (neomycin-polymyx) Rash       Actual Body Weight:   89.4 kg    Renal Function:   Estimated Creatinine Clearance: 21.3 mL/min (A) (based on SCr of 3.5 mg/dL (H)).,       CBC (last 72 hours):  Recent Labs   Lab Result Units 08/10/22  1710 08/10/22  2113 08/11/22  0013 08/11/22  0340 08/11/22  0518 08/11/22  0820 08/12/22  0753 08/13/22  0213   WBC K/uL 23.72* 22.15* 22.25* 18.74* 18.22* 18.11* 24.95*  24.95* 25.71*   Hemoglobin g/dL 11.2* 11.4* 11.0* 11.3* 11.4* 10.8* 9.9*  9.9* 7.9*   Hematocrit % 34.0* 34.1* 32.6* 33.8* 34.2* 31.2* 28.2*  28.2* 22.7*   Platelets K/uL 312 349 328 333 285 283 226  226 216   Gran % % 89.5* 90.5* 88.9* 88.5* 84.7* 80.3* 81.4*  81.4* 87.0*   Lymph % % 2.4* 1.9* 1.3* 1.1* 1.4* 1.8* 3.0*  3.0* 1.4*   Mono % % 6.6 6.5 8.6 9.7 13.2 17.2* 13.2  13.2 8.2   Eosinophil % % 0.0 0.0 0.0 0.0 0.0 0.1 0.6  0.6 0.1   Basophil % % 0.3 0.1 0.1 0.1 0.2  0.2 0.5  0.5 0.2   Differential Method  Automated Automated Automated Automated Automated Automated Automated  Automated Automated       Metabolic Panel (last 72 hours):  Recent Labs   Lab Result Units 08/10/22  1710 08/10/22  2113 08/11/22  0348 08/11/22  0407 08/11/22  1528 08/12/22  0632 08/13/22  0213   Sodium mmol/L 133* 133*  --   --  128* 126* 124*   Sodium, Urine mmol/L  --   --   --  16*  --   --   --    Potassium mmol/L 3.6 4.0  --   --  3.7 4.7 4.7   Chloride mmol/L 102 101  --   --  104 98 96   CO2 mmol/L 20* 19*  --   --  17* 19* 18*   Glucose mg/dL 223* 249*  --   --  202* 134* 146*   Glucose, UA   --   --  Negative  --   --   --   --    BUN mg/dL 85* 86*  --   --  86* 94* 104*   Creatinine mg/dL 2.2* 2.3*  --   --  2.5* 3.1* 3.5*   Creatinine, Urine mg/dL  --   --   --  67.0  --   --   --    Albumin g/dL 1.5* 1.5*  --   --  1.1* 1.3* 1.1*   Total Bilirubin mg/dL 0.8 1.8*  --   --  1.2* 1.5* 1.4*   Alkaline Phosphatase U/L 153* 154*  --   --  101 116 101   AST U/L 50* 75*  --   --  142* SEE COMMENT 185*   ALT U/L 40 35  --   --  19 16 12   Magnesium mg/dL 2.2 2.2  --   --   --  2.0 1.9   Phosphorus mg/dL 5.0*  --   --   --   --  5.6* 5.7*       Vancomycin Administrations:  vancomycin given in the last 96 hours                   vancomycin 1.25 g in dextrose 5% 250 mL IVPB (ready to mix) (mg) 1,250 mg New Bag 08/11/22 1416    vancomycin 1.75 g in 5 % dextrose 500 mL IVPB (mg) 1,750 mg New Bag 08/10/22 2135                Microbiologic Results:  Microbiology Results (last 7 days)     Procedure Component Value Units Date/Time    Culture, Respiratory with Gram Stain [[id number]] Collected: 08/11/22 0914    Order Status: Completed Specimen: Respiratory from Tracheal Aspirate Updated: 08/13/22 1223     Respiratory Culture Normal respiratory ronny      No S aureus or Pseudomonas isolated.     Gram Stain (Respiratory) <10 epithelial cells per low power field.     Gram Stain (Respiratory) Rare WBC's     Gram  Stain (Respiratory) No organisms seen    Urine culture [[id number]]     Order Status: Completed Specimen: Urine, Catheterized     Blood culture [[id number]] Collected: 08/10/22 2000    Order Status: Completed Specimen: Blood from Peripheral, Upper Arm, Left Updated: 08/12/22 2212     Blood Culture, Routine No Growth to date      No Growth to date      No Growth to date    Blood culture [[id number]] Collected: 08/10/22 2058    Order Status: Completed Specimen: Blood from Peripheral, Forearm, Left Updated: 08/12/22 2212     Blood Culture, Routine No Growth to date      No Growth to date      No Growth to date

## 2022-08-13 NOTE — CARE UPDATE
Care Update Note    Hemodynamics   MAP:68  SVO2: 65  CVP: 12  CO: 7.6  CI: 3.7  SVR: 600      Cole Wang MD  Cardiology Fellow, PGY4

## 2022-08-13 NOTE — PROGRESS NOTES
[facility]  Cardiology  Progress Note    Patient Name: Yusuf Mcdonald Jr.  MRN: [id number]  Admission Date: 8/10/2022  Hospital Length of Stay: 3 days  Code Status: Full Code   Attending Physician: Mau James MD   Primary Care Physician: Primary Doctor No  Expected Discharge Date: 8/19/2022  Principal Problem:Acute blood loss anemia    Subjective:     Hospital Course:   Patient admitted to CCU for management of shock in the setting of MV paravalvular leak repair completed on 8/10. Patient hypotensive post procedure and CVC placed with hemodynamics concerning for septic shock. Patient was given limited IVF and started on broad spectrum antibiotics with vancomycin and cefepime which was escalated to meropenem and azithromycin given ongoing fevers. CI/CO/SVR 4.1/8.6/553. Lactate improved from 4 down to normal. Patient sedated with propofol, fentanyl and on epinephrine and vasopressin for pressor support. Ventilated at 40% FiO2 with PEEP of 5. LJ likely ischemic ATN vs prerenal given procedure and septic shock. Patient also started on stress dose steroids. Plan for SBT/SAT and possible extubation on 8/12/22      Interval History: Patient was seen this morning, feels fatigued, noted drop in Hgb by 2 pts today. Urine appears dark concerning for hematuria vs hemolysis.     Review of Systems   Constitutional: Negative for chills and fever.   Cardiovascular:  Negative for chest pain, orthopnea and palpitations.   Respiratory:  Negative for cough and shortness of breath.    Gastrointestinal:  Negative for abdominal pain.   Neurological:  Negative for dizziness, headaches and light-headedness.   Psychiatric/Behavioral:  Negative for altered mental status.    Objective:     Vital Signs (Most Recent):  Temp: 99 °F (37.2 °C) (08/13/22 1200)  Pulse: (!) 131 (08/13/22 1200)  Resp: (!) 24 (08/13/22 1200)  BP: (!) 100/58 (08/12/22 0700)  SpO2: 97 % (08/13/22 1200)   Vital Signs (24h Range):  Temp:  [98.6  °F (37 °C)-100 °F (37.8 °C)] 99 °F (37.2 °C)  Pulse:  [107-143] 131  Resp:  [15-30] 24  SpO2:  [95 %-100 %] 97 %  Arterial Line BP: ()/(43-60) 85/53     Weight: 89.4 kg (197 lb)  Body mass index is 29.95 kg/m².     SpO2: 97 %  O2 Device (Oxygen Therapy): High Flow nasal Cannula      Intake/Output Summary (Last 24 hours) at 8/13/2022 1240  Last data filed at 8/13/2022 1200  Gross per 24 hour   Intake 3998 ml   Output 1005 ml   Net 2993 ml       Lines/Drains/Airways       Central Venous Catheter Line  Duration             Percutaneous Central Line Insertion/Assessment - Triple Lumen  08/10/22 1722 right internal jugular 2 days              Drain  Duration                  Urethral Catheter 08/10/22 1718 2 days              Arterial Line  Duration             Arterial Line 08/10/22 1334 Right Radial 2 days              Peripheral Intravenous Line  Duration                  Peripheral IV - Single Lumen 08/10/22 0812 20 G Left;Posterior Wrist 3 days         Peripheral IV - Single Lumen 08/10/22 1700 18 G Left Other 2 days         Peripheral IV - Single Lumen 08/11/22 1641 18 G Anterior;Distal;Left Upper Arm 1 day                    Physical Exam  Vitals and nursing note reviewed.   Constitutional:       General: He is not in acute distress.     Appearance: Normal appearance. He is ill-appearing. He is not toxic-appearing.      Comments: Pale   HENT:      Head: Normocephalic and atraumatic.      Mouth/Throat:      Mouth: Mucous membranes are moist.   Cardiovascular:      Rate and Rhythm: Tachycardia present. Rhythm irregular.      Heart sounds: Murmur (mitral valve systolic click) heard.     No friction rub. No gallop.      Comments: Telemetry afib with RVR  Pulmonary:      Effort: Pulmonary effort is normal. No respiratory distress.      Breath sounds: Normal breath sounds.   Abdominal:      Palpations: Abdomen is soft.   Musculoskeletal:      Right lower leg: No edema.      Left lower leg: No edema.   Skin:      General: Skin is warm.   Neurological:      Mental Status: He is alert and oriented to person, place, and time. Mental status is at baseline.     Significant Labs: BMP:   Recent Labs   Lab 08/11/22  1528 08/12/22  0632 08/13/22  0213   * 134* 146*   * 126* 124*   K 3.7 4.7 4.7    98 96   CO2 17* 19* 18*   BUN 86* 94* 104*   CREATININE 2.5* 3.1* 3.5*   CALCIUM 6.7* 8.0* 7.9*   MG  --  2.0 1.9   , CMP   Recent Labs   Lab 08/11/22  1528 08/12/22  0632 08/13/22  0213   * 126* 124*   K 3.7 4.7 4.7    98 96   CO2 17* 19* 18*   * 134* 146*   BUN 86* 94* 104*   CREATININE 2.5* 3.1* 3.5*   CALCIUM 6.7* 8.0* 7.9*   PROT 3.4* 4.6* 3.8*   ALBUMIN 1.1* 1.3* 1.1*   BILITOT 1.2* 1.5* 1.4*   ALKPHOS 101 116 101   * SEE COMMENT 185*   ALT 19 16 12   ANIONGAP 7* 9 10   , CBC   Recent Labs   Lab 08/12/22  0753 08/12/22  1132 08/13/22  0213   WBC 24.95*  24.95*  --  25.71*   HGB 9.9*  9.9*  --  7.9*   HCT 28.2*  28.2*   < > 22.7*     226  --  216    < > = values in this interval not displayed.   , INR   Recent Labs   Lab 08/12/22  0321 08/13/22  0213   INR 1.4* 1.2   , Lipid Panel No results for input(s): CHOL, HDL, LDLCALC, TRIG, CHOLHDL in the last 48 hours., Troponin No results for input(s): TROPONINI in the last 48 hours., and All pertinent lab results from the last 24 hours have been reviewed.    Significant Imaging: Echocardiogram: 2D echo with color flow doppler:   Results for orders placed or performed during the hospital encounter of 07/18/18   2D Echo w/ Color Flow Doppler   Result Value Ref Range    EF + QEF 50 55 - 65    Mitral Valve Regurgitation SEVERE (A)     Aortic Valve Regurgitation MODERATE (A)     Est. PA Systolic Pressure 88.62 (A)     Tricuspid Valve Regurgitation MODERATE TO SEVERE (A)     Narrative    Date of Procedure: 07/18/2018        TEST DESCRIPTION   Technical Quality: This is a technically adequate study.     General: The patient was in an  irregularly irregular rhythm throughout the study.     Aorta: The aortic root is normal in size, measuring 3.3 cm at sinotubular junction and 4.2 cm at Sinuses of Valsalva. The proximal ascending aorta is mildly enlarged, measuring 4.0 cm across.     Left Atrium: The left atrial volume index is severely enlarged, measuring 164.64 cc/m2.     Left Ventricle: The left ventricle is normal in size, with an end-diastolic diameter of 5.4 cm, and an end-systolic diameter of 3.7 cm. Wall thickness is upper limit of normal in size, with the septum and the posterior wall each measuring 1.1 cm across.   Relative wall thickness was normal at 0.41, and the LV mass index was increased at 129.9 g/m2 consistent with eccentric left ventricular hypertrophy. There are no regional wall motion abnormalities. Left ventricular systolic function appears low normal   to mildly depressed. Visually estimated ejection fraction is 50-55%. The LV Doppler derived stroke volume equals 63.0 ccs.         Right Atrium: The right atrium is enlarged, measuring 7.6 cm in length and 5.3 cm in width in the apical view.     Right Ventricle: The right ventricle is enlarged measuring 5.9 cm at the base in the apical right ventricle-focused view. Global right ventricular systolic function appears moderately depressed. There is abnormal septal motion consistent with RV pressure   overload. Tricuspid annular plane systolic excursion (TAPSE) is 1.5 cm. Tissue Doppler-derived tricuspid annular peak systolic velocity (S prime) is 9.3 cm/s. The estimated PA systolic pressure is 89 mmHg.     Aortic Valve:  The aortic valve is mildly sclerotic. Additionally, there is moderate aortic regurgitation. There is a pressure half time of 550 msec.     Mitral Valve:  There is a mechanical prosthesis present in the mitral position. The mean gradient obtained across the mitral valve is 8 mmHg. There is severe mitral regurgitation. Mitral prosthesis is partially dehisced and  exhibits rocking motion.    There is large PVL located katherin-laterally.      Tricuspid Valve:  The tricuspid valve is normal in structure. There is moderate to severe tricuspid regurgitation.     Pulmonary Valve:  The pulmonic valve is normal in structure. There is moderate pulmonic regurgitation.     IVC: IVC is enlarged and collapses < 50% with a sniff, suggesting high right atrial pressure of 15 mmHg.     Intracavitary: There is no evidence of pericardial effusion, intracavity mass, thrombi, or vegetation.     Other: If clinically indicated, a TRACY can be performed.         CONCLUSIONS     1 - Low normal to mildly depressed left ventricular systolic function (EF 50-55%).     2 - Right ventricular enlargement with moderately depressed systolic function.     3 - Mildly enlarged ascending aorta.     4 - Biatrial enlargement.     5 - No wall motion abnormalities.     6 - Pulmonary hypertension. The estimated PA systolic pressure is 89 mmHg.     7 - Moderate aortic regurgitation.     8 - Bileaflet Mitral valve prosthesis.     9 - Severe paravalvular mitral regurgitation.     10 - Moderate to severe tricuspid regurgitation.     11 - Moderate pulmonic regurgitation.     12 - Increased central venous pressure.     13 - If clinically indicated, a TRACY can be performed.             This document has been electronically    SIGNED BY: Azeb Mcpherson MD On: 07/24/2018 15:23    This document was originally electronically signed on: 07/18/2018 16:17    This document was last amended on: 07/24/2018 15:22    and Transthoracic echo (TTE) complete (Cupid Only):   Results for orders placed or performed during the hospital encounter of 11/13/18   Transthoracic echo (TTE) complete   Result Value Ref Range    Ascending aorta 4.20 cm    STJ 4.00 cm    AV mean gradient 4.43 mmHg    Ao peak kelin 1.57 m/s    Ao VTI 24.12 cm    IVS 1.12 (A) 0.6 - 1.1 cm    LA size 6.43 cm    Left Atrium Major Axis 6.76 cm    Left Atrium Minor Axis 6.98 cm     LVIDd 6.03 (A) 3.5 - 6.0 cm    LVIDs 4.53 (A) 2.1 - 4.0 cm    LVOT diameter 2.72 cm    LVOT peak VTI 13.05 cm    Posterior Wall 1.30 (A) 0.6 - 1.1 cm    RA Major Axis 5.90 cm    RA Width 4.16 cm    RVDD 4.43 cm    Sinus 4.14 cm    TAPSE 1.15 cm    TR Max Rohan 4.30 m/s    TDI LATERAL 0.09     TDI SEPTAL 0.09     LA WIDTH 5.98 cm    LV Diastolic Volume 182.29 mL    LV Systolic Volume 94.08 mL    RV S' 7.77 m/s    FS 25 %    LA volume 224.48 cm3    LV mass 320.17 g    Left Ventricle Relative Wall Thickness 0.43 cm    AV valve area 3.14 cm2    Mean e' 0.09     LVOT area 5.81 cm2    LVOT stroke volume 75.79 cm3    AV peak gradient 9.86 mmHg    Triscuspid Valve Regurgitation Peak Gradient 73.96 mmHg    BSA 2.12 m2    LV Systolic Volume Index 44.4 mL/m2    LV Diastolic Volume Index 85.99 mL/m2    LA Volume Index 105.9 mL/m2    LV Mass Index 151.0 g/m2    Right Atrial Pressure (from IVC) 15 mmHg    TV rest pulmonary artery pressure 88.96 mmHg    Narrative    · The left ventricle cavity is mildly dilated.  · Left ventricle ejection fraction is mildly decreased at 45%  · Left atrium is severely dilated.  · Right atrium is moderately dilated.  · There is a mechanical mitral valve prosthesis. presence of amplatzer   plug to close Pravalvular leak. There is mild to moderate MR seen, but   given severe pulmonary HTN suspect there is greater MR that is seen   (limited by shadowing, consider TRACY if clinically indicated). MV VTI/LVOT   VTI> 3.5 is also suggestive of significant regurgitation.  · Mild-to-moderate mitral regurgitation- possibly paravalvular.  · Moderate aortic regurgitation.  · Mild tricuspid regurgitation.  · Moderate stenosis tricuspid stenosis.  · No pericardial effusion.  · Elevated central venous pressure (15 mm Hg).  · The estimated PA systolic pressure is 88.96 mm Hg  · Pulmonary hypertension present.  · Right ventricular cavity size is mildly dilated.  · Left ventricular diastolic dysfunction.  · RV systolic  function is low normal.  · Pulmonic valve shows trace regurgitation.        Assessment and Plan:     * Acute blood loss anemia  Hgb trend 13 --> 11 -- 9.9 --> 7.9 since admit. Concern for chronic hemolytic process given mechanical valve as documented with haptoglobin and LDH altered since replacement in 2018 vs possible retroperitoneal bleed. Stable. Smear reviewed with some fragmented RBCs 5/hpf but PLTs stable and no further concerns for acute hemolytic or consumptive process.    - Type and screen q 72 hrs  - Daily cbc  - Transfuse RBC 2 unit 08/13/2022, noted decrease in pressor requirements with transfusion with Lasix 120 mg IV x 1   - CT scan abdomen without contrast 08/13/2022  - Keeping aspirin and Plavix for now considering PVL closure, heparin gtt for mechanical MV    Septic shock  70 yo M with PMH MR s/p mechanical MVR 2015 in [place]) c/b PVL s/p MV plug (Dr Tejeda 2018) on Warfarin, chronic AF s/p GEE ligation, and HTN.  He has recently extended his paravalvular leak on TRACY performed in [place]. He continues to have significant fatigue, GEORGE, and leg swelling. He presented for PLV closure with TRACY guidance.  Postprocedure patient was brought to CCU where he was found to be hypotensive and epinephrine was started.      Temp initially low and later 102.2 and persistently febrile  White count 23  CI/CO high with low SVR  Concern for septic shock vs hypovolemic shock from blood loss anemia  Blood cultures x 2 obtained and negative so far. UA unremarkable. Respiratory culture also negative  Vancomycin, meropenem and azithromycin.   On epi and vaso.   Starting stress dose steroids and will taper if improvement of hemodynamics with extubation  If not improved will consider ID consultation and repeat imaging    Atrial fibrillation with RVR  -Afib with RVR at 2 am, INR 2.0, rate 135-145, EKG obtained, will give amio iv 150x1 followed by gtt  On Amio gtt and holding home dilt and metoprolol in shock  holding  digoxin for with elevated Cr    Paravalvular leak (prosthetic valve)  Patient is a 70 yo M with chronic AF on warfarin, pHTN, HLD and mitral valve regurgitation s/p mechanical MVR 2015 s/p MAZE c/b PVL s/p MV plug in 2018 and now with extension of his paravalvular leak on TRACY. Here for PVL closure with TRACY guidance.  Patient admitted to CCU for monitoring post procedure.   - Follow up TTE limited 08/13/2022    Chronic a-fib  Currently afib with RVR    LJ (acute kidney injury)  Baseline of 1.3 prior to admission.  Trend 1.3 --> 2.5 --> 3.1 --> 3.5.   Likely prerenal azotemia considering BUN/Cr 20:1 and concern for acute blood loss anemia.     Plan:  - Monitor intake/output and daily standing weights.   - Avoid nephrotoxic agents such as NSAIDs, gadolinium and IV radiocontrast.  - Renally dose meds to current GFR.  - Maintain MAP > 65.      S/P mitral valve replacement with metallic valve  On warfarin at home. Transitioned to heparin gtt and will bridge once stable    Severe mitral regurgitation  S/p mechanical MVR 2015 and paravalvular leak repair 2018 and 2022    Shock  See septic shock    Gross hematuria  Hematuria likely from ischemic nephropathy  UA ordered, wctm      VTE Risk Mitigation (From admission, onward)         Ordered     heparin 25,000 units in dextrose 5% (100 units/ml) IV bolus from bag - ADDITIONAL PRN BOLUS - 60 units/kg  As needed (PRN)        Question:  Heparin Infusion Adjustment (DO NOT MODIFY ANSWER)  Answer:  [email or web address]\[employer]\Images\Pharmacy\HeparinInfusions\heparin LOW INTENSITY nomogram for [facility] [id number]    08/11/22 0737     heparin 25,000 units in dextrose 5% (100 units/ml) IV bolus from bag - ADDITIONAL PRN BOLUS - 30 units/kg  As needed (PRN)        Question:  Heparin Infusion Adjustment (DO NOT MODIFY ANSWER)  Answer:  \[email or web address]\epic\Images\Pharmacy\HeparinInfusions\heparin LOW INTENSITY nomogram for [facility] [id number]    08/11/22 0737     heparin 25,000 units in dextrose 5% 250 mL  (100 units/mL) infusion LOW INTENSITY nomogram - [facility]  Continuous        Question Answer Comment   Heparin Infusion Adjustment (DO NOT MODIFY ANSWER) \\[email or web address]\epic\Images\Pharmacy\HeparinInfusions\heparin LOW INTENSITY nomogram for [facility] [id number]    Begin at (in units/kg/hr) 12        08/11/22 0737     IP VTE HIGH RISK PATIENT  Once         08/10/22 1647     Place sequential compression device  Until discontinued         08/10/22 1647              Plan of care was discussed with staff, Dr James.     Doron Chavez MD  Cardiology PGY5  [facility]

## 2022-08-13 NOTE — ASSESSMENT & PLAN NOTE
Patient is a 70 yo M with chronic AF on warfarin, pHTN, HLD and mitral valve regurgitation s/p mechanical MVR 2015 s/p MAZE c/b PVL s/p MV plug in 2018 and now with extension of his paravalvular leak on TRACY. Here for PVL closure with TRACY guidance.  Patient admitted to CCU for monitoring post procedure.   - Follow up TTE limited 08/13/2022

## 2022-08-13 NOTE — ASSESSMENT & PLAN NOTE
Baseline of 1.3 prior to admission.  Trend 1.3 --> 2.5 --> 3.1 --> 3.5.   Likely prerenal azotemia considering BUN/Cr 20:1 and concern for acute blood loss anemia.     Plan:  - Monitor intake/output and daily standing weights.   - Avoid nephrotoxic agents such as NSAIDs, gadolinium and IV radiocontrast.  - Renally dose meds to current GFR.  - Maintain MAP > 65.

## 2022-08-14 NOTE — NURSING
Dr. Pelaez at the bedside, SVO2 (69) and CVP(16) was taken with him around. Urine output from 2003-1075 was 20cc (tea colored). Lasix (120mg) was given in day shift.    Latest HGB was 9.7 after 2 units of PRBC.    CT scan result was seen by Dr. Pelaez and informed the patient and relatives that no bleeding was seen in the scan.    MD informed the family that he will call the attending for the update. Will continue to monitor.

## 2022-08-14 NOTE — PLAN OF CARE
CICU DAILY GOALS       A: Awake    RASS: Goal - RASS Goal: 0-->alert and calm  Actual - RASS (Peace Agitation-Sedation Scale): 0-->alert and calm   Restraint necessity: Clinical Justification: Removing medical devices  B: Breath   SBT: NA   C: Coordinate A & B, analgesics/sedatives   Pain: no issues    SAT: NA  D: Delirium   CAM-ICU: Overall CAM-ICU: Negative  E: Early(intubated/ Progressive (non-intubated) Mobility   MOVE Screen: Fail   Activity: Activity Management: Rolling - L1  FAS: Feeding/Nutrition   Diet order: Diet/Nutrition Received: 2 gram sodium, low saturated fat/low cholesterol, Specialty Diet/Nutrition Received: renal diet Fluid restriction:    T: Thrombus   DVT prophylaxis: VTE Required Core Measure: Pharmacological prophylaxis initiated/maintained  H: HOB Elevation   Head of Bed (HOB) Positioning: HOB at 20 degrees  U: Ulcer Prophylaxis   GI: yes  G: Glucose control   managed Glycemic Management: blood glucose monitored  S: Skin   Bundle compliance: yes   Bathing/Skin Care: bath, complete, dressed/undressed, incontinence care, linen changed Date: Day bath  B: Bowel Function   no issues   I: Indwelling Catheters   Quiroz necessity:      Urethral Catheter 08/10/22 1718-Reason for Continuing Urinary Catheterization: Critically ill in ICU and requiring hourly monitoring of intake/output   CVC necessity: Yes   IPAD offered: No  D: De-escalation Antibx   No  Plan for the day   Continue to monitor hemodynamics. Possible HD due to worsening kidney function.  Family/Goals of care/Code Status   Code Status: Full Code     No acute events throughout day, VS and assessment per flow sheet, plan of care reviewed with Yusuf Mcdonald Jr. and family, all concerns addressed, will continue to monitor.

## 2022-08-14 NOTE — ASSESSMENT & PLAN NOTE
Hematuria continued since admission initially thought from traumatic insertion. Possible underlying coagulopathy. Imaging unrevealing so far    Will likely need urology consultation

## 2022-08-14 NOTE — HPI
71 year old male with a history of mitral valve regurgitation s/p mechanical MVR in 2015 complicated by prosthetic valve leak s/p MV plug in 2018 on Warfarin, chronic AF s/p GEE ligation, and HTN.  He has continued to have significant fatigue, GEORGE and lower extremity swelling.  He was admitted for TRACY and attempted valve repair.  Post procedure, he was hypotensive and initially hypothermic.  He was admitted to the ICU where he started having fevers and was noted to have leukocytosis.  Broad spectrum antibiotics were started and cultures obtained.  He was initially on azithromycin, cefepime and vancomycin.  His antibiotics were later modified to azithromycin and meropenem.

## 2022-08-14 NOTE — ASSESSMENT & PLAN NOTE
72 yo M with PMH MR s/p mechanical MVR 2015 in [place]) c/b PVL s/p MV plug (Dr Tejeda 2018) on Warfarin, chronic AF s/p GEE ligation, and HTN.  He has recently extended his paravalvular leak on TRACY performed in [place]. He continues to have significant fatigue, GEORGE, and leg swelling. He presented for PLV closure with TRACY guidance.  Postprocedure patient was brought to CCU where he was found to be hypotensive and epinephrine was started.      CT demonstated no RP bleed or any fluid collections concerning for source of continued hypotension    Temp initially low and later 102.2 and persistently febrile  White count 23  CI/CO high with low SVR  Concern for septic shock vs hypovolemic shock from blood loss anemia  Blood cultures x 2 obtained and negative so far. UA unremarkable. Respiratory culture also negative  Vancomycin, meropenem and azithromycin.   On epi and vaso.   stress dose steroids and will taper if improvement of hemodynamics with extubation   ID consultation

## 2022-08-14 NOTE — ASSESSMENT & PLAN NOTE
Patient has oliguric LJ on pressor support in the setting of shock s/p paravalvular mitral valve leak repair on 8/10. Patient has had limited urine output despite administration of IV lasix with worsening BUN/Cr from baseline sCr 1.3 to a peak 114/4.4 today.    He is net positive 15L since admission, requiring supplemental O2 and vasopressin and epinephrine to maintain MAP goals. Evidence of gross hematuria with dark red/brown urine output. Urinary catheter in place with no evidence of obstruction.    Urine sodium 16  Urine urea 450  Urine creatinine 67    Recommendations:  -initiate SLED  -monitor BUN/Cr  -monitor I/Os  -avoid nephrotoxic medications

## 2022-08-14 NOTE — SUBJECTIVE & OBJECTIVE
Interval History: Patient given increasing doses of lasix with minimal response so nephrology consulted. ID also consulted given persistent shock despite IV abx.     Review of Systems   Constitutional: Negative for chills and fever.   Cardiovascular:  Negative for chest pain, orthopnea and palpitations.   Respiratory:  Negative for cough and shortness of breath.    Gastrointestinal:  Negative for abdominal pain.   Neurological:  Negative for dizziness, headaches and light-headedness.   Psychiatric/Behavioral:  Negative for altered mental status.    Objective:     Vital Signs (Most Recent):  Temp: 98.8 °F (37.1 °C) (08/14/22 0700)  Pulse: (!) 114 (08/14/22 1100)  Resp: (!) 30 (08/14/22 1100)  BP: 96/65 (08/13/22 1910)  SpO2: 95 % (08/14/22 1100)   Vital Signs (24h Range):  Temp:  [97.8 °F (36.6 °C)-99 °F (37.2 °C)] 98.8 °F (37.1 °C)  Pulse:  [109-132] 114  Resp:  [20-32] 30  SpO2:  [90 %-97 %] 95 %  BP: (96)/(65) 96/65  Arterial Line BP: ()/(45-60) 102/59     Weight: 89.4 kg (197 lb)  Body mass index is 29.95 kg/m².     SpO2: 95 %  O2 Device (Oxygen Therapy): High Flow nasal Cannula      Intake/Output Summary (Last 24 hours) at 8/14/2022 1150  Last data filed at 8/14/2022 0800  Gross per 24 hour   Intake 2746.17 ml   Output 365 ml   Net 2381.17 ml         Lines/Drains/Airways       Central Venous Catheter Line  Duration             Percutaneous Central Line Insertion/Assessment - Triple Lumen  08/10/22 1722 right internal jugular 3 days              Drain  Duration                  Urethral Catheter 08/10/22 1718 3 days              Arterial Line  Duration             Arterial Line 08/10/22 1334 Right Radial 3 days              Peripheral Intravenous Line  Duration                  Peripheral IV - Single Lumen 08/10/22 0812 20 G Left;Posterior Wrist 4 days         Peripheral IV - Single Lumen 08/11/22 1641 18 G Anterior;Distal;Left Upper Arm 2 days                    Physical Exam  Vitals and nursing note  reviewed.   Constitutional:       General: He is not in acute distress.     Appearance: Normal appearance. He is ill-appearing. He is not toxic-appearing.      Comments: Pale   HENT:      Head: Normocephalic and atraumatic.      Mouth/Throat:      Mouth: Mucous membranes are moist.   Cardiovascular:      Rate and Rhythm: Tachycardia present. Rhythm irregular.      Heart sounds: Murmur (mitral valve systolic click) heard.     No friction rub. No gallop.      Comments: Telemetry afib with RVR  Pulmonary:      Effort: Pulmonary effort is normal. No respiratory distress.      Breath sounds: Normal breath sounds.   Abdominal:      Palpations: Abdomen is soft.   Musculoskeletal:      Right lower leg: No edema.      Left lower leg: No edema.   Skin:     General: Skin is warm.   Neurological:      Mental Status: He is alert and oriented to person, place, and time. Mental status is at baseline.     Significant Labs: BMP:   Recent Labs   Lab 08/13/22 0213 08/13/22 2237 08/14/22  0401   * 147* 145*   * 123* 124*   K 4.7 4.5 4.6   CL 96 94* 93*   CO2 18* 15* 18*   * 116* 114*   CREATININE 3.5* 4.2* 4.4*   CALCIUM 7.9* 8.2* 8.1*   MG 1.9 2.3 2.3     , CMP   Recent Labs   Lab 08/13/22 0213 08/13/22 2237 08/14/22  0401   * 123* 124*   K 4.7 4.5 4.6   CL 96 94* 93*   CO2 18* 15* 18*   * 147* 145*   * 116* 114*   CREATININE 3.5* 4.2* 4.4*   CALCIUM 7.9* 8.2* 8.1*   PROT 3.8*  --  3.8*   ALBUMIN 1.1*  --  1.2*   BILITOT 1.4*  --  1.6*   ALKPHOS 101  --  100   *  --  180*   ALT 12  --  11   ANIONGAP 10 14 13     , CBC   Recent Labs   Lab 08/13/22 0213 08/13/22 1942 08/14/22  0401   WBC 25.71* 28.03* 27.34*   HGB 7.9* 9.7* 9.7*   HCT 22.7* 27.3* 27.3*    191 189     , INR   Recent Labs   Lab 08/13/22  0213 08/14/22  0401   INR 1.2 1.1     , Lipid Panel No results for input(s): CHOL, HDL, LDLCALC, TRIG, CHOLHDL in the last 48 hours., Troponin No results for input(s): TROPONINI  in the last 48 hours., and All pertinent lab results from the last 24 hours have been reviewed.    Significant Imaging: Echocardiogram: 2D echo with color flow doppler:   Results for orders placed or performed during the hospital encounter of 07/18/18   2D Echo w/ Color Flow Doppler   Result Value Ref Range    EF + QEF 50 55 - 65    Mitral Valve Regurgitation SEVERE (A)     Aortic Valve Regurgitation MODERATE (A)     Est. PA Systolic Pressure 88.62 (A)     Tricuspid Valve Regurgitation MODERATE TO SEVERE (A)     Narrative    Date of Procedure: 07/18/2018        TEST DESCRIPTION   Technical Quality: This is a technically adequate study.     General: The patient was in an irregularly irregular rhythm throughout the study.     Aorta: The aortic root is normal in size, measuring 3.3 cm at sinotubular junction and 4.2 cm at Sinuses of Valsalva. The proximal ascending aorta is mildly enlarged, measuring 4.0 cm across.     Left Atrium: The left atrial volume index is severely enlarged, measuring 164.64 cc/m2.     Left Ventricle: The left ventricle is normal in size, with an end-diastolic diameter of 5.4 cm, and an end-systolic diameter of 3.7 cm. Wall thickness is upper limit of normal in size, with the septum and the posterior wall each measuring 1.1 cm across.   Relative wall thickness was normal at 0.41, and the LV mass index was increased at 129.9 g/m2 consistent with eccentric left ventricular hypertrophy. There are no regional wall motion abnormalities. Left ventricular systolic function appears low normal   to mildly depressed. Visually estimated ejection fraction is 50-55%. The LV Doppler derived stroke volume equals 63.0 ccs.         Right Atrium: The right atrium is enlarged, measuring 7.6 cm in length and 5.3 cm in width in the apical view.     Right Ventricle: The right ventricle is enlarged measuring 5.9 cm at the base in the apical right ventricle-focused view. Global right ventricular systolic function  appears moderately depressed. There is abnormal septal motion consistent with RV pressure   overload. Tricuspid annular plane systolic excursion (TAPSE) is 1.5 cm. Tissue Doppler-derived tricuspid annular peak systolic velocity (S prime) is 9.3 cm/s. The estimated PA systolic pressure is 89 mmHg.     Aortic Valve:  The aortic valve is mildly sclerotic. Additionally, there is moderate aortic regurgitation. There is a pressure half time of 550 msec.     Mitral Valve:  There is a mechanical prosthesis present in the mitral position. The mean gradient obtained across the mitral valve is 8 mmHg. There is severe mitral regurgitation. Mitral prosthesis is partially dehisced and exhibits rocking motion.    There is large PVL located katherin-laterally.      Tricuspid Valve:  The tricuspid valve is normal in structure. There is moderate to severe tricuspid regurgitation.     Pulmonary Valve:  The pulmonic valve is normal in structure. There is moderate pulmonic regurgitation.     IVC: IVC is enlarged and collapses < 50% with a sniff, suggesting high right atrial pressure of 15 mmHg.     Intracavitary: There is no evidence of pericardial effusion, intracavity mass, thrombi, or vegetation.     Other: If clinically indicated, a TRACY can be performed.         CONCLUSIONS     1 - Low normal to mildly depressed left ventricular systolic function (EF 50-55%).     2 - Right ventricular enlargement with moderately depressed systolic function.     3 - Mildly enlarged ascending aorta.     4 - Biatrial enlargement.     5 - No wall motion abnormalities.     6 - Pulmonary hypertension. The estimated PA systolic pressure is 89 mmHg.     7 - Moderate aortic regurgitation.     8 - Bileaflet Mitral valve prosthesis.     9 - Severe paravalvular mitral regurgitation.     10 - Moderate to severe tricuspid regurgitation.     11 - Moderate pulmonic regurgitation.     12 - Increased central venous pressure.     13 - If clinically indicated, a TRACY can  be performed.             This document has been electronically    SIGNED BY: Azeb Mcpherson MD On: 07/24/2018 15:23    This document was originally electronically signed on: 07/18/2018 16:17    This document was last amended on: 07/24/2018 15:22    and Transthoracic echo (TTE) complete (Cupid Only):   Results for orders placed or performed during the hospital encounter of 11/13/18   Transthoracic echo (TTE) complete   Result Value Ref Range    Ascending aorta 4.20 cm    STJ 4.00 cm    AV mean gradient 4.43 mmHg    Ao peak rohan 1.57 m/s    Ao VTI 24.12 cm    IVS 1.12 (A) 0.6 - 1.1 cm    LA size 6.43 cm    Left Atrium Major Axis 6.76 cm    Left Atrium Minor Axis 6.98 cm    LVIDd 6.03 (A) 3.5 - 6.0 cm    LVIDs 4.53 (A) 2.1 - 4.0 cm    LVOT diameter 2.72 cm    LVOT peak VTI 13.05 cm    Posterior Wall 1.30 (A) 0.6 - 1.1 cm    RA Major Axis 5.90 cm    RA Width 4.16 cm    RVDD 4.43 cm    Sinus 4.14 cm    TAPSE 1.15 cm    TR Max Rohan 4.30 m/s    TDI LATERAL 0.09     TDI SEPTAL 0.09     LA WIDTH 5.98 cm    LV Diastolic Volume 182.29 mL    LV Systolic Volume 94.08 mL    RV S' 7.77 m/s    FS 25 %    LA volume 224.48 cm3    LV mass 320.17 g    Left Ventricle Relative Wall Thickness 0.43 cm    AV valve area 3.14 cm2    Mean e' 0.09     LVOT area 5.81 cm2    LVOT stroke volume 75.79 cm3    AV peak gradient 9.86 mmHg    Triscuspid Valve Regurgitation Peak Gradient 73.96 mmHg    BSA 2.12 m2    LV Systolic Volume Index 44.4 mL/m2    LV Diastolic Volume Index 85.99 mL/m2    LA Volume Index 105.9 mL/m2    LV Mass Index 151.0 g/m2    Right Atrial Pressure (from IVC) 15 mmHg    TV rest pulmonary artery pressure 88.96 mmHg    Narrative    · The left ventricle cavity is mildly dilated.  · Left ventricle ejection fraction is mildly decreased at 45%  · Left atrium is severely dilated.  · Right atrium is moderately dilated.  · There is a mechanical mitral valve prosthesis. presence of amplatzer   plug to close Pravalvular leak. There is  mild to moderate MR seen, but   given severe pulmonary HTN suspect there is greater MR that is seen   (limited by shadowing, consider TRACY if clinically indicated). MV VTI/LVOT   VTI> 3.5 is also suggestive of significant regurgitation.  · Mild-to-moderate mitral regurgitation- possibly paravalvular.  · Moderate aortic regurgitation.  · Mild tricuspid regurgitation.  · Moderate stenosis tricuspid stenosis.  · No pericardial effusion.  · Elevated central venous pressure (15 mm Hg).  · The estimated PA systolic pressure is 88.96 mm Hg  · Pulmonary hypertension present.  · Right ventricular cavity size is mildly dilated.  · Left ventricular diastolic dysfunction.  · RV systolic function is low normal.  · Pulmonic valve shows trace regurgitation.

## 2022-08-14 NOTE — CONSULTS
[facility] - Card[facility] Intensive [facility]  Nephrology  Consult Note    Patient Name: Yusuf Mcdonald Jr.  MRN: [id number]  Admission Date: 8/10/2022  Hospital Length of Stay: 4 days  Attending Provider: Mau James MD   Primary Care Physician: Primary Doctor No  Principal Problem:Acute blood loss anemia    Inpatient consult to Nephrology  Consult performed by: Shala Kitchen MD  Consult ordered by: Hudson Mchugh DO        Subjective:     HPI: Ms. Mcdonald is a 71 year old woman with a history of chronic AF (on warfarin), pHTN, HLD, s/p MAZE and MV plug (2015) who was admitted for a mitral valve paravalvular leak repair on 8/10. Patient was hypotensive in septic shock post-procedure requiring pressor support with vasopressin, epinephrine. Patient was started vanc and cefepime, then transitioned to meropenem and azithromycin for continued fevers. He has had worsening renal function since admission on 8/9. Baseline sCr is 1.3. On admission sCr 2.4, which worsened to 3.1 on 8/12 with a peak BUN/Cr of 114/4.4 today. He received IV lasix yesterday with 400mL UOP that appears dark brown in color. He is net positive 15L since admission.    Nephrology consulted regarding acute renal failure in the setting of shock.      Past Medical History:   Diagnosis Date    A-fib     Anticoagulant long-term use     Hypertension        Past Surgical History:   Procedure Laterality Date    CARDIOVERSION      MITRAL VALVE REPLACEMENT  2015    mechanical    MITRAL VALVE REPLACEMENT Right 10/10/2018    Procedure: REPLACEMENT, MITRAL VALVE;  Surgeon: Yusuf Tejeda MD;  Location: [facility] CATH LAB;  Service: Cardiology;  Laterality: Right;    TRANSESOPHAGEAL ECHOCARDIOGRAPHY N/A 8/10/2022    Procedure: ECHOCARDIOGRAM, TRANSESOPHAGEAL;  Surgeon: Alexus Bell MD;  Location: [facility] CATH LAB;  Service: Cardiology;  Laterality: N/A;       Review of patient's allergies indicates:   Allergen Reactions    Neomycin-bacitracnzn-polymyxnb      Benzalkonium chloride Rash    Neosporin (neomycin-polymyx) Rash     Current Facility-Administered Medications   Medication Frequency    0.9%  NaCl infusion (for blood administration) Q24H PRN    0.9%  NaCl infusion Continuous    0.9%  NaCl infusion Continuous    acetaminophen tablet 650 mg Q4H PRN    amiodarone 360 mg/200 mL (1.8 mg/mL) infusion Continuous    cetirizine tablet 10 mg QHS    clopidogreL tablet 75 mg Daily    dextromethorphan-guaiFENesin  mg per 12 hr tablet 1 tablet BID    EPINEPHrine 5 mg in dextrose 5% 250 mL infusion (premix) Continuous    famotidine tablet 20 mg Daily    fentaNYL 50 mcg/mL injection 50 mcg Q1H PRN    fludrocortisone tablet 100 mcg Daily    heparin 25,000 units in dextrose 5% (100 units/ml) IV bolus from bag - ADDITIONAL PRN BOLUS - 30 units/kg PRN    heparin 25,000 units in dextrose 5% (100 units/ml) IV bolus from bag - ADDITIONAL PRN BOLUS - 60 units/kg PRN    heparin 25,000 units in dextrose 5% 250 mL (100 units/mL) infusion LOW INTENSITY nomogram - [facility] Continuous    hydrocortisone sodium succinate injection 100 mg Q8H    meropenem (MERREM) 2 g in sodium chloride 0.9% 100 mL IVPB Q12H    ondansetron disintegrating tablet 8 mg Q8H PRN    quetiapine split tablet 12.5 mg QHS    quetiapine split tablet 12.5 mg Nightly PRN    sodium chloride 0.9% flush 10 mL PRN    vancomycin - pharmacy to dose pharmacy to manage frequency    vasopressin (PITRESSIN) 0.2 Units/mL in dextrose 5 % 100 mL infusion Continuous     Family History    None       Tobacco Use    Smoking status: Never Smoker    Smokeless tobacco: Never Used   Substance and Sexual Activity    Alcohol use: Yes     Alcohol/week: 2.0 standard drinks     Types: 1 Glasses of wine, 1 Shots of liquor per week     Comment: occ.    Drug use: No    Sexual activity: Not on file     Review of Systems   HENT:  Negative for congestion and sore throat.    Respiratory:  Negative for shortness of breath.     Cardiovascular:  Positive for leg swelling. Negative for chest pain.   Gastrointestinal:  Negative for abdominal pain, nausea and vomiting.   Genitourinary:  Positive for decreased urine volume. Negative for dysuria and flank pain.   Musculoskeletal:  Negative for arthralgias and myalgias.   Psychiatric/Behavioral:  Negative for agitation. The patient is not nervous/anxious.    Objective:     Vital Signs (Most Recent):  Temp: 98.8 °F (37.1 °C) (08/14/22 0700)  Pulse: (!) 112 (08/14/22 1200)  Resp: (!) 28 (08/14/22 1200)  BP: 96/65 (08/13/22 1910)  SpO2: 96 % (08/14/22 1200)  O2 Device (Oxygen Therapy): High Flow nasal Cannula (08/14/22 1200)   Vital Signs (24h Range):  Temp:  [97.8 °F (36.6 °C)-98.8 °F (37.1 °C)] 98.8 °F (37.1 °C)  Pulse:  [109-132] 112  Resp:  [20-32] 28  SpO2:  [90 %-97 %] 96 %  BP: (96)/(65) 96/65  Arterial Line BP: ()/(45-60) 102/58     Weight: 89.4 kg (197 lb) (08/10/22 0814)  Body mass index is 29.95 kg/m².  Body surface area is 2.07 meters squared.    I/O last 3 completed shifts:  In: 4815.9 [P.O.:340; I.V.:3167.5; Blood:600; IV Piggyback:708.4]  Out: 925 [Urine:925]    Physical Exam  Vitals reviewed.   Constitutional:       Appearance: He is ill-appearing.   HENT:      Head: Normocephalic and atraumatic.   Eyes:      Extraocular Movements: Extraocular movements intact.      Conjunctiva/sclera: Conjunctivae normal.   Cardiovascular:      Rate and Rhythm: Tachycardia present. Rhythm irregular.   Pulmonary:      Effort: Pulmonary effort is normal.      Breath sounds: Normal breath sounds.   Abdominal:      General: Bowel sounds are normal. There is no distension.      Palpations: Abdomen is soft.      Tenderness: There is no abdominal tenderness.   Genitourinary:     Comments: Urinary catheter in place with dark brown urine output  Musculoskeletal:      Right lower leg: Edema present.      Left lower leg: Edema present.   Skin:     General: Skin is warm.      Coloration: Skin is pale.    Neurological:      Mental Status: He is alert and oriented to person, place, and time. Mental status is at baseline.       Significant Labs:  CMP:   Recent Labs   Lab 08/14/22  0401   *   CALCIUM 8.1*   ALBUMIN 1.2*   PROT 3.8*   *   K 4.6   CO2 18*   CL 93*   *   CREATININE 4.4*   ALKPHOS 100   ALT 11   *   BILITOT 1.6*       Significant Imaging:  Labs: Reviewed    Assessment/Plan:     LJ (acute kidney injury)  Patient has oliguric LJ on pressor support in the setting of shock s/p paravalvular mitral valve leak repair on 8/10. Patient has had limited urine output despite administration of IV lasix with worsening BUN/Cr from baseline sCr 1.3 to a peak 114/4.4 today.    He is net positive 15L since admission, requiring supplemental O2 and vasopressin and epinephrine to maintain MAP goals. Evidence of gross hematuria with dark red/brown urine output. Urinary catheter in place with no evidence of obstruction.    Urine sodium 16  Urine urea 450  Urine creatinine 67    Recommendations:  -initiate SLED  -monitor BUN/Cr  -monitor I/Os  -avoid nephrotoxic medications        Thank you for your consult. I will follow-up with patient. Please contact us if you have any additional questions.    Shala Kitchen MD  Nephrology  [facility]

## 2022-08-14 NOTE — ASSESSMENT & PLAN NOTE
Hgb trend 13 --> 11 -- 9.9 --> 7.9 since admit. Concern for chronic hemolytic process given mechanical valve as documented with haptoglobin and LDH altered since replacement in 2018 vs possible retroperitoneal bleed. Stable. Smear reviewed with some fragmented RBCs 5/hpf but PLTs stable and no further concerns for acute hemolytic or consumptive process. CT demonstrated no RP bleed.     - Type and screen q 72 hrs  - Daily cbc  - Transfuse RBC 2 unit 08/13/2022, noted decrease in pressor requirements with transfusion with Lasix 120 mg IV x 1   - Keeping aspirin and Plavix for now considering PVL closure, heparin gtt for mechanical MV

## 2022-08-14 NOTE — NURSING
Updated Dr. Pelaez that patient still did not respond to 200mg lasix IV bolus that was given earlier. Patient just voided 65cc tea colored after giving the said med. Will continue to monitor.

## 2022-08-14 NOTE — HPI
Ms. Mcdonald is a 71 year old woman with a history of chronic AF (on warfarin), pHTN, HLD, s/p MAZE and MV plug (2015) who was admitted for a mitral valve paravalvular leak repair on 8/10. Patient was hypotensive in septic shock post-procedure requiring pressor support with vasopressin, epinephrine. Patient was started vanc and cefepime, then transitioned to meropenem and azithromycin for continued fevers. He has had worsening renal function since admission on 8/9. Baseline sCr is 1.3. On admission sCr 2.4, which worsened to 3.1 on 8/12 with a peak BUN/Cr of 114/4.4 today. He received IV lasix yesterday with 400mL UOP that appears dark brown in color. He is net positive 15L since admission.    Nephrology consulted regarding acute renal failure in the setting of shock.

## 2022-08-14 NOTE — SUBJECTIVE & OBJECTIVE
Past Medical History:   Diagnosis Date    A-fib     Anticoagulant long-term use     Hypertension        Past Surgical History:   Procedure Laterality Date    CARDIOVERSION      MITRAL VALVE REPLACEMENT  2015    mechanical    MITRAL VALVE REPLACEMENT Right 10/10/2018    Procedure: REPLACEMENT, MITRAL VALVE;  Surgeon: Yusuf Tejeda MD;  Location: [facility] CATH LAB;  Service: Cardiology;  Laterality: Right;    TRANSESOPHAGEAL ECHOCARDIOGRAPHY N/A 8/10/2022    Procedure: ECHOCARDIOGRAM, TRANSESOPHAGEAL;  Surgeon: Alexus Bell MD;  Location: [facility] CATH LAB;  Service: Cardiology;  Laterality: N/A;       Review of patient's allergies indicates:   Allergen Reactions    Neomycin-bacitracnzn-polymyxnb     Benzalkonium chloride Rash    Neosporin (neomycin-polymyx) Rash       Medications:  Medications Prior to Admission   Medication Sig    acetaminophen (TYLENOL) 500 MG tablet Take 500 mg by mouth.    cetirizine (ZYRTEC) 10 MG tablet Take 10 mg by mouth.    clopidogreL (PLAVIX) 75 mg tablet TAKE ONE TABLET BY MOUTH EVERY DAY    diltiaZEM (CARDIZEM CD) 120 MG Cp24 Take 120 mg by mouth 2 (two) times a day.    metoprolol succinate (TOPROL-XL) 50 MG 24 hr tablet     torsemide (DEMADEX) 20 MG Tab Take 20 mg by mouth 2 (two) times daily.    coenzyme Q10 100 mg capsule Take 100 mg by mouth.    digoxin (LANOXIN) 250 mcg tablet     fish,bora,flax oils-om3,6,9no1 1,200 mg Cap Take 1,200 mg by mouth.    fluticasone (FLONASE) 50 mcg/actuation nasal spray 50 Bottles by Nasal route.    JANTOVEN 5 mg tablet     lisinopril (PRINIVIL,ZESTRIL) 20 MG tablet Take 20 mg by mouth.    multivitamin capsule Take by mouth.    warfarin (COUMADIN) 5 MG tablet Take 5 mg by mouth once daily.    [DISCONTINUED] furosemide (LASIX) 40 MG tablet     [DISCONTINUED] potassium chloride SA (K-DUR,KLOR-CON) 20 MEQ tablet Take 1 tablet (20 mEq total) by mouth once daily.     Antibiotics (From admission, onward)                Start     Stop Route Frequency Ordered  "   08/11/22 1230  meropenem (MERREM) 2 g in sodium chloride 0.9% 100 mL IVPB         -- IV Every 12 hours (non-standard times) 08/11/22 1121    08/10/22 2029  vancomycin - pharmacy to dose  (vancomycin IVPB)        "And" Linked Group Details    -- IV pharmacy to manage frequency 08/10/22 1930          Antifungals (From admission, onward)                None          Antivirals (From admission, onward)      None             Immunization History   Administered Date(s) Administered    COVID-19, MRNA, LN-S, PF (MODERNA FULL 0.5 ML DOSE) 02/11/2021, 03/15/2021    Influenza - Trivalent (ADULT) 10/13/2014       Family History    None       Social History     Socioeconomic History    Marital status: Single   Tobacco Use    Smoking status: Never Smoker    Smokeless tobacco: Never Used   Substance and Sexual Activity    Alcohol use: Yes     Alcohol/week: 2.0 standard drinks     Types: 1 Glasses of wine, 1 Shots of liquor per week     Comment: occ.    Drug use: No     Review of Systems   All other systems reviewed and are negative.  Objective:     Vital Signs (Most Recent):  Temp: 98.8 °F (37.1 °C) (08/14/22 0700)  Pulse: (!) 114 (08/14/22 1100)  Resp: (!) 30 (08/14/22 1100)  BP: 96/65 (08/13/22 1910)  SpO2: 95 % (08/14/22 1100)   Vital Signs (24h Range):  Temp:  [97.8 °F (36.6 °C)-99 °F (37.2 °C)] 98.8 °F (37.1 °C)  Pulse:  [109-132] 114  Resp:  [20-32] 30  SpO2:  [90 %-97 %] 95 %  BP: (96)/(65) 96/65  Arterial Line BP: ()/(45-60) 102/59     Weight: 89.4 kg (197 lb)  Body mass index is 29.95 kg/m².    Estimated Creatinine Clearance: 17 mL/min (A) (based on SCr of 4.4 mg/dL (H)).    Physical Exam  Vitals and nursing note reviewed.   Constitutional:       General: He is not in acute distress.     Appearance: He is well-developed. He is not diaphoretic.   HENT:      Head: Normocephalic and atraumatic.      Right Ear: External ear normal.      Left Ear: External ear normal.      Nose: Nose normal.      Mouth/Throat:      " Pharynx: No oropharyngeal exudate.   Eyes:      General: No scleral icterus.        Right eye: No discharge.         Left eye: No discharge.      Conjunctiva/sclera: Conjunctivae normal.      Pupils: Pupils are equal, round, and reactive to light.   Neck:      Thyroid: No thyromegaly.      Vascular: No JVD.      Trachea: No tracheal deviation.   Cardiovascular:      Rate and Rhythm: Normal rate and regular rhythm.      Heart sounds: No murmur heard.    No friction rub. No gallop.   Pulmonary:      Effort: Pulmonary effort is normal. No respiratory distress.      Breath sounds: Normal breath sounds. No stridor. No wheezing or rales.   Chest:      Chest wall: No tenderness.   Abdominal:      General: Bowel sounds are normal. There is no distension.      Palpations: Abdomen is soft. There is no mass.      Tenderness: There is no abdominal tenderness. There is no guarding or rebound.   Musculoskeletal:         General: No tenderness. Normal range of motion.      Cervical back: Normal range of motion and neck supple.   Lymphadenopathy:      Cervical: No cervical adenopathy.   Skin:     General: Skin is warm.      Coloration: Skin is not pale.      Findings: No erythema or rash.   Neurological:      Mental Status: He is alert and oriented to person, place, and time.      Cranial Nerves: No cranial nerve deficit.      Motor: No abnormal muscle tone.      Coordination: Coordination normal.      Deep Tendon Reflexes: Reflexes are normal and symmetric. Reflexes normal.   Psychiatric:         Behavior: Behavior normal.         Thought Content: Thought content normal.         Judgment: Judgment normal.       Significant Labs:   Microbiology Results (last 7 days)       Procedure Component Value Units Date/Time    Culture, Respiratory with Gram Stain [[id number]]     Order Status: No result Specimen: Respiratory     Blood culture [[id number]] Collected: 08/10/22 2000    Order Status: Completed Specimen: Blood from Peripheral, Upper  Arm, Left Updated: 08/13/22 2212     Blood Culture, Routine No Growth to date      No Growth to date      No Growth to date      No Growth to date    Blood culture [[id number]] Collected: 08/10/22 2058    Order Status: Completed Specimen: Blood from Peripheral, Forearm, Left Updated: 08/13/22 2212     Blood Culture, Routine No Growth to date      No Growth to date      No Growth to date      No Growth to date    Culture, Respiratory with Gram Stain [[id number]] Collected: 08/11/22 0914    Order Status: Completed Specimen: Respiratory from Tracheal Aspirate Updated: 08/13/22 1223     Respiratory Culture Normal respiratory ronny      No S aureus or Pseudomonas isolated.     Gram Stain (Respiratory) <10 epithelial cells per low power field.     Gram Stain (Respiratory) Rare WBC's     Gram Stain (Respiratory) No organisms seen    Urine culture [[id number]]     Order Status: Completed Specimen: Urine, Catheterized             Significant Imaging: I have reviewed all pertinent imaging results/findings within the past 24 hours.

## 2022-08-14 NOTE — ASSESSMENT & PLAN NOTE
71 year old male with a history of mitral valve replacement complicated by a leak.  He is admitted to the hospital after undergoing a procedure to repair the valve.  He has remained on pressors and with leukocytosis during his hospital stay.  Cardiology concerned that his shock is not completely cardiogenic.  All cultures to date have been negative.  He has been on empiric vancomycin, meropenem and azithromycin.  Per patient and nursing staff, he has been having diarrhea.  Differential includes infection following his procedure with skin organisms like staph/strep vs C diff colitis given his ongoing diarrhea.    Plan    1.  Okay to continue meropenem.    2. Consider stopping azithromycin as this can cause diarrhea and in some cases can be associated with cardiac arrhythmia.    3. Ongoing acute kidney injury.  Discontinue vancomycin.  Start daptomycin 8mg per kg every 48 hours.    4. Send stool for C diff.      5. Repeat blood cultures.    6. Will follow up on results of respiratory cultures.

## 2022-08-14 NOTE — PROGRESS NOTES
Pharmacokinetic Assessment Follow Up: IV Vancomycin    Vancomycin serum concentration assessment & plan:  · Vancomycin random concentration resulted yesterday at 20 mcg/ml which is within target range of 15 - 20 mcg/ml; concentration was drawn 44 hours post dose of 1250mg  · SCr continues to rise, Nephrology on board and recommend CRRT now  · Vancomycin 500 mg IV x 1 in anticipation of additional clearance with SLED  · Will obtain follow up concentration Monday with AM labs; dose if random < 20 mcg/mL    Drug levels (last 3 results):  Recent Labs   Lab Result Units 08/12/22  0321 08/12/22  1916 08/13/22  1019   Vancomycin, Random ug/mL 26.3 21.5 20.0       Pharmacy will continue to follow and monitor vancomycin.    Thank you for the consult,   Nuris Bennett, PharmD, BCPS  Ext. [phone]       Patient brief summary:  Yusuf Mcdonald Jr. is a 71 y.o. male initiated on antimicrobial therapy with IV Vancomycin for treatment of bacteremia    The patient's current regimen is pulse dosing    Drug Allergies:   Review of patient's allergies indicates:   Allergen Reactions    Neomycin-bacitracnzn-polymyxnb     Benzalkonium chloride Rash    Neosporin (neomycin-polymyx) Rash       Actual Body Weight:   89.4 kg    Renal Function:   Estimated Creatinine Clearance: 17 mL/min (A) (based on SCr of 4.4 mg/dL (H)).,       CBC (last 72 hours):  Recent Labs   Lab Result Units 08/12/22  0753 08/13/22  0213 08/13/22  1942 08/14/22  0401   WBC K/uL 24.95*  24.95* 25.71* 28.03* 27.34*   Hemoglobin g/dL 9.9*  9.9* 7.9* 9.7* 9.7*   Hematocrit % 28.2*  28.2* 22.7* 27.3* 27.3*   Platelets K/uL 226  226 216 191 189   Gran % % 81.4*  81.4* 87.0* 85.6* 85.1*   Lymph % % 3.0*  3.0* 1.4* 1.3* 1.2*   Mono % % 13.2  13.2 8.2 8.6 9.1   Eosinophil % % 0.6  0.6 0.1 0.0 0.0   Basophil % % 0.5  0.5 0.2 0.3 0.3   Differential Method  Automated  Automated Automated Automated Automated       Metabolic Panel (last 72 hours):  Recent Labs   Lab Result  Units 08/11/22  1528 08/12/22  0632 08/13/22  0213 08/13/22  2237 08/14/22  0401   Sodium mmol/L 128* 126* 124* 123* 124*   Potassium mmol/L 3.7 4.7 4.7 4.5 4.6   Chloride mmol/L 104 98 96 94* 93*   CO2 mmol/L 17* 19* 18* 15* 18*   Glucose mg/dL 202* 134* 146* 147* 145*   BUN mg/dL 86* 94* 104* 116* 114*   Creatinine mg/dL 2.5* 3.1* 3.5* 4.2* 4.4*   Albumin g/dL 1.1* 1.3* 1.1*  --  1.2*   Total Bilirubin mg/dL 1.2* 1.5* 1.4*  --  1.6*   Alkaline Phosphatase U/L 101 116 101  --  100   AST U/L 142* SEE COMMENT 185*  --  180*   ALT U/L 19 16 12  --  11   Magnesium mg/dL  --  2.0 1.9 2.3 2.3   Phosphorus mg/dL  --  5.6* 5.7*  --  6.5*       Vancomycin Administrations:  vancomycin given in the last 96 hours                   vancomycin 1.25 g in dextrose 5% 250 mL IVPB (ready to mix) (mg) 1,250 mg New Bag 08/11/22 1416    vancomycin 1.75 g in 5 % dextrose 500 mL IVPB (mg) 1,750 mg New Bag 08/10/22 2135                Microbiologic Results:  Microbiology Results (last 7 days)     Procedure Component Value Units Date/Time    Culture, Respiratory with Gram Stain [[id number]]     Order Status: No result Specimen: Respiratory     Blood culture [[id number]] Collected: 08/10/22 2000    Order Status: Completed Specimen: Blood from Peripheral, Upper Arm, Left Updated: 08/13/22 2212     Blood Culture, Routine No Growth to date      No Growth to date      No Growth to date      No Growth to date    Blood culture [[id number]] Collected: 08/10/22 2058    Order Status: Completed Specimen: Blood from Peripheral, Forearm, Left Updated: 08/13/22 2212     Blood Culture, Routine No Growth to date      No Growth to date      No Growth to date      No Growth to date    Culture, Respiratory with Gram Stain [[id number]] Collected: 08/11/22 0914    Order Status: Completed Specimen: Respiratory from Tracheal Aspirate Updated: 08/13/22 1223     Respiratory Culture Normal respiratory ronny      No S aureus or Pseudomonas isolated.     Gram Stain  (Respiratory) <10 epithelial cells per low power field.     Gram Stain (Respiratory) Rare WBC's     Gram Stain (Respiratory) No organisms seen    Urine culture [[id number]]     Order Status: Completed Specimen: Urine, Catheterized

## 2022-08-14 NOTE — ASSESSMENT & PLAN NOTE
Baseline of 1.3 prior to admission.  Trend 1.3 --> 2.5 --> 3.1 --> 3.5 and now 4.4   Likely prerenal azotemia considering BUN/Cr 20:1 and concern for acute blood loss anemia. Continued hematuria    Plan:  - nephrology consulted with appreciated recommendations  - Monitor intake/output and daily standing weights.   - Avoid nephrotoxic agents such as NSAIDs, gadolinium and IV radiocontrast.  - Renally dose meds to current GFR.  - Maintain MAP > 65.

## 2022-08-14 NOTE — PROGRESS NOTES
Therapy with vancomycin complete and/or consult discontinued by provider.  Pharmacy will sign off, please re-consult as needed.    Jayla Gonzalez, PharmD, BCCCP  Neurocritical Care Clinical Pharmacist  Ext. [phone]

## 2022-08-14 NOTE — CONSULTS
[facility] - Cardiac Intensive Care  Infectious Disease  Consult Note    Patient Name: Yusuf Mcdonald Jr.  MRN: [id number]  Admission Date: 8/10/2022  Hospital Length of Stay: 4 days  Attending Physician: Mau James MD  Primary Care Provider: Primary Doctor No     Isolation Status: No active isolations    Patient information was obtained from patient, spouse/SO, past medical records and ER records.      Inpatient consult to Infectious Diseases  Consult performed by: Mani Cohen MD  Consult ordered by: Hudson Mchugh DO        Assessment/Plan:     Shock  71 year old male with a history of mitral valve replacement complicated by a leak.  He is admitted to the hospital after undergoing a procedure to repair the valve.  He has remained on pressors and with leukocytosis during his hospital stay.  Cardiology concerned that his shock is not completely cardiogenic.  All cultures to date have been negative.  He has been on empiric vancomycin, meropenem and azithromycin.  Per patient and nursing staff, he has been having diarrhea.  Differential includes infection following his procedure with skin organisms like staph/strep vs C diff colitis given his ongoing diarrhea.    Plan    1.  Okay to continue meropenem.    2. Consider stopping azithromycin as this can cause diarrhea and in some cases can be associated with cardiac arrhythmia.    3. Ongoing acute kidney injury.  Discontinue vancomycin.  Start daptomycin 8mg per kg every 48 hours.    4. Send stool for C diff.      5. Repeat blood cultures.    6. Will follow up on results of respiratory cultures.        Thank you for your consult. I will follow-up with patient. Please contact us if you have any additional questions.    Mani Cohen MD  Infectious Disease  [facility] - Card[facility] Intensive [facility]    Subjective:     Principal Problem: Acute blood loss anemia    HPI: 71 year old male with a history of mitral valve regurgitation s/p mechanical MVR in 2015 complicated by  prosthetic valve leak s/p MV plug in 2018 on Warfarin, chronic AF s/p GEE ligation, and HTN.  He has continued to have significant fatigue, GEORGE and lower extremity swelling.  He was admitted for TRACY and attempted valve repair.  Post procedure, he was hypotensive and initially hypothermic.  He was admitted to the ICU where he started having fevers and was noted to have leukocytosis.  Broad spectrum antibiotics were started and cultures obtained.  He was initially on azithromycin, cefepime and vancomycin.  His antibiotics were later modified to azithromycin and meropenem.      Past Medical History:   Diagnosis Date    A-fib     Anticoagulant long-term use     Hypertension        Past Surgical History:   Procedure Laterality Date    CARDIOVERSION      MITRAL VALVE REPLACEMENT  2015    mechanical    MITRAL VALVE REPLACEMENT Right 10/10/2018    Procedure: REPLACEMENT, MITRAL VALVE;  Surgeon: Yusuf Tejeda MD;  Location: [facility] CATH LAB;  Service: Cardiology;  Laterality: Right;    TRANSESOPHAGEAL ECHOCARDIOGRAPHY N/A 8/10/2022    Procedure: ECHOCARDIOGRAM, TRANSESOPHAGEAL;  Surgeon: Alexus Bell MD;  Location: [facility] CATH LAB;  Service: Cardiology;  Laterality: N/A;       Review of patient's allergies indicates:   Allergen Reactions    Neomycin-bacitracnzn-polymyxnb     Benzalkonium chloride Rash    Neosporin (neomycin-polymyx) Rash       Medications:  Medications Prior to Admission   Medication Sig    acetaminophen (TYLENOL) 500 MG tablet Take 500 mg by mouth.    cetirizine (ZYRTEC) 10 MG tablet Take 10 mg by mouth.    clopidogreL (PLAVIX) 75 mg tablet TAKE ONE TABLET BY MOUTH EVERY DAY    diltiaZEM (CARDIZEM CD) 120 MG Cp24 Take 120 mg by mouth 2 (two) times a day.    metoprolol succinate (TOPROL-XL) 50 MG 24 hr tablet     torsemide (DEMADEX) 20 MG Tab Take 20 mg by mouth 2 (two) times daily.    coenzyme Q10 100 mg capsule Take 100 mg by mouth.    digoxin (LANOXIN) 250 mcg tablet      "fish,bora,flax oils-om3,6,9no1 1,200 mg Cap Take 1,200 mg by mouth.    fluticasone (FLONASE) 50 mcg/actuation nasal spray 50 Bottles by Nasal route.    JANTOVEN 5 mg tablet     lisinopril (PRINIVIL,ZESTRIL) 20 MG tablet Take 20 mg by mouth.    multivitamin capsule Take by mouth.    warfarin (COUMADIN) 5 MG tablet Take 5 mg by mouth once daily.    [DISCONTINUED] furosemide (LASIX) 40 MG tablet     [DISCONTINUED] potassium chloride SA (K-DUR,KLOR-CON) 20 MEQ tablet Take 1 tablet (20 mEq total) by mouth once daily.     Antibiotics (From admission, onward)                Start     Stop Route Frequency Ordered    08/11/22 1230  meropenem (MERREM) 2 g in sodium chloride 0.9% 100 mL IVPB         -- IV Every 12 hours (non-standard times) 08/11/22 1121    08/10/22 2029  vancomycin - pharmacy to dose  (vancomycin IVPB)        "And" Linked Group Details    -- IV pharmacy to manage frequency 08/10/22 1930          Antifungals (From admission, onward)                None          Antivirals (From admission, onward)      None             Immunization History   Administered Date(s) Administered    COVID-19, MRNA, LN-S, PF (MODERNA FULL 0.5 ML DOSE) 02/11/2021, 03/15/2021    Influenza - Trivalent (ADULT) 10/13/2014       Family History    None       Social History     Socioeconomic History    Marital status: Single   Tobacco Use    Smoking status: Never Smoker    Smokeless tobacco: Never Used   Substance and Sexual Activity    Alcohol use: Yes     Alcohol/week: 2.0 standard drinks     Types: 1 Glasses of wine, 1 Shots of liquor per week     Comment: occ.    Drug use: No     Review of Systems   All other systems reviewed and are negative.  Objective:     Vital Signs (Most Recent):  Temp: 98.8 °F (37.1 °C) (08/14/22 0700)  Pulse: (!) 114 (08/14/22 1100)  Resp: (!) 30 (08/14/22 1100)  BP: 96/65 (08/13/22 1910)  SpO2: 95 % (08/14/22 1100)   Vital Signs (24h Range):  Temp:  [97.8 °F (36.6 °C)-99 °F (37.2 °C)] 98.8 °F (37.1 " °C)  Pulse:  [109-132] 114  Resp:  [20-32] 30  SpO2:  [90 %-97 %] 95 %  BP: (96)/(65) 96/65  Arterial Line BP: ()/(45-60) 102/59     Weight: 89.4 kg (197 lb)  Body mass index is 29.95 kg/m².    Estimated Creatinine Clearance: 17 mL/min (A) (based on SCr of 4.4 mg/dL (H)).    Physical Exam  Vitals and nursing note reviewed.   Constitutional:       General: He is not in acute distress.     Appearance: He is well-developed. He is not diaphoretic.   HENT:      Head: Normocephalic and atraumatic.      Right Ear: External ear normal.      Left Ear: External ear normal.      Nose: Nose normal.      Mouth/Throat:      Pharynx: No oropharyngeal exudate.   Eyes:      General: No scleral icterus.        Right eye: No discharge.         Left eye: No discharge.      Conjunctiva/sclera: Conjunctivae normal.      Pupils: Pupils are equal, round, and reactive to light.   Neck:      Thyroid: No thyromegaly.      Vascular: No JVD.      Trachea: No tracheal deviation.   Cardiovascular:      Rate and Rhythm: Normal rate and regular rhythm.      Heart sounds: No murmur heard.    No friction rub. No gallop.   Pulmonary:      Effort: Pulmonary effort is normal. No respiratory distress.      Breath sounds: Normal breath sounds. No stridor. No wheezing or rales.   Chest:      Chest wall: No tenderness.   Abdominal:      General: Bowel sounds are normal. There is no distension.      Palpations: Abdomen is soft. There is no mass.      Tenderness: There is no abdominal tenderness. There is no guarding or rebound.   Musculoskeletal:         General: No tenderness. Normal range of motion.      Cervical back: Normal range of motion and neck supple.   Lymphadenopathy:      Cervical: No cervical adenopathy.   Skin:     General: Skin is warm.      Coloration: Skin is not pale.      Findings: No erythema or rash.   Neurological:      Mental Status: He is alert and oriented to person, place, and time.      Cranial Nerves: No cranial nerve  deficit.      Motor: No abnormal muscle tone.      Coordination: Coordination normal.      Deep Tendon Reflexes: Reflexes are normal and symmetric. Reflexes normal.   Psychiatric:         Behavior: Behavior normal.         Thought Content: Thought content normal.         Judgment: Judgment normal.       Significant Labs:   Microbiology Results (last 7 days)       Procedure Component Value Units Date/Time    Culture, Respiratory with Gram Stain [[id number]]     Order Status: No result Specimen: Respiratory     Blood culture [[id number]] Collected: 08/10/22 2000    Order Status: Completed Specimen: Blood from Peripheral, Upper Arm, Left Updated: 08/13/22 2212     Blood Culture, Routine No Growth to date      No Growth to date      No Growth to date      No Growth to date    Blood culture [[id number]] Collected: 08/10/22 2058    Order Status: Completed Specimen: Blood from Peripheral, Forearm, Left Updated: 08/13/22 2212     Blood Culture, Routine No Growth to date      No Growth to date      No Growth to date      No Growth to date    Culture, Respiratory with Gram Stain [[id number]] Collected: 08/11/22 0914    Order Status: Completed Specimen: Respiratory from Tracheal Aspirate Updated: 08/13/22 1223     Respiratory Culture Normal respiratory ronny      No S aureus or Pseudomonas isolated.     Gram Stain (Respiratory) <10 epithelial cells per low power field.     Gram Stain (Respiratory) Rare WBC's     Gram Stain (Respiratory) No organisms seen    Urine culture [[id number]]     Order Status: Completed Specimen: Urine, Catheterized             Significant Imaging: I have reviewed all pertinent imaging results/findings within the past 24 hours.

## 2022-08-14 NOTE — SUBJECTIVE & OBJECTIVE
Past Medical History:   Diagnosis Date    A-fib     Anticoagulant long-term use     Hypertension        Past Surgical History:   Procedure Laterality Date    CARDIOVERSION      MITRAL VALVE REPLACEMENT  2015    mechanical    MITRAL VALVE REPLACEMENT Right 10/10/2018    Procedure: REPLACEMENT, MITRAL VALVE;  Surgeon: Yusuf Tejeda MD;  Location: [facility] CATH LAB;  Service: Cardiology;  Laterality: Right;    TRANSESOPHAGEAL ECHOCARDIOGRAPHY N/A 8/10/2022    Procedure: ECHOCARDIOGRAM, TRANSESOPHAGEAL;  Surgeon: Alexus Bell MD;  Location: [facility] CATH LAB;  Service: Cardiology;  Laterality: N/A;       Review of patient's allergies indicates:   Allergen Reactions    Neomycin-bacitracnzn-polymyxnb     Benzalkonium chloride Rash    Neosporin (neomycin-polymyx) Rash     Current Facility-Administered Medications   Medication Frequency    0.9%  NaCl infusion (for blood administration) Q24H PRN    0.9%  NaCl infusion Continuous    0.9%  NaCl infusion Continuous    acetaminophen tablet 650 mg Q4H PRN    amiodarone 360 mg/200 mL (1.8 mg/mL) infusion Continuous    cetirizine tablet 10 mg QHS    clopidogreL tablet 75 mg Daily    dextromethorphan-guaiFENesin  mg per 12 hr tablet 1 tablet BID    EPINEPHrine 5 mg in dextrose 5% 250 mL infusion (premix) Continuous    famotidine tablet 20 mg Daily    fentaNYL 50 mcg/mL injection 50 mcg Q1H PRN    fludrocortisone tablet 100 mcg Daily    heparin 25,000 units in dextrose 5% (100 units/ml) IV bolus from bag - ADDITIONAL PRN BOLUS - 30 units/kg PRN    heparin 25,000 units in dextrose 5% (100 units/ml) IV bolus from bag - ADDITIONAL PRN BOLUS - 60 units/kg PRN    heparin 25,000 units in dextrose 5% 250 mL (100 units/mL) infusion LOW INTENSITY nomogram - [facility] Continuous    hydrocortisone sodium succinate injection 100 mg Q8H    meropenem (MERREM) 2 g in sodium chloride 0.9% 100 mL IVPB Q12H    ondansetron disintegrating tablet 8 mg Q8H PRN    quetiapine split tablet 12.5 mg QHS     quetiapine split tablet 12.5 mg Nightly PRN    sodium chloride 0.9% flush 10 mL PRN    vancomycin - pharmacy to dose pharmacy to manage frequency    vasopressin (PITRESSIN) 0.2 Units/mL in dextrose 5 % 100 mL infusion Continuous     Family History    None       Tobacco Use    Smoking status: Never Smoker    Smokeless tobacco: Never Used   Substance and Sexual Activity    Alcohol use: Yes     Alcohol/week: 2.0 standard drinks     Types: 1 Glasses of wine, 1 Shots of liquor per week     Comment: occ.    Drug use: No    Sexual activity: Not on file     Review of Systems   HENT:  Negative for congestion and sore throat.    Respiratory:  Negative for shortness of breath.    Cardiovascular:  Positive for leg swelling. Negative for chest pain.   Gastrointestinal:  Negative for abdominal pain, nausea and vomiting.   Genitourinary:  Positive for decreased urine volume. Negative for dysuria and flank pain.   Musculoskeletal:  Negative for arthralgias and myalgias.   Psychiatric/Behavioral:  Negative for agitation. The patient is not nervous/anxious.    Objective:     Vital Signs (Most Recent):  Temp: 98.8 °F (37.1 °C) (08/14/22 0700)  Pulse: (!) 112 (08/14/22 1200)  Resp: (!) 28 (08/14/22 1200)  BP: 96/65 (08/13/22 1910)  SpO2: 96 % (08/14/22 1200)  O2 Device (Oxygen Therapy): High Flow nasal Cannula (08/14/22 1200)   Vital Signs (24h Range):  Temp:  [97.8 °F (36.6 °C)-98.8 °F (37.1 °C)] 98.8 °F (37.1 °C)  Pulse:  [109-132] 112  Resp:  [20-32] 28  SpO2:  [90 %-97 %] 96 %  BP: (96)/(65) 96/65  Arterial Line BP: ()/(45-60) 102/58     Weight: 89.4 kg (197 lb) (08/10/22 0814)  Body mass index is 29.95 kg/m².  Body surface area is 2.07 meters squared.    I/O last 3 completed shifts:  In: 4815.9 [P.O.:340; I.V.:3167.5; Blood:600; IV Piggyback:708.4]  Out: 925 [Urine:925]    Physical Exam  Vitals reviewed.   Constitutional:       Appearance: He is ill-appearing.   HENT:      Head: Normocephalic and atraumatic.   Eyes:       Extraocular Movements: Extraocular movements intact.      Conjunctiva/sclera: Conjunctivae normal.   Cardiovascular:      Rate and Rhythm: Tachycardia present. Rhythm irregular.   Pulmonary:      Effort: Pulmonary effort is normal.      Breath sounds: Normal breath sounds.   Abdominal:      General: Bowel sounds are normal. There is no distension.      Palpations: Abdomen is soft.      Tenderness: There is no abdominal tenderness.   Genitourinary:     Comments: Urinary catheter in place with dark brown urine output  Musculoskeletal:      Right lower leg: Edema present.      Left lower leg: Edema present.   Skin:     General: Skin is warm.      Coloration: Skin is pale.   Neurological:      Mental Status: He is alert and oriented to person, place, and time. Mental status is at baseline.       Significant Labs:  CMP:   Recent Labs   Lab 08/14/22  0401   *   CALCIUM 8.1*   ALBUMIN 1.2*   PROT 3.8*   *   K 4.6   CO2 18*   CL 93*   *   CREATININE 4.4*   ALKPHOS 100   ALT 11   *   BILITOT 1.6*       Significant Imaging:  Labs: Reviewed

## 2022-08-14 NOTE — PROGRESS NOTES
[facility] - Cardiac Intensive Care  Cardiology  Progress Note    Patient Name: Yusuf Mcdonald Jr.  MRN: [id number]  Admission Date: 8/10/2022  Hospital Length of Stay: 4 days  Code Status: Full Code   Attending Physician: Mau James MD   Primary Care Physician: Primary Doctor No  Expected Discharge Date: 8/22/2022  Principal Problem:Acute blood loss anemia    Subjective:     Hospital Course:   Patient admitted to CCU for management of shock in the setting of MV paravalvular leak repair completed on 8/10. Patient hypotensive post procedure and CVC placed with hemodynamics concerning for septic shock. Patient was given limited IVF and started on broad spectrum antibiotics with vancomycin and cefepime which was escalated to meropenem and azithromycin given ongoing fevers. CI/CO/SVR 4.1/8.6/553. Lactate improved from 4 down to normal. Patient sedated with propofol, fentanyl and on epinephrine and vasopressin for pressor support. Ventilated at 40% FiO2 with PEEP of 5. LJ likely ischemic ATN vs prerenal given procedure and septic shock. Patient also started on stress dose steroids. Extubated on 8/12/22 to 7 L NC.     Patient with persistent shock concerning for septic in etiology on vancomcyin and meropenem so ID consulted for evalaution. CT demosntrated no identifiable source of infection and cultures NGTD. Patient with worsening oliguria and Cr and nephrology was consulted on 8/14.       Interval History: Patient given increasing doses of lasix with minimal response so nephrology consulted. ID also consulted given persistent shock despite IV abx.     Review of Systems   Constitutional: Negative for chills and fever.   Cardiovascular:  Negative for chest pain, orthopnea and palpitations.   Respiratory:  Negative for cough and shortness of breath.    Gastrointestinal:  Negative for abdominal pain.   Neurological:  Negative for dizziness, headaches and light-headedness.   Psychiatric/Behavioral:  Negative for altered  mental status.    Objective:     Vital Signs (Most Recent):  Temp: 98.8 °F (37.1 °C) (08/14/22 0700)  Pulse: (!) 114 (08/14/22 1100)  Resp: (!) 30 (08/14/22 1100)  BP: 96/65 (08/13/22 1910)  SpO2: 95 % (08/14/22 1100)   Vital Signs (24h Range):  Temp:  [97.8 °F (36.6 °C)-99 °F (37.2 °C)] 98.8 °F (37.1 °C)  Pulse:  [109-132] 114  Resp:  [20-32] 30  SpO2:  [90 %-97 %] 95 %  BP: (96)/(65) 96/65  Arterial Line BP: ()/(45-60) 102/59     Weight: 89.4 kg (197 lb)  Body mass index is 29.95 kg/m².     SpO2: 95 %  O2 Device (Oxygen Therapy): High Flow nasal Cannula      Intake/Output Summary (Last 24 hours) at 8/14/2022 1150  Last data filed at 8/14/2022 0800  Gross per 24 hour   Intake 2746.17 ml   Output 365 ml   Net 2381.17 ml         Lines/Drains/Airways       Central Venous Catheter Line  Duration             Percutaneous Central Line Insertion/Assessment - Triple Lumen  08/10/22 1722 right internal jugular 3 days              Drain  Duration                  Urethral Catheter 08/10/22 1718 3 days              Arterial Line  Duration             Arterial Line 08/10/22 1334 Right Radial 3 days              Peripheral Intravenous Line  Duration                  Peripheral IV - Single Lumen 08/10/22 0812 20 G Left;Posterior Wrist 4 days         Peripheral IV - Single Lumen 08/11/22 1641 18 G Anterior;Distal;Left Upper Arm 2 days                    Physical Exam  Vitals and nursing note reviewed.   Constitutional:       General: He is not in acute distress.     Appearance: Normal appearance. He is ill-appearing. He is not toxic-appearing.      Comments: Pale   HENT:      Head: Normocephalic and atraumatic.      Mouth/Throat:      Mouth: Mucous membranes are moist.   Cardiovascular:      Rate and Rhythm: Tachycardia present. Rhythm irregular.      Heart sounds: Murmur (mitral valve systolic click) heard.     No friction rub. No gallop.      Comments: Telemetry afib with RVR  Pulmonary:      Effort: Pulmonary effort is  normal. No respiratory distress.      Breath sounds: Normal breath sounds.   Abdominal:      Palpations: Abdomen is soft.   Musculoskeletal:      Right lower leg: No edema.      Left lower leg: No edema.   Skin:     General: Skin is warm.   Neurological:      Mental Status: He is alert and oriented to person, place, and time. Mental status is at baseline.     Significant Labs: BMP:   Recent Labs   Lab 08/13/22 0213 08/13/22 2237 08/14/22  0401   * 147* 145*   * 123* 124*   K 4.7 4.5 4.6   CL 96 94* 93*   CO2 18* 15* 18*   * 116* 114*   CREATININE 3.5* 4.2* 4.4*   CALCIUM 7.9* 8.2* 8.1*   MG 1.9 2.3 2.3     , CMP   Recent Labs   Lab 08/13/22 0213 08/13/22 2237 08/14/22 0401   * 123* 124*   K 4.7 4.5 4.6   CL 96 94* 93*   CO2 18* 15* 18*   * 147* 145*   * 116* 114*   CREATININE 3.5* 4.2* 4.4*   CALCIUM 7.9* 8.2* 8.1*   PROT 3.8*  --  3.8*   ALBUMIN 1.1*  --  1.2*   BILITOT 1.4*  --  1.6*   ALKPHOS 101  --  100   *  --  180*   ALT 12  --  11   ANIONGAP 10 14 13     , CBC   Recent Labs   Lab 08/13/22 0213 08/13/22 1942 08/14/22  0401   WBC 25.71* 28.03* 27.34*   HGB 7.9* 9.7* 9.7*   HCT 22.7* 27.3* 27.3*    191 189     , INR   Recent Labs   Lab 08/13/22 0213 08/14/22  0401   INR 1.2 1.1     , Lipid Panel No results for input(s): CHOL, HDL, LDLCALC, TRIG, CHOLHDL in the last 48 hours., Troponin No results for input(s): TROPONINI in the last 48 hours., and All pertinent lab results from the last 24 hours have been reviewed.    Significant Imaging: Echocardiogram: 2D echo with color flow doppler:   Results for orders placed or performed during the hospital encounter of 07/18/18   2D Echo w/ Color Flow Doppler   Result Value Ref Range    EF + QEF 50 55 - 65    Mitral Valve Regurgitation SEVERE (A)     Aortic Valve Regurgitation MODERATE (A)     Est. PA Systolic Pressure 88.62 (A)     Tricuspid Valve Regurgitation MODERATE TO SEVERE (A)     Narrative    Date of  Procedure: 07/18/2018        TEST DESCRIPTION   Technical Quality: This is a technically adequate study.     General: The patient was in an irregularly irregular rhythm throughout the study.     Aorta: The aortic root is normal in size, measuring 3.3 cm at sinotubular junction and 4.2 cm at Sinuses of Valsalva. The proximal ascending aorta is mildly enlarged, measuring 4.0 cm across.     Left Atrium: The left atrial volume index is severely enlarged, measuring 164.64 cc/m2.     Left Ventricle: The left ventricle is normal in size, with an end-diastolic diameter of 5.4 cm, and an end-systolic diameter of 3.7 cm. Wall thickness is upper limit of normal in size, with the septum and the posterior wall each measuring 1.1 cm across.   Relative wall thickness was normal at 0.41, and the LV mass index was increased at 129.9 g/m2 consistent with eccentric left ventricular hypertrophy. There are no regional wall motion abnormalities. Left ventricular systolic function appears low normal   to mildly depressed. Visually estimated ejection fraction is 50-55%. The LV Doppler derived stroke volume equals 63.0 ccs.         Right Atrium: The right atrium is enlarged, measuring 7.6 cm in length and 5.3 cm in width in the apical view.     Right Ventricle: The right ventricle is enlarged measuring 5.9 cm at the base in the apical right ventricle-focused view. Global right ventricular systolic function appears moderately depressed. There is abnormal septal motion consistent with RV pressure   overload. Tricuspid annular plane systolic excursion (TAPSE) is 1.5 cm. Tissue Doppler-derived tricuspid annular peak systolic velocity (S prime) is 9.3 cm/s. The estimated PA systolic pressure is 89 mmHg.     Aortic Valve:  The aortic valve is mildly sclerotic. Additionally, there is moderate aortic regurgitation. There is a pressure half time of 550 msec.     Mitral Valve:  There is a mechanical prosthesis present in the mitral position. The mean  gradient obtained across the mitral valve is 8 mmHg. There is severe mitral regurgitation. Mitral prosthesis is partially dehisced and exhibits rocking motion.    There is large PVL located katherin-laterally.      Tricuspid Valve:  The tricuspid valve is normal in structure. There is moderate to severe tricuspid regurgitation.     Pulmonary Valve:  The pulmonic valve is normal in structure. There is moderate pulmonic regurgitation.     IVC: IVC is enlarged and collapses < 50% with a sniff, suggesting high right atrial pressure of 15 mmHg.     Intracavitary: There is no evidence of pericardial effusion, intracavity mass, thrombi, or vegetation.     Other: If clinically indicated, a TRACY can be performed.         CONCLUSIONS     1 - Low normal to mildly depressed left ventricular systolic function (EF 50-55%).     2 - Right ventricular enlargement with moderately depressed systolic function.     3 - Mildly enlarged ascending aorta.     4 - Biatrial enlargement.     5 - No wall motion abnormalities.     6 - Pulmonary hypertension. The estimated PA systolic pressure is 89 mmHg.     7 - Moderate aortic regurgitation.     8 - Bileaflet Mitral valve prosthesis.     9 - Severe paravalvular mitral regurgitation.     10 - Moderate to severe tricuspid regurgitation.     11 - Moderate pulmonic regurgitation.     12 - Increased central venous pressure.     13 - If clinically indicated, a TRACY can be performed.             This document has been electronically    SIGNED BY: Azeb Mcpherson MD On: 07/24/2018 15:23    This document was originally electronically signed on: 07/18/2018 16:17    This document was last amended on: 07/24/2018 15:22    and Transthoracic echo (TTE) complete (Cupid Only):   Results for orders placed or performed during the hospital encounter of 11/13/18   Transthoracic echo (TTE) complete   Result Value Ref Range    Ascending aorta 4.20 cm    STJ 4.00 cm    AV mean gradient 4.43 mmHg    Ao peak kelin 1.57 m/s     Ao VTI 24.12 cm    IVS 1.12 (A) 0.6 - 1.1 cm    LA size 6.43 cm    Left Atrium Major Axis 6.76 cm    Left Atrium Minor Axis 6.98 cm    LVIDd 6.03 (A) 3.5 - 6.0 cm    LVIDs 4.53 (A) 2.1 - 4.0 cm    LVOT diameter 2.72 cm    LVOT peak VTI 13.05 cm    Posterior Wall 1.30 (A) 0.6 - 1.1 cm    RA Major Axis 5.90 cm    RA Width 4.16 cm    RVDD 4.43 cm    Sinus 4.14 cm    TAPSE 1.15 cm    TR Max Rohan 4.30 m/s    TDI LATERAL 0.09     TDI SEPTAL 0.09     LA WIDTH 5.98 cm    LV Diastolic Volume 182.29 mL    LV Systolic Volume 94.08 mL    RV S' 7.77 m/s    FS 25 %    LA volume 224.48 cm3    LV mass 320.17 g    Left Ventricle Relative Wall Thickness 0.43 cm    AV valve area 3.14 cm2    Mean e' 0.09     LVOT area 5.81 cm2    LVOT stroke volume 75.79 cm3    AV peak gradient 9.86 mmHg    Triscuspid Valve Regurgitation Peak Gradient 73.96 mmHg    BSA 2.12 m2    LV Systolic Volume Index 44.4 mL/m2    LV Diastolic Volume Index 85.99 mL/m2    LA Volume Index 105.9 mL/m2    LV Mass Index 151.0 g/m2    Right Atrial Pressure (from IVC) 15 mmHg    TV rest pulmonary artery pressure 88.96 mmHg    Narrative    · The left ventricle cavity is mildly dilated.  · Left ventricle ejection fraction is mildly decreased at 45%  · Left atrium is severely dilated.  · Right atrium is moderately dilated.  · There is a mechanical mitral valve prosthesis. presence of amplatzer   plug to close Pravalvular leak. There is mild to moderate MR seen, but   given severe pulmonary HTN suspect there is greater MR that is seen   (limited by shadowing, consider TRACY if clinically indicated). MV VTI/LVOT   VTI> 3.5 is also suggestive of significant regurgitation.  · Mild-to-moderate mitral regurgitation- possibly paravalvular.  · Moderate aortic regurgitation.  · Mild tricuspid regurgitation.  · Moderate stenosis tricuspid stenosis.  · No pericardial effusion.  · Elevated central venous pressure (15 mm Hg).  · The estimated PA systolic pressure is 88.96 mm Hg  ·  Pulmonary hypertension present.  · Right ventricular cavity size is mildly dilated.  · Left ventricular diastolic dysfunction.  · RV systolic function is low normal.  · Pulmonic valve shows trace regurgitation.        Assessment and Plan:       * Acute blood loss anemia  Hgb trend 13 --> 11 -- 9.9 --> 7.9 since admit. Concern for chronic hemolytic process given mechanical valve as documented with haptoglobin and LDH altered since replacement in 2018 vs possible retroperitoneal bleed. Stable. Smear reviewed with some fragmented RBCs 5/hpf but PLTs stable and no further concerns for acute hemolytic or consumptive process. CT demonstrated no RP bleed.     - Type and screen q 72 hrs  - Daily cbc  - Transfuse RBC 2 unit 08/13/2022, noted decrease in pressor requirements with transfusion with Lasix 120 mg IV x 1   - Keeping aspirin and Plavix for now considering PVL closure, heparin gtt for mechanical MV    Septic shock  70 yo M with PMH MR s/p mechanical MVR 2015 in [place]) c/b PVL s/p MV plug (Dr Tejeda 2018) on Warfarin, chronic AF s/p GEE ligation, and HTN.  He has recently extended his paravalvular leak on TRACY performed in [place]. He continues to have significant fatigue, GEORGE, and leg swelling. He presented for PLV closure with TRACY guidance.  Postprocedure patient was brought to CCU where he was found to be hypotensive and epinephrine was started.      CT demonstated no RP bleed or any fluid collections concerning for source of continued hypotension    Temp initially low and later 102.2 and persistently febrile  White count 23  CI/CO high with low SVR  Concern for septic shock vs hypovolemic shock from blood loss anemia  Blood cultures x 2 obtained and negative so far. UA unremarkable. Respiratory culture also negative  Vancomycin, meropenem and azithromycin.   On epi and vaso.   stress dose steroids and will taper if improvement of hemodynamics with extubation   ID consultation    Severe mitral regurgitation  S/p  mechanical MVR 2015 and paravalvular leak repair 2018 and 2022    Shock  See septic shock    Gross hematuria  Hematuria continued since admission initially thought from traumatic insertion. Possible underlying coagulopathy. Imaging unrevealing so far    Will likely need urology consultation      Atrial fibrillation with RVR  -Afib with RVR at 2 am, INR 2.0, rate 135-145, EKG obtained, will give amio iv 150x1 followed by gtt  On Amio gtt and holding home dilt and metoprolol in shock  holding digoxin for with elevated Cr    LJ (acute kidney injury)  Baseline of 1.3 prior to admission.  Trend 1.3 --> 2.5 --> 3.1 --> 3.5 and now 4.4   Likely prerenal azotemia considering BUN/Cr 20:1 and concern for acute blood loss anemia. Continued hematuria    Plan:  - nephrology consulted with appreciated recommendations  - Monitor intake/output and daily standing weights.   - Avoid nephrotoxic agents such as NSAIDs, gadolinium and IV radiocontrast.  - Renally dose meds to current GFR.  - Maintain MAP > 65.      S/P mitral valve replacement with metallic valve  On warfarin at home. Transitioned to heparin gtt and will bridge once stable    Chronic a-fib  Currently afib with RVR    Paravalvular leak (prosthetic valve)  Patient is a 72 yo M with chronic AF on warfarin, pHTN, HLD and mitral valve regurgitation s/p mechanical MVR 2015 s/p MAZE c/b PVL s/p MV plug in 2018 and now with extension of his paravalvular leak on TRACY. Here for PVL closure with TRACY guidance.  Patient admitted to CCU for monitoring post procedure.   - Follow up TTE limited 08/13/2022        VTE Risk Mitigation (From admission, onward)         Ordered     heparin 25,000 units in dextrose 5% (100 units/ml) IV bolus from bag - ADDITIONAL PRN BOLUS - 60 units/kg  As needed (PRN)        Question:  Heparin Infusion Adjustment (DO NOT MODIFY ANSWER)  Answer:  [email or web address]\epic\Images\Pharmacy\HeparinInfusions\heparin LOW INTENSITY nomogram for [facility] [id number]    08/11/22  0737     heparin 25,000 units in dextrose 5% (100 units/ml) IV bolus from bag - ADDITIONAL PRN BOLUS - 30 units/kg  As needed (PRN)        Question:  Heparin Infusion Adjustment (DO NOT MODIFY ANSWER)  Answer:  [email or web address]\epic\Images\Pharmacy\HeparinInfusions\heparin LOW INTENSITY nomogram for [facility] [id number]    08/11/22 0737     heparin 25,000 units in dextrose 5% 250 mL (100 units/mL) infusion LOW INTENSITY nomogram - [facility]  Continuous        Question Answer Comment   Heparin Infusion Adjustment (DO NOT MODIFY ANSWER) [email or web address]\epic\Images\Pharmacy\HeparinInfusions\heparin LOW INTENSITY nomogram for [facility] [id number]    Begin at (in units/kg/hr) 12        08/11/22 0737     IP VTE HIGH RISK PATIENT  Once         08/10/22 1647     Place sequential compression device  Until discontinued         08/10/22 1647                Nicole Mchugh DO  Cardiology  [facility] - Cardiac Intensive Care

## 2022-08-14 NOTE — PLAN OF CARE
Hemodynamics Care Update Note     SVO2: 69  CVP: 16  CO: 9.2  CI: 4.4  SVR: 406  UO: 325 cc only since am, got 120 mg lasix this morning     Inotropes/ Vasopressors: Epi gtt 0.1, Vaso gtt 0.08      Plan:  -lasix 200 mg x 1 dose since 120 mg failed earlier today, if no response, might need CRRT tomorrow, not giving fluid challenge due to CVP 16  -seroquel 12.5 mg today    Patient is critically sick at this time, plan discussed with Dr Jacob Vazquez MD  Cardiovascular Disease PGY4  [facility]

## 2022-08-14 NOTE — NURSING
Updated Dr. Pelaez of the latest CVP 16 and SVO2 71%. Patient is still oliguric and kidney function is getting worse.     Latest APTT 38.6, MD ordered to increase the dose from  14 to 15 units/kg/hr. Will continue to monitor.

## 2022-08-15 PROBLEM — E87.1 HYPONATREMIA: Status: ACTIVE | Noted: 2022-01-01

## 2022-08-15 NOTE — ASSESSMENT & PLAN NOTE
Hgb trend 13 --> 11 -- 9.9 --> 7.9 since admit. Concern for chronic hemolytic process given mechanical valve as documented with haptoglobin and LDH altered since replacement in 2018 vs possible retroperitoneal bleed. Stable. Smear reviewed with some fragmented RBCs 5/hpf but PLTs stable and no further concerns for acute hemolytic or consumptive process. CT demonstrated no RP bleed.  s/p RBC 2 unit 08/13/2022, noted decrease in pressor requirements with transfusion with Lasix 120 mg IV x 1      - Type and screen q 72 hrs  - Daily cbc  - Keeping aspirin and Plavix for now considering PVL closure, heparin gtt for mechanical MV

## 2022-08-15 NOTE — PROGRESS NOTES
[facility] - [facility]  Cardiology  Progress Note    Patient Name: Yusuf Mcdonald Jr.  MRN: [id number]  Admission Date: 8/10/2022  Hospital Length of Stay: 5 days  Code Status: Full Code   Attending Physician: Mau James MD   Primary Care Physician: Primary Doctor No  Expected Discharge Date: 8/29/2022  Principal Problem:Acute blood loss anemia    Subjective:     Hospital Course:   Patient admitted to CCU for management of shock in the setting of MV paravalvular leak repair completed on 8/10. Patient hypotensive post procedure and CVC placed with hemodynamics concerning for septic shock. Patient was given limited IVF and started on broad spectrum antibiotics with vancomycin and cefepime which was escalated to meropenem and azithromycin given ongoing fevers. CI/CO/SVR 4.1/8.6/553. Lactate improved from 4 down to normal. Patient sedated with propofol, fentanyl and on epinephrine and vasopressin for pressor support. Ventilated at 40% FiO2 with PEEP of 5. LJ likely ischemic ATN vs prerenal given procedure and septic shock. Patient also started on stress dose steroids. Extubated on 8/12/22 to 7 L NC.     Patient with persistent shock concerning for septic in etiology on vancomcyin and meropenem so ID consulted for evalaution. CT demosntrated no identifiable source of infection and cultures NGTD. Patient with worsening oliguria and Cr and nephrology was consulted on 8/14.  Given persistent shock and vasoplegia, will consult HTS for further eval.         Interval History:   No acute events overnight. Persistent shock, HTS consulted today. Continue with current regiment for now.   All questions answered.       Review of Systems   Constitutional: Negative for chills and fever.   Cardiovascular:  Negative for chest pain, orthopnea and palpitations.   Respiratory:  Negative for cough and shortness of breath.    Gastrointestinal:  Negative for abdominal pain.   Neurological:  Negative for dizziness, headaches  and light-headedness.   Psychiatric/Behavioral:  Negative for altered mental status.    Objective:     Vital Signs (Most Recent):  Temp: 97.6 °F (36.4 °C) (08/15/22 1600)  Pulse: (!) 116 (08/15/22 1600)  Resp: 16 (08/15/22 1600)  BP: 96/65 (08/13/22 1910)  SpO2: 98 % (08/15/22 1600)   Vital Signs (24h Range):  Temp:  [97.6 °F (36.4 °C)-98.4 °F (36.9 °C)] 97.6 °F (36.4 °C)  Pulse:  [] 116  Resp:  [15-34] 16  SpO2:  [92 %-100 %] 98 %  Arterial Line BP: ()/(41-57) 104/55     Weight: 89.4 kg (197 lb)  Body mass index is 29.95 kg/m².     SpO2: 98 %  O2 Device (Oxygen Therapy): High Flow nasal Cannula      Intake/Output Summary (Last 24 hours) at 8/15/2022 1623  Last data filed at 8/15/2022 1600  Gross per 24 hour   Intake 5879.52 ml   Output 5486 ml   Net 393.52 ml       Lines/Drains/Airways       Central Venous Catheter Line  Duration             Percutaneous Central Line Insertion/Assessment - Triple Lumen  08/10/22 1722 right internal jugular 4 days    Trialysis (Dialysis) Catheter 08/14/22 2130 left internal jugular <1 day              Drain  Duration                  Urethral Catheter 08/10/22 1718 4 days              Arterial Line  Duration             Arterial Line 08/10/22 1334 Right Radial 5 days              Peripheral Intravenous Line  Duration                  Peripheral IV - Single Lumen 08/11/22 1641 18 G Anterior;Distal;Left Upper Arm 3 days                    Physical Exam  Vitals and nursing note reviewed.   Constitutional:       General: He is not in acute distress.     Appearance: Normal appearance. He is ill-appearing. He is not toxic-appearing.      Comments: Pale   HENT:      Head: Normocephalic and atraumatic.      Mouth/Throat:      Mouth: Mucous membranes are moist.   Cardiovascular:      Rate and Rhythm: Tachycardia present. Rhythm irregular.      Heart sounds: Murmur (mitral valve systolic click) heard.     No friction rub. No gallop.      Comments: Telemetry afib with  RVR  Pulmonary:      Effort: Pulmonary effort is normal. No respiratory distress.      Breath sounds: Normal breath sounds.   Abdominal:      Palpations: Abdomen is soft.   Musculoskeletal:      Right lower leg: No edema.      Left lower leg: No edema.   Skin:     General: Skin is warm.   Neurological:      Mental Status: He is alert and oriented to person, place, and time. Mental status is at baseline.       Significant Labs: ABG:   Recent Labs   Lab 08/14/22  2001 08/14/22  2117 08/15/22  1254   PH 7.366 7.361 7.361   PCO2 36.5 37.2 35.9   HCO3 20.9* 21.1* 20.3*   POCSATURATED 61* 65* 71*   BE -4 -4 -5   , Blood Culture:   Recent Labs   Lab 08/14/22 1836 08/14/22 2111   LABBLOO No Growth to date No Growth to date   , BMP:   Recent Labs   Lab 08/15/22  0207 08/15/22  0516 08/15/22  1408   * 160* 149*   * 124* 122*   K 4.3 4.3 4.4   CL 93* 92* 92*   CO2 22* 19* 17*   BUN 93* 100* 97*   CREATININE 3.9* 4.3* 4.2*   CALCIUM 7.6* 7.8* 7.9*   MG 2.1 2.2 2.2   , CMP   Recent Labs   Lab 08/14/22  0401 08/15/22  0207 08/15/22  0516 08/15/22  1408   * 126* 124* 122*   K 4.6 4.3 4.3 4.4   CL 93* 93* 92* 92*   CO2 18* 22* 19* 17*   * 153* 160* 149*   * 93* 100* 97*   CREATININE 4.4* 3.9* 4.3* 4.2*   CALCIUM 8.1* 7.6* 7.8* 7.9*   PROT 3.8*  --  3.9*  --    ALBUMIN 1.2* 1.2* 1.2* 1.3*   BILITOT 1.6*  --  2.2*  --    ALKPHOS 100  --  114  --    *  --  171*  --    ALT 11  --  11  --    ANIONGAP 13 11 13 13   , CBC   Recent Labs   Lab 08/13/22 1942 08/14/22  0401 08/15/22  0516   WBC 28.03* 27.34* 30.96*   HGB 9.7* 9.7* 9.3*   HCT 27.3* 27.3* 25.5*    189 162   , INR   Recent Labs   Lab 08/14/22  0401 08/15/22  0516 08/15/22  0803   INR 1.1 SEE COMMENT 1.1   , Lipid Panel No results for input(s): CHOL, HDL, LDLCALC, TRIG, CHOLHDL in the last 48 hours.,   Pathology Results  (Last 10 years)      None        , Troponin No results for input(s): TROPONINI in the last 48 hours., and All  pertinent lab results from the last 24 hours have been reviewed.    Significant Imaging: Echocardiogram: Transthoracic echo (TTE) complete (Cupid Only):   Results for orders placed or performed during the hospital encounter of 08/10/22   Echo   Result Value Ref Range    IVS 0.87 0.6 - 1.1 cm    LA size 6.78 cm    LVIDd 5.64 3.5 - 6.0 cm    LVIDs 3.71 2.1 - 4.0 cm    Posterior Wall 0.82 0.6 - 1.1 cm    E wave deceleration time 409.69 msec    MV stenosis pressure 1/2 time 118.81 ms    LV Diastolic Volume 156.04 mL    LV Systolic Volume 58.52 mL    MV mean gradient 1 mmHg    MV peak gradient 24 mmHg    MV VTI 34.91 cm    FS 34 %    LV mass 179.00 g    Left Ventricle Relative Wall Thickness 0.29 cm    MV valve area p 1/2 method 1.85 cm2    LV Systolic Volume Index 28.8 mL/m2    LV Diastolic Volume Index 76.87 mL/m2    LV Mass Index 88 g/m2    BSA 2.07 m2    EF 45 %    Narrative    · Limited study to evaluate mitral PVL closure (s/p 10mm VSD closure   device).  · The left ventricle is normal in size with mildly decreased systolic   function.  · The estimated ejection fraction is 45%.  · There is a bileaflet mechanical mitral valve prosthesis. VSD plug   visualized anteriorly. There is mild rocking of the mechanical valve with   moderate mitral regurgitation. The mean diastolic gradient across the   mitral valve is 8mmHg at heart rate 109bpm.  · Severe right ventricular enlargement with moderately reduced right   ventricular systolic function.        Assessment and Plan:     * Acute blood loss anemia  Hgb trend 13 --> 11 -- 9.9 --> 7.9 since admit. Concern for chronic hemolytic process given mechanical valve as documented with haptoglobin and LDH altered since replacement in 2018 vs possible retroperitoneal bleed. Stable. Smear reviewed with some fragmented RBCs 5/hpf but PLTs stable and no further concerns for acute hemolytic or consumptive process. CT demonstrated no RP bleed.  s/p RBC 2 unit 08/13/2022, noted decrease  in pressor requirements with transfusion with Lasix 120 mg IV x 1      - Type and screen q 72 hrs  - Daily cbc  - Keeping aspirin and Plavix for now considering PVL closure, heparin gtt for mechanical MV    Severe mitral regurgitation  S/p mechanical MVR 2015 and paravalvular leak repair 2018 and 2022    Shock  See septic shock     Gross hematuria  Hematuria continued since admission initially thought from traumatic insertion. Possible underlying coagulopathy. Imaging unrevealing so far    Will likely need urology consultation       Septic shock  70 yo M with PMH MR s/p mechanical MVR 2015 in [place]) c/b PVL s/p MV plug (Dr Tejeda 2018) on Warfarin, chronic AF s/p GEE ligation, and HTN.  He has recently extended his paravalvular leak on TRACY performed in [place]. He continues to have significant fatigue, GEORGE, and leg swelling. He presented for PLV closure with TRACY guidance.  Postprocedure patient was brought to CCU where he was found to be hypotensive and epinephrine was started.      CT demonstated no RP bleed or any fluid collections concerning for source of continued hypotension     Temp initially low and later 102.2 and persistently febrile  White count 23  CI/CO high with low SVR  -- Concern for septic shock vs hypovolemic shock from blood loss anemia  -- Blood cultures x 2 obtained and negative so far. UA unremarkable. Respiratory culture also negative  -- Vancomycin, meropenem and azithromycin.   -- On epi and vaso.   -- stress dose steroids and will taper if improvement of hemodynamics with extubation  -- ID consultation  -- HTS consulted in setting of vasoplegia and persistent shock despite continued therapy    Atrial fibrillation with RVR  -Afib with RVR at 2 am, INR 2.0, rate 135-145, EKG obtained, will give amio iv 150x1 followed by gtt  On Amio gtt and holding home dilt and metoprolol in shock  holding digoxin for with elevated Cr     LJ (acute kidney injury)  Baseline of 1.3 prior to admission.  Trend  1.3 --> 2.5 --> 3.1 --> 3.5 and now 4.4   Likely prerenal azotemia considering BUN/Cr 20:1 and concern for acute blood loss anemia. Continued hematuria     Plan:  - nephrology consulted with appreciated recommendations  - Monitor intake/output and daily standing weights.    - Avoid nephrotoxic agents such as NSAIDs, gadolinium and IV radiocontrast.  - Renally dose meds to current GFR.  - Maintain MAP > 65.      S/P mitral valve replacement with metallic valve  On warfarin at home. Transitioned to heparin gtt and will bridge once stable     Chronic a-fib  Currently afib with RVR     Paravalvular leak (prosthetic valve)  Patient is a 72 yo M with chronic AF on warfarin, pHTN, HLD and mitral valve regurgitation s/p mechanical MVR 2015 s/p MAZE c/b PVL s/p MV plug in 2018 and now with extension of his paravalvular leak on TRACY. Here for PVL closure with TRACY guidance.  Patient admitted to CCU for monitoring post procedure.     TTE limited 08/13/2022         VTE Risk Mitigation (From admission, onward)         Ordered     heparin 25,000 units in dextrose 5% (100 units/ml) IV bolus from bag - ADDITIONAL PRN BOLUS - 60 units/kg  As needed (PRN)        Question:  Heparin Infusion Adjustment (DO NOT MODIFY ANSWER)  Answer:  \\[email or web address]\epic\Images\Pharmacy\HeparinInfusions\heparin LOW INTENSITY nomogram for [facility] [id number]    08/11/22 0737     heparin 25,000 units in dextrose 5% (100 units/ml) IV bolus from bag - ADDITIONAL PRN BOLUS - 30 units/kg  As needed (PRN)        Question:  Heparin Infusion Adjustment (DO NOT MODIFY ANSWER)  Answer:  \\[email or web address]\epic\Images\Pharmacy\HeparinInfusions\heparin LOW INTENSITY nomogram for [facility] [id number]    08/11/22 0737     heparin 25,000 units in dextrose 5% 250 mL (100 units/mL) infusion LOW INTENSITY nomogram - [facility]  Continuous        Question Answer Comment   Heparin Infusion Adjustment (DO NOT MODIFY ANSWER) \[email or web address]\epic\Images\Pharmacy\HeparinInfusions\heparin LOW INTENSITY  nomogram for [facility] [id number]    Begin at (in units/kg/hr) 12        08/11/22 0737     IP VTE HIGH RISK PATIENT  Once         08/10/22 1647     Place sequential compression device  Until discontinued         08/10/22 1647                Adeel Kapoor MD  Cardiology  [facility] - [facility]

## 2022-08-15 NOTE — CONSULTS
Heart Failure Consult Note  Attending Physician: Mau James MD  Reason for Consult: High output heart failure state     HPI:   Mr. Yusuf Mcdonald is a 71 y.o.yo male with a past medical history of MR s/p mechanical MVR (2015 in ) complicated by a paravalvular leak s/p MV plug (2018, Dr. Tejeda), chronic Afib on warfarin s/p MAZE procedure and GEE ligation (2015, ), HTN who presented to [facility] on 8/10/22 for elective repair of recurrent paravalvular leak.     He was referred to the structural team from  for symptomatic PVL detected on a TRACY. On 8/10/22, he successfully underwent TRACY with MV plug repair with usage of a 10 mm VSD closure device. Access used was the RFA, RFV and LFV. Estimated blood loss was 50 cc.     Post-op, the pt was initially intubated and put on 2 pressors (Epinephrine and Vasopressin) due to hypotension. He was also on amiodarone for Afib with RVR, and was on a heparin drip. He has since been extubated, however, it has been difficult to wean off the pressors. Pt's CO and CI have remained high with a low SVR. Sepsis was suspected since the pt initially did have some high-grade temperatures, but despite antibiotics (guided by ID), cultures have been negative and hemodynamics still reveal a distributive shock picture. CVP was initially high at 13, however since then has come down. The course has also been complicated by a drop in the hemoglobin with a hemolytic picture.     Hemodynamics today:   SVO2: 71  MAP: 72  CO 9.9  CI: 4.8      ROS:    Constitution: Negative for fever, chills, weight loss or gain.   HENT: Negative for sore throat, rhinorrhea, or headache.  Eyes: Negative for blurred or double vision.   Cardiovascular: See above  Pulmonary: positive for SOB   Gastrointestinal: Negative for abdominal pain, nausea, vomiting, or diarrhea.   : Negative for dysuria.   Neurological: Negative for focal weakness or sensory changes.  PMH:     Past Medical History:   Diagnosis  Date    A-fib     Anticoagulant long-term use     Hypertension      Past Surgical History:   Procedure Laterality Date    CARDIOVERSION      MITRAL VALVE REPLACEMENT  2015    mechanical    MITRAL VALVE REPLACEMENT Right 10/10/2018    Procedure: REPLACEMENT, MITRAL VALVE;  Surgeon: Yusuf Tejeda MD;  Location: [facility] CATH LAB;  Service: Cardiology;  Laterality: Right;    TRANSESOPHAGEAL ECHOCARDIOGRAPHY N/A 8/10/2022    Procedure: ECHOCARDIOGRAM, TRANSESOPHAGEAL;  Surgeon: Alexus Bell MD;  Location: [facility] CATH LAB;  Service: Cardiology;  Laterality: N/A;     Allergies:     Review of patient's allergies indicates:   Allergen Reactions    Neomycin-bacitracnzn-polymyxnb     Benzalkonium chloride Rash    Neosporin (neomycin-polymyx) Rash     Medications:     No current facility-administered medications on file prior to encounter.     Current Outpatient Medications on File Prior to Encounter   Medication Sig Dispense Refill    acetaminophen (TYLENOL) 500 MG tablet Take 500 mg by mouth.      cetirizine (ZYRTEC) 10 MG tablet Take 10 mg by mouth.      clopidogreL (PLAVIX) 75 mg tablet TAKE ONE TABLET BY MOUTH EVERY DAY 30 tablet 11    diltiaZEM (CARDIZEM CD) 120 MG Cp24 Take 120 mg by mouth 2 (two) times a day.      metoprolol succinate (TOPROL-XL) 50 MG 24 hr tablet       coenzyme Q10 100 mg capsule Take 100 mg by mouth.      digoxin (LANOXIN) 250 mcg tablet       fish,bora,flax oils-om3,6,9no1 1,200 mg Cap Take 1,200 mg by mouth.      fluticasone (FLONASE) 50 mcg/actuation nasal spray 50 Bottles by Nasal route.      JANTOVEN 5 mg tablet       lisinopril (PRINIVIL,ZESTRIL) 20 MG tablet Take 20 mg by mouth.      multivitamin capsule Take by mouth.      [DISCONTINUED] furosemide (LASIX) 40 MG tablet          Inpatient Medications   Continuous Infusions:   sodium chloride 0.9% 200 mL/hr at 08/15/22 1500    sodium chloride 0.9% Stopped (08/15/22 1045)    sodium chloride 0.9% Stopped (08/11/22  0848)    sodium chloride 0.9% Stopped (22 0850)    amiodarone in dextrose 5% 0.5 mg/min (08/15/22 1500)    EPINEPHrine 0.16 mcg/kg/min (08/15/22 1500)    heparin (porcine) in D5W 19 Units/kg/hr (08/15/22 1500)    vasopressin 0.08 Units/min (08/15/22 1500)     Scheduled Meds:   clopidogreL  75 mg Per OG tube Daily    DAPTOmycin (CUBICIN) IV (PEDS and ADULTS)  8 mg/kg Intravenous Q48H    dextromethorphan-guaiFENesin  mg  1 tablet Oral BID    famotidine  20 mg Per OG tube Daily    fludrocortisone  100 mcg Oral Daily    hydrocortisone sodium succinate  100 mg Intravenous Q8H    meropenem (MERREM) IVPB  2 g Intravenous Q12H    QUEtiapine  12.5 mg Oral QHS     PRN Meds:sodium chloride, acetaminophen, [] fentaNYL **FOLLOWED BY** fentaNYL, heparin (PORCINE), heparin (PORCINE), magnesium sulfate IVPB, magnesium sulfate IVPB, ondansetron, QUEtiapine, sodium chloride 0.9%     Social History:     Social History     Tobacco Use    Smoking status: Never Smoker    Smokeless tobacco: Never Used   Substance Use Topics    Alcohol use: Yes     Alcohol/week: 2.0 standard drinks     Types: 1 Glasses of wine, 1 Shots of liquor per week     Comment: occ.     Family History:   History reviewed. No pertinent family history.  Physical Exam:     Vitals:  Temp:  [97.6 °F (36.4 °C)-98.4 °F (36.9 °C)]   Pulse:  []   Resp:  [15-34]   SpO2:  [92 %-100 %]   Arterial Line BP: ()/(41-57)  on 7L on HFNC I/O's:    Intake/Output Summary (Last 24 hours) at 8/15/2022 1507  Last data filed at 8/15/2022 1500  Gross per 24 hour   Intake 6179.56 ml   Output 5035 ml   Net 1144.56 ml          Physical Exam:  Vitals:    08/15/22 1200 08/15/22 1300 08/15/22 1400 08/15/22 1500   BP:       BP Location:       Patient Position:       Pulse: (!) 111 (!) 122 (!) 115 (!) 118   Resp: (!) 33 20 15 18   Temp: 97.6 °F (36.4 °C)      TempSrc: Oral      SpO2: 97% 98% 97% 97%   Weight:       Height:          Gen: Alert and awake.  No acute distress.   Neck: Left triple lumen catheter  CV: tachycardic, regular rhythm  Lungs: Decreased breath sounds in bilateral bases.     Extremities: Good pedal pulses. No pedal edema.   Abd: soft, non tender, non distended  Neuro: AOx3,  No focal neurological deficit.        Labs:     Recent Labs   Lab 08/15/22  0207 08/15/22  0516 08/15/22  1408   * 124* 122*   K 4.3 4.3 4.4   CL 93* 92* 92*   CO2 22* 19* 17*   BUN 93* 100* 97*   CREATININE 3.9* 4.3* 4.2*   ANIONGAP 11 13 13     Recent Labs   Lab 08/13/22  0213 08/14/22  0401 08/15/22  0207 08/15/22  0516 08/15/22  1408   * 180*  --  171*  --    ALT 12 11  --  11  --    ALKPHOS 101 100  --  114  --    BILITOT 1.4* 1.6*  --  2.2*  --    ALBUMIN 1.1* 1.2*   < > 1.2* 1.3*    < > = values in this interval not displayed.     No results for input(s): TROPONINI, BNP in the last 168 hours. Recent Labs   Lab 08/13/22  1942 08/14/22  0401 08/15/22  0516   WBC 28.03* 27.34* 30.96*   HGB 9.7* 9.7* 9.3*   HCT 27.3* 27.3* 25.5*    189 162   GRAN 85.6*  24.0* 85.1*  23.3* 84.4*  26.2*     Recent Labs   Lab 08/15/22  0803   INR 1.1     Lab Results   Component Value Date    CHOL 219 (H) 12/30/2011    HDL 39 (L) 12/30/2011    LDLCALC 136.0 12/30/2011    TRIG 219 (H) 12/30/2011     Lab Results   Component Value Date    HGBA1C 5.2 12/30/2011        Micro:  Blood Cultures  Lab Results   Component Value Date    LABBLOO No Growth to date 08/14/2022    LABBLOO No Growth to date 08/14/2022    LABBLOO No Growth to date 08/10/2022    LABBLOO No Growth to date 08/10/2022    LABBLOO No Growth to date 08/10/2022    LABBLOO No Growth to date 08/10/2022    LABBLOO No Growth to date 08/10/2022     Urine Cultures  No results found for: LABURIN    Imaging:     Limited TTE 8/14/22:  · Limited study to evaluate mitral PVL closure (s/p 10mm VSD closure device).  · The left ventricle is normal in size with mildly decreased systolic function.  · The estimated ejection  fraction is 45%.  · There is a bileaflet mechanical mitral valve prosthesis. VSD plug visualized anteriorly. There is mild rocking of the mechanical valve with moderate mitral regurgitation. The mean diastolic gradient across the mitral valve is 8mmHg at heart rate 109bpm.  · Severe right ventricular enlargement with moderately reduced right ventricular systolic function.    EF   Date Value Ref Range Status   08/14/2022 45 % Final   08/10/2022 55 % Final     Assessment:     Mr. Yusuf Mcdonald is a 71 y.o.yo male with a past medical history of MR s/p mechanical MVR (2015 in ) complicated by a paravalvular leak s/p MV plug (2018, Dr. Tejeda), chronic Afib on warfarin s/p MAZE procedure and GEE ligation (2015, ), HTN who presented to [facility] on 8/10/22 for elective repair of recurrent paravalvular leak.     He was referred to the structural team from  for symptomatic PVL detected on a TRACY. On 8/10/22, he successfully underwent TRACY with MV plug repair with usage of a 10 mm VSD closure device. Access used was the RFA, RFV and LFV. Estimated blood loss was 50 cc.     Post-op, the pt was initially intubated and put on 2 pressors (Epinephrine and Vasopressin) due to hypotension. He was also on amiodarone for Afib with RVR, and was on a heparin drip. He has since been extubated, however, it has been difficult to wean off the pressors. Pt's CO and CI have remained high with a low SVR. Sepsis was suspected since the pt initially had some high-grade temperatures, but despite antibiotics (guided by ID), cultures have been negative and hemodynamics still reveal a distributive shock picture. CVP was initially high at 13, however has come down. The course has also been complicated by a drop in the hemoglobin with a hemolytic picture. The pt also has cirrhosis, ascites and b/l pleural effusions.     The HTS service has been consulted for assistance with management of high output heart failure.     Plan:     # Distributive shock  - Pt  with high CO and CI, low SVR, normal SVO2.   - Etiology of distributive shock is not clear, however consider sepsis, shunting / acquired AV fistula, endocrine, SIRS from fluid losses/third spacing due to liver and kidney failure.   - Consider ruling out adrenal and thyroid insufficiency.   - Recommend obtaining Echo with bubble study to rule out an intracardiac shunt secondary to the transseptal puncture and VSD closure device.   - Recommend obtaining right and left heart pressures with a leave-in [place].   - Continue pressors, antibiotics, steroids.     # H/o MR s/p mechanical MVR c/b PVLx2  # S/p PVL repair with MV plug , most recently 8/11/22  - Continue anticoagulation with heparin gtt.   - Continue ASA, Plavix (as long as blood counts will allow)  - Pt with hemolytic picture (see below); not sure if this secondary to the mechanical valve.     # Afib with RVR   - Pt remains tachycardic while on two pressors. Attempt to wean pressors as able.   - Continue amiodarone gtt and heparin gtt.   - Home digoxin, metoprolol, diltiazem currently held.     # LJ / ATN  - Creatinine elevated; pt is oliguric.   - Nephrology following.   - Pt is on CRRT.   - Consider hemolytic uremic syndrome (no thrombocytopenia yet, although plts are gradually trending down)    # Anemia - blood loss vs hemolysis  - Pt has a hemolytic picture (low Hgb, high LDH, low haptoglobin, high bilirubin with schistocytes on smear). Estimated blood loss intra-op was ~50 cc.   - Consider checking a retic count. Platelets are within range, but trending down.   - Cause of hemolysis unclear. Liver cirrhosis could be playing a role. Consider HUS given his ATN.   - Consider Hematology consult to determine cause of hemolysis.   - ASA held. Currently on Plavix.     # Hyponatremia  Per primary/renal      Signed:  Linus Bird MD, MSCI  Cardiology Fellow  8/15/2022 3:07 PM

## 2022-08-15 NOTE — ASSESSMENT & PLAN NOTE
Oliguric LJ on pressor support in the setting of shock s/p paravalvular mitral valve leak repair on 8/10.   Patient has had limited urine output despite administration of IV lasix with worsening BUN/Cr from baseline sCr 1.3 to a peak 114/4.4  Net positive 1.7L over the past 24h and requiring supplemental O2  Gross hematuria with dark red/brown urine output  Urine sodium 16, urine urea 450, urine creatinine 67  Urinary catheter in place with no evidence of obstruction.  Will keep on CRRT SLED alternating with SCUF for volume management and metabolic clearance   Keep araujo for strict I/Os  Avoid nephrotoxic medications including NSAIDs, ACEi/ARBs, IV radiocontrast

## 2022-08-15 NOTE — PLAN OF CARE
Problem: Fluid and Electrolyte Imbalance (Acute Kidney Injury/Impairment)  Goal: Fluid and Electrolyte Balance  Outcome: Ongoing, Progressing     Problem: Oral Intake Inadequate (Acute Kidney Injury/Impairment)  Goal: Optimal Nutrition Intake  Outcome: Ongoing, Progressing     CICU DAILY GOALS       A: Awake    RASS: Goal - RASS Goal: 0-->alert and calm  Actual - RASS (Peace Agitation-Sedation Scale): 0-->alert and calm   Restraint necessity: Clinical Justification: Removing medical devices  B: Breath   SBT: Not intubated   C: Coordinate A & B, analgesics/sedatives   Pain: managed    SAT: Not intubated  D: Delirium   CAM-ICU: Overall CAM-ICU: Negative  E: Early(intubated/ Progressive (non-intubated) Mobility   MOVE Screen: Fail   Activity: Activity Management: Arm raise - L1  FAS: Feeding/Nutrition   Diet order: Diet/Nutrition Received: 2 gram sodium, low saturated fat/low cholesterol, mechanical/dental soft, Specialty Diet/Nutrition Received: renal diet Fluid restriction:    T: Thrombus   DVT prophylaxis: VTE Required Core Measure: Pharmacological prophylaxis initiated/maintained  H: HOB Elevation   Head of Bed (HOB) Positioning: HOB elevated  U: Ulcer Prophylaxis   GI: yes  G: Glucose control   managed Glycemic Management: blood glucose monitored  S: Skin   Bundle compliance: no   Bathing/Skin Care: bath, complete, dressed/undressed, incontinence care Date: 08/14/2022  B: Bowel Function   diarrhea   I: Indwelling Catheters   Quiroz necessity:      Urethral Catheter 08/10/22 1718-Reason for Continuing Urinary Catheterization: Critically ill in ICU and requiring hourly monitoring of intake/output   CVC necessity: Yes   IPAD offered: No  D: De-escalation Antibx   Yes  Plan for the day   Trialysis placed for CRRT, patient started on CRRT. CVP trends 16/17/16. Collected stool for culture and blood cultures. Patient remains oliguric with tea colored urine. Severe edema noted at all extremities.   Family/Goals of  care/Code Status   Code Status: Full Code     VS and assessment per flow sheet, patient progressing towards goals as tolerated, plan of care reviewed with Yusuf Mcdonald Jr. and family, all concerns addressed, will continue to monitor.

## 2022-08-15 NOTE — PROGRESS NOTES
[facility] - [facility]  Nephrology  Progress Note    Patient Name: Yusuf Mcdonald Jr.  MRN: [id number]  Admission Date: 8/10/2022  Hospital Length of Stay: 5 days  Attending Provider: Mau James MD   Primary Care Physician: Primary Doctor No  Principal Problem:Acute blood loss anemia    Subjective:     HPI: Ms. Mcdonald is a 71 year old woman with a history of chronic AF (on warfarin), pHTN, HLD, s/p MAZE and MV plug (2015) who was admitted for a mitral valve paravalvular leak repair on 8/10. Patient was hypotensive in septic shock post-procedure requiring pressor support with vasopressin, epinephrine. Patient was started vanc and cefepime, then transitioned to meropenem and azithromycin for continued fevers. He has had worsening renal function since admission on 8/9. Baseline sCr is 1.3. On admission sCr 2.4, which worsened to 3.1 on 8/12 with a peak BUN/Cr of 114/4.4 today. He received IV lasix yesterday with 400mL UOP that appears dark brown in color. He is net positive 15L since admission.    Nephrology consulted regarding acute renal failure in the setting of shock.      Interval History: Net positive 1.7L over the past 24h. On CRRT/SLED alternating with SCUF. BUN/Cr trending up. Remains oliguric.     Review of patient's allergies indicates:   Allergen Reactions    Neomycin-bacitracnzn-polymyxnb     Benzalkonium chloride Rash    Neosporin (neomycin-polymyx) Rash     Current Facility-Administered Medications   Medication Frequency    0.9%  NaCl infusion (CRRT USE ONLY) Continuous    0.9%  NaCl infusion (CRRT USE ONLY) Continuous    0.9%  NaCl infusion (for blood administration) Q24H PRN    0.9%  NaCl infusion Continuous    0.9%  NaCl infusion Continuous    acetaminophen tablet 650 mg Q4H PRN    amiodarone 360 mg/200 mL (1.8 mg/mL) infusion Continuous    cetirizine tablet 10 mg QHS    clopidogreL tablet 75 mg Daily    DAPTOmycin (CUBICIN) 715 mg in sodium chloride 0.9% 50 mL IVPB Q48H     dextromethorphan-guaiFENesin  mg per 12 hr tablet 1 tablet BID    EPINEPHrine 5 mg in dextrose 5% 250 mL infusion (premix) Continuous    famotidine tablet 20 mg Daily    fentaNYL 50 mcg/mL injection 50 mcg Q1H PRN    fludrocortisone tablet 100 mcg Daily    heparin 25,000 units in dextrose 5% (100 units/ml) IV bolus from bag - ADDITIONAL PRN BOLUS - 30 units/kg PRN    heparin 25,000 units in dextrose 5% (100 units/ml) IV bolus from bag - ADDITIONAL PRN BOLUS - 60 units/kg PRN    heparin 25,000 units in dextrose 5% 250 mL (100 units/mL) infusion LOW INTENSITY nomogram - [facility] Continuous    hydrocortisone sodium succinate injection 100 mg Q8H    magnesium sulfate 2g in water 50mL IVPB (premix) PRN    magnesium sulfate 2g in water 50mL IVPB (premix) PRN    meropenem (MERREM) 2 g in sodium chloride 0.9% 100 mL IVPB Q12H    ondansetron disintegrating tablet 8 mg Q8H PRN    quetiapine split tablet 12.5 mg QHS    quetiapine split tablet 12.5 mg Nightly PRN    sodium chloride 0.9% flush 10 mL PRN    vasopressin (PITRESSIN) 1 Units/mL in dextrose 5 % 100 mL infusion Continuous       Objective:     Vital Signs (Most Recent):  Temp: 97.8 °F (36.6 °C) (08/15/22 0700)  Pulse: (!) 115 (08/15/22 0900)  Resp: (!) 26 (08/15/22 0900)  BP: 96/65 (08/13/22 1910)  SpO2: 96 % (08/15/22 0900)  O2 Device (Oxygen Therapy): High Flow nasal Cannula (08/15/22 0755)   Vital Signs (24h Range):  Temp:  [97.8 °F (36.6 °C)-98.4 °F (36.9 °C)] 97.8 °F (36.6 °C)  Pulse:  [] 115  Resp:  [20-34] 26  SpO2:  [92 %-100 %] 96 %  Arterial Line BP: ()/(41-60) 107/57     Weight: 89.4 kg (197 lb) (08/14/22 1200)  Body mass index is 29.95 kg/m².  Body surface area is 2.07 meters squared.    I/O last 3 completed shifts:  In: 4960.5 [P.O.:340; I.V.:4114.5; IV Piggyback:506]  Out: 2176 [Urine:365; Other:1811]    Physical Exam  Vitals reviewed.   Constitutional:       Appearance: He is ill-appearing.   HENT:      Head: Normocephalic  and atraumatic.   Eyes:      Extraocular Movements: Extraocular movements intact.      Conjunctiva/sclera: Conjunctivae normal.   Cardiovascular:      Rate and Rhythm: Tachycardia present. Rhythm irregular.   Pulmonary:      Effort: Pulmonary effort is normal.      Breath sounds: Normal breath sounds.   Abdominal:      General: Bowel sounds are normal. There is no distension.      Palpations: Abdomen is soft.      Tenderness: There is no abdominal tenderness.   Genitourinary:     Comments: Urinary catheter in place with dark brown urine output  Musculoskeletal:      Right lower leg: Edema present.      Left lower leg: Edema present.   Skin:     General: Skin is warm.      Coloration: Skin is pale.   Neurological:      Mental Status: He is alert and oriented to person, place, and time. Mental status is at baseline.       Significant Labs:  CBC:   Recent Labs   Lab 08/15/22  0516   WBC 30.96*   RBC 3.06*   HGB 9.3*   HCT 25.5*      MCV 83   MCH 30.4   MCHC 36.5*     CMP:   Recent Labs   Lab 08/15/22  0516   *   CALCIUM 7.8*   ALBUMIN 1.2*   PROT 3.9*   *   K 4.3   CO2 19*   CL 92*   *   CREATININE 4.3*   ALKPHOS 114   ALT 11   *   BILITOT 2.2*            Assessment/Plan:     Hyponatremia  Serum sodium down to 124 this AM   Likely hypervolemic in the setting of cardiogenic shock and positive fluid balance   Will keep on CRRT/SLED, Na bath adjusted  Please change IV meds solution from D5 to NS as allowed by pharmacy       Shock  Cardiogenic + possibly septic   Management per primary     LJ (acute kidney injury)  Oliguric LJ on pressor support in the setting of shock s/p paravalvular mitral valve leak repair on 8/10.   Patient has had limited urine output despite administration of IV lasix with worsening BUN/Cr from baseline sCr 1.3 to a peak 114/4.4  Net positive 1.7L over the past 24h and requiring supplemental O2  Gross hematuria with dark red/brown urine output  Urine sodium 16,  urine urea 450, urine creatinine 67  Urinary catheter in place with no evidence of obstruction.  Will keep on CRRT SLED alternating with SCUF for volume management and metabolic clearance   Keep araujo for strict I/Os  Avoid nephrotoxic medications including NSAIDs, ACEi/ARBs, IV radiocontrast     Paravalvular leak (prosthetic valve)  S/p repair (8/10)          Chris Nuno MD  Nephrology  [facility] - [facility]

## 2022-08-15 NOTE — PROGRESS NOTES
08/14/22 4343   Treatment   Treatment Type SLED   Treatment Status New start   Dialysis Machine Number [id number]   Dialyzer Time (hours) 0   BVP (Liters) 0 L   Solutions Labeled and Current  Yes   Access Left;IJ;Temporary Cath   Catheter Dressing Intact  Yes   Alarms Engaged Yes   CRRT Comments Started   $ CRRT Charges   $ CRRT Charges Initial Setup   Prescription   Time (Hours) Continuous  (3 hour SLED followed by continuous SCUF)   Dialysate K + (mEq/L) 4   Dialysate CA + (mEq/L) 2.25   Dialysate HCO3 - (Bicarb) (mEq/L) 30   Dialysate Na + (mEq/L) Other (comment)  (132)   Cartridge Type   (r300)   Dialysate Flow Rate (mL/min) 200   UF Goal Rate 400 mL/hr   CRRT Hourly Documentation   Blood Flow (mL/min) 150   UF Rate 200 cc/hr   Arterial Pressure (mmHg) -20 mmHg   Venous Pressure (mmHg) 40 mmHg   Effluent Pressure (EP) (mmHg) 10 mmHg

## 2022-08-15 NOTE — SUBJECTIVE & OBJECTIVE
Interval History:   No acute events overnight. Persistent shock, HTS consulted today. Continue with current regiment for now.   All questions answered.       Review of Systems   Constitutional: Negative for chills and fever.   Cardiovascular:  Negative for chest pain, orthopnea and palpitations.   Respiratory:  Negative for cough and shortness of breath.    Gastrointestinal:  Negative for abdominal pain.   Neurological:  Negative for dizziness, headaches and light-headedness.   Psychiatric/Behavioral:  Negative for altered mental status.    Objective:     Vital Signs (Most Recent):  Temp: 97.6 °F (36.4 °C) (08/15/22 1600)  Pulse: (!) 116 (08/15/22 1600)  Resp: 16 (08/15/22 1600)  BP: 96/65 (08/13/22 1910)  SpO2: 98 % (08/15/22 1600)   Vital Signs (24h Range):  Temp:  [97.6 °F (36.4 °C)-98.4 °F (36.9 °C)] 97.6 °F (36.4 °C)  Pulse:  [] 116  Resp:  [15-34] 16  SpO2:  [92 %-100 %] 98 %  Arterial Line BP: ()/(41-57) 104/55     Weight: 89.4 kg (197 lb)  Body mass index is 29.95 kg/m².     SpO2: 98 %  O2 Device (Oxygen Therapy): High Flow nasal Cannula      Intake/Output Summary (Last 24 hours) at 8/15/2022 1623  Last data filed at 8/15/2022 1600  Gross per 24 hour   Intake 5879.52 ml   Output 5486 ml   Net 393.52 ml       Lines/Drains/Airways       Central Venous Catheter Line  Duration             Percutaneous Central Line Insertion/Assessment - Triple Lumen  08/10/22 1722 right internal jugular 4 days    Trialysis (Dialysis) Catheter 08/14/22 2130 left internal jugular <1 day              Drain  Duration                  Urethral Catheter 08/10/22 1718 4 days              Arterial Line  Duration             Arterial Line 08/10/22 1334 Right Radial 5 days              Peripheral Intravenous Line  Duration                  Peripheral IV - Single Lumen 08/11/22 1641 18 G Anterior;Distal;Left Upper Arm 3 days                    Physical Exam  Vitals and nursing note reviewed.   Constitutional:       General: He  is not in acute distress.     Appearance: Normal appearance. He is ill-appearing. He is not toxic-appearing.      Comments: Pale   HENT:      Head: Normocephalic and atraumatic.      Mouth/Throat:      Mouth: Mucous membranes are moist.   Cardiovascular:      Rate and Rhythm: Tachycardia present. Rhythm irregular.      Heart sounds: Murmur (mitral valve systolic click) heard.     No friction rub. No gallop.      Comments: Telemetry afib with RVR  Pulmonary:      Effort: Pulmonary effort is normal. No respiratory distress.      Breath sounds: Normal breath sounds.   Abdominal:      Palpations: Abdomen is soft.   Musculoskeletal:      Right lower leg: No edema.      Left lower leg: No edema.   Skin:     General: Skin is warm.   Neurological:      Mental Status: He is alert and oriented to person, place, and time. Mental status is at baseline.       Significant Labs: ABG:   Recent Labs   Lab 08/14/22  2001 08/14/22  2117 08/15/22  1254   PH 7.366 7.361 7.361   PCO2 36.5 37.2 35.9   HCO3 20.9* 21.1* 20.3*   POCSATURATED 61* 65* 71*   BE -4 -4 -5   , Blood Culture:   Recent Labs   Lab 08/14/22  1836 08/14/22 2111   LABBLOO No Growth to date No Growth to date   , BMP:   Recent Labs   Lab 08/15/22  0207 08/15/22  0516 08/15/22  1408   * 160* 149*   * 124* 122*   K 4.3 4.3 4.4   CL 93* 92* 92*   CO2 22* 19* 17*   BUN 93* 100* 97*   CREATININE 3.9* 4.3* 4.2*   CALCIUM 7.6* 7.8* 7.9*   MG 2.1 2.2 2.2   , CMP   Recent Labs   Lab 08/14/22  0401 08/15/22  0207 08/15/22  0516 08/15/22  1408   * 126* 124* 122*   K 4.6 4.3 4.3 4.4   CL 93* 93* 92* 92*   CO2 18* 22* 19* 17*   * 153* 160* 149*   * 93* 100* 97*   CREATININE 4.4* 3.9* 4.3* 4.2*   CALCIUM 8.1* 7.6* 7.8* 7.9*   PROT 3.8*  --  3.9*  --    ALBUMIN 1.2* 1.2* 1.2* 1.3*   BILITOT 1.6*  --  2.2*  --    ALKPHOS 100  --  114  --    *  --  171*  --    ALT 11  --  11  --    ANIONGAP 13 11 13 13   , CBC   Recent Labs   Lab 08/13/22 1942  08/14/22  0401 08/15/22  0516   WBC 28.03* 27.34* 30.96*   HGB 9.7* 9.7* 9.3*   HCT 27.3* 27.3* 25.5*    189 162   , INR   Recent Labs   Lab 08/14/22  0401 08/15/22  0516 08/15/22  0803   INR 1.1 SEE COMMENT 1.1   , Lipid Panel No results for input(s): CHOL, HDL, LDLCALC, TRIG, CHOLHDL in the last 48 hours.,   Pathology Results  (Last 10 years)      None        , Troponin No results for input(s): TROPONINI in the last 48 hours., and All pertinent lab results from the last 24 hours have been reviewed.    Significant Imaging: Echocardiogram: Transthoracic echo (TTE) complete (Cupid Only):   Results for orders placed or performed during the hospital encounter of 08/10/22   Echo   Result Value Ref Range    IVS 0.87 0.6 - 1.1 cm    LA size 6.78 cm    LVIDd 5.64 3.5 - 6.0 cm    LVIDs 3.71 2.1 - 4.0 cm    Posterior Wall 0.82 0.6 - 1.1 cm    E wave deceleration time 409.69 msec    MV stenosis pressure 1/2 time 118.81 ms    LV Diastolic Volume 156.04 mL    LV Systolic Volume 58.52 mL    MV mean gradient 1 mmHg    MV peak gradient 24 mmHg    MV VTI 34.91 cm    FS 34 %    LV mass 179.00 g    Left Ventricle Relative Wall Thickness 0.29 cm    MV valve area p 1/2 method 1.85 cm2    LV Systolic Volume Index 28.8 mL/m2    LV Diastolic Volume Index 76.87 mL/m2    LV Mass Index 88 g/m2    BSA 2.07 m2    EF 45 %    Narrative    · Limited study to evaluate mitral PVL closure (s/p 10mm VSD closure   device).  · The left ventricle is normal in size with mildly decreased systolic   function.  · The estimated ejection fraction is 45%.  · There is a bileaflet mechanical mitral valve prosthesis. VSD plug   visualized anteriorly. There is mild rocking of the mechanical valve with   moderate mitral regurgitation. The mean diastolic gradient across the   mitral valve is 8mmHg at heart rate 109bpm.  · Severe right ventricular enlargement with moderately reduced right   ventricular systolic function.

## 2022-08-15 NOTE — ASSESSMENT & PLAN NOTE
Patient is a 70 yo M with chronic AF on warfarin, pHTN, HLD and mitral valve regurgitation s/p mechanical MVR 2015 s/p MAZE c/b PVL s/p MV plug in 2018 and now with extension of his paravalvular leak on TRACY. Here for PVL closure with TRACY guidance.  Patient admitted to CCU for monitoring post procedure.     TTE limited 08/13/2022

## 2022-08-15 NOTE — MEDICAL/APP STUDENT
"Yusuf Mcdonald Jr.  [id number]    Hospital day Hospital Day: 6    Reason for admission: Acute blood loss anemia    Interval History:   Mr. Mcdonald is a 70 yo M with PMHx severe MR, s/p MVR 2015 and PVL revision 2018, permanent AF s/p GEE ligation on home warfarin, HTN, HLD, NICM, admitted to CCU for management of septic shock in the setting of MV paravalvular leak repair 8/10, further complicated by pre-procedural LJ. Post procedure, patient was hypotensive MAPS in 40s. Emergent R CVC placed, revelaing high output low SVR hemodynamics, requiring 0.08 vasopressin and 0.12 epinephrine. Patient was sedated on propofol, fentanyl and precedex.  Hypothermic to 88.9, then spiked fever to 102.2. Blood cultures x2 and respiratory cultures drawn, NGTD. Patient was started on vancomycin and cefepime, later escalated to meropenem and azithromycin due to ongoing fevers. Started on stress dose steroids. Vancomycin switched to daptomycin and azithromycin discontinued as per ID consult. Lactate improved from 4 to 2.07. SAT/SBT 8/12, downstepped to BiPAP, now on 7L O2 NC. Hgb trending downward over hospital course 13 --> 7.9. Patient received 2U  RBCs with improvement to 9.3.  CT abdomen demonstrated no source infection, nor RP bleed, revealed cirrhosis with mild-moderate ascites and anasarca.  8/11 AF with RVR to 130s-140s, started on amio drip.   LJ likely ischemic ATN vs prerenal given septic shock. Creatinine trended up from 2.4 day of procedure to peak 4.4. Received IV lasix 8/14 with 400 mL tea colored UOP. Nephrology consulted and started on CRRT SLED alternating SCUF.    ROS:   Gen: No fevers, fatigue or malaise  HEENT: congestion, "post-nasal drip"  CVS: No cyanosis  Resp: No shortness of breath, wheezing  FEN/GI: Tolerating PO, no vomiting, no diarrhea today  Heme: No bruising or bleeding  Skin: No rashes    Objective:  Temp:  [97.6 °F (36.4 °C)-98.4 °F (36.9 °C)] 97.6 °F (36.4 °C)  Pulse:  [] 122  Resp:  [20-34] 20  SpO2: "  [92 %-100 %] 98 %  Arterial Line BP: ()/(41-60) 110/57      Current Medications:    clopidogreL  75 mg Per OG tube Daily    DAPTOmycin (CUBICIN) IV (PEDS and ADULTS)  8 mg/kg Intravenous Q48H    dextromethorphan-guaiFENesin  mg  1 tablet Oral BID    famotidine  20 mg Per OG tube Daily    fludrocortisone  100 mcg Oral Daily    hydrocortisone sodium succinate  100 mg Intravenous Q8H    meropenem (MERREM) IVPB  2 g Intravenous Q12H    QUEtiapine  12.5 mg Oral QHS     sodium chloride, acetaminophen, [] fentaNYL **FOLLOWED BY** fentaNYL, heparin (PORCINE), heparin (PORCINE), magnesium sulfate IVPB, magnesium sulfate IVPB, ondansetron, QUEtiapine, sodium chloride 0.9%    Data Review  Labs:    Lab Results   Component Value Date    WBC 30.96 (H) 08/15/2022    HGB 9.3 (L) 08/15/2022    HCT 25.5 (L) 08/15/2022    MCV 83 08/15/2022     08/15/2022     Lab Results   Component Value Date    CREATININE 4.3 (H) 08/15/2022     (H) 08/15/2022     (L) 08/15/2022    K 4.3 08/15/2022    CL 92 (L) 08/15/2022    CO2 19 (L) 08/15/2022       Microbiology Results (last 7 days)     Procedure Component Value Units Date/Time    Blood culture [[id number]] Collected: 22    Order Status: Completed Specimen: Blood from Peripheral, Jugular, Internal Left Updated: 08/15/22 0515     Blood Culture, Routine No Growth to date    Clostridium difficile EIA [[id number]] Collected: 22    Order Status: Completed Specimen: Stool Updated: 08/15/22 0434     C. diff Antigen Negative     C difficile Toxins A+B, EIA Negative     Comment: Testing not recommended for children <24 months old.       Blood culture [[id number]] Collected: 22 1836    Order Status: Completed Specimen: Blood from Peripheral, Antecubital, Left Updated: 08/15/22 0115     Blood Culture, Routine No Growth to date    Blood culture [[id number]] Collected: 08/10/22 2000    Order Status: Completed Specimen: Blood from  Peripheral, Upper Arm, Left Updated: 08/14/22 2212     Blood Culture, Routine No Growth to date      No Growth to date      No Growth to date      No Growth to date      No Growth to date    Blood culture [[id number]] Collected: 08/10/22 2058    Order Status: Completed Specimen: Blood from Peripheral, Forearm, Left Updated: 08/14/22 2212     Blood Culture, Routine No Growth to date      No Growth to date      No Growth to date      No Growth to date      No Growth to date    Culture, Respiratory with Gram Stain [[id number]]     Order Status: No result Specimen: Respiratory     Culture, Respiratory with Gram Stain [[id number]] Collected: 08/11/22 0914    Order Status: Completed Specimen: Respiratory from Tracheal Aspirate Updated: 08/13/22 1223     Respiratory Culture Normal respiratory ronny      No S aureus or Pseudomonas isolated.     Gram Stain (Respiratory) <10 epithelial cells per low power field.     Gram Stain (Respiratory) Rare WBC's     Gram Stain (Respiratory) No organisms seen    Urine culture [[id number]]     Order Status: Completed Specimen: Urine, Catheterized           Physical Exam  Constitutional:       Appearance: He is ill-appearing.   HENT:      Head: Normocephalic and atraumatic.   Cardiovascular:      Rate and Rhythm: Regular rhythm. Tachycardia present.   Pulmonary:      Effort: Pulmonary effort is normal.      Breath sounds: Normal breath sounds.      Comments: On 7L O2 NC  Abdominal:      General: There is distension.      Tenderness: There is no abdominal tenderness. There is no guarding.   Musculoskeletal:         General: Swelling present.      Right lower leg: 3+ Edema present.      Left lower leg: 3+ Edema present.   Skin:     General: Skin is warm and dry.      Coloration: Skin is pale.   Neurological:      General: No focal deficit present.      Mental Status: He is alert.         Radiology Review:   TTE 8/14:  · Limited study to evaluate mitral PVL closure (s/p 10mm VSD closure  device).  · The left ventricle is normal in size with mildly decreased systolic function.  · The estimated ejection fraction is 45%.  · There is a bileaflet mechanical mitral valve prosthesis. VSD plug visualized anteriorly. There is mild rocking of the mechanical valve with moderate mitral regurgitation. The mean diastolic gradient across the mitral valve is 8mmHg at heart rate 109bpm.  · Severe right ventricular enlargement with moderately reduced right ventricular systolic function.    CXR 8/14:  Impression:  Postop changes of prior mitral valve replacement with sternotomy wires present.  Extubated. Enteric tube removed. Right IJ catheter unchanged. Left IJ catheter placed with tip overlying the SVC.  Cardiomegaly and bilateral edema, similar to prior.  No pneumothorax.    Assessment:  Mr. Yusuf Mcdonald is a 71 y.o. who presented for PVL repair, which was complicated by prolonged surgery and anesthesia time, a pre-procedural LJ, post-procedure septic shock requiring 2 pressors, AF with RVR, ATN, hematuria and anasarca.    Plan:    Septic Shock  Post procedural hypotension with MAP to 40s, requiring 0.14 epi and 0.08 vasopressin. WCC increased from 10 to 23, fever peak to 102.2 Started on vanc/cefepime, escalated to meropenem, vancomycin and azithromycin with stress dose steroids. Now, patient afebrile with elevated WCC to 30, diarrhea x2 days, pressor requirement increased to 0.16 epi/0.08 vaso. Given lack of hemodynamic progress on antibiotics, possible concern for high output cardiogenic shock vs septic shock.     Per ID consult 8/14: vancomycin switched to daptomycin for LJ, azithromycin discontinued.    Stool culture for C diff negative   Blood cultures x2 8/10 NGTD  Blood cultures x2 8/14 NGTD  Respiratory culture 8/11 NGTD  UA 8/11 unremarkable     - Continue daptomycin   - Heart Failure consult requested    Acute Blood Loss Anemia  Patient with mechanical mitral valve and chronic hemolytic process with  altered haptoglobin and LDH since 2018. However, down-trending Hgb from 13 to 7.9 concern for bleed, platelets stable. Transfused 2U RBCs 8/13 with increase Hgb to 9.7. On ASA and clopidogrel 75mg for PVL, heparin drip for mechanical valve and AF.    CT 8/14 with no sign of retroperitoneal bleed.  Hgb today 9.3  - Daily CBC    LJ/ATN  Baseline Cr 1.3-1.6, on admission 2.4 Has trended up to peak BUN/Cr of 114/4.4 with little tea-colored UOP despite multiple pushes IV lasix. Net positive 16L since admission. Concern for prerenal LJ vs ATN.   Nephrology consulted 8/14: patient started on CRRT/SLED alternating with SCUF. Has remained oliguric.  - continue dialysis as per nephrology rec's  - Quiroz catheter inserted for strict I/Os  - Avoid nephrotoxic medications, including NSAIDs, ACEi/ARBs, IV radiocontrast     AF RVR  Patient with permanent AF, s/p GEE ligation on home warfarin. Post procedure, patient developed AF with RVR to 130s-140s. Given 150mL push amio and started on amio gtt   aPTT 8/15 46.1  - continue amio gtt  - continue low intensity heparin gtt  - hold home metoprolol and diltiazem due to hypotension  - hold home digoxin due to LJ  - on tele    Anasarca  Patient with volume overload, pitting edema throughout, albumin chronically low, 1.8 on admission, low of 1.1 8/13. Likely multifactorial due to LJ, cirrhosis, sepsis, chronic malnutrition. Unlikely hepatorenal syndrome due to urine sodium >10 (urine sodium 16 on 8/11).    CT 8/14 revealed cirrhosis with mild-moderate ascites and anasarca.  LFTs 8/15: , ALT 11, TBili 2.2  Albumin 8/15: 1.2     PVL  S/p procedure 8/10  TTE 8/14: EF 45%, mild rocking of the mechanical valve with moderate mitral regurgitation. The mean diastolic gradient across the mitral valve is 8mmHg at heart rate 109bpm. Severe right ventricular enlargement with moderately reduced right ventricular systolic function.  - continue ASA 81, clopidogrel 75mg    SOCIAL: Full  code    F renal diet  A acetaminophen 650 PRN  S awake, seroquel 12.5 PRN nightly  T low intensity heparin gtt  H HOB >30  U famotidine 20 mg poqd  G 160  S n/a  B no bowel regimen  I indwelling aruajo catheter x 4days  D continue meropenem, vancomycin switched to daptomycin, d/c'd azithromycin

## 2022-08-15 NOTE — ASSESSMENT & PLAN NOTE
Serum sodium down to 124 this AM   Likely hypervolemic in the setting of cardiogenic shock and positive fluid balance   Will keep on CRRT/SLED, Na bath adjusted  Please change IV meds solution from D5 to NS as allowed by pharmacy

## 2022-08-15 NOTE — SUBJECTIVE & OBJECTIVE
Interval History: Net positive 1.7L over the past 24h. On CRRT/SLED alternating with SCUF. BUN/Cr trending up. Remains oliguric.     Review of patient's allergies indicates:   Allergen Reactions    Neomycin-bacitracnzn-polymyxnb     Benzalkonium chloride Rash    Neosporin (neomycin-polymyx) Rash     Current Facility-Administered Medications   Medication Frequency    0.9%  NaCl infusion (CRRT USE ONLY) Continuous    0.9%  NaCl infusion (CRRT USE ONLY) Continuous    0.9%  NaCl infusion (for blood administration) Q24H PRN    0.9%  NaCl infusion Continuous    0.9%  NaCl infusion Continuous    acetaminophen tablet 650 mg Q4H PRN    amiodarone 360 mg/200 mL (1.8 mg/mL) infusion Continuous    cetirizine tablet 10 mg QHS    clopidogreL tablet 75 mg Daily    DAPTOmycin (CUBICIN) 715 mg in sodium chloride 0.9% 50 mL IVPB Q48H    dextromethorphan-guaiFENesin  mg per 12 hr tablet 1 tablet BID    EPINEPHrine 5 mg in dextrose 5% 250 mL infusion (premix) Continuous    famotidine tablet 20 mg Daily    fentaNYL 50 mcg/mL injection 50 mcg Q1H PRN    fludrocortisone tablet 100 mcg Daily    heparin 25,000 units in dextrose 5% (100 units/ml) IV bolus from bag - ADDITIONAL PRN BOLUS - 30 units/kg PRN    heparin 25,000 units in dextrose 5% (100 units/ml) IV bolus from bag - ADDITIONAL PRN BOLUS - 60 units/kg PRN    heparin 25,000 units in dextrose 5% 250 mL (100 units/mL) infusion LOW INTENSITY nomogram - [facility] Continuous    hydrocortisone sodium succinate injection 100 mg Q8H    magnesium sulfate 2g in water 50mL IVPB (premix) PRN    magnesium sulfate 2g in water 50mL IVPB (premix) PRN    meropenem (MERREM) 2 g in sodium chloride 0.9% 100 mL IVPB Q12H    ondansetron disintegrating tablet 8 mg Q8H PRN    quetiapine split tablet 12.5 mg QHS    quetiapine split tablet 12.5 mg Nightly PRN    sodium chloride 0.9% flush 10 mL PRN    vasopressin (PITRESSIN) 1 Units/mL in dextrose 5 % 100 mL infusion Continuous       Objective:     Vital  Signs (Most Recent):  Temp: 97.8 °F (36.6 °C) (08/15/22 0700)  Pulse: (!) 115 (08/15/22 0900)  Resp: (!) 26 (08/15/22 0900)  BP: 96/65 (08/13/22 1910)  SpO2: 96 % (08/15/22 0900)  O2 Device (Oxygen Therapy): High Flow nasal Cannula (08/15/22 0755)   Vital Signs (24h Range):  Temp:  [97.8 °F (36.6 °C)-98.4 °F (36.9 °C)] 97.8 °F (36.6 °C)  Pulse:  [] 115  Resp:  [20-34] 26  SpO2:  [92 %-100 %] 96 %  Arterial Line BP: ()/(41-60) 107/57     Weight: 89.4 kg (197 lb) (08/14/22 1200)  Body mass index is 29.95 kg/m².  Body surface area is 2.07 meters squared.    I/O last 3 completed shifts:  In: 4960.5 [P.O.:340; I.V.:4114.5; IV Piggyback:506]  Out: 2176 [Urine:365; Other:1811]    Physical Exam  Vitals reviewed.   Constitutional:       Appearance: He is ill-appearing.   HENT:      Head: Normocephalic and atraumatic.   Eyes:      Extraocular Movements: Extraocular movements intact.      Conjunctiva/sclera: Conjunctivae normal.   Cardiovascular:      Rate and Rhythm: Tachycardia present. Rhythm irregular.   Pulmonary:      Effort: Pulmonary effort is normal.      Breath sounds: Normal breath sounds.   Abdominal:      General: Bowel sounds are normal. There is no distension.      Palpations: Abdomen is soft.      Tenderness: There is no abdominal tenderness.   Genitourinary:     Comments: Urinary catheter in place with dark brown urine output  Musculoskeletal:      Right lower leg: Edema present.      Left lower leg: Edema present.   Skin:     General: Skin is warm.      Coloration: Skin is pale.   Neurological:      Mental Status: He is alert and oriented to person, place, and time. Mental status is at baseline.       Significant Labs:  CBC:   Recent Labs   Lab 08/15/22  0516   WBC 30.96*   RBC 3.06*   HGB 9.3*   HCT 25.5*      MCV 83   MCH 30.4   MCHC 36.5*     CMP:   Recent Labs   Lab 08/15/22  0516   *   CALCIUM 7.8*   ALBUMIN 1.2*   PROT 3.9*   *   K 4.3   CO2 19*   CL 92*   *    CREATININE 4.3*   ALKPHOS 114   ALT 11   *   BILITOT 2.2*

## 2022-08-15 NOTE — ASSESSMENT & PLAN NOTE
72 yo M with PMH MR s/p mechanical MVR 2015 in [place]) c/b PVL s/p MV plug (Dr Tejeda 2018) on Warfarin, chronic AF s/p GEE ligation, and HTN.  He has recently extended his paravalvular leak on TRACY performed in [place]. He continues to have significant fatigue, GEORGE, and leg swelling. He presented for PLV closure with TRACY guidance.  Postprocedure patient was brought to CCU where he was found to be hypotensive and epinephrine was started.      CT demonstated no RP bleed or any fluid collections concerning for source of continued hypotension     Temp initially low and later 102.2 and persistently febrile  White count 23  CI/CO high with low SVR  -- Concern for septic shock vs hypovolemic shock from blood loss anemia  -- Blood cultures x 2 obtained and negative so far. UA unremarkable. Respiratory culture also negative  -- Vancomycin, meropenem and azithromycin.   -- On epi and vaso.   -- stress dose steroids and will taper if improvement of hemodynamics with extubation  -- ID consultation  -- HTS consulted in setting of vasoplegia and persistent shock despite continued therapy   Infant B positive with positive roxana cord bili 1.9

## 2022-08-16 PROBLEM — Z51.5 PALLIATIVE CARE ENCOUNTER: Status: ACTIVE | Noted: 2022-01-01

## 2022-08-16 PROBLEM — T14.8XXA BRUISE: Status: ACTIVE | Noted: 2022-01-01

## 2022-08-16 NOTE — SUBJECTIVE & OBJECTIVE
Interval History:   No acute events overnight. Slight improvement in hemodynamics today without increasing pressors requirements. Will administer a lasix trial today and monitor for response.     Review of Systems   Constitutional: Negative for chills and fever.   Cardiovascular:  Negative for chest pain, orthopnea and palpitations.   Respiratory:  Negative for cough and shortness of breath.    Gastrointestinal:  Negative for abdominal pain.   Neurological:  Negative for dizziness, headaches and light-headedness.   Psychiatric/Behavioral:  Negative for altered mental status.    Objective:     Vital Signs (Most Recent):  Temp: 98.1 °F (36.7 °C) (08/16/22 0715)  Pulse: (!) 130 (08/16/22 1400)  Resp: (!) 30 (08/16/22 1400)  BP: (!) 95/45 (08/16/22 1400)  SpO2: 95 % (08/16/22 1400)   Vital Signs (24h Range):  Temp:  [97.6 °F (36.4 °C)-98.1 °F (36.7 °C)] 98.1 °F (36.7 °C)  Pulse:  [116-131] 130  Resp:  [14-30] 30  SpO2:  [94 %-100 %] 95 %  BP: (95)/(45) 95/45  Arterial Line BP: ()/(45-58) 103/45     Weight: 89.4 kg (197 lb)  Body mass index is 29.95 kg/m².     SpO2: 95 %  O2 Device (Oxygen Therapy): High Flow nasal Cannula      Intake/Output Summary (Last 24 hours) at 8/16/2022 1453  Last data filed at 8/16/2022 1400  Gross per 24 hour   Intake 5570.12 ml   Output 5184 ml   Net 386.12 ml       Lines/Drains/Airways       Central Venous Catheter Line  Duration             Introducer 08/15/22 0620 right internal jugular 1 day    Trialysis (Dialysis) Catheter 08/14/22 2130 left internal jugular 1 day    Pulmonary Artery Catheter Assessment  08/15/22 1820 right internal jugular <1 day              Drain  Duration                  Urethral Catheter 08/10/22 1718 5 days              Arterial Line  Duration             Arterial Line 08/10/22 1334 Right Radial 6 days              Peripheral Intravenous Line  Duration                  Peripheral IV - Single Lumen 08/11/22 1641 18 G Anterior;Distal;Left Upper Arm 4 days                     Physical Exam  Vitals and nursing note reviewed.   Constitutional:       General: He is not in acute distress.     Appearance: Normal appearance. He is ill-appearing. He is not toxic-appearing.      Comments: Pale   HENT:      Head: Normocephalic and atraumatic.      Mouth/Throat:      Mouth: Mucous membranes are moist.   Cardiovascular:      Rate and Rhythm: Tachycardia present. Rhythm irregular.      Heart sounds: Murmur (mitral valve systolic click) heard.     No friction rub. No gallop.      Comments: Telemetry afib with RVR  Pulmonary:      Effort: Pulmonary effort is normal. No respiratory distress.      Breath sounds: Normal breath sounds.   Abdominal:      Palpations: Abdomen is soft.   Musculoskeletal:      Right lower leg: No edema.      Left lower leg: No edema.   Skin:     General: Skin is warm.   Neurological:      Mental Status: He is alert and oriented to person, place, and time. Mental status is at baseline.       Significant Labs: ABG:   Recent Labs   Lab 08/15/22  1254 08/16/22  0525 08/16/22  0957   PH 7.361 7.409 7.437   PCO2 35.9 39.2 33.9*   HCO3 20.3* 24.8 22.9*   POCSATURATED 71* 50* 65*   BE -5 0 -1   , Blood Culture:   Recent Labs   Lab 08/14/22  1836 08/14/22  2111   LABBLOO No Growth to date  No Growth to date No Growth to date  No Growth to date   , BMP:   Recent Labs   Lab 08/15/22  1408 08/16/22  0254 08/16/22  1353   * 155* 120*   * 126* 125*   K 4.4 4.4 4.3   CL 92* 95 96   CO2 17* 20* 20*   BUN 97* 67* 62*   CREATININE 4.2* 3.2* 3.5*   CALCIUM 7.9* 7.5* 7.1*   MG 2.2 2.0 1.9   , CMP   Recent Labs   Lab 08/15/22  0516 08/15/22  1408 08/16/22  0254 08/16/22  1353   * 122* 126* 125*   K 4.3 4.4 4.4 4.3   CL 92* 92* 95 96   CO2 19* 17* 20* 20*   * 149* 155* 120*   * 97* 67* 62*   CREATININE 4.3* 4.2* 3.2* 3.5*   CALCIUM 7.8* 7.9* 7.5* 7.1*   PROT 3.9*  --  4.2*  --    ALBUMIN 1.2* 1.3* 1.3* 1.2*   BILITOT 2.2*  --  2.9*  --    ALKPHOS 114   --  125  --    *  --  174*  --    ALT 11  --  10  --    ANIONGAP 13 13 11 9   , CBC   Recent Labs   Lab 08/15/22  0516 08/16/22  0254   WBC 30.96* 25.19*   HGB 9.3* 9.1*   HCT 25.5* 25.2*    135*   , INR   Recent Labs   Lab 08/15/22  0516 08/15/22  0803 08/16/22  0254   INR SEE COMMENT 1.1 1.2   , Lipid Panel No results for input(s): CHOL, HDL, LDLCALC, TRIG, CHOLHDL in the last 48 hours.,   Pathology Results  (Last 10 years)      None        , Troponin No results for input(s): TROPONINI in the last 48 hours., and All pertinent lab results from the last 24 hours have been reviewed.    Significant Imaging: Echocardiogram: Transthoracic echo (TTE) complete (Cupid Only):   Results for orders placed or performed during the hospital encounter of 08/10/22   Echo Saline Bubble? Yes   Result Value Ref Range    Ascending aorta 3.60 cm    STJ 3.66 cm    AV mean gradient 4 mmHg    Ao peak rohan 1.45 m/s    Ao VTI 16.69 cm    IVS 1.05 0.6 - 1.1 cm    LA size 6.40 cm    Left Atrium Major Axis 8.99 cm    Left Atrium Minor Axis 9.10 cm    LVIDd 3.96 3.5 - 6.0 cm    LVIDs 2.65 2.1 - 4.0 cm    LVOT diameter 2.26 cm    LVOT peak VTI 11.68 cm    Posterior Wall 0.96 0.6 - 1.1 cm    MV Peak A Rohan 1.27 m/s    E wave deceleration time 224.02 msec    MV Peak E Rohan 1.75 m/s    RA Major Axis 7.19 cm    RA Width 7.11 cm    RVDD 6.36 cm    Sinus 4.22 cm    TAPSE 0.86 cm    TR Max Rohan 3.99 m/s    TDI LATERAL 0.13 m/s    TDI SEPTAL 0.05 m/s    LA WIDTH 5.17 cm    PV PEAK VELOCITY 1.06 cm/s    MV stenosis pressure 1/2 time 64.97 ms    LV Diastolic Volume 68.52 mL    LV Systolic Volume 25.77 mL    LVOT peak rohan 0.89 m/s    RVOT peak VTI 7.52 cm    Mr max rohan 0.04 m/s    RVOT peak rohan 0.69 m/s    LA volume (mod) 157.89 cm3    MV mean gradient 1 mmHg    MV peak gradient 24 mmHg    MV VTI 41.97 cm    PV mean gradient 0.90 mmHg    LV LATERAL E/E' RATIO 13.46 m/s    LV SEPTAL E/E' RATIO 35.00 m/s    FS 33 %    LA volume 254.38 cm3    LV  mass 125.96 g    Left Ventricle Relative Wall Thickness 0.48 cm    AV valve area 2.81 cm2    AV Velocity Ratio 0.61     AV index (prosthetic) 0.70     MV valve area p 1/2 method 3.39 cm2    MV valve area by continuity eq 1.12 cm2    E/A ratio 1.38     Mean e' 0.09 m/s    LVOT area 4.0 cm2    LVOT stroke volume 46.83 cm3    AV peak gradient 8 mmHg    E/E' ratio 19.44 m/s    LV Systolic Volume Index 12.7 mL/m2    LV Diastolic Volume Index 33.75 mL/m2    LA Volume Index 125.3 mL/m2    LV Mass Index 62 g/m2    Triscuspid Valve Regurgitation Peak Gradient 64 mmHg    LA Volume Index (Mod) 77.8 mL/m2    BSA 2.07 m2

## 2022-08-16 NOTE — SUBJECTIVE & OBJECTIVE
Interval History:     No adverse events.    Review of Systems   All other systems reviewed and are negative.  Objective:     Vital Signs (Most Recent):  Temp: 97.6 °F (36.4 °C) (08/15/22 1901)  Pulse: (!) 124 (08/15/22 2009)  Resp: 19 (08/15/22 2009)  BP: 96/65 (08/13/22 1910)  SpO2: 97 % (08/15/22 2009)   Vital Signs (24h Range):  Temp:  [97.6 °F (36.4 °C)-98.4 °F (36.9 °C)] 97.6 °F (36.4 °C)  Pulse:  [111-125] 124  Resp:  [15-34] 19  SpO2:  [92 %-100 %] 97 %  Arterial Line BP: ()/(41-57) 106/51     Weight: 89.4 kg (197 lb)  Body mass index is 29.95 kg/m².    Estimated Creatinine Clearance: 17.8 mL/min (A) (based on SCr of 4.2 mg/dL (H)).    Physical Exam  Vitals and nursing note reviewed.   Constitutional:       General: He is not in acute distress.     Appearance: He is well-developed. He is not diaphoretic.   HENT:      Head: Normocephalic and atraumatic.      Right Ear: External ear normal.      Left Ear: External ear normal.      Nose: Nose normal.      Mouth/Throat:      Pharynx: No oropharyngeal exudate.   Eyes:      General: No scleral icterus.        Right eye: No discharge.         Left eye: No discharge.      Conjunctiva/sclera: Conjunctivae normal.      Pupils: Pupils are equal, round, and reactive to light.   Neck:      Thyroid: No thyromegaly.      Vascular: No JVD.      Trachea: No tracheal deviation.   Cardiovascular:      Rate and Rhythm: Normal rate and regular rhythm.      Heart sounds: No murmur heard.    No friction rub. No gallop.   Pulmonary:      Effort: Pulmonary effort is normal. No respiratory distress.      Breath sounds: Normal breath sounds. No stridor. No wheezing or rales.   Chest:      Chest wall: No tenderness.   Abdominal:      General: Bowel sounds are normal. There is no distension.      Palpations: Abdomen is soft. There is no mass.      Tenderness: There is no abdominal tenderness. There is no guarding or rebound.   Musculoskeletal:         General: No tenderness. Normal  range of motion.      Cervical back: Normal range of motion and neck supple.   Lymphadenopathy:      Cervical: No cervical adenopathy.   Skin:     General: Skin is warm.      Coloration: Skin is not pale.      Findings: No erythema or rash.   Neurological:      Mental Status: He is alert and oriented to person, place, and time.      Cranial Nerves: No cranial nerve deficit.      Motor: No abnormal muscle tone.      Coordination: Coordination normal.      Deep Tendon Reflexes: Reflexes are normal and symmetric. Reflexes normal.   Psychiatric:         Behavior: Behavior normal.         Thought Content: Thought content normal.         Judgment: Judgment normal.       Significant Labs:   Microbiology Results (last 7 days)       Procedure Component Value Units Date/Time    Blood culture [[id number]] Collected: 08/14/22 1836    Order Status: Completed Specimen: Blood from Peripheral, Antecubital, Left Updated: 08/15/22 2012     Blood Culture, Routine No Growth to date      No Growth to date    Blood culture [[id number]] Collected: 08/14/22 2111    Order Status: Completed Specimen: Blood from Peripheral, Jugular, Internal Left Updated: 08/15/22 0515     Blood Culture, Routine No Growth to date    Clostridium difficile EIA [[id number]] Collected: 08/14/22 2005    Order Status: Completed Specimen: Stool Updated: 08/15/22 0434     C. diff Antigen Negative     C difficile Toxins A+B, EIA Negative     Comment: Testing not recommended for children <24 months old.       Blood culture [[id number]] Collected: 08/10/22 2000    Order Status: Completed Specimen: Blood from Peripheral, Upper Arm, Left Updated: 08/14/22 2212     Blood Culture, Routine No Growth to date      No Growth to date      No Growth to date      No Growth to date      No Growth to date    Blood culture [[id number]] Collected: 08/10/22 2058    Order Status: Completed Specimen: Blood from Peripheral, Forearm, Left Updated: 08/14/22 2212     Blood Culture, Routine No  Growth to date      No Growth to date      No Growth to date      No Growth to date      No Growth to date    Culture, Respiratory with Gram Stain [[id number]]     Order Status: No result Specimen: Respiratory     Culture, Respiratory with Gram Stain [[id number]] Collected: 08/11/22 0914    Order Status: Completed Specimen: Respiratory from Tracheal Aspirate Updated: 08/13/22 1223     Respiratory Culture Normal respiratory ronny      No S aureus or Pseudomonas isolated.     Gram Stain (Respiratory) <10 epithelial cells per low power field.     Gram Stain (Respiratory) Rare WBC's     Gram Stain (Respiratory) No organisms seen    Urine culture [[id number]]     Order Status: Completed Specimen: Urine, Catheterized             Significant Imaging: I have reviewed all pertinent imaging results/findings within the past 24 hours.

## 2022-08-16 NOTE — PROCEDURES
"Yusuf Mcdonald Jr. is a 71 y.o. male patient.    Temp: 97.6 °F (36.4 °C) (08/15/22 1600)  Pulse: (!) 121 (08/15/22 1800)  Resp: 16 (08/15/22 1800)  BP: 96/65 (08/13/22 1910)  SpO2: 97 % (08/15/22 1800)  Weight: 89.4 kg (197 lb) (08/14/22 1200)  Height: 5' 8" (172.7 cm) (08/14/22 1200)  Mallampati Scale: Class II  ASA Classification: Class 3    [place] Shahzad catheter placement    Date/Time: 8/15/2022 7:09 PM  Performed by: Doron Chavez MD  Authorized by: Doron Chavez MD     Location procedure was performed:  [facility]ARD[facility] MED[facility] ([facility])  Assisting Provider:  Daniele Alejandra MD  Consent Done ?:  Yes  Indications:  Med administration, vascular access and hemodynamic monitoring  Anesthesia:  Local infiltration  Local anesthetic:  Lidocaine 1% without epinephrine  Anesthetic total (ml):  1  Preparation:  Skin prepped with ChloraPrep  Location:  Right internal jugular  Catheter type:  Cordis  Catheter size:  8 Fr  Inserted Catheter Length (cm):  58  Ultrasound guidance: No    Manometry: Yes    Number of attempts:  1  XRay:  Placement verified by x-ray, no pneumothorax on x-ray, tip termination and successful placement  Adverse Events:  NoneTermination sites: PA.    Over the wire [place] Shahzad catheter exchange in the R IJJ with removal of the 7-Fr triple lumen catheter    Discussed with Dr James.    Please call with questions or concerns.     Doron Chavez MD  Cardiology PGY5  [facility]    8/15/2022  "

## 2022-08-16 NOTE — PROGRESS NOTES
Heart Failure Progress Note    Subjective:     - [place] Shahzad catheter placed last evening.   - Pt feels well, no complaints.   - Still oliguric. Plan for more CRRT today.   - Remains on 2 pressors    Hemodynamics :   - CVP: 13 --> 15  - PCWP: 19  - SVO2 (RA): 56  - MVO2 (PA): 65  - MAP: 65  - CO 8.2  - CI: 3.9  - SVR: 506    Medications:   Continuous Infusions:   sodium chloride 0.9%      sodium chloride 0.9% Stopped (22 0848)    sodium chloride 0.9% Stopped (22 0850)    amiodarone in dextrose 5% 0.5 mg/min (22 1100)    EPINEPHrine 0.16 mcg/kg/min (22 1100)    heparin (porcine) in D5W 19 Units/kg/hr (22 1100)    vasopressin 0.08 Units/min (22 1100)       Scheduled Meds:   clopidogreL  75 mg Oral Daily    DAPTOmycin (CUBICIN) IV (PEDS and ADULTS)  8 mg/kg Intravenous Q24H    famotidine  20 mg Oral Daily    fludrocortisone  100 mcg Oral Daily    hydrocortisone sodium succinate  100 mg Intravenous Q8H    meropenem (MERREM) IVPB  1 g Intravenous Q12H    QUEtiapine  12.5 mg Oral QHS     PRN Meds:sodium chloride, acetaminophen, [] fentaNYL **FOLLOWED BY** fentaNYL, heparin (PORCINE), heparin (PORCINE), magnesium sulfate IVPB, ondansetron, sodium chloride 0.9%  Objective:     Vitals:  Temp:  [97.6 °F (36.4 °C)-98.1 °F (36.7 °C)]   Pulse:  [111-128]   Resp:  [14-33]   SpO2:  [86 %-100 %]   Arterial Line BP: ()/(47-58)  on 7L I/O's:    Intake/Output Summary (Last 24 hours) at 2022 1116  Last data filed at 2022 1100  Gross per 24 hour   Intake 5819.51 ml   Output 6218 ml   Net -398.49 ml      Constitutional: No distress, conversant  HEENT: Sclera anicteric, PERRLA, EOMI  Neck: Left triple lumen catheter, RIJ Cordis with [place]  CV: tachycardic, regular rhythm  Pulm: Decreased breath sounds in bilateral bases.   GI: Abdomen soft, non-tender, good bowel sounds  Extremities: upper extremities cool to touch and slightly swollen, lower extremities warm  to touch.   Skin: No ecchymosis, erythema, or ulcers  Neuro: A&Ox3, no significant focal deficits    Labs:     Recent Labs   Lab 08/15/22  0516 08/15/22  1408 08/16/22  0254   * 122* 126*   K 4.3 4.4 4.4   CL 92* 92* 95   CO2 19* 17* 20*   * 97* 67*   CREATININE 4.3* 4.2* 3.2*   ANIONGAP 13 13 11     Recent Labs   Lab 08/14/22  0401 08/15/22  0207 08/15/22  0516 08/15/22  1408 08/16/22  0254   *  --  171*  --  174*   ALT 11  --  11  --  10   ALKPHOS 100  --  114  --  125   BILITOT 1.6*  --  2.2*  --  2.9*   ALBUMIN 1.2*   < > 1.2*   < > 1.3*    < > = values in this interval not displayed.     No results for input(s): TROPONINI, BNP in the last 168 hours. Recent Labs   Lab 08/14/22  0401 08/15/22  0516 08/16/22  0254   WBC 27.34* 30.96* 25.19*   HGB 9.7* 9.3* 9.1*   HCT 27.3* 25.5* 25.2*    162 135*   GRAN 85.1*  23.3* 84.4*  26.2* 95.0*     Recent Labs   Lab 08/16/22  0254   INR 1.2     Lab Results   Component Value Date    CHOL 219 (H) 12/30/2011    HDL 39 (L) 12/30/2011    LDLCALC 136.0 12/30/2011    TRIG 219 (H) 12/30/2011     Lab Results   Component Value Date    HGBA1C 5.2 12/30/2011          Lab Results   Component Value Date    HGBA1C 5.2 12/30/2011     No results found for: BNP, BNPTRIAGEBLO  Lab Results   Component Value Date    CHOL 219 (H) 12/30/2011    HDL 39 (L) 12/30/2011    LDLCALC 136.0 12/30/2011    TRIG 219 (H) 12/30/2011       Micro:  Blood Cultures  Lab Results   Component Value Date    LABBLOO No Growth to date 08/14/2022    LABBLOO No Growth to date 08/14/2022    LABBLOO No Growth to date 08/14/2022    LABBLOO No Growth to date 08/14/2022    LABBLOO No growth after 5 days. 08/10/2022     Urine Cultures  No results found for: LABURIN    Imaging:     EF   Date Value Ref Range Status   08/14/2022 45 % Final   08/10/2022 55 % Final       TTE: {Echo:   EF   Date Value Ref Range Status   08/14/2022 45 % Final   08/10/2022 55 % Final       Assessment/Plan   Mr. Goldberg Tamara  is a 71 y.o.yo male with a past medical history of MR s/p mechanical MVR (2015 in [place]) complicated by a paravalvular leak s/p MV plug (2018, Dr. Tejeda), chronic Afib on warfarin s/p MAZE procedure and GEE ligation (2015, [place]), HTN who presented to [facility] on 8/10/22 for elective repair of recurrent paravalvular leak.      He was referred to the structural team from  for symptomatic PVL detected on a TRACY. On 8/10/22, he successfully underwent TRACY with MV plug repair with usage of a 10 mm VSD closure device. Access used was the RFA, RFV and LFV. Estimated blood loss was 50 cc.      Post-op, the pt was initially intubated and put on 2 pressors (Epinephrine and Vasopressin) due to hypotension. He was also on amiodarone for Afib with RVR, and was on a heparin drip. He has since been extubated, however, it has been difficult to wean off the pressors. Pt's CO and CI have remained high with a low SVR. Sepsis was suspected since the pt initially had some high-grade temperatures, but despite antibiotics (guided by ID), cultures have been negative and hemodynamics still reveal a distributive shock picture. CVP was initially high at 13, however has come down. The course has also been complicated by a drop in the hemoglobin with a hemolytic picture. The pt also has cirrhosis, ascites and b/l pleural effusions.      The HTS service has been consulted for assistance with management of high output heart failure.       # Distributive vs cardiogenic shock  - Pt with normal CO and CI, low SVR, normal SVO2 but low MVO2.   - CVP high (13, 15). PCWP high (19).   - TTE with bubble study shows severe RV pressure and volume overload, iatrogenic ASD with L-->R shunt per Doppler.  - Etiology of shock could be distributive (given low SVR) or cardiogenic given elevated PCWP and presence of RV pressure and volume overload on Echo.    - Qp/Qs (by echo) is 0.73, indicating R to L shunt although this is not consistent with Doppler findings.   -  Recommend removing more volume via CRRT or aggressive diuresis to relieve R pressure/volume overload.   - Continue pressors, antibiotics, steroids.      # H/o MR s/p mechanical MVR c/b PVLx2  # S/p PVL repair with MV plug, most recently 8/11/22  - Continue anticoagulation with heparin gtt.   - Continue ASA, Plavix (as long as blood counts will allow)  - Pt with hemolytic picture; not sure if this secondary to the mechanical valve.   - TTE shows possible MR around mechanical valve - will defer to structural specialist.      # Afib with RVR   - Pt remains tachycardic while on two pressors. Attempt to wean pressors as able.   - Continue amiodarone gtt and heparin gtt.   - Home digoxin, metoprolol, diltiazem currently held.      # LJ / ATN  - Creatinine elevated; pt is oliguric.   - Nephrology following.   - Pt is on CRRT. Recommend removing more volume.  - Consider hemolytic uremic syndrome (no thrombocytopenia yet, although plts are gradually trending down)     # Anemia - blood loss vs hemolysis  - Pt has a hemolytic picture (low Hgb, high LDH, low haptoglobin, high bilirubin with schistocytes on smear). Estimated blood loss intra-op was ~50 cc.   - Platelets are within range, but trending down.   - Cause of hemolysis unclear. Liver cirrhosis could be playing a role. Consider HUS given his ATN.   - ASA held. Currently on Plavix.      # Hyponatremia  Per primary/renal.        Signed:  Linus Bird MD, MSCI  [employer] - Cardiology Fellow (rotating)  Heart transplant Service

## 2022-08-16 NOTE — PROGRESS NOTES
[facility] - Cardiac Intensive Care  Nephrology  Progress Note    Patient Name: Yusuf Mcdonald Jr.  MRN: [id number]  Admission Date: 8/10/2022  Hospital Length of Stay: 6 days  Attending Provider: Mau James MD   Primary Care Physician: Primary Doctor No  Principal Problem:Shock    Subjective:     HPI: Ms. Mcdonald is a 71 year old woman with a history of chronic AF (on warfarin), pHTN, HLD, s/p MAZE and MV plug (2015) who was admitted for a mitral valve paravalvular leak repair on 8/10. Patient was hypotensive in septic shock post-procedure requiring pressor support with vasopressin, epinephrine. Patient was started vanc and cefepime, then transitioned to meropenem and azithromycin for continued fevers. He has had worsening renal function since admission on 8/9. Baseline sCr is 1.3. On admission sCr 2.4, which worsened to 3.1 on 8/12 with a peak BUN/Cr of 114/4.4 today. He received IV lasix yesterday with 400mL UOP that appears dark brown in color. He is net positive 15L since admission.    Nephrology consulted regarding acute renal failure in the setting of shock.      Interval History: Net negative 1.1L over the past 24h. On CRRT/SLED alternating with SCUF. Remains anuric.    Review of patient's allergies indicates:   Allergen Reactions    Neomycin-bacitracnzn-polymyxnb     Benzalkonium chloride Rash    Neosporin (neomycin-polymyx) Rash     Current Facility-Administered Medications   Medication Frequency    0.9%  NaCl infusion (CRRT USE ONLY) Continuous    0.9%  NaCl infusion (for blood administration) Q24H PRN    0.9%  NaCl infusion Continuous    0.9%  NaCl infusion Continuous    acetaminophen tablet 650 mg Q4H PRN    amiodarone 360 mg/200 mL (1.8 mg/mL) infusion Continuous    balsam peru-castor oiL Oint BID    clopidogreL tablet 75 mg Daily    DAPTOmycin (CUBICIN) 715 mg in sodium chloride 0.9% 50 mL IVPB Q24H    EPINEPHrine 5 mg in dextrose 5% 250 mL infusion (premix) Continuous    famotidine  tablet 20 mg Daily    fentaNYL 50 mcg/mL injection 50 mcg Q1H PRN    fludrocortisone tablet 100 mcg Daily    heparin 25,000 units in dextrose 5% (100 units/ml) IV bolus from bag - ADDITIONAL PRN BOLUS - 30 units/kg PRN    heparin 25,000 units in dextrose 5% (100 units/ml) IV bolus from bag - ADDITIONAL PRN BOLUS - 60 units/kg PRN    heparin 25,000 units in dextrose 5% 250 mL (100 units/mL) infusion LOW INTENSITY nomogram - [facility] Continuous    hydrocortisone sodium succinate injection 100 mg Q8H    magnesium sulfate 2g in water 50mL IVPB (premix) PRN    meropenem-0.9% sodium chloride 1 g/50 mL IVPB Q12H    ondansetron disintegrating tablet 8 mg Q8H PRN    quetiapine split tablet 12.5 mg QHS    sodium chloride 0.9% flush 10 mL PRN    vasopressin (PITRESSIN) 1 Units/mL in dextrose 5 % 100 mL infusion Continuous       Objective:     Vital Signs (Most Recent):  Temp: 98.1 °F (36.7 °C) (08/16/22 1500)  Pulse: (!) 131 (08/16/22 1500)  Resp: (!) 32 (08/16/22 1500)  BP: (!) 95/45 (08/16/22 1400)  SpO2: 96 % (08/16/22 1500)  O2 Device (Oxygen Therapy): High Flow nasal Cannula (08/16/22 1500)   Vital Signs (24h Range):  Temp:  [97.6 °F (36.4 °C)-98.1 °F (36.7 °C)] 98.1 °F (36.7 °C)  Pulse:  [116-131] 131  Resp:  [14-32] 32  SpO2:  [94 %-100 %] 96 %  BP: (95)/(45) 95/45  Arterial Line BP: ()/(45-58) 105/47     Weight: 89.4 kg (197 lb) (08/16/22 1400)  Body mass index is 29.95 kg/m².  Body surface area is 2.07 meters squared.    I/O last 3 completed shifts:  In: 9416.9 [P.O.:460; I.V.:8493; IV Piggyback:463.9]  Out: 9647 [Urine:174; Other:9473]    Physical Exam  Vitals reviewed.   Constitutional:       Appearance: He is ill-appearing.   HENT:      Head: Normocephalic and atraumatic.   Eyes:      Extraocular Movements: Extraocular movements intact.      Conjunctiva/sclera: Conjunctivae normal.   Cardiovascular:      Rate and Rhythm: Tachycardia present. Rhythm irregular.   Pulmonary:      Effort: Pulmonary effort  is normal.      Breath sounds: Normal breath sounds.   Abdominal:      General: Bowel sounds are normal. There is no distension.      Palpations: Abdomen is soft.      Tenderness: There is no abdominal tenderness.   Genitourinary:     Comments: Urinary catheter in place with dark brown urine output  Musculoskeletal:      Right lower leg: Edema present.      Left lower leg: Edema present.   Skin:     General: Skin is warm.      Coloration: Skin is pale.   Neurological:      Mental Status: He is alert and oriented to person, place, and time. Mental status is at baseline.       Significant Labs:  CBC:   Recent Labs   Lab 08/16/22  0254   WBC 25.19*   RBC 3.04*   HGB 9.1*   HCT 25.2*   *   MCV 83   MCH 29.9   MCHC 36.1*       CMP:   Recent Labs   Lab 08/16/22 0254 08/16/22  1353   * 120*   CALCIUM 7.5* 7.1*   ALBUMIN 1.3* 1.2*   PROT 4.2*  --    * 125*   K 4.4 4.3   CO2 20* 20*   CL 95 96   BUN 67* 62*   CREATININE 3.2* 3.5*   ALKPHOS 125  --    ALT 10  --    *  --    BILITOT 2.9*  --               Assessment/Plan:     * Shock  Cardiogenic + possibly septic   Management per primary     Hyponatremia  Serum sodium down to 126 this AM   Likely hypervolemic in the setting of cardiogenic shock and positive fluid balance   Will keep on CRRT/SLED, Na bath adjusted  Please change IV meds solution from D5 to NS as allowed by pharmacy       LJ (acute kidney injury)  Oliguric LJ on pressor support in the setting of shock s/p paravalvular mitral valve leak repair on 8/10.   Patient has had limited urine output despite administration of IV lasix with worsening BUN/Cr from baseline sCr 1.3 to a peak 114/4.4  Net positive 1.7L over the past 24h and requiring supplemental O2  Gross hematuria with dark red/brown urine output  Urine sodium 16, urine urea 450, urine creatinine 67  Urinary catheter in place with no evidence of obstruction.    Reccs:  Will keep on CRRT SLED alternating with SCUF for volume  management and metabolic clearance   Keep araujo for strict I/Os  Avoid nephrotoxic medications including NSAIDs, ACEi/ARBs, IV radiocontrast     Paravalvular leak (prosthetic valve)  S/p repair (8/10)        Chris Nuno MD  Nephrology  [facility] - Cardiac Int[facility]

## 2022-08-16 NOTE — ASSESSMENT & PLAN NOTE
Patient is a 72 yo M with chronic AF on warfarin, pHTN, HLD and mitral valve regurgitation s/p mechanical MVR 2015 s/p MAZE c/b PVL s/p MV plug in 2018 and now with extension of his paravalvular leak on TRACY. Here for PVL closure with TRACY guidance.    Patient admitted to CCU for monitoring post procedure.   TTE limited 08/13/2022    HTS recs for repeat bubble study in setting of recent perivalve alfredo

## 2022-08-16 NOTE — PROGRESS NOTES
[facility] - Cardiac Intensive Care  Infectious Disease  Progress Note    Patient Name: Yusuf Mcdonald Jr.  MRN: [id number]  Admission Date: 8/10/2022  Length of Stay: 5 days  Attending Physician: Mau James MD  Primary Care Provider: Primary Doctor No    Isolation Status: No active isolations  Assessment/Plan:      Shock  71 year old male with a history of mitral valve replacement complicated by a leak.  He is admitted to the hospital after undergoing a procedure to repair the valve.  He has remained on pressors and with leukocytosis during his hospital stay.  Cardiology concerned that his shock is not completely cardiogenic.  All cultures to date have been negative.  He has been on empiric vancomycin, meropenem and azithromycin.  Per patient and nursing staff, he has been having diarrhea.  Differential includes infection following his procedure with skin organisms like staph/strep vs C diff colitis given his ongoing diarrhea.    Plan    1.  Daptomycin and meropenem for now.    2. Follow up on blood and respiratory cultures.          Anticipated Disposition: TBD    Thank you for your consult. I will follow-up with patient. Please contact us if you have any additional questions.    Mani Cohen MD  Infectious Disease  [facility] - Cardiac Intensive Care    Subjective:     Principal Problem:Acute blood loss anemia    HPI: 71 year old male with a history of mitral valve regurgitation s/p mechanical MVR in 2015 complicated by prosthetic valve leak s/p MV plug in 2018 on Warfarin, chronic AF s/p GEE ligation, and HTN.  He has continued to have significant fatigue, GEORGE and lower extremity swelling.  He was admitted for TRACY and attempted valve repair.  Post procedure, he was hypotensive and initially hypothermic.  He was admitted to the ICU where he started having fevers and was noted to have leukocytosis.  Broad spectrum antibiotics were started and cultures obtained.  He was initially on azithromycin, cefepime and  vancomycin.  His antibiotics were later modified to azithromycin and meropenem.    Interval History:     No adverse events.    Review of Systems   All other systems reviewed and are negative.  Objective:     Vital Signs (Most Recent):  Temp: 97.6 °F (36.4 °C) (08/15/22 1901)  Pulse: (!) 124 (08/15/22 2009)  Resp: 19 (08/15/22 2009)  BP: 96/65 (08/13/22 1910)  SpO2: 97 % (08/15/22 2009)   Vital Signs (24h Range):  Temp:  [97.6 °F (36.4 °C)-98.4 °F (36.9 °C)] 97.6 °F (36.4 °C)  Pulse:  [111-125] 124  Resp:  [15-34] 19  SpO2:  [92 %-100 %] 97 %  Arterial Line BP: ()/(41-57) 106/51     Weight: 89.4 kg (197 lb)  Body mass index is 29.95 kg/m².    Estimated Creatinine Clearance: 17.8 mL/min (A) (based on SCr of 4.2 mg/dL (H)).    Physical Exam  Vitals and nursing note reviewed.   Constitutional:       General: He is not in acute distress.     Appearance: He is well-developed. He is not diaphoretic.   HENT:      Head: Normocephalic and atraumatic.      Right Ear: External ear normal.      Left Ear: External ear normal.      Nose: Nose normal.      Mouth/Throat:      Pharynx: No oropharyngeal exudate.   Eyes:      General: No scleral icterus.        Right eye: No discharge.         Left eye: No discharge.      Conjunctiva/sclera: Conjunctivae normal.      Pupils: Pupils are equal, round, and reactive to light.   Neck:      Thyroid: No thyromegaly.      Vascular: No JVD.      Trachea: No tracheal deviation.   Cardiovascular:      Rate and Rhythm: Normal rate and regular rhythm.      Heart sounds: No murmur heard.    No friction rub. No gallop.   Pulmonary:      Effort: Pulmonary effort is normal. No respiratory distress.      Breath sounds: Normal breath sounds. No stridor. No wheezing or rales.   Chest:      Chest wall: No tenderness.   Abdominal:      General: Bowel sounds are normal. There is no distension.      Palpations: Abdomen is soft. There is no mass.      Tenderness: There is no abdominal tenderness. There  is no guarding or rebound.   Musculoskeletal:         General: No tenderness. Normal range of motion.      Cervical back: Normal range of motion and neck supple.   Lymphadenopathy:      Cervical: No cervical adenopathy.   Skin:     General: Skin is warm.      Coloration: Skin is not pale.      Findings: No erythema or rash.   Neurological:      Mental Status: He is alert and oriented to person, place, and time.      Cranial Nerves: No cranial nerve deficit.      Motor: No abnormal muscle tone.      Coordination: Coordination normal.      Deep Tendon Reflexes: Reflexes are normal and symmetric. Reflexes normal.   Psychiatric:         Behavior: Behavior normal.         Thought Content: Thought content normal.         Judgment: Judgment normal.       Significant Labs:   Microbiology Results (last 7 days)       Procedure Component Value Units Date/Time    Blood culture [[id number]] Collected: 08/14/22 1836    Order Status: Completed Specimen: Blood from Peripheral, Antecubital, Left Updated: 08/15/22 2012     Blood Culture, Routine No Growth to date      No Growth to date    Blood culture [[id number]] Collected: 08/14/22 2111    Order Status: Completed Specimen: Blood from Peripheral, Jugular, Internal Left Updated: 08/15/22 0515     Blood Culture, Routine No Growth to date    Clostridium difficile EIA [[id number]] Collected: 08/14/22 2005    Order Status: Completed Specimen: Stool Updated: 08/15/22 0434     C. diff Antigen Negative     C difficile Toxins A+B, EIA Negative     Comment: Testing not recommended for children <24 months old.       Blood culture [[id number]] Collected: 08/10/22 2000    Order Status: Completed Specimen: Blood from Peripheral, Upper Arm, Left Updated: 08/14/22 2212     Blood Culture, Routine No Growth to date      No Growth to date      No Growth to date      No Growth to date      No Growth to date    Blood culture [[id number]] Collected: 08/10/22 2058    Order Status: Completed Specimen: Blood  from Peripheral, Forearm, Left Updated: 08/14/22 2212     Blood Culture, Routine No Growth to date      No Growth to date      No Growth to date      No Growth to date      No Growth to date    Culture, Respiratory with Gram Stain [[id number]]     Order Status: No result Specimen: Respiratory     Culture, Respiratory with Gram Stain [[id number]] Collected: 08/11/22 0914    Order Status: Completed Specimen: Respiratory from Tracheal Aspirate Updated: 08/13/22 1223     Respiratory Culture Normal respiratory ronny      No S aureus or Pseudomonas isolated.     Gram Stain (Respiratory) <10 epithelial cells per low power field.     Gram Stain (Respiratory) Rare WBC's     Gram Stain (Respiratory) No organisms seen    Urine culture [[id number]]     Order Status: Completed Specimen: Urine, Catheterized             Significant Imaging: I have reviewed all pertinent imaging results/findings within the past 24 hours.

## 2022-08-16 NOTE — CONSULTS
[facility] - Cardiac Intensive Care  Skin Integrity [facility]IA [facility]ADALU[facility]  Consult Note    Patient Name: Yusuf Mcdonald Jr.  MRN: [id number]  Admission Date: 8/10/2022  Hospital Length of Stay: 6 days  Attending Physician: Mau James MD  Primary Care Provider: Primary Doctor No     Consults  Subjective:     History of Present Illness:  Mr Mcdonald is a 71 year old male with PMH of MR s/p mechanical MVR 2015 in [place]) c/b PVL s/p MV plug (Dr Tejeda 2018) on Warfarin, chronic AF s/p GEE ligation, and HTN.  He has recently extended his paravalvular leak on TRACY performed in [place].  An attempt was made to occlude paravalvular leak at outside hospital, but was aborted due to fear of dislodging current device. He continues to have significant fatigue, GEORGE, and leg swelling. He presented for PLV closure with TRACY guidance.  Postprocedure patient was brought to ICU where he was found to be hypotensive and epinephrine was started.  Emergent right IJ triple-lumen catheter was inserted and hemodynamics were obtained which showed high output state with low SVR. Skin integrity MARIA GUADALUPE consulted for evaluation of skin injury.      Scheduled Meds:   clopidogreL  75 mg Oral Daily    DAPTOmycin (CUBICIN) IV (PEDS and ADULTS)  8 mg/kg Intravenous Q24H    famotidine  20 mg Oral Daily    fludrocortisone  100 mcg Oral Daily    hydrocortisone sodium succinate  100 mg Intravenous Q8H    meropenem (MERREM) IVPB  1 g Intravenous Q12H    QUEtiapine  12.5 mg Oral QHS     Continuous Infusions:   sodium chloride 0.9% 200 mL/hr at 22 1400    sodium chloride 0.9% Stopped (22 0848)    sodium chloride 0.9% Stopped (22 0850)    amiodarone in dextrose 5% 0.5 mg/min (22 1400)    EPINEPHrine 0.16 mcg/kg/min (22 1400)    heparin (porcine) in D5W 19 Units/kg/hr (22 1400)    vasopressin 0.08 Units/min (22 1400)     PRN Meds:sodium chloride, acetaminophen, [] fentaNYL **FOLLOWED BY** fentaNYL, heparin (PORCINE),  heparin (PORCINE), magnesium sulfate IVPB, ondansetron, sodium chloride 0.9%    Review of patient's allergies indicates:   Allergen Reactions    Neomycin-bacitracnzn-polymyxnb     Benzalkonium chloride Rash    Neosporin (neomycin-polymyx) Rash        Past Medical History:   Diagnosis Date    A-fib     Anticoagulant long-term use     Hypertension      Past Surgical History:   Procedure Laterality Date    CARDIOVERSION      MITRAL VALVE REPLACEMENT  2015    mechanical    MITRAL VALVE REPLACEMENT Right 10/10/2018    Procedure: REPLACEMENT, MITRAL VALVE;  Surgeon: Yusuf Tejeda MD;  Location: [facility] CATH LAB;  Service: Cardiology;  Laterality: Right;    TRANSESOPHAGEAL ECHOCARDIOGRAPHY N/A 8/10/2022    Procedure: ECHOCARDIOGRAM, TRANSESOPHAGEAL;  Surgeon: Alexus Bell MD;  Location: [facility] CATH LAB;  Service: Cardiology;  Laterality: N/A;       Family History    None       Tobacco Use    Smoking status: Never Smoker    Smokeless tobacco: Never Used   Substance and Sexual Activity    Alcohol use: Yes     Alcohol/week: 2.0 standard drinks     Types: 1 Glasses of wine, 1 Shots of liquor per week     Comment: occ.    Drug use: No    Sexual activity: Not on file     Review of Systems   Skin:  Positive for wound.     Objective:     Vital Signs (Most Recent):  Temp: 98.1 °F (36.7 °C) (08/16/22 0715)  Pulse: (!) 130 (08/16/22 1400)  Resp: (!) 30 (08/16/22 1400)  BP: 96/65 (08/13/22 1910)  SpO2: 95 % (08/16/22 1400)   Vital Signs (24h Range):  Temp:  [97.6 °F (36.4 °C)-98.1 °F (36.7 °C)] 98.1 °F (36.7 °C)  Pulse:  [116-131] 130  Resp:  [14-30] 30  SpO2:  [94 %-100 %] 95 %  Arterial Line BP: ()/(45-58) 103/45     Weight: 89.4 kg (197 lb)  Body mass index is 29.95 kg/m².  Physical Exam  Constitutional:       Appearance: Normal appearance.   Skin:     General: Skin is warm and dry.      Findings: Lesion present.   Neurological:      Mental Status: He is alert.       Laboratory:  All pertinent labs reviewed  within the last 24 hours.    Diagnostic Results:  None        Assessment/Plan:          MARIA GUADALUPE Skin Integrity Evaluation    Skin Integrity MARIA GUADALUPE evaluation of patient as part of the comprehensive skin care team.   He has been admitted for 6 days. Skin injury was noted on 8/10/22. POA yes.    Sacrum            Bruise  - consult received for evaluation of skin injury to sacrum.  - pt has purple bruised area above sacral region.  - foam dressing intact.  - will follow wound progression.  - Venelex ordered bid/prn to increase capillary blood flow and provide moisture protection to the skin.  - Fawn bed utilized.  - RN at bedside and assisted with turning.  - encouraged q2h turns and pillow/wedge for offloading.  - nursing to maintain pressure injury prevention measures and continue wound care per orders.        Thank you for your consult. I will follow-up with patient. Please contact us if you have any additional questions.       Sara Garner NP  Skin Integrity [facility]ALU[facility]  [facility] - Cardiac Intensive Care

## 2022-08-16 NOTE — ASSESSMENT & PLAN NOTE
71 year old male with a history of mitral valve replacement complicated by a leak.  He is admitted to the hospital after undergoing a procedure to repair the valve.  He has remained on pressors and with leukocytosis during his hospital stay.  Cardiology concerned that his shock is not completely cardiogenic.  All cultures to date have been negative.  He has been on empiric vancomycin, meropenem and azithromycin.  Per patient and nursing staff, he has been having diarrhea.  Differential includes infection following his procedure with skin organisms like staph/strep vs C diff colitis given his ongoing diarrhea.    Plan    1.  Daptomycin and meropenem for now.    2. Follow up on blood and respiratory cultures.

## 2022-08-16 NOTE — NURSING
svo2 : 65   cvp 13, 15, 15    CRRT (sled) restarted at 1315. UF @ 250     Patient MAPs in 45s-50s...called provider to advise regarding hypotension, placed patient in reverse trendelen, UF rate lowered to 200. Provider Scott also advised of this and the need to continue to titrate up on Epi.     Requested order for levo and to begin titrating off of epi. Provider entered orders for levo, will continue to monitor/assess patient      Progress Note  Critical Care    Admit Date: 8/10/2022   LOS: 6 days     SUBJECTIVE:     Continuous Infusions:   sodium chloride 0.9%      sodium chloride 0.9% Stopped (2248)    sodium chloride 0.9% Stopped (2250)    amiodarone in dextrose 5% 0.5 mg/min (22)    EPINEPHrine 0.16 mcg/kg/min (22)    heparin (porcine) in D5W 19 Units/kg/hr (22)    vasopressin 0.08 Units/min (22)     Scheduled Meds:   clopidogreL  75 mg Oral Daily    DAPTOmycin (CUBICIN) IV (PEDS and ADULTS)  8 mg/kg Intravenous Q24H    famotidine  20 mg Oral Daily    fludrocortisone  100 mcg Oral Daily    hydrocortisone sodium succinate  100 mg Intravenous Q8H    meropenem (MERREM) IVPB  1 g Intravenous Q12H    QUEtiapine  12.5 mg Oral QHS     PRN Meds:sodium chloride, acetaminophen, [] fentaNYL **FOLLOWED BY** fentaNYL, heparin (PORCINE), heparin (PORCINE), magnesium sulfate IVPB, ondansetron, sodium chloride 0.9%    Review of patient's allergies indicates:   Allergen Reactions    Neomycin-bacitracnzn-polymyxnb     Benzalkonium chloride Rash    Neosporin (neomycin-polymyx) Rash       OBJECTIVE:     Vital Signs (Most Recent)  Temp: 98.1 °F (36.7 °C) (2215)  Pulse: (!) 128 (22)  Resp: (!) 21 (22)  BP: 96/65 (220)  SpO2: (!) 70 % (22)    Vital Signs Range (Last 24H):  Temp:  [97.6 °F (36.4 °C)-98.1 °F (36.7 °C)]   Pulse:  [111-128]   Resp:  [14-33]   SpO2:  [70 %-100 %]   Arterial Line BP:  ()/(47-58)     I & O (Last 24H):    Intake/Output Summary (Last 24 hours) at 8/16/2022 0959  Last data filed at 8/16/2022 0900  Gross per 24 hour   Intake 6218.32 ml   Output 6918 ml   Net -699.68 ml     Ventilator Data (Last 24H):          Hemodynamic Parameters (Last 24H):  PAP: (51-61)/(25-32) 60/29  PAP (Mean):  [37 mmHg-45 mmHg] 41 mmHg    Physical Exam:  See flowsheet    Lines/Drains:  Introducer 08/15/22 0620 right internal jugular (Active)   Site Assessment No drainage;No redness;No swelling;No warmth 08/15/22 2300   Line Securement Device Secured with sutures 08/15/22 2300   Dressing Type Biopatch in place;Central line dressing 08/15/22 2300   Dressing Status Clean;Dry;Intact 08/15/22 2300   Dressing Intervention First dressing 08/15/22 2300   Date on Dressing 08/15/22 08/15/22 2300   Number of days: 1       Pulmonary Artery Catheter Assessment  08/15/22 1820 right internal jugular (Active)   Verification by X-ray Yes 08/15/22 1901   Site Assessment No drainage;No redness;No swelling;No warmth 08/15/22 2300   Line Securement Device Secured with sutures 08/15/22 2300   Dressing Type Central line dressing 08/15/22 2300   Dressing Status Clean;Dry;Intact 08/15/22 2300   Dressing Intervention First dressing 08/15/22 2300   Date on Dressing 08/15/22 08/15/22 1901   Waveform Normal 08/15/22 2300   Line Necessity Review Hemodynamic instability 08/15/22 2300   Number of days: 0       Trialysis (Dialysis) Catheter 08/14/22 2130 left internal jugular (Active)   Verification by X-ray Yes 08/14/22 2130   Site Assessment No drainage;No redness;No swelling;No warmth 08/15/22 2300   Line Securement Device Secured with sutures 08/15/22 2300   Dressing Type Biopatch in place;Central line dressing 08/15/22 2300   Dressing Status Clean;Dry;Intact 08/15/22 2300   Dressing Intervention Integrity maintained 08/15/22 2300   Date on Dressing 08/14/22 08/15/22 2300   Dressing Due to be Changed 08/21/22 08/15/22 2300   Venous  Patency/Care infusing 08/15/22 2300   Arterial Patency/Care infusing 08/15/22 2300   Distal Patency/Care flushed w/o difficulty 08/15/22 2300   Flows Good 08/15/22 1600   Line Necessity Review CRRT/HD 08/15/22 2300   Number of days: 1            Peripheral IV - Single Lumen 08/11/22 1641 18 G Anterior;Distal;Left Upper Arm (Active)   Site Assessment Clean;Dry;Intact 08/15/22 2300   Extremity Assessment Distal to IV No abnormal discoloration;No redness;No swelling;No warmth 08/15/22 2300   Line Status Infusing 08/15/22 2300   Dressing Status Clean;Dry;Intact 08/15/22 2300   Dressing Intervention Integrity maintained 08/15/22 2300   Dressing Change Due 08/15/22 08/15/22 2300   Site Change Due 08/15/22 08/15/22 2300   Reason Not Rotated Not due 08/15/22 2300   Number of days: 4       Arterial Line 08/10/22 1334 Right Radial (Active)   Site Assessment Clean;Intact;Dry 08/15/22 2300   Line Status Pulsatile blood flow 08/15/22 2300   Art Line Waveform Appropriate 08/15/22 2300   Arterial Line Interventions Zeroed and calibrated;Leveled;Connections checked and tightened;Flushed per protocol 08/15/22 2300   Color/Movement/Sensation Capillary refill less than 3 sec 08/15/22 2300   Dressing Type Central line dressing 08/15/22 2300   Dressing Status Clean;Intact;Dry 08/15/22 2300   Dressing Intervention Integrity maintained 08/15/22 2300   Dressing Change Due 08/17/22 08/15/22 2300   Tubing Change Due 08/17/22 08/15/22 2300   Number of days: 5            Urethral Catheter 08/10/22 1718 (Active)   $ Quiroz Insertion Bedside Insertion Performed 08/10/22 1718   Site Assessment Clean;Intact 08/15/22 2300   Collection Container Urimeter 08/15/22 2300   Securement Method secured to top of thigh w/ adhesive device 08/15/22 2300   Catheter Care Performed yes 08/15/22 2300   Reason for Continuing Urinary Catheterization Critically ill in ICU and requiring hourly monitoring of intake/output 08/15/22 2300   CAUTI Prevention Bundle  "Securement Device in place with 1" slack;Intact seal between catheter & drainage tubing;Drainage bag/urimeter off the floor;Sheeting clip in use;No dependent loops or kinks;Drainage bag/urimeter not overfilled (<2/3 full);Drainage bag/urimeter below bladder 08/15/22 1901   Output (mL) 30 mL 08/16/22 0700   Number of days: 5       Laboratory (Last 24H):  CBC:  Recent Labs   Lab 08/16/22  0254   WBC 25.19*   HGB 9.1*   HCT 25.2*   *     CMP:  Recent Labs   Lab 08/16/22  0254   CALCIUM 7.5*   ALBUMIN 1.3*   PROT 4.2*   *   K 4.4   CO2 20*   CL 95   BUN 67*   CREATININE 3.2*   ALKPHOS 125   ALT 10   *   BILITOT 2.9*         Deonte Parker RN  8/16/2022  9:59 AM  "

## 2022-08-16 NOTE — HPI
Mr Mcdonald is a 71 year old male with PMH of MR s/p mechanical MVR 2015 in [place]) c/b PVL s/p MV plug (Dr Tejeda 2018) on Warfarin, chronic AF s/p GEE ligation, and HTN.  He has recently extended his paravalvular leak on TRACY performed in [place].  An attempt was made to occlude paravalvular leak at outside hospital, but was aborted due to fear of dislodging current device. He continues to have significant fatigue, GEORGE, and leg swelling. He presented for PLV closure with TRACY guidance.  Postprocedure patient was brought to ICU where he was found to be hypotensive and epinephrine was started.  Emergent right IJ triple-lumen catheter was inserted and hemodynamics were obtained which showed high output state with low SVR. Skin integrity MARIA GUADALUPE consulted for evaluation of skin injury.

## 2022-08-16 NOTE — ASSESSMENT & PLAN NOTE
72 yo M with PMH MR s/p mechanical MVR 2015 in [place]) c/b PVL s/p MV plug (Dr Tejeda 2018) on Warfarin, chronic AF s/p GEE ligation, and HTN.  He has recently extended his paravalvular leak on TRACY performed in [place]. He continues to have significant fatigue, GEORGE, and leg swelling. He presented for PLV closure with TRACY guidance.  Postprocedure patient was brought to CCU where he was found to be hypotensive and epinephrine was started.      CT demonstated no RP bleed or any fluid collections concerning for source of continued hypotension      Temp initially low and later 102.2 and persistently febrile  White count 23  CI/CO high with low SVR  -- Concern for septic shock vs hypovolemic shock from blood loss anemia  -- Blood cultures x 2 obtained and negative so far. UA unremarkable. Respiratory culture also negative  -- Vancomycin, meropenem and azithromycin.   -- On epi and vaso.    -- stress dose steroids and will taper if improvement of hemodynamics with extubation  -- ID consultation  -- HTS consulted in setting of vasoplegia and persistent shock despite continued therapy

## 2022-08-16 NOTE — PROGRESS NOTES
CRRT restarted ,  ,  uf rate initially stated 250. B/P 101/42, pulse 129 . Patient alert  And awake

## 2022-08-16 NOTE — CONSULTS
[facility] - Cardiac Intensive Care  Palliative Medicine  Consult Note    Patient Name: Yusuf Mcdonald Jr.  MRN: [id number]  Admission Date: 8/10/2022  Hospital Length of Stay: 6 days  Code Status: Full Code   Attending Provider: Mau James MD  Consulting Provider: Camilo Sun MD  Primary Care Physician: Primary Doctor No  Principal Problem:Shock    Patient information was obtained from patient, past medical records, ER records and primary team.      Inpatient consult to Palliative Care  Consult performed by: Camilo Sun MD  Consult ordered by: Doron Chavez MD        Assessment/Plan:     Palliative care encounter  Palliative Medicine and Supportive Care    Assessment: 71 y.o. male patient with history of chronic Afib on warfarin s/p GEE ligation and MR s/p mechanical MVR 2015 and 2018 with chronic, worsening HF symptoms in setting of paravalvular leak. S/p PLV closure patient became hypotensive and required pressor support. Palliative Medicine consulted for symptom management, medical decision making, and aiding in the formation of goals of care. Introduced role of Palliative Medicine briefly. Patient determined to continue treatment until all interventions are exhausted. Daughters at bedside prior to finishing consult. ROS negative for distressing symptoms. Good psychosocial support via daughters. Patient open to discussion once prognosis is more concrete.    Admit date: 8/10/2022  -Mechanical ventilation: no   -Pressors: yes  -Renal replacement therapy: yes  -Health status prior to admission: fair  -Functional status prior to admission: requires further evaluation; patient reports not participating in hunting but has plans for hunting trip in October. Still practicing law.   -Prior experiences with critical illness: requires further evaluation    Advance Care Planning   Decision-making:   -Pt does have decision-making capacity  -Marital status: Single  -Children: 2 daughters    Advance  directives:  -The patient has not previously engaged in advance care planning with Palliative Medicine at [facility]  -No Living will, HCPOA, DNR, [place]POST scanned in EPIC    Support system:  -Spiritual: unknown  -Family: 2 daughters    Estimated prognosis:   -Pending Cardiology workup    Prior GOC discussions: no    GOC Discussion:  -Introductory discussion with patient to introduce the role of palliative medicine and supportive care in the ICU/hospital in the setting of a serious diagnosis had taken place. Patient at this time reports having no needs but will contact Palliative Medicine if that changes.    Symptoms:  - Denies symptoms at this time    Summary/Recommendation:  -Most important goals at this time are curative/life-prolongation without limits to invasive therapies  -Code status: FULL  -Palliative care to provide support and assist with communication regarding disease expectations and trajectory once and with medical decision-making, at the appropriate time  -Most appropriate disposition: continue with current LOC    Thank you for the opportunity to care for this patient and family.     Please call with questions.     * Shock  Persistent distributive shock in setting of cardiac disease.    Acute blood loss anemia  s/p RBC 2 unit 08/13/2022. Concern for chronic hemolytic process. Hemoglobin stable (9's).    Septic shock  On antibiotics. Source unclear. Infectious Disease following.     S/P mitral valve replacement with metallic valve  S/p mechanical MVR 2015 and paravalvular leak repair 2018 and 2022. ECHO EF 55% 08/16/2022. Possible TRACY after patient diuresed and HR lowered to 70s (per ECHO report) for further prosthetic valve evaluation        Thank you for your consult. I will follow-up with patient. Please contact us if you have any additional questions.    Subjective:     HPI:   Patient admitted to CCU for management of shock in the setting of MV paravalvular leak repair completed on 8/10. Patient  hypotensive post procedure and CVC placed with hemodynamics concerning for septic shock. Patient was given limited IVF and started on broad spectrum antibiotics with vancomycin and cefepime which was escalated to meropenem and azithromycin given ongoing fevers. CI/CO/SVR 4.1/8.6/553. Lactate improved from 4 down to normal. Patient sedated with propofol, fentanyl and on epinephrine and vasopressin for pressor support. Ventilated at 40% FiO2 with PEEP of 5. LJ likely ischemic ATN vs prerenal given procedure and septic shock. Patient also started on stress dose steroids. Extubated on 8/12/22 to 7 L NC.      Patient with persistent shock concerning for septic in etiology on vancomcyin and meropenem so ID consulted for evalaution. CT demosntrated no identifiable source of infection and cultures NGTD. Patient with worsening oliguria and Cr and nephrology was consulted on 8/14.  Given persistent shock and vasoplegia, HTS has been consulted to exhaust all possibilities for advanced options.     He has had persistent distributive shock, despite the best care possible and concerns persist about poor prognosis.     In this setting, palliative medicine was consulted to help with symptom management, medical decision making, and aiding in the formation of goals of care.       Hospital Course:  No notes on file    Interval History: pt seen at bedside; discussed with the primary team    Past Medical History:   Diagnosis Date    A-fib     Anticoagulant long-term use     Hypertension        Past Surgical History:   Procedure Laterality Date    CARDIOVERSION      MITRAL VALVE REPLACEMENT  2015    mechanical    MITRAL VALVE REPLACEMENT Right 10/10/2018    Procedure: REPLACEMENT, MITRAL VALVE;  Surgeon: Yusuf Tejeda MD;  Location: [facility] CATH LAB;  Service: Cardiology;  Laterality: Right;    TRANSESOPHAGEAL ECHOCARDIOGRAPHY N/A 8/10/2022    Procedure: ECHOCARDIOGRAM, TRANSESOPHAGEAL;  Surgeon: Alexus Bell MD;  Location:  [facility] CATH LAB;  Service: Cardiology;  Laterality: N/A;       Review of patient's allergies indicates:   Allergen Reactions    Neomycin-bacitracnzn-polymyxnb     Benzalkonium chloride Rash    Neosporin (neomycin-polymyx) Rash       Medications:  Continuous Infusions:   sodium chloride 0.9%      sodium chloride 0.9% Stopped (22 0848)    sodium chloride 0.9% Stopped (22 0850)    amiodarone in dextrose 5% 0.5 mg/min (22 1100)    EPINEPHrine 0.16 mcg/kg/min (22 1159)    heparin (porcine) in D5W 19 Units/kg/hr (22 1100)    vasopressin 0.08 Units/min (22 1206)     Scheduled Meds:   clopidogreL  75 mg Oral Daily    DAPTOmycin (CUBICIN) IV (PEDS and ADULTS)  8 mg/kg Intravenous Q24H    famotidine  20 mg Oral Daily    fludrocortisone  100 mcg Oral Daily    furosemide (LASIX) injection  200 mg Intravenous Once    hydrocortisone sodium succinate  100 mg Intravenous Q8H    meropenem (MERREM) IVPB  1 g Intravenous Q12H    QUEtiapine  12.5 mg Oral QHS     PRN Meds:sodium chloride, acetaminophen, [] fentaNYL **FOLLOWED BY** fentaNYL, heparin (PORCINE), heparin (PORCINE), magnesium sulfate IVPB, ondansetron, sodium chloride 0.9%    Family History    None       Tobacco Use    Smoking status: Never Smoker    Smokeless tobacco: Never Used   Substance and Sexual Activity    Alcohol use: Yes     Alcohol/week: 2.0 standard drinks     Types: 1 Glasses of wine, 1 Shots of liquor per week     Comment: occ.    Drug use: No    Sexual activity: Not on file       Review of Systems   Constitutional:  Positive for appetite change (does not like the food at the hospital).   Respiratory:  Negative for shortness of breath.    Cardiovascular:  Positive for leg swelling.   Musculoskeletal:  Negative for myalgias.   Neurological:  Negative for facial asymmetry and speech difficulty.   Psychiatric/Behavioral:  Negative for agitation, confusion and decreased concentration. The patient is  not nervous/anxious.    Objective:     Vital Signs (Most Recent):  Temp: 98.1 °F (36.7 °C) (08/16/22 0715)  Pulse: (!) 131 (08/16/22 1200)  Resp: (!) 28 (08/16/22 1200)  BP: 96/65 (08/13/22 1910)  SpO2: 100 % (08/16/22 1200)   Vital Signs (24h Range):  Temp:  [97.6 °F (36.4 °C)-98.1 °F (36.7 °C)] 98.1 °F (36.7 °C)  Pulse:  [115-131] 131  Resp:  [14-28] 28  SpO2:  [86 %-100 %] 100 %  Arterial Line BP: ()/(46-58) 103/46     Weight: 89.4 kg (197 lb)  Body mass index is 29.95 kg/m².    Physical Exam  Vitals and nursing note reviewed.   Constitutional:       General: He is not in acute distress.     Appearance: He is well-developed. He is not diaphoretic.   HENT:      Head: Normocephalic and atraumatic.      Right Ear: External ear normal.      Left Ear: External ear normal.      Nose: Nose normal.   Eyes:      General: No scleral icterus.        Right eye: No discharge.         Left eye: No discharge.      Extraocular Movements: Extraocular movements intact.      Conjunctiva/sclera: Conjunctivae normal.      Pupils: Pupils are equal, round, and reactive to light.   Neck:      Thyroid: No thyromegaly.      Vascular: No JVD.      Trachea: No tracheal deviation.   Cardiovascular:      Rate and Rhythm: Normal rate and regular rhythm.   Pulmonary:      Effort: Pulmonary effort is normal. No respiratory distress.   Musculoskeletal:         General: Normal range of motion.      Cervical back: Normal range of motion and neck supple.   Neurological:      General: No focal deficit present.      Mental Status: He is alert.      Cranial Nerves: No cranial nerve deficit.      Motor: No abnormal muscle tone.      Deep Tendon Reflexes: Reflexes are normal and symmetric.   Psychiatric:         Behavior: Behavior normal.         Thought Content: Thought content normal.         Judgment: Judgment normal.       Review of Symptoms      Symptom Assessment (ESAS 0-10 Scale)  Pain:  0  Dyspnea:  0  Anxiety:  0  Nausea:  0  Depression:   0  Anorexia:  0  Fatigue:  0  Insomnia:  0  Restlessness:  0  Agitation:  0       Anxiety:  Is not nervous/anxious    Psychosocial/Cultural: 2 daughters:  Nargis Velasco [phone]   Daughter - next of kin     Emperatriz Mcdonald [phone]   Daughter -  2nd call       Advance Care Planning   Advance Directives:   Living Will: No    [place]POST: No    Do Not Resuscitate Status: No    Medical Power of : No      Decision Making:  Patient answered questions and Family answered questions       Significant Labs: All pertinent labs within the past 24 hours have been reviewed.  CBC:   Recent Labs   Lab 08/16/22 0254   WBC 25.19*   HGB 9.1*   HCT 25.2*   MCV 83   *     BMP:  Recent Labs   Lab 08/16/22 0254   *   *   K 4.4   CL 95   CO2 20*   BUN 67*   CREATININE 3.2*   CALCIUM 7.5*   MG 2.0     LFT:  Lab Results   Component Value Date     (H) 08/16/2022    ALKPHOS 125 08/16/2022    BILITOT 2.9 (H) 08/16/2022     Albumin:   Albumin   Date Value Ref Range Status   08/16/2022 1.3 (L) 3.5 - 5.2 g/dL Final     Protein:   Total Protein   Date Value Ref Range Status   08/16/2022 4.2 (L) 6.0 - 8.4 g/dL Final     Lactic acid:   Lab Results   Component Value Date    LACTATE 2.6 (H) 08/10/2022       Significant Imaging: I have reviewed all pertinent imaging results/findings within the past 24 hours.        > 50% of 60 min visit spent in chart review, face to face discussion of goals of care,  symptom assessment, coordination of care and emotional support.    Camilo Sun MD  Palliative Medicine  [facility] - Cardiac Intensive Care

## 2022-08-16 NOTE — PROGRESS NOTES
[facility] - Cardiac Intensive [facility]  Cardiology  Progress Note    Patient Name: Yusuf Mcdonald Jr.  MRN: [id number]  Admission Date: 8/10/2022  Hospital Length of Stay: 6 days  Code Status: Full Code   Attending Physician: Mau James MD   Primary Care Physician: Primary Doctor No  Expected Discharge Date: 8/22/2022  Principal Problem:Shock    Subjective:     Hospital Course:   Patient admitted to CCU for management of shock in the setting of MV paravalvular leak repair completed on 8/10. Patient hypotensive post procedure and CVC placed with hemodynamics concerning for septic shock. Patient was given limited IVF and started on broad spectrum antibiotics with vancomycin and cefepime which was escalated to meropenem and azithromycin given ongoing fevers. CI/CO/SVR 4.1/8.6/553. Lactate improved from 4 down to normal. Patient sedated with propofol, fentanyl and on epinephrine and vasopressin for pressor support. Ventilated at 40% FiO2 with PEEP of 5. LJ likely ischemic ATN vs prerenal given procedure and septic shock. Patient also started on stress dose steroids. Extubated on 8/12/22 to 7 L NC.     Patient with persistent shock concerning for septic in etiology on vancomcyin and meropenem so ID consulted for evalaution. CT demosntrated no identifiable source of infection and cultures NGTD. Patient with worsening oliguria and Cr and nephrology was consulted on 8/14.  Given persistent shock and vasoplegia, will consult [facility] for further eval. Leave-in swan in place with bubble TTE pending as well.         Interval History:   No acute events overnight. Slight improvement in hemodynamics today without increasing pressors requirements. Will administer a lasix trial today and monitor for response.     Review of Systems   Constitutional: Negative for chills and fever.   Cardiovascular:  Negative for chest pain, orthopnea and palpitations.   Respiratory:  Negative for cough and shortness of breath.    Gastrointestinal:   Negative for abdominal pain.   Neurological:  Negative for dizziness, headaches and light-headedness.   Psychiatric/Behavioral:  Negative for altered mental status.    Objective:     Vital Signs (Most Recent):  Temp: 98.1 °F (36.7 °C) (08/16/22 0715)  Pulse: (!) 130 (08/16/22 1400)  Resp: (!) 30 (08/16/22 1400)  BP: (!) 95/45 (08/16/22 1400)  SpO2: 95 % (08/16/22 1400)   Vital Signs (24h Range):  Temp:  [97.6 °F (36.4 °C)-98.1 °F (36.7 °C)] 98.1 °F (36.7 °C)  Pulse:  [116-131] 130  Resp:  [14-30] 30  SpO2:  [94 %-100 %] 95 %  BP: (95)/(45) 95/45  Arterial Line BP: ()/(45-58) 103/45     Weight: 89.4 kg (197 lb)  Body mass index is 29.95 kg/m².     SpO2: 95 %  O2 Device (Oxygen Therapy): High Flow nasal Cannula      Intake/Output Summary (Last 24 hours) at 8/16/2022 1453  Last data filed at 8/16/2022 1400  Gross per 24 hour   Intake 5570.12 ml   Output 5184 ml   Net 386.12 ml       Lines/Drains/Airways       Central Venous Catheter Line  Duration             Introducer 08/15/22 0620 right internal jugular 1 day    Trialysis (Dialysis) Catheter 08/14/22 2130 left internal jugular 1 day    Pulmonary Artery Catheter Assessment  08/15/22 1820 right internal jugular <1 day              Drain  Duration                  Urethral Catheter 08/10/22 1718 5 days              Arterial Line  Duration             Arterial Line 08/10/22 1334 Right Radial 6 days              Peripheral Intravenous Line  Duration                  Peripheral IV - Single Lumen 08/11/22 1641 18 G Anterior;Distal;Left Upper Arm 4 days                    Physical Exam  Vitals and nursing note reviewed.   Constitutional:       General: He is not in acute distress.     Appearance: Normal appearance. He is ill-appearing. He is not toxic-appearing.      Comments: Pale   HENT:      Head: Normocephalic and atraumatic.      Mouth/Throat:      Mouth: Mucous membranes are moist.   Cardiovascular:      Rate and Rhythm: Tachycardia present. Rhythm irregular.       Heart sounds: Murmur (mitral valve systolic click) heard.     No friction rub. No gallop.      Comments: Telemetry afib with RVR  Pulmonary:      Effort: Pulmonary effort is normal. No respiratory distress.      Breath sounds: Normal breath sounds.   Abdominal:      Palpations: Abdomen is soft.   Musculoskeletal:      Right lower leg: No edema.      Left lower leg: No edema.   Skin:     General: Skin is warm.   Neurological:      Mental Status: He is alert and oriented to person, place, and time. Mental status is at baseline.       Significant Labs: ABG:   Recent Labs   Lab 08/15/22  1254 08/16/22  0525 08/16/22  0957   PH 7.361 7.409 7.437   PCO2 35.9 39.2 33.9*   HCO3 20.3* 24.8 22.9*   POCSATURATED 71* 50* 65*   BE -5 0 -1   , Blood Culture:   Recent Labs   Lab 08/14/22  1836 08/14/22  2111   LABBLOO No Growth to date  No Growth to date No Growth to date  No Growth to date   , BMP:   Recent Labs   Lab 08/15/22  1408 08/16/22  0254 08/16/22  1353   * 155* 120*   * 126* 125*   K 4.4 4.4 4.3   CL 92* 95 96   CO2 17* 20* 20*   BUN 97* 67* 62*   CREATININE 4.2* 3.2* 3.5*   CALCIUM 7.9* 7.5* 7.1*   MG 2.2 2.0 1.9   , CMP   Recent Labs   Lab 08/15/22  0516 08/15/22  1408 08/16/22  0254 08/16/22  1353   * 122* 126* 125*   K 4.3 4.4 4.4 4.3   CL 92* 92* 95 96   CO2 19* 17* 20* 20*   * 149* 155* 120*   * 97* 67* 62*   CREATININE 4.3* 4.2* 3.2* 3.5*   CALCIUM 7.8* 7.9* 7.5* 7.1*   PROT 3.9*  --  4.2*  --    ALBUMIN 1.2* 1.3* 1.3* 1.2*   BILITOT 2.2*  --  2.9*  --    ALKPHOS 114  --  125  --    *  --  174*  --    ALT 11  --  10  --    ANIONGAP 13 13 11 9   , CBC   Recent Labs   Lab 08/15/22  0516 08/16/22  0254   WBC 30.96* 25.19*   HGB 9.3* 9.1*   HCT 25.5* 25.2*    135*   , INR   Recent Labs   Lab 08/15/22  0516 08/15/22  0803 08/16/22  0254   INR SEE COMMENT 1.1 1.2   , Lipid Panel No results for input(s): CHOL, HDL, LDLCALC, TRIG, CHOLHDL in the last 48 hours.,    Pathology Results  (Last 10 years)      None        , Troponin No results for input(s): TROPONINI in the last 48 hours., and All pertinent lab results from the last 24 hours have been reviewed.    Significant Imaging: Echocardiogram: Transthoracic echo (TTE) complete (Cupid Only):   Results for orders placed or performed during the hospital encounter of 08/10/22   Echo Saline Bubble? Yes   Result Value Ref Range    Ascending aorta 3.60 cm    STJ 3.66 cm    AV mean gradient 4 mmHg    Ao peak rohan 1.45 m/s    Ao VTI 16.69 cm    IVS 1.05 0.6 - 1.1 cm    LA size 6.40 cm    Left Atrium Major Axis 8.99 cm    Left Atrium Minor Axis 9.10 cm    LVIDd 3.96 3.5 - 6.0 cm    LVIDs 2.65 2.1 - 4.0 cm    LVOT diameter 2.26 cm    LVOT peak VTI 11.68 cm    Posterior Wall 0.96 0.6 - 1.1 cm    MV Peak A Rohan 1.27 m/s    E wave deceleration time 224.02 msec    MV Peak E Rohan 1.75 m/s    RA Major Axis 7.19 cm    RA Width 7.11 cm    RVDD 6.36 cm    Sinus 4.22 cm    TAPSE 0.86 cm    TR Max Rohan 3.99 m/s    TDI LATERAL 0.13 m/s    TDI SEPTAL 0.05 m/s    LA WIDTH 5.17 cm    PV PEAK VELOCITY 1.06 cm/s    MV stenosis pressure 1/2 time 64.97 ms    LV Diastolic Volume 68.52 mL    LV Systolic Volume 25.77 mL    LVOT peak rohan 0.89 m/s    RVOT peak VTI 7.52 cm    Mr max rohan 0.04 m/s    RVOT peak rohan 0.69 m/s    LA volume (mod) 157.89 cm3    MV mean gradient 1 mmHg    MV peak gradient 24 mmHg    MV VTI 41.97 cm    PV mean gradient 0.90 mmHg    LV LATERAL E/E' RATIO 13.46 m/s    LV SEPTAL E/E' RATIO 35.00 m/s    FS 33 %    LA volume 254.38 cm3    LV mass 125.96 g    Left Ventricle Relative Wall Thickness 0.48 cm    AV valve area 2.81 cm2    AV Velocity Ratio 0.61     AV index (prosthetic) 0.70     MV valve area p 1/2 method 3.39 cm2    MV valve area by continuity eq 1.12 cm2    E/A ratio 1.38     Mean e' 0.09 m/s    LVOT area 4.0 cm2    LVOT stroke volume 46.83 cm3    AV peak gradient 8 mmHg    E/E' ratio 19.44 m/s    LV Systolic Volume Index 12.7  mL/m2    LV Diastolic Volume Index 33.75 mL/m2    LA Volume Index 125.3 mL/m2    LV Mass Index 62 g/m2    Triscuspid Valve Regurgitation Peak Gradient 64 mmHg    LA Volume Index (Mod) 77.8 mL/m2    BSA 2.07 m2     Assessment and Plan:     * Shock  See septic shock      Acute blood loss anemia  Hgb trend 13 --> 11 -- 9.9 --> 7.9 since admit. Concern for chronic hemolytic process given mechanical valve as documented with haptoglobin and LDH altered since replacement in 2018 vs possible retroperitoneal bleed. Stable. Smear reviewed with some fragmented RBCs 5/hpf but PLTs stable and no further concerns for acute hemolytic or consumptive process. CT demonstrated no RP bleed.  s/p RBC 2 unit 08/13/2022, noted decrease in pressor requirements with transfusion with Lasix 120 mg IV x 1      - Type and screen q 72 hrs   - Daily cbc   - Keeping aspirin and Plavix for now considering PVL closure, heparin gtt for mechanical MV    Severe mitral regurgitation  S/p mechanical MVR 2015 and paravalvular leak repair 2018 and 2022     Gross hematuria  Hematuria continued since admission initially thought from traumatic insertion. Possible underlying coagulopathy. Imaging unrevealing so far    Will likely need urology consultation        Septic shock  72 yo M with PMH MR s/p mechanical MVR 2015 in [place]) c/b PVL s/p MV plug (Dr Tejeda 2018) on Warfarin, chronic AF s/p GEE ligation, and HTN.  He has recently extended his paravalvular leak on TRACY performed in [place]. He continues to have significant fatigue, GEORGE, and leg swelling. He presented for PLV closure with TRACY guidance.  Postprocedure patient was brought to CCU where he was found to be hypotensive and epinephrine was started.      CT demonstated no RP bleed or any fluid collections concerning for source of continued hypotension      Temp initially low and later 102.2 and persistently febrile  White count 23  CI/CO high with low SVR  -- Concern for septic shock vs hypovolemic  shock from blood loss anemia  -- Blood cultures x 2 obtained and negative so far. UA unremarkable. Respiratory culture also negative  -- Vancomycin, meropenem and azithromycin.   -- On epi and vaso.    -- stress dose steroids and will taper if improvement of hemodynamics with extubation  -- ID consultation  -- HTS consulted in setting of vasoplegia and persistent shock despite continued therapy    Atrial fibrillation with RVR  -Afib with RVR at 2 am, INR 2.0, rate 135-145, EKG obtained, will give amio iv 150x1 followed by gtt  On Amio gtt and holding home dilt and metoprolol in shock   holding digoxin for with elevated Cr      LJ (acute kidney injury)  Baseline of 1.3 prior to admission.  Trend 1.3 --> 2.5 --> 3.1 --> 3.5 and now 4.4   Likely prerenal azotemia considering BUN/Cr 20:1 and concern for acute blood loss anemia. Continued hematuria      Plan:  - nephrology consulted with appreciated recommendations  - Lasix trial today   - Monitor intake/output and daily standing weights.    - Avoid nephrotoxic agents such as NSAIDs, gadolinium and IV radiocontrast.  - Renally dose meds to current GFR.  - Maintain MAP > 65.      S/P mitral valve replacement with metallic valve  On warfarin at home. Transitioned to heparin gtt and will bridge once stable       Chronic a-fib  Currently afib with RVR        Paravalvular leak (prosthetic valve)  Patient is a 70 yo M with chronic AF on warfarin, pHTN, HLD and mitral valve regurgitation s/p mechanical MVR 2015 s/p MAZE c/b PVL s/p MV plug in 2018 and now with extension of his paravalvular leak on TRACY. Here for PVL closure with TRACY guidance.    Patient admitted to CCU for monitoring post procedure.   TTE limited 08/13/2022    [facility] recs for repeat bubble study in setting of recent perivalve alfredo        VTE Risk Mitigation (From admission, onward)         Ordered     heparin 25,000 units in dextrose 5% (100 units/ml) IV bolus from bag - ADDITIONAL PRN BOLUS - 60 units/kg  As  needed (PRN)        Question:  Heparin Infusion Adjustment (DO NOT MODIFY ANSWER)  Answer:  [email or web address]\epic\Images\Pharmacy\HeparinInfusions\heparin LOW INTENSITY nomogram for [facility] [id number]    08/11/22 0737     heparin 25,000 units in dextrose 5% (100 units/ml) IV bolus from bag - ADDITIONAL PRN BOLUS - 30 units/kg  As needed (PRN)        Question:  Heparin Infusion Adjustment (DO NOT MODIFY ANSWER)  Answer:  [email or web address]\epic\Images\Pharmacy\HeparinInfusions\heparin LOW INTENSITY nomogram for [facility] [id number]    08/11/22 0737     heparin 25,000 units in dextrose 5% 250 mL (100 units/mL) infusion LOW INTENSITY nomogram - [facility]  Continuous        Question Answer Comment   Heparin Infusion Adjustment (DO NOT MODIFY ANSWER) [email or web address]\epic\Images\Pharmacy\HeparinInfusions\heparin LOW INTENSITY nomogram for [facility] [id number]    Begin at (in units/kg/hr) 12        08/11/22 0737     IP VTE HIGH RISK PATIENT  Once         08/10/22 1647     Place sequential compression device  Until discontinued         08/10/22 1647                Adeel Kapoor MD  Cardiology  [facility] - Cardiac Intensive Care

## 2022-08-16 NOTE — PLAN OF CARE
[facility]  Discharge Reassessment    Primary Care Provider: Primary Doctor No    Expected Discharge Date: 8/22/2022    Reassessment (most recent)     Discharge Reassessment - 08/16/22 1445        Discharge Reassessment    Assessment Type Discharge Planning Reassessment     Communicated SCOOTER with patient/caregiver Date not available/Unable to determine     Discharge Plan A Home Health     Discharge Plan B Skilled Nursing Facility     DME Needed Upon Discharge  none     Discharge Barriers Identified None     Why the patient remains in the hospital Requires continued medical care               Per treatment team they are consulting Palliative Care and discharge disposition is pending medical stability.  Will continue to follow.    Ryanne Benavides LMSW  [facility]  [phone]

## 2022-08-16 NOTE — HPI
Patient admitted to CCU for management of shock in the setting of MV paravalvular leak repair completed on 8/10. Patient hypotensive post procedure and CVC placed with hemodynamics concerning for septic shock. Patient was given limited IVF and started on broad spectrum antibiotics with vancomycin and cefepime which was escalated to meropenem and azithromycin given ongoing fevers. CI/CO/SVR 4.1/8.6/553. Lactate improved from 4 down to normal. Patient sedated with propofol, fentanyl and on epinephrine and vasopressin for pressor support. Ventilated at 40% FiO2 with PEEP of 5. LJ likely ischemic ATN vs prerenal given procedure and septic shock. Patient also started on stress dose steroids. Extubated on 8/12/22 to 7 L NC.      Patient with persistent shock concerning for septic in etiology on vancomcyin and meropenem so ID consulted for evalaution. CT demosntrated no identifiable source of infection and cultures NGTD. Patient with worsening oliguria and Cr and nephrology was consulted on 8/14.  Given persistent shock and vasoplegia, HTS has been consulted to exhaust all possibilities for advanced options.     He has had persistent distributive shock, despite the best care possible and concerns persist about poor prognosis.     In this setting, palliative medicine was consulted to help with symptom management, medical decision making, and aiding in the formation of goals of care.

## 2022-08-16 NOTE — PROGRESS NOTES
Two patient identifiers verified. #18g IV in place to LFA. 8 cc's of bacteriostatic saline agitated and injected into IV x 2 for bubble study. Patient tolerated well.

## 2022-08-16 NOTE — ASSESSMENT & PLAN NOTE
Serum sodium down to 126 this AM   Likely hypervolemic in the setting of cardiogenic shock and positive fluid balance   Will keep on CRRT/SLED, Na bath adjusted  Please change IV meds solution from D5 to NS as allowed by pharmacy

## 2022-08-16 NOTE — ASSESSMENT & PLAN NOTE
Baseline of 1.3 prior to admission.  Trend 1.3 --> 2.5 --> 3.1 --> 3.5 and now 4.4   Likely prerenal azotemia considering BUN/Cr 20:1 and concern for acute blood loss anemia. Continued hematuria      Plan:  - nephrology consulted with appreciated recommendations  - Lasix trial today   - Monitor intake/output and daily standing weights.    - Avoid nephrotoxic agents such as NSAIDs, gadolinium and IV radiocontrast.  - Renally dose meds to current GFR.  - Maintain MAP > 65.

## 2022-08-16 NOTE — MEDICAL/APP STUDENT
Yusuf Mcdonald Jr.  [id number]    Hospital day Hospital Day: 7    Reason for admission: Shock    Interval History:   Mr. Mcdonald is a 72 yo M with PMHx severe MR, s/p MVR 2015 and PVL revision 2018, permanent AF s/p GEE ligation on home warfarin, HTN, HLD, NICM, admitted to CCU for management of septic shock in the setting of MV paravalvular leak repair 8/10, further complicated by pre-procedural LJ. Post procedure, patient was hypotensive MAPS in 40s. Emergent R CVC placed, revelaing high output low SVR hemodynamics, requiring 0.08 vasopressin and 0.12 epinephrine. Patient was sedated on propofol, fentanyl and precedex.  Hypothermic to 88.9, then spiked fever to 102.2. Blood cultures x2 and respiratory cultures drawn, NGTD. Patient was started on vancomycin and cefepime, later escalated to meropenem and azithromycin due to ongoing fevers. Started on stress dose steroids. Vancomycin switched to daptomycin and azithromycin discontinued as per ID consult. Lactate improved from 4 to 2.07. SAT/SBT 8/12, downstepped to BiPAP, now on 7L O2 NC. Hgb trending downward over hospital course 13 --> 7.9. Patient received 2U  RBCs with improvement to 9.3.  CT abdomen demonstrated no source infection, nor RP bleed, revealed cirrhosis with mild-moderate ascites and anasarca.  8/11 AF with RVR to 130s-140s, started on amio drip.   LJ likely ischemic ATN vs prerenal given septic shock. Creatinine trended up from 2.4 day of procedure to peak 4.4. Received IV lasix 8/14 with 400 mL tea colored UOP. Nephrology consulted and started on CRRT SLED alternating SCUF. HTS consulted and [place] catheter placed.    ROS:   Gen: No fevers, fatigue or malaise  HEENT: No congestion or rhinorrhea  CVS: No cyanosis  Resp: No shortness of breath, wheezing, nonproductive cough  FEN/GI: Tolerating PO, no vomiting, no diarrhea  Heme: No bruising or bleeding  Skin: No rashes    Objective:  Temp:  [97.6 °F (36.4 °C)] 97.6 °F (36.4 °C)  Pulse:  [111-125] 122  Resp:   [14-33] 19  SpO2:  [94 %-100 %] 100 %  Arterial Line BP: ()/(47-57) 105/52      Current Medications:    clopidogreL  75 mg Oral Daily    DAPTOmycin (CUBICIN) IV (PEDS and ADULTS)  8 mg/kg Intravenous Q24H    famotidine  20 mg Oral Daily    fludrocortisone  100 mcg Oral Daily    hydrocortisone sodium succinate  100 mg Intravenous Q8H    meropenem (MERREM) IVPB  1 g Intravenous Q12H    QUEtiapine  12.5 mg Oral QHS     sodium chloride, acetaminophen, [] fentaNYL **FOLLOWED BY** fentaNYL, heparin (PORCINE), heparin (PORCINE), ondansetron, sodium chloride 0.9%    Data Review  Labs:    Lab Results   Component Value Date    WBC 25.19 (H) 2022    HGB 9.1 (L) 2022    HCT 25.2 (L) 2022    MCV 83 2022     (L) 2022     Lab Results   Component Value Date    CREATININE 3.2 (H) 2022    BUN 67 (H) 2022     (L) 2022    K 4.4 2022    CL 95 2022    CO2 20 (L) 2022       Microbiology Results (last 7 days)     Procedure Component Value Units Date/Time    Blood culture [[id number]] Collected: 22    Order Status: Completed Specimen: Blood from Peripheral, Jugular, Internal Left Updated: 08/15/22 2212     Blood Culture, Routine No Growth to date      No Growth to date    Blood culture [[id number]] Collected: 08/10/22 2000    Order Status: Completed Specimen: Blood from Peripheral, Upper Arm, Left Updated: 08/15/22 2212     Blood Culture, Routine No growth after 5 days.    Blood culture [[id number]] Collected: 08/10/22 2058    Order Status: Completed Specimen: Blood from Peripheral, Forearm, Left Updated: 08/15/22 2212     Blood Culture, Routine No growth after 5 days.    Blood culture [[id number]] Collected: 22 1836    Order Status: Completed Specimen: Blood from Peripheral, Antecubital, Left Updated: 08/15/22 2012     Blood Culture, Routine No Growth to date      No Growth to date    Clostridium difficile EIA [[id number]]  Collected: 08/14/22 2005    Order Status: Completed Specimen: Stool Updated: 08/15/22 0434     C. diff Antigen Negative     C difficile Toxins A+B, EIA Negative     Comment: Testing not recommended for children <24 months old.       Culture, Respiratory with Gram Stain [[id number]]     Order Status: No result Specimen: Respiratory     Culture, Respiratory with Gram Stain [[id number]] Collected: 08/11/22 0914    Order Status: Completed Specimen: Respiratory from Tracheal Aspirate Updated: 08/13/22 1223     Respiratory Culture Normal respiratory ronny      No S aureus or Pseudomonas isolated.     Gram Stain (Respiratory) <10 epithelial cells per low power field.     Gram Stain (Respiratory) Rare WBC's     Gram Stain (Respiratory) No organisms seen    Urine culture [[id number]]     Order Status: Completed Specimen: Urine, Catheterized           Physical Exam  Vitals reviewed.   Constitutional:       Appearance: He is ill-appearing.   HENT:      Head: Normocephalic and atraumatic.      Mouth/Throat:      Mouth: Mucous membranes are moist.   Cardiovascular:      Rate and Rhythm: Regular rhythm. Tachycardia present.      Pulses: Normal pulses.   Pulmonary:      Effort: Pulmonary effort is normal.      Breath sounds: Normal breath sounds.   Abdominal:      General: There is distension.      Tenderness: There is no abdominal tenderness. There is no guarding.   Musculoskeletal:         General: Swelling present.      Right lower leg: Edema present.      Left lower leg: Edema present.   Skin:     General: Skin is warm and dry.      Capillary Refill: Capillary refill takes less than 2 seconds.      Coloration: Skin is pale.   Neurological:      General: No focal deficit present.      Mental Status: He is alert.         Assessment:  Mr. Yusuf Mcdonald is a 71 y.o. who presented for PVL repair, which was complicated by prolonged surgery and anesthesia time, a pre-procedural LJ, post-procedure septic shock requiring 2 pressors, AF with  RVR, ATN, hematuria and anasarca.     Plan:     Septic Shock  Post procedural hypotension with MAP to 40s, requiring 0.14 epi and 0.08 vasopressin. WCC increased from 10 to 23, fever peak to 102.2 Started on vanc/cefepime, escalated to meropenem, vancomycin and azithromycin with stress dose steroids. Now, patient afebrile with elevated WCC to 25, diarrhea x2 days, pressor requirement increased to 0.16 epi/0.08 vaso. Given lack of hemodynamic progress on antibiotics, concern for high output cardiogenic shock vs septic shock.     Per ID consult 8/14: vancomycin switched to daptomycin for LJ, azithromycin discontinued.    Stool culture for C diff negative   Blood cultures x2 8/10 NGTD  Blood cultures x2 8/14 NGTD  Respiratory culture 8/11 NGTD  UA 8/11 unremarkable     - Continue daptomycin and meropenem    Per HTS Consult:  - R swan catheter placed for R and L heart pressures  - hemodynamics have improved, MAP >75, wean pressors as tolerated  - MAP >65  - For echo with bubble study today to ruleout VSD shunt, as per HTS recommendation     Hemolytic Anemia  Patient with mechanical mitral valve and chronic hemolytic process with altered haptoglobin and LDH since 2018. However, down-trending Hgb from 13 to 7.9 concern for bleed, platelets downtrending. Transfused 2U RBCs 8/13 with increase Hgb to 9.7. On ASA and clopidogrel 75mg for PVL, heparin drip for mechanical valve and AF.     CT 8/14 with no sign of retroperitoneal bleed.  Hgb today 9.1, platelets downtrending 162 today  - Daily CBC  - reperfuse if Hgb <8      LJ/ATN  Baseline Cr 1.3-1.6, on admission 2.4 Has trended up to peak BUN/Cr of 114/4.4 with little tea-colored UOP despite multiple pushes IV lasix. Net positive 18L since admission. Concern for prerenal LJ vs ATN.   Nephrology consulted 8/14: patient started on CRRT/SLED alternating with SCUF. Has remained oliguric, 9ccs UOP overnight.  - continue CRRT today as per nephrology rec's  - 200 mg IV lasix  trial today for increased UOP  - Araujo catheter inserted for strict I/Os  - Avoid nephrotoxic medications, including NSAIDs, ACEi/ARBs, IV radiocontrast      AF RVR  Patient with permanent AF, s/p GEE ligation on home warfarin. Post procedure, patient developed AF with RVR to 130s-140s. Given 150mL push amio and started on amio gtt   aPTT 8/16 43.9   CHADsVASC 2, HAS-BLED 3  - continue amio gtt  - continue low intensity heparin gtt  - hold home metoprolol and diltiazem due to hypotension  - hold home digoxin due to LJ  - on tele     Anasarca  Patient with volume overload, pitting edema throughout, albumin chronically low, 1.8 on admission, low of 1.1 8/13. Likely multifactorial due to LJ, cirrhosis, sepsis, chronic malnutrition. Unlikely hepatorenal syndrome due to urine sodium >10 (urine sodium 16 on 8/11).     CT 8/14 revealed cirrhosis with mild-moderate ascites and anasarca.  LFTs 8/15: , ALT 11, TBili 2.2  Albumin 8/15: 1.2    - continue to observe for possible reduction in anasarca post-CRRT       PVL  S/p procedure 8/10  TTE 8/14: EF 45%, mild rocking of the mechanical valve with moderate mitral regurgitation. The mean diastolic gradient across the mitral valve is 8mmHg at heart rate 109bpm. Severe right ventricular enlargement with moderately reduced right ventricular systolic function.    - continue ASA 81, clopidogrel 75mg     SOCIAL: Full code     F renal diet  A acetaminophen 650 PRN  S awake, seroquel 12.5 PRN nightly  T low intensity heparin gtt  H HOB >30  U famotidine 20 mg poqd  G 160  S n/a  B no bowel regimen  I indwelling araujo catheter x 5days  D continue meropenem, daptomycin

## 2022-08-16 NOTE — SUBJECTIVE & OBJECTIVE
Interval History: Net negative 1.1L over the past 24h. On CRRT/SLED alternating with SCUF. Remains anuric.    Review of patient's allergies indicates:   Allergen Reactions    Neomycin-bacitracnzn-polymyxnb     Benzalkonium chloride Rash    Neosporin (neomycin-polymyx) Rash     Current Facility-Administered Medications   Medication Frequency    0.9%  NaCl infusion (CRRT USE ONLY) Continuous    0.9%  NaCl infusion (for blood administration) Q24H PRN    0.9%  NaCl infusion Continuous    0.9%  NaCl infusion Continuous    acetaminophen tablet 650 mg Q4H PRN    amiodarone 360 mg/200 mL (1.8 mg/mL) infusion Continuous    balsam peru-castor oiL Oint BID    clopidogreL tablet 75 mg Daily    DAPTOmycin (CUBICIN) 715 mg in sodium chloride 0.9% 50 mL IVPB Q24H    EPINEPHrine 5 mg in dextrose 5% 250 mL infusion (premix) Continuous    famotidine tablet 20 mg Daily    fentaNYL 50 mcg/mL injection 50 mcg Q1H PRN    fludrocortisone tablet 100 mcg Daily    heparin 25,000 units in dextrose 5% (100 units/ml) IV bolus from bag - ADDITIONAL PRN BOLUS - 30 units/kg PRN    heparin 25,000 units in dextrose 5% (100 units/ml) IV bolus from bag - ADDITIONAL PRN BOLUS - 60 units/kg PRN    heparin 25,000 units in dextrose 5% 250 mL (100 units/mL) infusion LOW INTENSITY nomogram - [facility] Continuous    hydrocortisone sodium succinate injection 100 mg Q8H    magnesium sulfate 2g in water 50mL IVPB (premix) PRN    meropenem-0.9% sodium chloride 1 g/50 mL IVPB Q12H    ondansetron disintegrating tablet 8 mg Q8H PRN    quetiapine split tablet 12.5 mg QHS    sodium chloride 0.9% flush 10 mL PRN    vasopressin (PITRESSIN) 1 Units/mL in dextrose 5 % 100 mL infusion Continuous       Objective:     Vital Signs (Most Recent):  Temp: 98.1 °F (36.7 °C) (08/16/22 1500)  Pulse: (!) 131 (08/16/22 1500)  Resp: (!) 32 (08/16/22 1500)  BP: (!) 95/45 (08/16/22 1400)  SpO2: 96 % (08/16/22 1500)  O2 Device (Oxygen Therapy): High Flow nasal Cannula (08/16/22 1500)   Vital  Signs (24h Range):  Temp:  [97.6 °F (36.4 °C)-98.1 °F (36.7 °C)] 98.1 °F (36.7 °C)  Pulse:  [116-131] 131  Resp:  [14-32] 32  SpO2:  [94 %-100 %] 96 %  BP: (95)/(45) 95/45  Arterial Line BP: ()/(45-58) 105/47     Weight: 89.4 kg (197 lb) (08/16/22 1400)  Body mass index is 29.95 kg/m².  Body surface area is 2.07 meters squared.    I/O last 3 completed shifts:  In: 9416.9 [P.O.:460; I.V.:8493; IV Piggyback:463.9]  Out: 9647 [Urine:174; Other:9473]    Physical Exam  Vitals reviewed.   Constitutional:       Appearance: He is ill-appearing.   HENT:      Head: Normocephalic and atraumatic.   Eyes:      Extraocular Movements: Extraocular movements intact.      Conjunctiva/sclera: Conjunctivae normal.   Cardiovascular:      Rate and Rhythm: Tachycardia present. Rhythm irregular.   Pulmonary:      Effort: Pulmonary effort is normal.      Breath sounds: Normal breath sounds.   Abdominal:      General: Bowel sounds are normal. There is no distension.      Palpations: Abdomen is soft.      Tenderness: There is no abdominal tenderness.   Genitourinary:     Comments: Urinary catheter in place with dark brown urine output  Musculoskeletal:      Right lower leg: Edema present.      Left lower leg: Edema present.   Skin:     General: Skin is warm.      Coloration: Skin is pale.   Neurological:      Mental Status: He is alert and oriented to person, place, and time. Mental status is at baseline.       Significant Labs:  CBC:   Recent Labs   Lab 08/16/22  0254   WBC 25.19*   RBC 3.04*   HGB 9.1*   HCT 25.2*   *   MCV 83   MCH 29.9   MCHC 36.1*       CMP:   Recent Labs   Lab 08/16/22  0254 08/16/22  1353   * 120*   CALCIUM 7.5* 7.1*   ALBUMIN 1.3* 1.2*   PROT 4.2*  --    * 125*   K 4.4 4.3   CO2 20* 20*   CL 95 96   BUN 67* 62*   CREATININE 3.2* 3.5*   ALKPHOS 125  --    ALT 10  --    *  --    BILITOT 2.9*  --

## 2022-08-16 NOTE — ASSESSMENT & PLAN NOTE
Palliative Medicine and Supportive Care    Assessment: 71 y.o. male patient with history of chronic Afib on warfarin s/p GEE ligation and MR s/p mechanical MVR 2015 and 2018 with chronic, worsening HF symptoms in setting of paravalvular leak. S/p PLV closure patient became hypotensive and required pressor support. Introduced role of Palliative Medicine. Patient determined to continue treatment until all interventions are exhausted. Daughters at bedside prior to finishing consult. ROS negative for distressing symptoms. Good psychosocial support via 2 daughters. Patient open to discussion once prognosis is more concrete.    Admit date: 8/10/2022  -Mechanical ventilation: no   -Pressors: yes  -Renal replacement therapy: yes  -Health status prior to admission: fair  -Functional status prior to admission: requires further evaluation; patient reports not participating in hunting but has plans for hunting trip in October. Still practicing law.   -Prior experiences with critical illness: requires further evaluation    Advance Care Planning   Decision-making:   -Pt does have decision-making capacity  -Marital status: Single  -Children: 2 daughters    Advance directives:  -The patient has not previously engaged in advance care planning with Palliative Medicine at [facility]  -No Living will, HCPOA, DNR, [place]POST scanned in EPIC    Support system:  -Spiritual: unknown  -Family: 2 daughters    Estimated prognosis:   -Pending Cardiology workup    Prior GOC discussions: no    GOC Discussion:  -Introductory discussion with patient to introduce the role of palliative medicine and supportive care in the ICU/hospital in the setting of a serious diagnosis had taken place. Patient at this time reports having no needs but will contact Palliative Medicine if that changes.    Symptoms:  - Denies symptoms at this time    Summary/Recommendation:  -Most important goals at this time are curative/life-prolongation without limits to invasive  therapies  -Code status: FULL  -Palliative care to provide support and assist with communication regarding disease expectations and trajectory once and with medical decision-making, at the appropriate time  -Most appropriate disposition: continue with current LOC     Thank you for the opportunity to care for this patient and family.     Please call with questions.

## 2022-08-16 NOTE — ASSESSMENT & PLAN NOTE
- consult received for evaluation of skin injury to sacrum.  - pt has purple bruised area above sacral region.  - foam dressing intact.  - will follow wound progression.  - Venelex ordered bid/prn to increase capillary blood flow and provide moisture protection to the skin.  - Fawn bed utilized.  - RN at bedside and assisted with turning.  - encouraged q2h turns and pillow/wedge for offloading.  - nursing to maintain pressure injury prevention measures and continue wound care per orders.

## 2022-08-16 NOTE — ASSESSMENT & PLAN NOTE
S/p mechanical MVR 2015 and paravalvular leak repair 2018 and 2022. ECHO EF 55% 08/16/2022. Possible TRACY after patient diuresed and HR lowered to 70s (per ECHO report) for further prosthetic valve evaluation

## 2022-08-16 NOTE — ASSESSMENT & PLAN NOTE
Oliguric LJ on pressor support in the setting of shock s/p paravalvular mitral valve leak repair on 8/10.   Patient has had limited urine output despite administration of IV lasix with worsening BUN/Cr from baseline sCr 1.3 to a peak 114/4.4  Net positive 1.7L over the past 24h and requiring supplemental O2  Gross hematuria with dark red/brown urine output  Urine sodium 16, urine urea 450, urine creatinine 67  Urinary catheter in place with no evidence of obstruction.    Reccs:  Will keep on CRRT SLED alternating with SCUF for volume management and metabolic clearance   Keep araujo for strict I/Os  Avoid nephrotoxic medications including NSAIDs, ACEi/ARBs, IV radiocontrast

## 2022-08-16 NOTE — SUBJECTIVE & OBJECTIVE
Interval History: pt seen at bedside; discussed with the primary team    Past Medical History:   Diagnosis Date    A-fib     Anticoagulant long-term use     Hypertension        Past Surgical History:   Procedure Laterality Date    CARDIOVERSION      MITRAL VALVE REPLACEMENT      mechanical    MITRAL VALVE REPLACEMENT Right 10/10/2018    Procedure: REPLACEMENT, MITRAL VALVE;  Surgeon: Yusuf Tejeda MD;  Location: [facility] CATH LAB;  Service: Cardiology;  Laterality: Right;    TRANSESOPHAGEAL ECHOCARDIOGRAPHY N/A 8/10/2022    Procedure: ECHOCARDIOGRAM, TRANSESOPHAGEAL;  Surgeon: Alexus Bell MD;  Location: [facility] CATH LAB;  Service: Cardiology;  Laterality: N/A;       Review of patient's allergies indicates:   Allergen Reactions    Neomycin-bacitracnzn-polymyxnb     Benzalkonium chloride Rash    Neosporin (neomycin-polymyx) Rash       Medications:  Continuous Infusions:   sodium chloride 0.9%      sodium chloride 0.9% Stopped (22 0848)    sodium chloride 0.9% Stopped (22 0850)    amiodarone in dextrose 5% 0.5 mg/min (22 1100)    EPINEPHrine 0.16 mcg/kg/min (22 1159)    heparin (porcine) in D5W 19 Units/kg/hr (22 1100)    vasopressin 0.08 Units/min (22 1206)     Scheduled Meds:   clopidogreL  75 mg Oral Daily    DAPTOmycin (CUBICIN) IV (PEDS and ADULTS)  8 mg/kg Intravenous Q24H    famotidine  20 mg Oral Daily    fludrocortisone  100 mcg Oral Daily    furosemide (LASIX) injection  200 mg Intravenous Once    hydrocortisone sodium succinate  100 mg Intravenous Q8H    meropenem (MERREM) IVPB  1 g Intravenous Q12H    QUEtiapine  12.5 mg Oral QHS     PRN Meds:sodium chloride, acetaminophen, [] fentaNYL **FOLLOWED BY** fentaNYL, heparin (PORCINE), heparin (PORCINE), magnesium sulfate IVPB, ondansetron, sodium chloride 0.9%    Family History    None       Tobacco Use    Smoking status: Never Smoker    Smokeless tobacco: Never Used   Substance and Sexual Activity    Alcohol  use: Yes     Alcohol/week: 2.0 standard drinks     Types: 1 Glasses of wine, 1 Shots of liquor per week     Comment: occ.    Drug use: No    Sexual activity: Not on file       Review of Systems   Constitutional:  Positive for appetite change (does not like the food at the hospital).   Respiratory:  Negative for shortness of breath.    Cardiovascular:  Positive for leg swelling.   Musculoskeletal:  Negative for myalgias.   Neurological:  Negative for facial asymmetry and speech difficulty.   Psychiatric/Behavioral:  Negative for agitation, confusion and decreased concentration. The patient is not nervous/anxious.    Objective:     Vital Signs (Most Recent):  Temp: 98.1 °F (36.7 °C) (08/16/22 0715)  Pulse: (!) 131 (08/16/22 1200)  Resp: (!) 28 (08/16/22 1200)  BP: 96/65 (08/13/22 1910)  SpO2: 100 % (08/16/22 1200)   Vital Signs (24h Range):  Temp:  [97.6 °F (36.4 °C)-98.1 °F (36.7 °C)] 98.1 °F (36.7 °C)  Pulse:  [115-131] 131  Resp:  [14-28] 28  SpO2:  [86 %-100 %] 100 %  Arterial Line BP: ()/(46-58) 103/46     Weight: 89.4 kg (197 lb)  Body mass index is 29.95 kg/m².    Physical Exam  Vitals and nursing note reviewed.   Constitutional:       General: He is not in acute distress.     Appearance: He is well-developed. He is not diaphoretic.   HENT:      Head: Normocephalic and atraumatic.      Right Ear: External ear normal.      Left Ear: External ear normal.      Nose: Nose normal.   Eyes:      General: No scleral icterus.        Right eye: No discharge.         Left eye: No discharge.      Extraocular Movements: Extraocular movements intact.      Conjunctiva/sclera: Conjunctivae normal.      Pupils: Pupils are equal, round, and reactive to light.   Neck:      Thyroid: No thyromegaly.      Vascular: No JVD.      Trachea: No tracheal deviation.   Cardiovascular:      Rate and Rhythm: Normal rate and regular rhythm.   Pulmonary:      Effort: Pulmonary effort is normal. No respiratory distress.   Musculoskeletal:          General: Normal range of motion.      Cervical back: Normal range of motion and neck supple.   Neurological:      General: No focal deficit present.      Mental Status: He is alert.      Cranial Nerves: No cranial nerve deficit.      Motor: No abnormal muscle tone.      Deep Tendon Reflexes: Reflexes are normal and symmetric.   Psychiatric:         Behavior: Behavior normal.         Thought Content: Thought content normal.         Judgment: Judgment normal.       Review of Symptoms      Symptom Assessment (ESAS 0-10 Scale)  Pain:  0  Dyspnea:  0  Anxiety:  0  Nausea:  0  Depression:  0  Anorexia:  0  Fatigue:  0  Insomnia:  0  Restlessness:  0  Agitation:  0       Anxiety:  Is not nervous/anxious    Psychosocial/Cultural: 2 daughters:  Nargis Velasco [phone]   Daughter - next of kin     Emperatriz Mcdonald [phone]   Daughter -  2nd call       Advance Care Planning   Advance Directives:   Living Will: No    LaPOST: No    Do Not Resuscitate Status: No    Medical Power of : No      Decision Making:  Patient answered questions and Family answered questions       Significant Labs: All pertinent labs within the past 24 hours have been reviewed.  CBC:   Recent Labs   Lab 08/16/22 0254   WBC 25.19*   HGB 9.1*   HCT 25.2*   MCV 83   *     BMP:  Recent Labs   Lab 08/16/22 0254   *   *   K 4.4   CL 95   CO2 20*   BUN 67*   CREATININE 3.2*   CALCIUM 7.5*   MG 2.0     LFT:  Lab Results   Component Value Date     (H) 08/16/2022    ALKPHOS 125 08/16/2022    BILITOT 2.9 (H) 08/16/2022     Albumin:   Albumin   Date Value Ref Range Status   08/16/2022 1.3 (L) 3.5 - 5.2 g/dL Final     Protein:   Total Protein   Date Value Ref Range Status   08/16/2022 4.2 (L) 6.0 - 8.4 g/dL Final     Lactic acid:   Lab Results   Component Value Date    LACTATE 2.6 (H) 08/10/2022       Significant Imaging: I have reviewed all pertinent imaging results/findings within the past 24 hours.

## 2022-08-16 NOTE — SUBJECTIVE & OBJECTIVE
Scheduled Meds:   clopidogreL  75 mg Oral Daily    DAPTOmycin (CUBICIN) IV (PEDS and ADULTS)  8 mg/kg Intravenous Q24H    famotidine  20 mg Oral Daily    fludrocortisone  100 mcg Oral Daily    hydrocortisone sodium succinate  100 mg Intravenous Q8H    meropenem (MERREM) IVPB  1 g Intravenous Q12H    QUEtiapine  12.5 mg Oral QHS     Continuous Infusions:   sodium chloride 0.9% 200 mL/hr at 22 1400    sodium chloride 0.9% Stopped (22 0848)    sodium chloride 0.9% Stopped (22 0850)    amiodarone in dextrose 5% 0.5 mg/min (22 1400)    EPINEPHrine 0.16 mcg/kg/min (22 1400)    heparin (porcine) in D5W 19 Units/kg/hr (22 1400)    vasopressin 0.08 Units/min (22)     PRN Meds:sodium chloride, acetaminophen, [] fentaNYL **FOLLOWED BY** fentaNYL, heparin (PORCINE), heparin (PORCINE), magnesium sulfate IVPB, ondansetron, sodium chloride 0.9%    Review of patient's allergies indicates:   Allergen Reactions    Neomycin-bacitracnzn-polymyxnb     Benzalkonium chloride Rash    Neosporin (neomycin-polymyx) Rash        Past Medical History:   Diagnosis Date    A-fib     Anticoagulant long-term use     Hypertension      Past Surgical History:   Procedure Laterality Date    CARDIOVERSION      MITRAL VALVE REPLACEMENT      mechanical    MITRAL VALVE REPLACEMENT Right 10/10/2018    Procedure: REPLACEMENT, MITRAL VALVE;  Surgeon: Yusuf Tejeda MD;  Location: [facility] CATH LAB;  Service: Cardiology;  Laterality: Right;    TRANSESOPHAGEAL ECHOCARDIOGRAPHY N/A 8/10/2022    Procedure: ECHOCARDIOGRAM, TRANSESOPHAGEAL;  Surgeon: Alexus Bell MD;  Location: [facility] CATH LAB;  Service: Cardiology;  Laterality: N/A;       Family History    None       Tobacco Use    Smoking status: Never Smoker    Smokeless tobacco: Never Used   Substance and Sexual Activity    Alcohol use: Yes     Alcohol/week: 2.0 standard drinks     Types: 1 Glasses of wine, 1 Shots of liquor per week     Comment: occ.     Drug use: No    Sexual activity: Not on file     Review of Systems   Skin:  Positive for wound.     Objective:     Vital Signs (Most Recent):  Temp: 98.1 °F (36.7 °C) (08/16/22 0715)  Pulse: (!) 130 (08/16/22 1400)  Resp: (!) 30 (08/16/22 1400)  BP: 96/65 (08/13/22 1910)  SpO2: 95 % (08/16/22 1400)   Vital Signs (24h Range):  Temp:  [97.6 °F (36.4 °C)-98.1 °F (36.7 °C)] 98.1 °F (36.7 °C)  Pulse:  [116-131] 130  Resp:  [14-30] 30  SpO2:  [94 %-100 %] 95 %  Arterial Line BP: ()/(45-58) 103/45     Weight: 89.4 kg (197 lb)  Body mass index is 29.95 kg/m².  Physical Exam  Constitutional:       Appearance: Normal appearance.   Skin:     General: Skin is warm and dry.      Findings: Lesion present.   Neurological:      Mental Status: He is alert.       Laboratory:  All pertinent labs reviewed within the last 24 hours.    Diagnostic Results:  None

## 2022-08-17 NOTE — PROGRESS NOTES
Heart Failure Progress Note    Subjective:     - Overnight, a third pressor (norepinephrine) was started.   - Still oliguric. Received CRRT yesterday. Pulled 4.3L although net fluid balance > +2L      Hemodynamics :   - CVP: 15   - PAP: 57/31 (42)  - SVO2 (RA): 53   - MVO2 (PA): 56   - MAP: 65   - CO 6.7  - CI: 3.2  - SVR: 617    Medications:   Continuous Infusions:   sodium chloride 0.9% 200 mL/hr at 22 1000    sodium chloride 0.9%      sodium chloride 0.9% Stopped (22 0848)    sodium chloride 0.9% 5 mL/hr at 22 1000    amiodarone in dextrose 5% 0.5 mg/min (22 1000)    EPINEPHrine 0.1 mcg/kg/min (22 1000)    heparin (porcine) in D5W 19 Units/kg/hr (22 1000)    NORepinephrine bitartrate-D5W 0.16 mcg/kg/min (22 1000)    vasopressin 0.08 Units/min (22 1000)       Scheduled Meds:   balsam peru-castor oiL   Topical (Top) BID    clopidogreL  75 mg Oral Daily    DAPTOmycin (CUBICIN) IV (PEDS and ADULTS)  8 mg/kg Intravenous Q24H    famotidine  20 mg Oral Daily    fludrocortisone  100 mcg Oral Daily    hydrocortisone sodium succinate  100 mg Intravenous Q8H    meropenem (MERREM) IVPB  1 g Intravenous Q12H    QUEtiapine  12.5 mg Oral QHS     PRN Meds:sodium chloride, acetaminophen, [] fentaNYL **FOLLOWED BY** fentaNYL, heparin (PORCINE), heparin (PORCINE), magnesium sulfate IVPB, ondansetron, sodium chloride 0.9%  Objective:     Vitals:  Temp:  [97.5 °F (36.4 °C)-98.9 °F (37.2 °C)]   Pulse:  [105-153]   Resp:  [11-34]   BP: ()/(45-67)   SpO2:  [74 %-100 %]   Arterial Line BP: ()/(39-61)  on 7L I/O's:    Intake/Output Summary (Last 24 hours) at 2022 1048  Last data filed at 2022 1000  Gross per 24 hour   Intake 7073.36 ml   Output 4724 ml   Net 2349.36 ml      Constitutional: No distress, conversant  HEENT: Sclera anicteric, PERRLA, EOMI  Neck: Left triple lumen catheter, RIJ Cordis with Orange  CV: tachycardic, irregular  rhythm  Pulm: Decreased breath sounds in bilateral bases.   GI: Abdomen soft, non-tender, good bowel sounds  Extremities: upper extremities cool to touch and slightly swollen, lower extremities swollen and warm to touch.   Skin: No ecchymosis, erythema, or ulcers  Neuro: A&Ox3, no significant focal deficits    Labs:     Recent Labs   Lab 08/16/22  1353 08/16/22  2221 08/17/22  0246   * 131* 130*   K 4.3 4.3 4.3   CL 96 101 100   CO2 20* 21* 20*   BUN 62* 31* 26*   CREATININE 3.5* 1.7* 1.5*   ANIONGAP 9 9 10     Recent Labs   Lab 08/15/22  0516 08/15/22  1408 08/16/22  0254 08/16/22  1353 08/17/22  0246   *  --  174*  --  185*   ALT 11  --  10  --  9*   ALKPHOS 114  --  125  --  142*   BILITOT 2.2*  --  2.9*  --  3.3*   ALBUMIN 1.2*   < > 1.3*   < > 1.3*    < > = values in this interval not displayed.     No results for input(s): TROPONINI, BNP in the last 168 hours. Recent Labs   Lab 08/15/22  0516 08/16/22  0254 08/17/22  0246   WBC 30.96* 25.19* 29.24*   HGB 9.3* 9.1* 8.6*   HCT 25.5* 25.2* 25.8*    135* 115*   GRAN 84.4*  26.2* 95.0* 83.0*     Recent Labs   Lab 08/17/22  0246   INR 1.3*     Lab Results   Component Value Date    CHOL 219 (H) 12/30/2011    HDL 39 (L) 12/30/2011    LDLCALC 136.0 12/30/2011    TRIG 219 (H) 12/30/2011     Lab Results   Component Value Date    HGBA1C 5.2 12/30/2011          Lab Results   Component Value Date    HGBA1C 5.2 12/30/2011     No results found for: BNP, BNPTRIAGEBLO  Lab Results   Component Value Date    CHOL 219 (H) 12/30/2011    HDL 39 (L) 12/30/2011    LDLCALC 136.0 12/30/2011    TRIG 219 (H) 12/30/2011       Micro:  Blood Cultures  Lab Results   Component Value Date    LABBLOO No Growth to date 08/14/2022    LABBLOO No Growth to date 08/14/2022    LABBLOO No Growth to date 08/14/2022    LABBLOO No Growth to date 08/14/2022    LABBLOO No Growth to date 08/14/2022    LABBLOO No Growth to date 08/14/2022     Urine Cultures  No results found for:  LABURIN    Imaging:     EF   Date Value Ref Range Status   08/16/2022 55 % Final   08/14/2022 45 % Final   08/10/2022 55 % Final       TTE: {Echo:   EF   Date Value Ref Range Status   08/16/2022 55 % Final   08/14/2022 45 % Final   08/10/2022 55 % Final       Assessment/Plan   Mr. Yusuf Mcdonald is a 71 y.o.yo male with a past medical history of MR s/p mechanical MVR (2015 in ) complicated by a paravalvular leak s/p MV plug (2018, Dr. Tejeda), chronic Afib on warfarin s/p MAZE procedure and GEE ligation (2015, ), HTN who presented to [facility] on 8/10/22 for elective repair of recurrent paravalvular leak.      He was referred to the structural team from  for symptomatic PVL detected on a TRACY. On 8/10/22, he successfully underwent TRACY with MV plug repair with usage of a 10 mm VSD closure device. Access used was the RFA, RFV and LFV. Estimated blood loss was 50 cc.      Post-op, the pt was initially intubated and put on 2 pressors (Epinephrine and Vasopressin) due to hypotension. He was also on amiodarone for Afib with RVR, and was on a heparin drip. He has since been extubated, however, it has been difficult to wean off the pressors. Pt's CO and CI have remained high with a low SVR. Sepsis was suspected since the pt initially had some high-grade temperatures, but despite antibiotics (guided by ID), cultures have been negative and hemodynamics still reveal a distributive shock picture. CVP was initially high at 13, however has come down. The course has also been complicated by a drop in the hemoglobin with a hemolytic picture. The pt also has cirrhosis, ascites and b/l pleural effusions.      The HTS service has been consulted for assistance with management of high output heart failure.       # Mixed distributive and cardiogenic shock  - Pt with normal CO and CI, low SVR, normal SVO2 but low MVO2.   - CVP high (13, 15). PCWP high (19).   - TTE with bubble study shows severe RV pressure and volume overload, iatrogenic ASD  with L-->R shunt per Doppler.  - Etiology of shock could be mixed with a distributive pattern (given low SVR) and cardiogenic given elevated PCWP and presence of RV pressure and volume overload on Echo.    - Qp/Qs (by echo) is 0.73, indicating R to L shunt although this is not consistent with Doppler findings.   - Recommend continuing to remove more volume than present via CRRT or aggressive diuresis to relieve R pressure/volume overload.   - Consider starting inhaled nitric oxide at 10 ppm to assist with pulmonary vasodilation.  - Attempt to wean off vasopressin and epinephrine as able.      # H/o MR s/p mechanical MVR c/b PVLx2  # S/p PVL repair with MV plug, most recently 8/11/22  - Continue anticoagulation with heparin gtt.   - Continue ASA, Plavix (as long as blood counts will allow)  - Pt with hemolytic picture; not sure if this secondary to the mechanical valve.   - TTE shows possible MR around mechanical valve - will defer to structural specialist.      # Afib with RVR   - Pt remains tachycardic while on three pressors. Attempt to wean pressors as able.   - Continue amiodarone gtt and heparin gtt.   - Home digoxin, metoprolol, diltiazem currently held.      # LJ / ATN  - Creatinine elevated; pt is oliguric.   - Nephrology following.   - Pt is on CRRT. Recommend removing more volume as noted above.  - Consider hemolytic uremic syndrome (no thrombocytopenia yet, although plts are gradually trending down)     # Anemia - blood loss vs hemolysis  - Pt has a hemolytic picture (low Hgb, high LDH, low haptoglobin, high bilirubin with schistocytes on smear). Estimated blood loss intra-op was ~50 cc.   - Platelets are within range, but trending down.   - Cause of hemolysis unclear. Liver cirrhosis could be playing a role. Consider HUS given his ATN, anemia and declining platelets.   - ASA held. Currently on Plavix.      # Hyponatremia  Per primary/renal.        Signed:  Linus Bird MD, MSCI  [employer] -  Cardiology Fellow (rotating)  Heart transplant Service

## 2022-08-17 NOTE — NURSING
Per Scott LUNA, confirmed with nephlin romanay to titrate UF up to 400cc for 2-3 hrs then increase 450cc 2-3 hrs after and again to 500cc if pt tolerates. Levo gtt currently infusing at 0.16mcg/kg/min. Reviewed concern about dusky fingers on increased amount of pressors currently, received order for nitro paste TID. Per Scott LUNA and nephlin LUNA ok to titrate levo gtt up to maintain MAP>60 while increasing UF in attempt to remove fluid. Will implement.

## 2022-08-17 NOTE — PROGRESS NOTES
[facility] - Cardiac Intensive Care  Nephrology  Progress Note    Patient Name: Yusuf Mcdonald Jr.  MRN: [id number]  Admission Date: 8/10/2022  Hospital Length of Stay: 7 days  Attending Provider: Mau James MD   Primary Care Physician: Primary Doctor No  Principal Problem:Shock    Subjective:     HPI: Ms. Mcdonald is a 71 year old woman with a history of chronic AF (on warfarin), pHTN, HLD, s/p MAZE and MV plug (2015) who was admitted for a mitral valve paravalvular leak repair on 8/10. Patient was hypotensive in septic shock post-procedure requiring pressor support with vasopressin, epinephrine. Patient was started vanc and cefepime, then transitioned to meropenem and azithromycin for continued fevers. He has had worsening renal function since admission on 8/9. Baseline sCr is 1.3. On admission sCr 2.4, which worsened to 3.1 on 8/12 with a peak BUN/Cr of 114/4.4 today. He received IV lasix yesterday with 400mL UOP that appears dark brown in color. He is net positive 15L since admission.    Nephrology consulted regarding acute renal failure in the setting of shock.      Interval History: Net positive 2L over the past 24h. On CRRT/SLED alternating with SCUF. Remains anuric.    Review of patient's allergies indicates:   Allergen Reactions    Neomycin-bacitracnzn-polymyxnb     Benzalkonium chloride Rash    Neosporin (neomycin-polymyx) Rash     Current Facility-Administered Medications   Medication Frequency    0.9%  NaCl infusion (CRRT USE ONLY) Continuous    0.9%  NaCl infusion (for blood administration) Q24H PRN    0.9%  NaCl infusion Continuous    0.9%  NaCl infusion Continuous    acetaminophen tablet 650 mg Q4H PRN    amiodarone 360 mg/200 mL (1.8 mg/mL) infusion Continuous    balsam peru-castor oiL Oint BID    clopidogreL tablet 75 mg Daily    DAPTOmycin (CUBICIN) 715 mg in sodium chloride 0.9% 50 mL IVPB Q24H    EPINEPHrine (ADRENALIN) 10 mg in dextrose 5 % 250 mL infusion Continuous    famotidine  tablet 20 mg Daily    fentaNYL 50 mcg/mL injection 50 mcg Q1H PRN    fludrocortisone tablet 100 mcg Daily    heparin 25,000 units in dextrose 5% (100 units/ml) IV bolus from bag - ADDITIONAL PRN BOLUS - 30 units/kg PRN    heparin 25,000 units in dextrose 5% (100 units/ml) IV bolus from bag - ADDITIONAL PRN BOLUS - 60 units/kg PRN    heparin 25,000 units in dextrose 5% 250 mL (100 units/mL) infusion LOW INTENSITY nomogram - [facility] Continuous    hydrocortisone sodium succinate injection 100 mg Q8H    magnesium sulfate 2g in water 50mL IVPB (premix) PRN    meropenem-0.9% sodium chloride 1 g/50 mL IVPB Q12H    NORepinephrine 32 mg in dextrose 5 % 250 mL infusion Continuous    ondansetron disintegrating tablet 8 mg Q8H PRN    quetiapine split tablet 12.5 mg QHS    sodium chloride 0.9% flush 10 mL PRN    vasopressin (PITRESSIN) 1 Units/mL in dextrose 5 % 100 mL infusion Continuous       Objective:     Vital Signs (Most Recent):  Temp: 98.3 °F (36.8 °C) (08/17/22 0301)  Pulse: (!) 129 (08/17/22 0601)  Resp: 17 (08/17/22 0601)  BP: 110/62 (08/17/22 0601)  SpO2: (!) 86 % (08/17/22 0807)  O2 Device (Oxygen Therapy): High Flow nasal Cannula (08/17/22 0807)   Vital Signs (24h Range):  Temp:  [97.5 °F (36.4 °C)-98.9 °F (37.2 °C)] 98.3 °F (36.8 °C)  Pulse:  [118-143] 129  Resp:  [17-34] 17  SpO2:  [86 %-100 %] 86 %  BP: ()/(45-67) 110/62  Arterial Line BP: ()/(39-58) 100/52     Weight: 89.4 kg (197 lb) (08/16/22 1400)  Body mass index is 29.95 kg/m².  Body surface area is 2.07 meters squared.    I/O last 3 completed shifts:  In: 9607 [P.O.:540; I.V.:8801; IV Piggyback:266]  Out: 7796 [Urine:50; Other:7746]    Physical Exam  Vitals reviewed.   Constitutional:       Appearance: He is ill-appearing.   HENT:      Head: Normocephalic and atraumatic.   Eyes:      Extraocular Movements: Extraocular movements intact.      Conjunctiva/sclera: Conjunctivae normal.   Cardiovascular:      Rate and Rhythm: Tachycardia  present. Rhythm irregular.   Pulmonary:      Effort: Pulmonary effort is normal.      Breath sounds: Normal breath sounds.   Abdominal:      General: Bowel sounds are normal. There is no distension.      Palpations: Abdomen is soft.      Tenderness: There is no abdominal tenderness.   Genitourinary:     Comments: Urinary catheter in place  Musculoskeletal:      Right lower leg: Edema present.      Left lower leg: Edema present.   Skin:     General: Skin is warm.      Coloration: Skin is pale.   Neurological:      Mental Status: He is alert and oriented to person, place, and time. Mental status is at baseline.       Significant Labs:  CBC:   Recent Labs   Lab 08/17/22  0246   WBC 29.24*   RBC 2.95*   HGB 8.6*   HCT 25.8*   *   MCV 88   MCH 29.2   MCHC 33.3       CMP:   Recent Labs   Lab 08/17/22 0246   *   CALCIUM 7.1*   ALBUMIN 1.3*   PROT 4.0*   *   K 4.3   CO2 20*      BUN 26*   CREATININE 1.5*   ALKPHOS 142*   ALT 9*   *   BILITOT 3.3*              Assessment/Plan:     * Shock  Cardiogenic + possibly septic   Management per primary     Hyponatremia  Serum sodium up to 130 this AM from 126 yesterday   Likely hypervolemic in the setting of cardiogenic shock and positive fluid balance   Will keep on CRRT/SLED, Na bath adjusted  Please try to change IV meds solutions to NS as much as possible       LJ (acute kidney injury)  Oliguric LJ on pressor support in the setting of shock s/p paravalvular mitral valve leak repair on 8/10.   Patient has had limited urine output despite administration of IV lasix with worsening BUN/Cr from baseline sCr 1.3 to a peak 114/4.4  Gross hematuria with dark red/brown urine output  Urine sodium 16, urine urea 450, urine creatinine 67  Now anuric   Net positive 2L over the past 24h   Urinary catheter in place with no evidence of obstruction.    Reccs:  Will keep on CRRT SLED alternating with SCUF for volume management and metabolic clearance   Keep  araujo for strict I/Os  Avoid nephrotoxic medications including NSAIDs, ACEi/ARBs, IV radiocontrast     Paravalvular leak (prosthetic valve)  S/p repair (8/10)          Chris Nuno MD  Nephrology  [facility] - Cardiac Int[facility]

## 2022-08-17 NOTE — SUBJECTIVE & OBJECTIVE
Interval History:   No acute events overnight. Stable pressors requirements while monitoring hemodynamics. Palliative care following for GOC discussion.          Review of Systems   Constitutional: Negative for chills and fever.   Cardiovascular:  Negative for chest pain, orthopnea and palpitations.   Respiratory:  Negative for cough and shortness of breath.    Gastrointestinal:  Negative for abdominal pain.   Neurological:  Negative for dizziness, headaches and light-headedness.   Psychiatric/Behavioral:  Negative for altered mental status.    Objective:     Vital Signs (Most Recent):  Temp: 97.6 °F (36.4 °C) (08/17/22 1515)  Pulse: (!) 114 (08/17/22 1500)  Resp: 19 (08/17/22 1500)  BP: (!) 84/59 (08/17/22 1200)  SpO2:  (not reading) (08/17/22 1449)   Vital Signs (24h Range):  Temp:  [97.4 °F (36.3 °C)-98.9 °F (37.2 °C)] 97.6 °F (36.4 °C)  Pulse:  [105-153] 114  Resp:  [11-34] 19  SpO2:  [74 %-98 %] 74 %  BP: ()/(54-67) 84/59  Arterial Line BP: ()/(39-61) 89/53     Weight: 89.4 kg (197 lb)  Body mass index is 29.95 kg/m².     SpO2:  (not reading)  O2 Device (Oxygen Therapy): High Flow nasal Cannula      Intake/Output Summary (Last 24 hours) at 8/17/2022 1606  Last data filed at 8/17/2022 1500  Gross per 24 hour   Intake 7460.55 ml   Output 6125 ml   Net 1335.55 ml       Lines/Drains/Airways       Central Venous Catheter Line  Duration             Introducer 08/15/22 0620 right internal jugular 2 days    Trialysis (Dialysis) Catheter 08/14/22 2130 left internal jugular 2 days    Pulmonary Artery Catheter Assessment  08/15/22 1820 right internal jugular 1 day              Drain  Duration                  Urethral Catheter 08/10/22 1718 6 days              Arterial Line  Duration             Arterial Line 08/10/22 1334 Right Radial 7 days              Peripheral Intravenous Line  Duration                  Peripheral IV - Single Lumen 08/11/22 1641 18 G Anterior;Distal;Left Upper Arm 5 days                     Physical Exam  Vitals and nursing note reviewed.   Constitutional:       General: He is not in acute distress.     Appearance: Normal appearance. He is ill-appearing. He is not toxic-appearing.      Comments: Pale   HENT:      Head: Normocephalic and atraumatic.      Mouth/Throat:      Mouth: Mucous membranes are moist.   Cardiovascular:      Rate and Rhythm: Tachycardia present. Rhythm irregular.      Heart sounds: Murmur (mitral valve systolic click) heard.     No friction rub. No gallop.      Comments: Telemetry afib with RVR  Pulmonary:      Effort: Pulmonary effort is normal. No respiratory distress.      Breath sounds: Normal breath sounds.   Abdominal:      Palpations: Abdomen is soft.   Musculoskeletal:      Right lower leg: No edema.      Left lower leg: No edema.   Skin:     General: Skin is warm.   Neurological:      Mental Status: He is alert and oriented to person, place, and time. Mental status is at baseline.       Significant Labs: ABG:   Recent Labs   Lab 08/17/22  0807 08/17/22  0815 08/17/22  1328   PH 7.447 7.446 7.431   PCO2 34.4* 34.0* 31.0*   HCO3 23.7* 23.4* 20.7*   POCSATURATED 56* 53* 97   BE 0 -1 -4   , Blood Culture: No results for input(s): LABBLOO in the last 48 hours., BMP:   Recent Labs   Lab 08/16/22  2221 08/17/22  0246 08/17/22  1312   * 144* 135*   * 130* 129*   K 4.3 4.3 4.5    100 102   CO2 21* 20* 18*   BUN 31* 26* 34*   CREATININE 1.7* 1.5* 2.3*   CALCIUM 7.1* 7.1* 7.0*   MG 1.9 1.8 2.2   , CMP   Recent Labs   Lab 08/16/22  0254 08/16/22  1353 08/16/22  2221 08/17/22  0246 08/17/22  1312   *   < > 131* 130* 129*   K 4.4   < > 4.3 4.3 4.5   CL 95   < > 101 100 102   CO2 20*   < > 21* 20* 18*   *   < > 137* 144* 135*   BUN 67*   < > 31* 26* 34*   CREATININE 3.2*   < > 1.7* 1.5* 2.3*   CALCIUM 7.5*   < > 7.1* 7.1* 7.0*   PROT 4.2*  --   --  4.0*  --    ALBUMIN 1.3*   < > 1.3* 1.3* 1.3*   BILITOT 2.9*  --   --  3.3*  --    ALKPHOS 125  --   --   142*  --    *  --   --  185*  --    ALT 10  --   --  9*  --    ANIONGAP 11   < > 9 10 9    < > = values in this interval not displayed.   , CBC   Recent Labs   Lab 08/16/22  0254 08/17/22  0246   WBC 25.19* 29.24*   HGB 9.1* 8.6*   HCT 25.2* 25.8*   * 115*   , INR   Recent Labs   Lab 08/16/22 0254 08/17/22  0246   INR 1.2 1.3*   , Lipid Panel No results for input(s): CHOL, HDL, LDLCALC, TRIG, CHOLHDL in the last 48 hours.,   Pathology Results  (Last 10 years)      None        , and Troponin No results for input(s): TROPONINI in the last 48 hours.    Significant Imaging: Echocardiogram: Transthoracic echo (TTE) complete (Cupid Only):   Results for orders placed or performed during the hospital encounter of 08/10/22   Echo Saline Bubble? Yes   Result Value Ref Range    Ascending aorta 3.60 cm    STJ 3.66 cm    AV mean gradient 4 mmHg    Ao peak rohan 1.45 m/s    Ao VTI 16.69 cm    IVS 1.05 0.6 - 1.1 cm    LA size 6.40 cm    Left Atrium Major Axis 8.99 cm    Left Atrium Minor Axis 9.10 cm    LVIDd 3.96 3.5 - 6.0 cm    LVIDs 2.65 2.1 - 4.0 cm    LVOT diameter 2.26 cm    LVOT peak VTI 11.68 cm    Posterior Wall 0.96 0.6 - 1.1 cm    MV Peak A Rohan 1.27 m/s    E wave deceleration time 224.02 msec    MV Peak E Rohan 1.75 m/s    RA Major Axis 7.19 cm    RA Width 7.11 cm    RVDD 6.36 cm    Sinus 4.22 cm    TAPSE 0.86 cm    TR Max Rohan 3.99 m/s    TDI LATERAL 0.13 m/s    TDI SEPTAL 0.05 m/s    LA WIDTH 5.17 cm    PV PEAK VELOCITY 1.06 cm/s    MV stenosis pressure 1/2 time 64.97 ms    LV Diastolic Volume 68.52 mL    LV Systolic Volume 25.77 mL    LVOT peak rohan 0.89 m/s    RVOT peak VTI 7.52 cm    Mr max rohan 0.04 m/s    RVOT peak rohan 0.69 m/s    LA volume (mod) 157.89 cm3    MV mean gradient 9 mmHg    MV peak gradient 19 mmHg    MV VTI 41.97 cm    PV mean gradient 0.90 mmHg    LV LATERAL E/E' RATIO 13.46 m/s    LV SEPTAL E/E' RATIO 35.00 m/s    FS 33 %    LA volume 254.38 cm3    LV mass 125.96 g    Left Ventricle  Relative Wall Thickness 0.48 cm    AV valve area 2.81 cm2    AV Velocity Ratio 0.61     AV index (prosthetic) 0.70     MV valve area p 1/2 method 3.39 cm2    MV valve area by continuity eq 1.12 cm2    E/A ratio 1.38     Mean e' 0.09 m/s    LVOT area 4.0 cm2    LVOT stroke volume 46.83 cm3    AV peak gradient 8 mmHg    E/E' ratio 19.44 m/s    LV Systolic Volume Index 12.7 mL/m2    LV Diastolic Volume Index 33.75 mL/m2    LA Volume Index 125.3 mL/m2    LV Mass Index 62 g/m2    Triscuspid Valve Regurgitation Peak Gradient 64 mmHg    LA Volume Index (Mod) 77.8 mL/m2    BSA 2.07 m2    Right Atrial Pressure (from IVC) 15 mmHg    EF 55 %    RA area 36.00 cm2    TV rest pulmonary artery pressure 79 mmHg    Mitral Valve Heart Rate 133 bpm    Narrative    · The estimated ejection fraction is 55%.  · There is abnormal septal wall motion. There is systolic and diastolic   flattening of the interventricular septum consistent with right ventricle   pressure and volume overload.  · The left ventricle is normal in size with concentric remodeling and   normal systolic function.  · Grade II left ventricular diastolic dysfunction.  · Normal right ventricular size with moderately to severely reduced right   ventricular systolic function.  · Severe left atrial enlargement.  · Moderate right atrial enlargement.  · Moderate mitral regurgitation.  · There is a mechanical mitral valve prosthesis. Mean gradient 9 mmHg at   HR of 133 bpm.  · Presence of VSD plug around mechanical MV anteriorly. Stability of the   device cannot be ascertained accurately as it is not seen in many views.   There is trace MR seen anetriorly that is likely paravalvular but cannot   be seen well. Based on MVVTI/LVOT VTI ratio if > 2.5 there is likely   greater MR than seen. Consider TRACY after patient diuresed and HR lowered   to 70s.  · Severe tricuspid regurgitation.  · There is pulmonary hypertension.  · The estimated PA systolic pressure is 79 mmHg.  · Elevated  central venous pressure (15 mmHg).  · Presence of iatrogenic ASD with left to right shunt. Bubble study is   positive for intracardiac shunt.

## 2022-08-17 NOTE — ASSESSMENT & PLAN NOTE
Oliguric LJ on pressor support in the setting of shock s/p paravalvular mitral valve leak repair on 8/10.   Patient has had limited urine output despite administration of IV lasix with worsening BUN/Cr from baseline sCr 1.3 to a peak 114/4.4  Gross hematuria with dark red/brown urine output  Urine sodium 16, urine urea 450, urine creatinine 67  Now anuric   Net positive 2L over the past 24h   Urinary catheter in place with no evidence of obstruction.    Reccs:  Will keep on CRRT SLED alternating with SCUF for volume management and metabolic clearance   Keep araujo for strict I/Os  Avoid nephrotoxic medications including NSAIDs, ACEi/ARBs, IV radiocontrast

## 2022-08-17 NOTE — ASSESSMENT & PLAN NOTE
Serum sodium up to 130 this AM from 126 yesterday   Likely hypervolemic in the setting of cardiogenic shock and positive fluid balance   Will keep on CRRT/SLED, Na bath adjusted  Please try to change IV meds solutions to NS as much as possible

## 2022-08-17 NOTE — SUBJECTIVE & OBJECTIVE
Interval History:     No adverse events.    Review of Systems   All other systems reviewed and are negative.  Objective:     Vital Signs (Most Recent):  Temp: 98.9 °F (37.2 °C) (08/16/22 1901)  Pulse: (!) 130 (08/16/22 2201)  Resp: (P) 20 (08/16/22 2201)  BP: 91/63 (08/16/22 2201)  SpO2: (!) 93 % (08/16/22 2201)   Vital Signs (24h Range):  Temp:  [98.1 °F (36.7 °C)-98.9 °F (37.2 °C)] 98.9 °F (37.2 °C)  Pulse:  [120-133] 130  Resp:  [17-34] (P) 20  SpO2:  [92 %-100 %] 93 %  BP: ()/(45-63) 91/63  Arterial Line BP: ()/(39-58) 98/49     Weight: 89.4 kg (197 lb)  Body mass index is 29.95 kg/m².    Estimated Creatinine Clearance: 21.3 mL/min (A) (based on SCr of 3.5 mg/dL (H)).    Physical Exam  Vitals and nursing note reviewed.   Constitutional:       General: He is not in acute distress.     Appearance: He is well-developed. He is not diaphoretic.   HENT:      Head: Normocephalic and atraumatic.      Right Ear: External ear normal.      Left Ear: External ear normal.      Nose: Nose normal.      Mouth/Throat:      Pharynx: No oropharyngeal exudate.   Eyes:      General: No scleral icterus.        Right eye: No discharge.         Left eye: No discharge.      Conjunctiva/sclera: Conjunctivae normal.      Pupils: Pupils are equal, round, and reactive to light.   Neck:      Thyroid: No thyromegaly.      Vascular: No JVD.      Trachea: No tracheal deviation.   Cardiovascular:      Rate and Rhythm: Normal rate and regular rhythm.      Heart sounds: No murmur heard.    No friction rub. No gallop.   Pulmonary:      Effort: Pulmonary effort is normal. No respiratory distress.      Breath sounds: Normal breath sounds. No stridor. No wheezing or rales.   Chest:      Chest wall: No tenderness.   Abdominal:      General: Bowel sounds are normal. There is no distension.      Palpations: Abdomen is soft. There is no mass.      Tenderness: There is no abdominal tenderness. There is no guarding or rebound.   Musculoskeletal:          General: No tenderness. Normal range of motion.      Cervical back: Normal range of motion and neck supple.   Lymphadenopathy:      Cervical: No cervical adenopathy.   Skin:     General: Skin is warm.      Coloration: Skin is not pale.      Findings: No erythema or rash.   Neurological:      Mental Status: He is alert and oriented to person, place, and time.      Cranial Nerves: No cranial nerve deficit.      Motor: No abnormal muscle tone.      Coordination: Coordination normal.      Deep Tendon Reflexes: Reflexes are normal and symmetric. Reflexes normal.   Psychiatric:         Behavior: Behavior normal.         Thought Content: Thought content normal.         Judgment: Judgment normal.       Significant Labs:   Microbiology Results (last 7 days)       Procedure Component Value Units Date/Time    Blood culture [[id number]] Collected: 08/14/22 2111    Order Status: Completed Specimen: Blood from Peripheral, Jugular, Internal Left Updated: 08/16/22 2212     Blood Culture, Routine No Growth to date      No Growth to date      No Growth to date    Blood culture [[id number]] Collected: 08/14/22 1836    Order Status: Completed Specimen: Blood from Peripheral, Antecubital, Left Updated: 08/16/22 2012     Blood Culture, Routine No Growth to date      No Growth to date      No Growth to date    Blood culture [[id number]] Collected: 08/10/22 2000    Order Status: Completed Specimen: Blood from Peripheral, Upper Arm, Left Updated: 08/15/22 2212     Blood Culture, Routine No growth after 5 days.    Blood culture [[id number]] Collected: 08/10/22 2058    Order Status: Completed Specimen: Blood from Peripheral, Forearm, Left Updated: 08/15/22 2212     Blood Culture, Routine No growth after 5 days.    Clostridium difficile EIA [[id number]] Collected: 08/14/22 2005    Order Status: Completed Specimen: Stool Updated: 08/15/22 0434     C. diff Antigen Negative     C difficile Toxins A+B, EIA Negative     Comment: Testing not  recommended for children <24 months old.       Culture, Respiratory with Gram Stain [[id number]]     Order Status: No result Specimen: Respiratory     Culture, Respiratory with Gram Stain [[id number]] Collected: 08/11/22 0914    Order Status: Completed Specimen: Respiratory from Tracheal Aspirate Updated: 08/13/22 1223     Respiratory Culture Normal respiratory ronny      No S aureus or Pseudomonas isolated.     Gram Stain (Respiratory) <10 epithelial cells per low power field.     Gram Stain (Respiratory) Rare WBC's     Gram Stain (Respiratory) No organisms seen    Urine culture [[id number]]     Order Status: Completed Specimen: Urine, Catheterized             Significant Imaging: I have reviewed all pertinent imaging results/findings within the past 24 hours.

## 2022-08-17 NOTE — ASSESSMENT & PLAN NOTE
72 yo M with PMH MR s/p mechanical MVR 2015 in [place]) c/b PVL s/p MV plug (Dr Tejeda 2018) on Warfarin, chronic AF s/p GEE ligation, and HTN.  He has recently extended his paravalvular leak on TRACY performed in [place]. He continues to have significant fatigue, GEORGE, and leg swelling. He presented for PLV closure with TRACY guidance.  Postprocedure patient was brought to CCU where he was found to be hypotensive and epinephrine was started.      CT demonstated no RP bleed or any fluid collections concerning for source of continued hypotension      Temp initially low and later 102.2 and persistently febrile  White count 23  CI/CO high with low SVR  CO 5.4 ci 2.6 svr 800  Epinephrine, Noreepinephrine and vasopressin     - Concern for septic shock vs hypovolemic shock from blood loss anemia  - Blood cultures x 2 obtained and negative so far. UA unremarkable. Respiratory culture also negative  - Daptomycin, meropenem   - On epi and vaso.    - stress dose steroids and will taper if improvement of hemodynamics with extubation  - ID consultation  - HTS consulted in setting of vasoplegia and persistent shock despite continued therapy

## 2022-08-17 NOTE — ASSESSMENT & PLAN NOTE
71 year old male with a history of mitral valve replacement complicated by a leak.  He is admitted to the hospital after undergoing a procedure to repair the valve.  He has remained on pressors and with leukocytosis during his hospital stay.  Cardiology concerned that his shock is not completely cardiogenic.  All cultures to date have been negative.  Unsure if patient has an infection.      Plan    1.  Daptomycin for 7 days total.    2. Discontinue meropenem when daptomycin is discontinued.    ID will sign off.

## 2022-08-17 NOTE — PLAN OF CARE
CICU DAILY GOALS       A: Awake    RASS: Goal - RASS Goal: 0-->alert and calm  Actual - RASS (Peace Agitation-Sedation Scale): 0-->alert and calm   Restraint necessity: Clinical Justification: Removing medical devices  B: Breath   SBT: Not intubated   C: Coordinate A & B, analgesics/sedatives   Pain: managed    SAT: Not intubated  D: Delirium   CAM-ICU: Overall CAM-ICU: Negative  E: Early(intubated/ Progressive (non-intubated) Mobility   MOVE Screen:  na   Activity: Activity Management: Rolling - L1  FAS: Feeding/Nutrition   Diet order: Diet/Nutrition Received: 2 gram sodium, low saturated fat/low cholesterol, Specialty Diet/Nutrition Received: renal diet Fluid restriction:    T: Thrombus   DVT prophylaxis: VTE Required Core Measure: Pharmacological prophylaxis initiated/maintained  H: HOB Elevation   Head of Bed (HOB) Positioning: HOB at 20-30 degrees  U: Ulcer Prophylaxis   GI: yes  G: Glucose control   managed Glycemic Management: blood glucose monitored  S: Skin   Bundle compliance: yes   Bathing/Skin Care: incontinence care, dressed/undressed, bath, complete, electrode patches/site rotation, linen changed Date: 8/17/22  B: Bowel Function   no issues   I: Indwelling Catheters   Quiroz necessity:      Urethral Catheter 08/10/22 1718-Reason for Continuing Urinary Catheterization: Critically ill in ICU and requiring hourly monitoring of intake/output   CVC necessity: Yes   IPAD offered: Not appropriate  D: De-escalation Antibx   Yes  Plan for the day   Hemodynamics continue to be monitored via RIJ swan. CVPs 15,13,15. SVO2 56. CRRT continues, UF parameters increased to 500 if pt able to tolerate. All pressors max concentrated, titrating to maintain MAP>60. Fingers noted to be more discolored today, nitro paste added TID. POC reviewed with pt and daughters at bs.   Family/Goals of care/Code Status   Code Status: Full Code     No acute events throughout day, VS and assessment per flow sheet, patient progressing  towards goals as tolerated, plan of care reviewed with Yusuf Mcdonald Jr. and family, all concerns addressed, will continue to monitor.

## 2022-08-17 NOTE — PROGRESS NOTES
[facility] - Cardiac Intensive Care  Infectious Disease  Progress Note    Patient Name: Yusuf Mcdonald Jr.  MRN: [id number]  Admission Date: 8/10/2022  Length of Stay: 6 days  Attending Physician: Mau James MD  Primary Care Provider: Primary Doctor No    Isolation Status: No active isolations  Assessment/Plan:      * Shock  71 year old male with a history of mitral valve replacement complicated by a leak.  He is admitted to the hospital after undergoing a procedure to repair the valve.  He has remained on pressors and with leukocytosis during his hospital stay.  Cardiology concerned that his shock is not completely cardiogenic.  All cultures to date have been negative.  Unsure if patient has an infection.      Plan    1.  Daptomycin for 7 days total.    2. Discontinue meropenem when daptomycin is discontinued.    ID will sign off.        Anticipated Disposition: TBD    Thank you for your consult. I will sign off. Please contact us if you have any additional questions.    Mani Cohen MD  Infectious Disease  [facility] - Cardiac Intensive Care    Subjective:     Principal Problem:Shock    HPI: 71 year old male with a history of mitral valve regurgitation s/p mechanical MVR in 2015 complicated by prosthetic valve leak s/p MV plug in 2018 on Warfarin, chronic AF s/p GEE ligation, and HTN.  He has continued to have significant fatigue, GEORGE and lower extremity swelling.  He was admitted for TRACY and attempted valve repair.  Post procedure, he was hypotensive and initially hypothermic.  He was admitted to the ICU where he started having fevers and was noted to have leukocytosis.  Broad spectrum antibiotics were started and cultures obtained.  He was initially on azithromycin, cefepime and vancomycin.  His antibiotics were later modified to azithromycin and meropenem.    Interval History:     No adverse events.    Review of Systems   All other systems reviewed and are negative.  Objective:     Vital Signs (Most  Recent):  Temp: 98.9 °F (37.2 °C) (08/16/22 1901)  Pulse: (!) 130 (08/16/22 2201)  Resp: (P) 20 (08/16/22 2201)  BP: 91/63 (08/16/22 2201)  SpO2: (!) 93 % (08/16/22 2201)   Vital Signs (24h Range):  Temp:  [98.1 °F (36.7 °C)-98.9 °F (37.2 °C)] 98.9 °F (37.2 °C)  Pulse:  [120-133] 130  Resp:  [17-34] (P) 20  SpO2:  [92 %-100 %] 93 %  BP: ()/(45-63) 91/63  Arterial Line BP: ()/(39-58) 98/49     Weight: 89.4 kg (197 lb)  Body mass index is 29.95 kg/m².    Estimated Creatinine Clearance: 21.3 mL/min (A) (based on SCr of 3.5 mg/dL (H)).    Physical Exam  Vitals and nursing note reviewed.   Constitutional:       General: He is not in acute distress.     Appearance: He is well-developed. He is not diaphoretic.   HENT:      Head: Normocephalic and atraumatic.      Right Ear: External ear normal.      Left Ear: External ear normal.      Nose: Nose normal.      Mouth/Throat:      Pharynx: No oropharyngeal exudate.   Eyes:      General: No scleral icterus.        Right eye: No discharge.         Left eye: No discharge.      Conjunctiva/sclera: Conjunctivae normal.      Pupils: Pupils are equal, round, and reactive to light.   Neck:      Thyroid: No thyromegaly.      Vascular: No JVD.      Trachea: No tracheal deviation.   Cardiovascular:      Rate and Rhythm: Normal rate and regular rhythm.      Heart sounds: No murmur heard.    No friction rub. No gallop.   Pulmonary:      Effort: Pulmonary effort is normal. No respiratory distress.      Breath sounds: Normal breath sounds. No stridor. No wheezing or rales.   Chest:      Chest wall: No tenderness.   Abdominal:      General: Bowel sounds are normal. There is no distension.      Palpations: Abdomen is soft. There is no mass.      Tenderness: There is no abdominal tenderness. There is no guarding or rebound.   Musculoskeletal:         General: No tenderness. Normal range of motion.      Cervical back: Normal range of motion and neck supple.   Lymphadenopathy:       Cervical: No cervical adenopathy.   Skin:     General: Skin is warm.      Coloration: Skin is not pale.      Findings: No erythema or rash.   Neurological:      Mental Status: He is alert and oriented to person, place, and time.      Cranial Nerves: No cranial nerve deficit.      Motor: No abnormal muscle tone.      Coordination: Coordination normal.      Deep Tendon Reflexes: Reflexes are normal and symmetric. Reflexes normal.   Psychiatric:         Behavior: Behavior normal.         Thought Content: Thought content normal.         Judgment: Judgment normal.       Significant Labs:   Microbiology Results (last 7 days)       Procedure Component Value Units Date/Time    Blood culture [[id number]] Collected: 08/14/22 2111    Order Status: Completed Specimen: Blood from Peripheral, Jugular, Internal Left Updated: 08/16/22 2212     Blood Culture, Routine No Growth to date      No Growth to date      No Growth to date    Blood culture [[id number]] Collected: 08/14/22 1836    Order Status: Completed Specimen: Blood from Peripheral, Antecubital, Left Updated: 08/16/22 2012     Blood Culture, Routine No Growth to date      No Growth to date      No Growth to date    Blood culture [[id number]] Collected: 08/10/22 2000    Order Status: Completed Specimen: Blood from Peripheral, Upper Arm, Left Updated: 08/15/22 2212     Blood Culture, Routine No growth after 5 days.    Blood culture [[id number]] Collected: 08/10/22 2058    Order Status: Completed Specimen: Blood from Peripheral, Forearm, Left Updated: 08/15/22 2212     Blood Culture, Routine No growth after 5 days.    Clostridium difficile EIA [[id number]] Collected: 08/14/22 2005    Order Status: Completed Specimen: Stool Updated: 08/15/22 0434     C. diff Antigen Negative     C difficile Toxins A+B, EIA Negative     Comment: Testing not recommended for children <24 months old.       Culture, Respiratory with Gram Stain [[id number]]     Order Status: No result Specimen:  Respiratory     Culture, Respiratory with Gram Stain [[id number]] Collected: 08/11/22 0914    Order Status: Completed Specimen: Respiratory from Tracheal Aspirate Updated: 08/13/22 1223     Respiratory Culture Normal respiratory ronny      No S aureus or Pseudomonas isolated.     Gram Stain (Respiratory) <10 epithelial cells per low power field.     Gram Stain (Respiratory) Rare WBC's     Gram Stain (Respiratory) No organisms seen    Urine culture [[id number]]     Order Status: Completed Specimen: Urine, Catheterized             Significant Imaging: I have reviewed all pertinent imaging results/findings within the past 24 hours.

## 2022-08-17 NOTE — ASSESSMENT & PLAN NOTE
Patient is a 70 yo M with chronic AF on warfarin, pHTN, HLD and mitral valve regurgitation s/p mechanical MVR 2015 s/p MAZE c/b PVL s/p MV plug in 2018 and now with extension of his paravalvular leak on TRACY. Here for PVL closure with TRACY guidance.     Patient admitted to CCU for monitoring post procedure.   TTE limited 08/13/2022    HTS recs for repeat bubble study in setting of recent perivalve leak; recs for leave in [place].

## 2022-08-17 NOTE — SUBJECTIVE & OBJECTIVE
Interval History: Net positive 2L over the past 24h. On CRRT/SLED alternating with SCUF. Remains anuric.    Review of patient's allergies indicates:   Allergen Reactions    Neomycin-bacitracnzn-polymyxnb     Benzalkonium chloride Rash    Neosporin (neomycin-polymyx) Rash     Current Facility-Administered Medications   Medication Frequency    0.9%  NaCl infusion (CRRT USE ONLY) Continuous    0.9%  NaCl infusion (for blood administration) Q24H PRN    0.9%  NaCl infusion Continuous    0.9%  NaCl infusion Continuous    acetaminophen tablet 650 mg Q4H PRN    amiodarone 360 mg/200 mL (1.8 mg/mL) infusion Continuous    balsam peru-castor oiL Oint BID    clopidogreL tablet 75 mg Daily    DAPTOmycin (CUBICIN) 715 mg in sodium chloride 0.9% 50 mL IVPB Q24H    EPINEPHrine (ADRENALIN) 10 mg in dextrose 5 % 250 mL infusion Continuous    famotidine tablet 20 mg Daily    fentaNYL 50 mcg/mL injection 50 mcg Q1H PRN    fludrocortisone tablet 100 mcg Daily    heparin 25,000 units in dextrose 5% (100 units/ml) IV bolus from bag - ADDITIONAL PRN BOLUS - 30 units/kg PRN    heparin 25,000 units in dextrose 5% (100 units/ml) IV bolus from bag - ADDITIONAL PRN BOLUS - 60 units/kg PRN    heparin 25,000 units in dextrose 5% 250 mL (100 units/mL) infusion LOW INTENSITY nomogram - [facility] Continuous    hydrocortisone sodium succinate injection 100 mg Q8H    magnesium sulfate 2g in water 50mL IVPB (premix) PRN    meropenem-0.9% sodium chloride 1 g/50 mL IVPB Q12H    NORepinephrine 32 mg in dextrose 5 % 250 mL infusion Continuous    ondansetron disintegrating tablet 8 mg Q8H PRN    quetiapine split tablet 12.5 mg QHS    sodium chloride 0.9% flush 10 mL PRN    vasopressin (PITRESSIN) 1 Units/mL in dextrose 5 % 100 mL infusion Continuous       Objective:     Vital Signs (Most Recent):  Temp: 98.3 °F (36.8 °C) (08/17/22 0301)  Pulse: (!) 129 (08/17/22 0601)  Resp: 17 (08/17/22 0601)  BP: 110/62 (08/17/22 0601)  SpO2: (!) 86 % (08/17/22 0807)  O2 Device  (Oxygen Therapy): High Flow nasal Cannula (08/17/22 0807)   Vital Signs (24h Range):  Temp:  [97.5 °F (36.4 °C)-98.9 °F (37.2 °C)] 98.3 °F (36.8 °C)  Pulse:  [118-143] 129  Resp:  [17-34] 17  SpO2:  [86 %-100 %] 86 %  BP: ()/(45-67) 110/62  Arterial Line BP: ()/(39-58) 100/52     Weight: 89.4 kg (197 lb) (08/16/22 1400)  Body mass index is 29.95 kg/m².  Body surface area is 2.07 meters squared.    I/O last 3 completed shifts:  In: 9607 [P.O.:540; I.V.:8801; IV Piggyback:266]  Out: 7796 [Urine:50; Other:7746]    Physical Exam  Vitals reviewed.   Constitutional:       Appearance: He is ill-appearing.   HENT:      Head: Normocephalic and atraumatic.   Eyes:      Extraocular Movements: Extraocular movements intact.      Conjunctiva/sclera: Conjunctivae normal.   Cardiovascular:      Rate and Rhythm: Tachycardia present. Rhythm irregular.   Pulmonary:      Effort: Pulmonary effort is normal.      Breath sounds: Normal breath sounds.   Abdominal:      General: Bowel sounds are normal. There is no distension.      Palpations: Abdomen is soft.      Tenderness: There is no abdominal tenderness.   Genitourinary:     Comments: Urinary catheter in place with dark brown urine output  Musculoskeletal:      Right lower leg: Edema present.      Left lower leg: Edema present.   Skin:     General: Skin is warm.      Coloration: Skin is pale.   Neurological:      Mental Status: He is alert and oriented to person, place, and time. Mental status is at baseline.       Significant Labs:  CBC:   Recent Labs   Lab 08/17/22  0246   WBC 29.24*   RBC 2.95*   HGB 8.6*   HCT 25.8*   *   MCV 88   MCH 29.2   MCHC 33.3       CMP:   Recent Labs   Lab 08/17/22  0246   *   CALCIUM 7.1*   ALBUMIN 1.3*   PROT 4.0*   *   K 4.3   CO2 20*      BUN 26*   CREATININE 1.5*   ALKPHOS 142*   ALT 9*   *   BILITOT 3.3*

## 2022-08-17 NOTE — NURSING
Notified Cardiology team, Dr Kapoor, of CVP of 15, as well as patient +72mL fluid intake for the day. No new orders at this time.

## 2022-08-17 NOTE — PROGRESS NOTES
08/17/22 0330   Treatment   Treatment Type SCUF   Treatment Status Daily equipment check   Dialysis Machine Number [id number]   Dialyzer Time (hours) 14.35   BVP (Liters) 125.1 L   Solutions Labeled and Current  Yes   Access Temporary Cath   Catheter Dressing Intact  Yes   Alarms Engaged Yes   CRRT Comments daily check   $ CRRT Charges   $ CRRT Daily Assessment Complete   $ CRRT Daily Maintenance Complete   Daily check completed. Orders and machine settings verified

## 2022-08-17 NOTE — PROGRESS NOTES
[facility] - Cardiac Intensive Care  Cardiology  Progress Note    Patient Name: Yusuf Mcdonald Jr.  MRN: [id number]  Admission Date: 8/10/2022  Hospital Length of Stay: 7 days  Code Status: Full Code   Attending Physician: Mau James MD   Primary Care Physician: Primary Doctor No  Expected Discharge Date: 8/24/2022  Principal Problem:Shock    Subjective:     Hospital Course:   Patient admitted to CCU for management of shock in the setting of MV paravalvular leak repair completed on 8/10. Patient hypotensive post procedure and CVC placed with hemodynamics concerning for septic shock. Patient was given limited IVF and started on broad spectrum antibiotics with vancomycin and cefepime which was escalated to meropenem and azithromycin given ongoing fevers. CI/CO/SVR 4.1/8.6/553. Lactate improved from 4 down to normal. Patient sedated with propofol, fentanyl and on epinephrine and vasopressin for pressor support. Ventilated at 40% FiO2 with PEEP of 5. LJ likely ischemic ATN vs prerenal given procedure and septic shock. Patient also started on stress dose steroids. Extubated on 8/12/22 to 7 L NC.     Patient with persistent shock concerning for septic in etiology on vancomcyin and meropenem so ID consulted for evalaution. CT demosntrated no identifiable source of infection and cultures NGTD. Patient with worsening oliguria and Cr and nephrology was consulted on 8/14.  Given persistent shock and vasoplegia, will consult [facility] for further eval. Leave-in swan in place. Discussion on going with nephrology to optimize fluid filtration rate. Palliative care following.         Interval History:   No acute events overnight. Stable pressors requirements while monitoring hemodynamics. Palliative care following for GOC discussion.          Review of Systems   Constitutional: Negative for chills and fever.   Cardiovascular:  Negative for chest pain, orthopnea and palpitations.   Respiratory:  Negative for cough and shortness of  breath.    Gastrointestinal:  Negative for abdominal pain.   Neurological:  Negative for dizziness, headaches and light-headedness.   Psychiatric/Behavioral:  Negative for altered mental status.    Objective:     Vital Signs (Most Recent):  Temp: 97.6 °F (36.4 °C) (08/17/22 1515)  Pulse: (!) 114 (08/17/22 1500)  Resp: 19 (08/17/22 1500)  BP: (!) 84/59 (08/17/22 1200)  SpO2:  (not reading) (08/17/22 1449)   Vital Signs (24h Range):  Temp:  [97.4 °F (36.3 °C)-98.9 °F (37.2 °C)] 97.6 °F (36.4 °C)  Pulse:  [105-153] 114  Resp:  [11-34] 19  SpO2:  [74 %-98 %] 74 %  BP: ()/(54-67) 84/59  Arterial Line BP: ()/(39-61) 89/53     Weight: 89.4 kg (197 lb)  Body mass index is 29.95 kg/m².     SpO2:  (not reading)  O2 Device (Oxygen Therapy): High Flow nasal Cannula      Intake/Output Summary (Last 24 hours) at 8/17/2022 1606  Last data filed at 8/17/2022 1500  Gross per 24 hour   Intake 7460.55 ml   Output 6125 ml   Net 1335.55 ml       Lines/Drains/Airways       Central Venous Catheter Line  Duration             Introducer 08/15/22 0620 right internal jugular 2 days    Trialysis (Dialysis) Catheter 08/14/22 2130 left internal jugular 2 days    Pulmonary Artery Catheter Assessment  08/15/22 1820 right internal jugular 1 day              Drain  Duration                  Urethral Catheter 08/10/22 1718 6 days              Arterial Line  Duration             Arterial Line 08/10/22 1334 Right Radial 7 days              Peripheral Intravenous Line  Duration                  Peripheral IV - Single Lumen 08/11/22 1641 18 G Anterior;Distal;Left Upper Arm 5 days                    Physical Exam  Vitals and nursing note reviewed.   Constitutional:       General: He is not in acute distress.     Appearance: Normal appearance. He is ill-appearing. He is not toxic-appearing.      Comments: Pale   HENT:      Head: Normocephalic and atraumatic.      Mouth/Throat:      Mouth: Mucous membranes are moist.   Cardiovascular:      Rate  and Rhythm: Tachycardia present. Rhythm irregular.      Heart sounds: Murmur (mitral valve systolic click) heard.     No friction rub. No gallop.      Comments: Telemetry afib with RVR  Pulmonary:      Effort: Pulmonary effort is normal. No respiratory distress.      Breath sounds: Normal breath sounds.   Abdominal:      Palpations: Abdomen is soft.   Musculoskeletal:      Right lower leg: No edema.      Left lower leg: No edema.   Skin:     General: Skin is warm.   Neurological:      Mental Status: He is alert and oriented to person, place, and time. Mental status is at baseline.       Significant Labs: ABG:   Recent Labs   Lab 08/17/22  0807 08/17/22  0815 08/17/22  1328   PH 7.447 7.446 7.431   PCO2 34.4* 34.0* 31.0*   HCO3 23.7* 23.4* 20.7*   POCSATURATED 56* 53* 97   BE 0 -1 -4   , Blood Culture: No results for input(s): LABBLOO in the last 48 hours., BMP:   Recent Labs   Lab 08/16/22  2221 08/17/22  0246 08/17/22  1312   * 144* 135*   * 130* 129*   K 4.3 4.3 4.5    100 102   CO2 21* 20* 18*   BUN 31* 26* 34*   CREATININE 1.7* 1.5* 2.3*   CALCIUM 7.1* 7.1* 7.0*   MG 1.9 1.8 2.2   , CMP   Recent Labs   Lab 08/16/22  0254 08/16/22  1353 08/16/22  2221 08/17/22  0246 08/17/22  1312   *   < > 131* 130* 129*   K 4.4   < > 4.3 4.3 4.5   CL 95   < > 101 100 102   CO2 20*   < > 21* 20* 18*   *   < > 137* 144* 135*   BUN 67*   < > 31* 26* 34*   CREATININE 3.2*   < > 1.7* 1.5* 2.3*   CALCIUM 7.5*   < > 7.1* 7.1* 7.0*   PROT 4.2*  --   --  4.0*  --    ALBUMIN 1.3*   < > 1.3* 1.3* 1.3*   BILITOT 2.9*  --   --  3.3*  --    ALKPHOS 125  --   --  142*  --    *  --   --  185*  --    ALT 10  --   --  9*  --    ANIONGAP 11   < > 9 10 9    < > = values in this interval not displayed.   , CBC   Recent Labs   Lab 08/16/22 0254 08/17/22 0246   WBC 25.19* 29.24*   HGB 9.1* 8.6*   HCT 25.2* 25.8*   * 115*   , INR   Recent Labs   Lab 08/16/22 0254 08/17/22 0246   INR 1.2 1.3*   , Lipid  Panel No results for input(s): CHOL, HDL, LDLCALC, TRIG, CHOLHDL in the last 48 hours.,   Pathology Results  (Last 10 years)      None        , and Troponin No results for input(s): TROPONINI in the last 48 hours.    Significant Imaging: Echocardiogram: Transthoracic echo (TTE) complete (Cupid Only):   Results for orders placed or performed during the hospital encounter of 08/10/22   Echo Saline Bubble? Yes   Result Value Ref Range    Ascending aorta 3.60 cm    STJ 3.66 cm    AV mean gradient 4 mmHg    Ao peak rohan 1.45 m/s    Ao VTI 16.69 cm    IVS 1.05 0.6 - 1.1 cm    LA size 6.40 cm    Left Atrium Major Axis 8.99 cm    Left Atrium Minor Axis 9.10 cm    LVIDd 3.96 3.5 - 6.0 cm    LVIDs 2.65 2.1 - 4.0 cm    LVOT diameter 2.26 cm    LVOT peak VTI 11.68 cm    Posterior Wall 0.96 0.6 - 1.1 cm    MV Peak A Rohan 1.27 m/s    E wave deceleration time 224.02 msec    MV Peak E Rohan 1.75 m/s    RA Major Axis 7.19 cm    RA Width 7.11 cm    RVDD 6.36 cm    Sinus 4.22 cm    TAPSE 0.86 cm    TR Max Rohan 3.99 m/s    TDI LATERAL 0.13 m/s    TDI SEPTAL 0.05 m/s    LA WIDTH 5.17 cm    PV PEAK VELOCITY 1.06 cm/s    MV stenosis pressure 1/2 time 64.97 ms    LV Diastolic Volume 68.52 mL    LV Systolic Volume 25.77 mL    LVOT peak rohan 0.89 m/s    RVOT peak VTI 7.52 cm    Mr max rohan 0.04 m/s    RVOT peak rohan 0.69 m/s    LA volume (mod) 157.89 cm3    MV mean gradient 9 mmHg    MV peak gradient 19 mmHg    MV VTI 41.97 cm    PV mean gradient 0.90 mmHg    LV LATERAL E/E' RATIO 13.46 m/s    LV SEPTAL E/E' RATIO 35.00 m/s    FS 33 %    LA volume 254.38 cm3    LV mass 125.96 g    Left Ventricle Relative Wall Thickness 0.48 cm    AV valve area 2.81 cm2    AV Velocity Ratio 0.61     AV index (prosthetic) 0.70     MV valve area p 1/2 method 3.39 cm2    MV valve area by continuity eq 1.12 cm2    E/A ratio 1.38     Mean e' 0.09 m/s    LVOT area 4.0 cm2    LVOT stroke volume 46.83 cm3    AV peak gradient 8 mmHg    E/E' ratio 19.44 m/s    LV Systolic  Volume Index 12.7 mL/m2    LV Diastolic Volume Index 33.75 mL/m2    LA Volume Index 125.3 mL/m2    LV Mass Index 62 g/m2    Triscuspid Valve Regurgitation Peak Gradient 64 mmHg    LA Volume Index (Mod) 77.8 mL/m2    BSA 2.07 m2    Right Atrial Pressure (from IVC) 15 mmHg    EF 55 %    RA area 36.00 cm2    TV rest pulmonary artery pressure 79 mmHg    Mitral Valve Heart Rate 133 bpm    Narrative    · The estimated ejection fraction is 55%.  · There is abnormal septal wall motion. There is systolic and diastolic   flattening of the interventricular septum consistent with right ventricle   pressure and volume overload.  · The left ventricle is normal in size with concentric remodeling and   normal systolic function.  · Grade II left ventricular diastolic dysfunction.  · Normal right ventricular size with moderately to severely reduced right   ventricular systolic function.  · Severe left atrial enlargement.  · Moderate right atrial enlargement.  · Moderate mitral regurgitation.  · There is a mechanical mitral valve prosthesis. Mean gradient 9 mmHg at   HR of 133 bpm.  · Presence of VSD plug around mechanical MV anteriorly. Stability of the   device cannot be ascertained accurately as it is not seen in many views.   There is trace MR seen anetriorly that is likely paravalvular but cannot   be seen well. Based on MVVTI/LVOT VTI ratio if > 2.5 there is likely   greater MR than seen. Consider TRACY after patient diuresed and HR lowered   to 70s.  · Severe tricuspid regurgitation.  · There is pulmonary hypertension.  · The estimated PA systolic pressure is 79 mmHg.  · Elevated central venous pressure (15 mmHg).  · Presence of iatrogenic ASD with left to right shunt. Bubble study is   positive for intracardiac shunt.        Assessment and Plan:   * Shock  See septic shock         Acute blood loss anemia  Hgb trend 13 --> 11 -- 9.9 --> 7.9 since admit. Concern for chronic hemolytic process given mechanical valve as documented  with haptoglobin and LDH altered since replacement in 2018 vs possible retroperitoneal bleed. Stable. Smear reviewed with some fragmented RBCs 5/hpf but PLTs stable and no further concerns for acute hemolytic or consumptive process. CT demonstrated no RP bleed.  s/p RBC 2 unit 08/13/2022, noted decrease in pressor requirements with transfusion with Lasix 120 mg IV x 1      - Type and screen q 72 hrs   - Daily cbc   - Keeping aspirin and Plavix for now considering PVL closure, heparin gtt for mechanical MV    Severe mitral regurgitation  S/p mechanical MVR 2015 and paravalvular leak repair 2018 and 2022      Gross hematuria  Hematuria continued since admission initially thought from traumatic insertion. Possible underlying coagulopathy. Imaging unrevealing so far     Will likely need urology consultation        Septic shock  70 yo M with PMH MR s/p mechanical MVR 2015 in [place]) c/b PVL s/p MV plug (Dr Tejeda 2018) on Warfarin, chronic AF s/p GEE ligation, and HTN.  He has recently extended his paravalvular leak on TRACY performed in [place]. He continues to have significant fatigue, GEORGE, and leg swelling. He presented for PLV closure with TRACY guidance.  Postprocedure patient was brought to CCU where he was found to be hypotensive and epinephrine was started.      CT demonstated no RP bleed or any fluid collections concerning for source of continued hypotension      Temp initially low and later 102.2 and persistently febrile  White count 23  CI/CO high with low SVR  Today: CO 5.4 ci 2.6 svr 800  While on Epinephrine, Noreepinephrine and vasopressin     - Concern for septic shock vs hypovolemic shock from blood loss anemia  - Blood cultures x 2 obtained and negative so far. UA unremarkable. Respiratory culture also negative  - Daptomycin, meropenem   - On epi and vaso.    - stress dose steroids and will taper if improvement of hemodynamics with extubation  - ID consultation  - HTS consulted in setting of vasoplegia and  persistent shock despite continued therapy    Atrial fibrillation with RVR  -Afib with RVR at 2 am, INR 2.0, rate 135-145, EKG obtained, will give amio iv 150x1 followed by gtt  On Amio gtt and holding home dilt and metoprolol in shock   holding digoxin for with elevated Cr      LJ (acute kidney injury)  Baseline of 1.3 prior to admission.  Trend 1.3 --> 2.5 --> 3.1 --> 3.5 and now 4.4   Likely prerenal azotemia considering BUN/Cr 20:1 and concern for acute blood loss anemia. Continued hematuria       Plan:    - nephrology consulted; appreciated recommendations  - On CRRT while unable to remove fluid as much due to hypotension   - Lasix trial; without response   - Monitor intake/output and daily standing weights    - Avoid nephrotoxic agents such as NSAIDs, gadolinium and IV radiocontrast.  - Renally dose meds to current GFR.  - Maintain MAP > 65.      S/P mitral valve replacement with metallic valve  On warfarin at home. Transitioned to heparin gtt and will bridge once stable         Chronic a-fib  Currently afib with RVR           Paravalvular leak (prosthetic valve)  Patient is a 72 yo M with chronic AF on warfarin, pHTN, HLD and mitral valve regurgitation s/p mechanical MVR 2015 s/p MAZE c/b PVL s/p MV plug in 2018 and now with extension of his paravalvular leak on TRACY. Here for PVL closure with TRACY guidance.     Patient admitted to CCU for monitoring post procedure.   TTE limited 08/13/2022    HTS recs for repeat bubble study in setting of recent perivalve leak; recs for leave in [place].         VTE Risk Mitigation (From admission, onward)         Ordered     heparin 25,000 units in dextrose 5% (100 units/ml) IV bolus from bag - ADDITIONAL PRN BOLUS - 60 units/kg  As needed (PRN)        Question:  Heparin Infusion Adjustment (DO NOT MODIFY ANSWER)  Answer:  [email or web address]\epic\Images\Pharmacy\HeparinInfusions\heparin LOW INTENSITY nomogram for [facility] [id number]    08/11/22 0737     heparin 25,000 units in dextrose  5% (100 units/ml) IV bolus from bag - ADDITIONAL PRN BOLUS - 30 units/kg  As needed (PRN)        Question:  Heparin Infusion Adjustment (DO NOT MODIFY ANSWER)  Answer:  [email or web address]\epic\Images\Pharmacy\HeparinInfusions\heparin LOW INTENSITY nomogram for [facility] [id number]    08/11/22 0737     heparin 25,000 units in dextrose 5% 250 mL (100 units/mL) infusion LOW INTENSITY nomogram - [facility]  Continuous        Question Answer Comment   Heparin Infusion Adjustment (DO NOT MODIFY ANSWER) [email or web address]\epic\Images\Pharmacy\HeparinInfusions\heparin LOW INTENSITY nomogram for [facility] [id number]    Begin at (in units/kg/hr) 12        08/11/22 0737     IP VTE HIGH RISK PATIENT  Once         08/10/22 1647     Place sequential compression device  Until discontinued         08/10/22 1647                Adeel Kapoor MD  Cardiology  [facility] - Cardiac Intensive Care

## 2022-08-17 NOTE — PLAN OF CARE
CICU DAILY GOALS       A: Awake    RASS: Goal - RASS Goal: 0-->alert and calm  Actual - RASS ([place]ace Agitation-Sedation Scale): 0-->alert and calm   Restraint necessity: NA  B: Breath   SBT: Not intubated   C: Coordinate A & B, analgesics/sedatives   Pain: managed    SAT: Not intubated  D: Delirium   CAM-ICU: Overall CAM-ICU: Negative  E: Early(intubated/ Progressive (non-intubated) Mobility   MOVE Screen: Fail   Activity: Activity Management: Rolling - L1  FAS: Feeding/Nutrition   Diet order: Diet/Nutrition Received: mechanical/dental soft, Specialty Diet/Nutrition Received: renal diet Fluid restriction:    T: Thrombus   DVT prophylaxis: VTE Required Core Measure: Pharmacological prophylaxis initiated/maintained  H: HOB Elevation   Head of Bed (HOB) Positioning: HOB at 20 degrees  U: Ulcer Prophylaxis   GI: yes  G: Glucose control   managed Glycemic Management: blood glucose monitored  S: Skin   Bundle compliance: yes   Bathing/Skin Care: incontinence care Date: 08/16/2022  B: Bowel Function   no issues   I: Indwelling Catheters   Quiroz necessity:      Urethral Catheter 08/10/22 1718-Reason for Continuing Urinary Catheterization: Critically ill in ICU and requiring hourly monitoring of intake/output   CVC necessity: No   IPAD offered: No  D: De-escalation Antibx   Yes  Plan for the day   CVP- 10,12,9   SVO2- 51   CRRT  transitioned to SCUFF.    Magnesium 1.8. replacement given per PRN order.    Phos 2.4. MD notified. No new orders received.      Family/Goals of care/Code Status   Code Status: Full Code     No acute events throughout day, VS and assessment per flow sheet, patient progressing towards goals as tolerated, plan of care reviewed with Yusuf Mcdonald Jr. and family, all concerns addressed, will continue to monitor.

## 2022-08-17 NOTE — NURSING
"Pt states he's "feeling anxious", asking if there is anything he can take for it. RN placed call to MD. Awaiting orders.  "

## 2022-08-17 NOTE — ASSESSMENT & PLAN NOTE
Baseline of 1.3 prior to admission.  Trend 1.3 --> 2.5 --> 3.1 --> 3.5 and now 4.4   Likely prerenal azotemia considering BUN/Cr 20:1 and concern for acute blood loss anemia. Continued hematuria       Plan:    - nephrology consulted; appreciated recommendations  - Lasix trial; without response   - Monitor intake/output and daily standing weights    - Avoid nephrotoxic agents such as NSAIDs, gadolinium and IV radiocontrast.  - Renally dose meds to current GFR.  - Maintain MAP > 65.

## 2022-08-18 NOTE — PLAN OF CARE
Cardiac ICU Care Plan      Pt denies any pain or distress, daughter remaining at bedside. Pt -120s in a-fib throughout the night. CVPs 14, 13, 12; SVO2s 55 and 60. Afebrile, no complaints of chest pain, numbness, or tingling. 5ppm Keegan added. Gtts per eMAR. , -2L.    POC reviewed with Yusuf Mcdonald Jr. and family. Questions and concerns addressed. No acute events overnight. Pt progressing toward goals. See below and flowsheets for full assessment and VS info.       Neuro:  [place] Coma Scale  Best Eye Response: 4-->(E4) spontaneous  Best Motor Response: 6-->(M6) obeys commands  Best Verbal Response: 5-->(V5) oriented  Tara Coma Scale Score: 15  Assessment Qualifiers: patient not sedated/intubated  Pupil PERRLA: yes    24 hr Temp:  [97.4 °F (36.3 °C)-98.3 °F (36.8 °C)]      CV:  Rhythm: atrial rhythm   DVT prophylaxis: VTE Required Core Measure: Pharmacological prophylaxis initiated/maintained    CVP (mean): 12 mmHg (08/18/22 0330)    Pulmonary Artery Catheter Assessment  08/15/22 1820 right internal jugular-Current Insertion Depth (cm): 57 cm (08/18/22 0301)  PAP: 60/28 (08/18/22 0630)  SVO2 (%): 60 % (08/18/22 0430)               Pulses  Right Radial Pulse: 2+ (normal), palpation  Left Radial Pulse: palpation, 2+ (normal)  Right Dorsalis Pedis Pulse: 1+ (weak), palpation  Left Dorsalis Pedis Pulse: 1+ (weak), palpation  Right Posterior Tibial Pulse: 1+ (weak), palpation  Left Posterior Tibial Pulse: 1+ (weak), palpation    Resp:  O2 Device (Oxygen Therapy): High Flow nasal Cannula  Flow (L/min): 7  Vent Mode: Spont  Set Rate: 20 BPM  Oxygen Concentration (%): 40  Vt Set: 375 mL  PEEP/CPAP: 5 cmH20  Pressure Support: 5 cmH20    GI/:  GI prophylaxis: yes  Diet/Nutrition Received: low saturated fat/low cholesterol, 2 gram sodium, restrict fluids  Last Bowel Movement: 08/17/22  Voiding Characteristics: anuria, patient on CRRT, urethral catheter (bladder)       Urethral Catheter 08/10/22 3903-Reason for  Continuing Urinary Catheterization: Critically ill in ICU and requiring hourly monitoring of intake/output   Intake/Output Summary (Last 24 hours) at 8/18/2022 0650  Last data filed at 8/18/2022 0601  Gross per 24 hour   Intake 6900.44 ml   Output 8701 ml   Net -1800.56 ml     Treatment Type: Slow continuous ultrafiltration  UF Rate: 500 cc/hr    Labs/Accuchecks:  Recent Labs   Lab 08/17/22  0246 08/17/22 2120 08/18/22 0312   WBC 29.24* 40.63* 38.86*   RBC 2.95* 2.88* 2.88*   HGB 8.6* 8.4* 8.3*   HCT 25.8* 25.1* 25.2*   * 93* 92*      Recent Labs   Lab 08/16/22  0254 08/17/22  0246 08/18/22 0312   INR 1.2 1.3* 1.4*   APTT 43.9* 61.8* 61.9*      Recent Labs     08/18/22 0312   *   K 4.4   CO2 16*      BUN 41*   CREATININE 2.7*   ALKPHOS 148*   ALT 11   *   BILITOT 3.7*       Recent Labs   Lab 08/16/22  0254   CPK 1064*      Recent Labs     08/17/22  1328 08/17/22  1947 08/18/22  0420   PH 7.431 7.399 7.391   PCO2 31.0* 34.6* 34.4*   PO2 83 29* 31*   HCO3 20.7* 21.4* 20.9*   POCSATURATED 97 55* 60*   BE -4 -3 -4       Electrolytes: N/A - electrolytes WDL  Accuchecks: none    Gtts/LDAs:   sodium chloride 0.9% 200 mL/hr at 08/18/22 0601    sodium chloride 0.9% Stopped (08/11/22 0848)    sodium chloride 0.9% 5 mL/hr at 08/18/22 0601    amiodarone in dextrose 5% 0.5 mg/min (08/18/22 0601)    EPINEPHrine 0.04 mcg/kg/min (08/18/22 0601)    heparin (porcine) in D5W 19 Units/kg/hr (08/18/22 0601)    nitric oxide gas      NORepinephrine bitartrate-D5W 0.16 mcg/kg/min (08/18/22 0601)    vasopressin 0.08 Units/min (08/18/22 0601)       Lines/Drains/Airways       Central Venous Catheter Line  Duration             Introducer 08/15/22 0620 right internal jugular 3 days    Trialysis (Dialysis) Catheter 08/14/22 2130 left internal jugular 3 days    Pulmonary Artery Catheter Assessment  08/15/22 1820 right internal jugular 2 days              Drain  Duration                  Urethral Catheter 08/10/22  1718 7 days              Arterial Line  Duration             Arterial Line 08/10/22 1334 Right Radial 7 days                    Skin/Wounds  Bathing/Skin Care: incontinence care (08/17/22 2301)  Wounds: Yes  Wound care consulted: Yes

## 2022-08-18 NOTE — PROGRESS NOTES
08/18/22 1641   Treatment   Treatment Type SLED   Treatment Status Restart;Equipment change   Dialysis Machine Number [id number]   Solutions Labeled and Current  Yes   Access Temporary Cath;Left;IJ   Catheter Dressing Intact  Yes   Alarms Engaged Yes   CRRT Comments CRRT restarted without issue

## 2022-08-18 NOTE — PROGRESS NOTES
[facility]  Nephrology  Progress Note    Patient Name: Yusuf Mcdonald Jr.  MRN: [id number]  Admission Date: 8/10/2022  Hospital Length of Stay: 8 days  Attending Provider: Mau James MD   Primary Care Physician: Primary Doctor No  Principal Problem:Shock    Subjective:     HPI: Ms. Mcdonald is a 71 year old woman with a history of chronic AF (on warfarin), pHTN, HLD, s/p MAZE and MV plug (2015) who was admitted for a mitral valve paravalvular leak repair on 8/10. Patient was hypotensive in septic shock post-procedure requiring pressor support with vasopressin, epinephrine. Patient was started vanc and cefepime, then transitioned to meropenem and azithromycin for continued fevers. He has had worsening renal function since admission on 8/9. Baseline sCr is 1.3. On admission sCr 2.4, which worsened to 3.1 on 8/12 with a peak BUN/Cr of 114/4.4 today. He received IV lasix yesterday with 400mL UOP that appears dark brown in color. He is net positive 15L since admission.    Nephrology consulted regarding acute renal failure in the setting of shock.      Interval History: Net negative 1.8L over the past 24h. On CRRT/SLED alternating with SCUF. Remains anuric.    Review of patient's allergies indicates:   Allergen Reactions    Neomycin-bacitracnzn-polymyxnb     Benzalkonium chloride Rash    Neosporin (neomycin-polymyx) Rash     Current Facility-Administered Medications   Medication Frequency    0.9%  NaCl infusion (CRRT USE ONLY) Continuous    0.9%  NaCl infusion (for blood administration) Q24H PRN    0.9%  NaCl infusion Continuous    0.9%  NaCl infusion Continuous    acetaminophen tablet 650 mg Q4H PRN    amiodarone 360 mg/200 mL (1.8 mg/mL) infusion Continuous    balsam peru-castor oiL Oint BID    clopidogreL tablet 75 mg Daily    DAPTOmycin (CUBICIN) 715 mg in sodium chloride 0.9% 50 mL IVPB Q24H    EPINEPHrine (ADRENALIN) 10 mg in dextrose 5 % 250 mL infusion Continuous    famotidine  tablet 20 mg Daily    fentaNYL 50 mcg/mL injection 50 mcg Q1H PRN    fludrocortisone tablet 100 mcg Daily    heparin 25,000 units in dextrose 5% (100 units/ml) IV bolus from bag - ADDITIONAL PRN BOLUS - 30 units/kg PRN    heparin 25,000 units in dextrose 5% (100 units/ml) IV bolus from bag - ADDITIONAL PRN BOLUS - 60 units/kg PRN    heparin 25,000 units in dextrose 5% 250 mL (100 units/mL) infusion LOW INTENSITY nomogram - [facility] Continuous    hydrocortisone sodium succinate injection 100 mg Q8H    hydrOXYzine HCL tablet 25 mg TID PRN    magnesium sulfate 2g in water 50mL IVPB (premix) PRN    meropenem-0.9% sodium chloride 1 g/50 mL IVPB Q12H    nitric oxide gas Gas 5 ppm Continuous    nitroGLYCERIN 2% TD oint ointment 0.5 inch Q6H    NORepinephrine 32 mg in dextrose 5 % 250 mL infusion Continuous    ondansetron disintegrating tablet 8 mg Q8H PRN    quetiapine split tablet 12.5 mg QHS    sodium chloride 0.9% flush 10 mL PRN    vasopressin (PITRESSIN) 1 Units/mL in dextrose 5 % 100 mL infusion Continuous       Objective:     Vital Signs (Most Recent):  Temp: 97.4 °F (36.3 °C) (08/17/22 2301)  Pulse: (!) 119 (08/18/22 0905)  Resp: 18 (08/18/22 0905)  BP: (!) 95/57 (08/18/22 0401)  SpO2: 95 % (08/18/22 0705)  O2 Device (Oxygen Therapy): High Flow nasal Cannula (08/18/22 0805)   Vital Signs (24h Range):  Temp:  [97.4 °F (36.3 °C)-97.9 °F (36.6 °C)] 97.4 °F (36.3 °C)  Pulse:  [110-126] 119  Resp:  [11-28] 18  SpO2:  [95 %-99 %] 95 %  BP: (79-95)/(55-64) 95/57  Arterial Line BP: ()/(48-62) 88/51     Weight: 89.4 kg (197 lb) (08/16/22 1400)  Body mass index is 29.95 kg/m².  Body surface area is 2.07 meters squared.    I/O last 3 completed shifts:  In: 10293.3 [P.O.:790; I.V.:9857.9; IV Piggyback:217.4]  Out: 04517 [Urine:6; Other:46912]    Physical Exam  Vitals reviewed.   Constitutional:       Appearance: He is ill-appearing.   HENT:      Head: Normocephalic and atraumatic.   Eyes:      Extraocular  Movements: Extraocular movements intact.      Conjunctiva/sclera: Conjunctivae normal.   Cardiovascular:      Rate and Rhythm: Tachycardia present. Rhythm irregular.   Pulmonary:      Effort: Pulmonary effort is normal.      Breath sounds: Normal breath sounds.   Abdominal:      General: Bowel sounds are normal. There is no distension.      Palpations: Abdomen is soft.      Tenderness: There is no abdominal tenderness.   Genitourinary:     Comments: Urinary catheter in place with dark brown urine output  Musculoskeletal:      Right lower leg: Edema present.      Left lower leg: Edema present.   Skin:     General: Skin is warm.      Coloration: Skin is pale.   Neurological:      Mental Status: He is alert and oriented to person, place, and time. Mental status is at baseline.       Significant Labs:  CBC:   Recent Labs   Lab 08/18/22 0312   WBC 38.86*   RBC 2.88*   HGB 8.3*   HCT 25.2*   PLT 92*   MCV 88   MCH 28.8   MCHC 32.9       CMP:   Recent Labs   Lab 08/18/22 0312   *   CALCIUM 7.4*   ALBUMIN 1.6*   PROT 4.1*   *   K 4.4   CO2 16*      BUN 41*   CREATININE 2.7*   ALKPHOS 148*   ALT 11   *   BILITOT 3.7*              Assessment/Plan:     * Shock  Cardiogenic + possibly septic   Management per primary     Hyponatremia  Serum sodium improved at 130  Likely hypervolemic in the setting of cardiogenic shock and positive fluid balance   Will keep on CRRT/SLED  Please use NS instead of D5 for IV med solutions as much as possible       LJ (acute kidney injury)  Oliguric LJ on pressor support in the setting of shock s/p paravalvular mitral valve leak repair on 8/10.   Patient has had limited urine output despite administration of IV lasix with worsening BUN/Cr from baseline sCr 1.3 to a peak 114/4.4  Gross hematuria with dark red/brown urine output  Urine sodium 16, urine urea 450, urine creatinine 67  Now anuric   Net negative 1.8L over the past 24h   Urinary catheter in place with no  evidence of obstruction.    Reccs:  Will keep on CRRT SLED alternating with SCUF for volume management and metabolic clearance   Keep araujo for strict I/Os  Avoid nephrotoxic medications including NSAIDs, ACEi/ARBs, IV radiocontrast     Paravalvular leak (prosthetic valve)  S/p repair (8/10)          Chris Nuno MD  Nephrology  [facility]

## 2022-08-18 NOTE — NURSING
MD Jacob at bedside for rounds. Pt in Afib, -140s. Per chart review, pt has been persistently tachycardic since admission. Amio gtt currently infusing at 0.5mg/min. Unable to give beta blockers at this time d/t labile BP. Reviewed with team, ordered to increase Amio gtt to 1mg/min. Gtt also concentrated to reduce hourly fluid intake. Will implement. Will continue to monitor.

## 2022-08-18 NOTE — ASSESSMENT & PLAN NOTE
Oliguric LJ on pressor support in the setting of shock s/p paravalvular mitral valve leak repair on 8/10.   Patient has had limited urine output despite administration of IV lasix with worsening BUN/Cr from baseline sCr 1.3 to a peak 114/4.4  Gross hematuria with dark red/brown urine output  Urine sodium 16, urine urea 450, urine creatinine 67  Now anuric   Net negative 1.8L over the past 24h   Urinary catheter in place with no evidence of obstruction.    Reccs:  Will keep on CRRT SLED alternating with SCUF for volume management and metabolic clearance   Keep araujo for strict I/Os  Avoid nephrotoxic medications including NSAIDs, ACEi/ARBs, IV radiocontrast

## 2022-08-18 NOTE — SUBJECTIVE & OBJECTIVE
Interval History: Net negative 1.8L over the past 24h. On CRRT/SLED alternating with SCUF. Remains anuric.    Review of patient's allergies indicates:   Allergen Reactions    Neomycin-bacitracnzn-polymyxnb     Benzalkonium chloride Rash    Neosporin (neomycin-polymyx) Rash     Current Facility-Administered Medications   Medication Frequency    0.9%  NaCl infusion (CRRT USE ONLY) Continuous    0.9%  NaCl infusion (for blood administration) Q24H PRN    0.9%  NaCl infusion Continuous    0.9%  NaCl infusion Continuous    acetaminophen tablet 650 mg Q4H PRN    amiodarone 360 mg/200 mL (1.8 mg/mL) infusion Continuous    balsam peru-castor oiL Oint BID    clopidogreL tablet 75 mg Daily    DAPTOmycin (CUBICIN) 715 mg in sodium chloride 0.9% 50 mL IVPB Q24H    EPINEPHrine (ADRENALIN) 10 mg in dextrose 5 % 250 mL infusion Continuous    famotidine tablet 20 mg Daily    fentaNYL 50 mcg/mL injection 50 mcg Q1H PRN    fludrocortisone tablet 100 mcg Daily    heparin 25,000 units in dextrose 5% (100 units/ml) IV bolus from bag - ADDITIONAL PRN BOLUS - 30 units/kg PRN    heparin 25,000 units in dextrose 5% (100 units/ml) IV bolus from bag - ADDITIONAL PRN BOLUS - 60 units/kg PRN    heparin 25,000 units in dextrose 5% 250 mL (100 units/mL) infusion LOW INTENSITY nomogram - [facility] Continuous    hydrocortisone sodium succinate injection 100 mg Q8H    hydrOXYzine HCL tablet 25 mg TID PRN    magnesium sulfate 2g in water 50mL IVPB (premix) PRN    meropenem-0.9% sodium chloride 1 g/50 mL IVPB Q12H    nitric oxide gas Gas 5 ppm Continuous    nitroGLYCERIN 2% TD oint ointment 0.5 inch Q6H    NORepinephrine 32 mg in dextrose 5 % 250 mL infusion Continuous    ondansetron disintegrating tablet 8 mg Q8H PRN    quetiapine split tablet 12.5 mg QHS    sodium chloride 0.9% flush 10 mL PRN    vasopressin (PITRESSIN) 1 Units/mL in dextrose 5 % 100 mL infusion Continuous       Objective:     Vital Signs (Most Recent):  Temp: 97.4 °F (36.3 °C) (08/17/22  2301)  Pulse: (!) 119 (08/18/22 0905)  Resp: 18 (08/18/22 0905)  BP: (!) 95/57 (08/18/22 0401)  SpO2: 95 % (08/18/22 0705)  O2 Device (Oxygen Therapy): High Flow nasal Cannula (08/18/22 0805)   Vital Signs (24h Range):  Temp:  [97.4 °F (36.3 °C)-97.9 °F (36.6 °C)] 97.4 °F (36.3 °C)  Pulse:  [110-126] 119  Resp:  [11-28] 18  SpO2:  [95 %-99 %] 95 %  BP: (79-95)/(55-64) 95/57  Arterial Line BP: ()/(48-62) 88/51     Weight: 89.4 kg (197 lb) (08/16/22 1400)  Body mass index is 29.95 kg/m².  Body surface area is 2.07 meters squared.    I/O last 3 completed shifts:  In: 40877.3 [P.O.:790; I.V.:9857.9; IV Piggyback:217.4]  Out: 89618 [Urine:6; Other:46607]    Physical Exam  Vitals reviewed.   Constitutional:       Appearance: He is ill-appearing.   HENT:      Head: Normocephalic and atraumatic.   Eyes:      Extraocular Movements: Extraocular movements intact.      Conjunctiva/sclera: Conjunctivae normal.   Cardiovascular:      Rate and Rhythm: Tachycardia present. Rhythm irregular.   Pulmonary:      Effort: Pulmonary effort is normal.      Breath sounds: Normal breath sounds.   Abdominal:      General: Bowel sounds are normal. There is no distension.      Palpations: Abdomen is soft.      Tenderness: There is no abdominal tenderness.   Genitourinary:     Comments: Urinary catheter in place with dark brown urine output  Musculoskeletal:      Right lower leg: Edema present.      Left lower leg: Edema present.   Skin:     General: Skin is warm.      Coloration: Skin is pale.   Neurological:      Mental Status: He is alert and oriented to person, place, and time. Mental status is at baseline.       Significant Labs:  CBC:   Recent Labs   Lab 08/18/22  0312   WBC 38.86*   RBC 2.88*   HGB 8.3*   HCT 25.2*   PLT 92*   MCV 88   MCH 28.8   MCHC 32.9       CMP:   Recent Labs   Lab 08/18/22  0312   *   CALCIUM 7.4*   ALBUMIN 1.6*   PROT 4.1*   *   K 4.4   CO2 16*      BUN 41*   CREATININE 2.7*   ALKPHOS 148*    ALT 11   *   BILITOT 3.7*

## 2022-08-18 NOTE — ASSESSMENT & PLAN NOTE
Serum sodium improved at 130  Likely hypervolemic in the setting of cardiogenic shock and positive fluid balance   Will keep on CRRT/SLED  Please use NS instead of D5 for IV med solutions as much as possible

## 2022-08-18 NOTE — NURSING
Pt reporting tongue swelling and trouble swallowing this AM. Speech notably affected by tongue swelling, tongue appears to be more swollen than yesterday. Pt reports similar experience in the past when he bit his tongue with surgery in the past. Per daughter at bedside, pt reported pt rubbing tongue on teeth frequently throughout the night and pt believes that could be causing the swelling. Examined by MD Scott at bedside. Speech consult placed, pt encouraged to eat soft foods until speech sees. Will continue to monitor.

## 2022-08-18 NOTE — NURSING
MAPs sustaining 55-59. Levo titrated up to .24mcg/kg, MAPs unchanged. Vaso currently infusing at 0.06mcg/kg and Epi currently infusing at 0.04mcg/kg. Notified MD Scott. Ordered to increase Epi to 0.06mcg/kg. Will implement. Will continue to monitor.

## 2022-08-18 NOTE — ASSESSMENT & PLAN NOTE
Patient is a 70 yo M with chronic AF on warfarin, pHTN, HLD and mitral valve regurgitation s/p mechanical MVR 2015 s/p MAZE c/b PVL s/p MV plug in 2018 and now with extension of his paravalvular leak on TRACY. Here for PVL closure with TRACY guidance.     Patient admitted to CCU for monitoring post procedure.   TTE limited 08/13/2022     HTS recs for repeat bubble study in setting of recent perivalve leak; recs for leave in [place].

## 2022-08-18 NOTE — PROGRESS NOTES
08/18/22 0715   Treatment   Treatment Type SLED   Treatment Status Order change   Dialysis Machine Number [id number]   Dialyzer Time (hours) 22.16   BVP (Liters) 0.1 L   Solutions Labeled and Current  Yes   Access Temporary Cath;Left;IJ   Catheter Dressing Intact  Yes   Alarms Engaged Yes   CRRT Comments CRRT changed to SLED per order

## 2022-08-18 NOTE — PLAN OF CARE
CICU DAILY GOALS       A: Awake    RASS: Goal - RASS Goal: 0-->alert and calm  Actual - RASS ([place] Agitation-Sedation Scale): 0-->alert and calm   Restraint necessity: Clinical Justification: Removing medical devices  B: Breath   SBT: Not intubated   C: Coordinate A & B, analgesics/sedatives   Pain: managed    SAT: Not intubated  D: Delirium   CAM-ICU: Overall CAM-ICU: Negative  E: Early(intubated/ Progressive (non-intubated) Mobility   MOVE Screen: Fail   Activity: Activity Management: Ankle pumps - L1, Arm raise - L1  FAS: Feeding/Nutrition   Diet order: Diet/Nutrition Received: 2 gram sodium, low saturated fat/low cholesterol, Specialty Diet/Nutrition Received: renal diet Fluid restriction: Fluid Requirement: 1500cc FR  T: Thrombus   DVT prophylaxis: VTE Required Core Measure: Pharmacological prophylaxis initiated/maintained  H: HOB Elevation   Head of Bed (HOB) Positioning: HOB at 30-45 degrees  U: Ulcer Prophylaxis   GI: yes  G: Glucose control   managed Glycemic Management: blood glucose monitored  S: Skin   Bundle compliance: yes   Bathing/Skin Care: incontinence care Date: 8/17/22  B: Bowel Function   no issues   I: Indwelling Catheters   Quiroz necessity:      Urethral Catheter 08/10/22 1718-Reason for Continuing Urinary Catheterization: Critically ill in ICU and requiring hourly monitoring of intake/output   CVC necessity: Yes   IPAD offered: Not appropriate  D: De-escalation Antibx   No  Plan for the day   Continue to wean pressors as pt tolerates, pt remains on Levo, Vaso and Epi gtts for BP support. Attempted to wean Vaso today, pt did not tolerate. Continue to monitor hemodynamics via RIJ [place]. CVPs 11, 10, 11. SvO2 60 this AM. Pt remains on [place] @ 5. Tolerating CRRT intermittently, BP remains labile. Daughters at bedside, updated throughout the shift on POC.  Family/Goals of care/Code Status   Code Status: Full Code     No acute events throughout day, VS and assessment per flow sheet, patient  progressing towards goals as tolerated, plan of care reviewed with Yusuf Mcdonald Jr. and family, all concerns addressed, will continue to monitor.

## 2022-08-18 NOTE — ASSESSMENT & PLAN NOTE
72 yo M with PMH MR s/p mechanical MVR 2015 in [place]) c/b PVL s/p MV plug (Dr Tejeda 2018) on Warfarin, chronic AF s/p GEE ligation, and HTN.  He has recently extended his paravalvular leak on TRACY performed in [place]. He continues to have significant fatigue, GEORGE, and leg swelling. He presented for PLV closure with TRACY guidance.  Postprocedure patient was brought to CCU where he was found to be hypotensive and epinephrine was started.      CT demonstated no RP bleed or any fluid collections concerning for source of continued hypotension       Temp initially low and later 102.2 and persistently febrile  White count 23  CI/CO high with low SVR  CO 5.4 ci 2.6 svr 800  Epinephrine 0.04, Noreepinephrine 0.17 and vasopressin 0.04    - Concern for septic shock vs hypovolemic shock from blood loss anemia  - Blood cultures x 2 obtained and negative so far. UA unremarkable. Respiratory culture also negative  - Daptomycin, meropenem   - On epi and vaso.    - stress dose steroids and will taper if improvement of hemodynamics with extubation  - ID consultation  - HTS consulted in setting of vasoplegia and persistent shock despite continued therapy

## 2022-08-18 NOTE — PROGRESS NOTES
08/18/22 1630   Treatment   Treatment Type SLED   Treatment Status Equipment change;Discontinued treatment;Blood returned   Dialysis Machine Number [id number]   Dialyzer Time (hours) 31.26   BVP (Liters) 72.3 L   CRRT Comments CRRT rinsed back for machine change

## 2022-08-18 NOTE — NURSING
MD Scott at bedside. Advanced swan catheter to wedge swan at bedside, wedge 23. [place] pulled back to 58cm, normal waveform noted. Mention to MD that swan was measuring at 57cm prior to advancing, stated that with normal waveform, would like to keep at 58cm. Will continue to monitor.

## 2022-08-18 NOTE — NURSING
MD [place] notified of the following labs:     08/17/22 21:20   WBC 40.63 (H)      08/17/22 21:20   Platelets 93 (L)     Pt afebrile w/ cool body temp.    SV02 55 and CVPs 14 and 13. CRRT UF currently 400, will continue to be inc as tolerated. No new orders, will redraw labs at 0300.

## 2022-08-18 NOTE — PROGRESS NOTES
[facility] - Cardiac Int[facility]  Cardiology  Progress Note    Patient Name: Yusuf Mcdonald Jr.  MRN: [id number]  Admission Date: 8/10/2022  Hospital Length of Stay: 8 days  Code Status: Full Code   Attending Physician: Mau James MD   Primary Care Physician: Primary Doctor No  Expected Discharge Date: 8/24/2022  Principal Problem:Shock    Subjective:     Hospital Course:   Patient admitted to CCU for management of shock in the setting of MV paravalvular leak repair completed on 8/10. Patient hypotensive post procedure and CVC placed with hemodynamics concerning for septic shock. Patient was given limited IVF and started on broad spectrum antibiotics with vancomycin and cefepime which was escalated to meropenem and azithromycin given ongoing fevers. CI/CO/SVR 4.1/8.6/553. Lactate improved from 4 down to normal. Patient sedated with propofol, fentanyl and on epinephrine and vasopressin for pressor support. Ventilated at 40% FiO2 with PEEP of 5. LJ likely ischemic ATN vs prerenal given procedure and septic shock. Patient also started on stress dose steroids. Extubated on 8/12/22 to 7 L NC.     Patient with persistent shock concerning for septic in etiology on vancomcyin and meropenem so ID consulted for evalaution. CT demosntrated no identifiable source of infection and cultures NGTD. Patient with worsening oliguria and Cr and nephrology was consulted on 8/14.  Given persistent shock and vasoplegia, will consult \[facility]\"" for further eval. Leave-in swan in place. Discussion on going with nephrology to optimize fluid filtration rate. Palliative care following.          Interval History: No acute events overnight.   Continues to require stable pressors requirements while monitoring hemodynamics. Nephro increased UF to 500 Palliative care following for GOC discussion.     Review of Systems   Constitutional: Negative for chills and fever.   Cardiovascular:  Negative for chest pain, orthopnea and palpitations.    Respiratory:  Negative for cough and shortness of breath.    Gastrointestinal:  Negative for abdominal pain.   Neurological:  Negative for dizziness, headaches and light-headedness.   Psychiatric/Behavioral:  Negative for altered mental status.    Objective:     Vital Signs (Most Recent):  Temp: 97.8 °F (36.6 °C) (08/18/22 1105)  Pulse: (!) 123 (08/18/22 1505)  Resp: 20 (08/18/22 1505)  BP: (!) 95/57 (08/18/22 0401)  SpO2: 100 % (08/18/22 1505)   Vital Signs (24h Range):  Temp:  [97.4 °F (36.3 °C)-97.9 °F (36.6 °C)] 97.8 °F (36.6 °C)  Pulse:  [110-126] 123  Resp:  [11-26] 20  SpO2:  [95 %-100 %] 100 %  BP: (88-95)/(57-64) 95/57  Arterial Line BP: (79-97)/(43-62) 88/47     Weight: 89.4 kg (197 lb)  Body mass index is 29.95 kg/m².     SpO2: 100 %  O2 Device (Oxygen Therapy): High Flow nasal Cannula      Intake/Output Summary (Last 24 hours) at 8/18/2022 1538  Last data filed at 8/18/2022 1526  Gross per 24 hour   Intake 6732.86 ml   Output 77037 ml   Net -4141.14 ml       Lines/Drains/Airways       Central Venous Catheter Line  Duration             Introducer 08/15/22 0620 right internal jugular 3 days    Trialysis (Dialysis) Catheter 08/14/22 2130 left internal jugular 3 days    Pulmonary Artery Catheter Assessment  08/15/22 1820 right internal jugular 2 days              Drain  Duration                  Urethral Catheter 08/10/22 1718 7 days              Arterial Line  Duration             Arterial Line 08/10/22 1334 Right Radial 8 days                    Physical Exam  Vitals and nursing note reviewed.   Constitutional:       General: He is not in acute distress.     Appearance: Normal appearance. He is ill-appearing. He is not toxic-appearing.      Comments: Pale   HENT:      Head: Normocephalic and atraumatic.      Mouth/Throat:      Mouth: Mucous membranes are moist.   Cardiovascular:      Rate and Rhythm: Tachycardia present. Rhythm irregular.      Heart sounds: Murmur (mitral valve systolic click) heard.      No friction rub. No gallop.      Comments: Telemetry afib with RVR  Pulmonary:      Effort: Pulmonary effort is normal. No respiratory distress.      Breath sounds: Normal breath sounds.   Abdominal:      Palpations: Abdomen is soft.   Musculoskeletal:      Right lower leg: No edema.      Left lower leg: No edema.   Skin:     General: Skin is warm.   Neurological:      Mental Status: He is alert and oriented to person, place, and time. Mental status is at baseline.       Significant Labs: ABG:   Recent Labs   Lab 08/17/22  1328 08/17/22  1947 08/18/22  0420   PH 7.431 7.399 7.391   PCO2 31.0* 34.6* 34.4*   HCO3 20.7* 21.4* 20.9*   POCSATURATED 97 55* 60*   BE -4 -3 -4   , Blood Culture: No results for input(s): LABBLOO in the last 48 hours., BMP:   Recent Labs   Lab 08/17/22 2120 08/18/22 0312 08/18/22  1350   *  141* 133* 132*   *  126* 130* 131*   K 4.3  4.3 4.4 4.4   CL 98  98 102 101   CO2 15*  15* 16* 20*   BUN 39*  39* 41* 24*   CREATININE 2.5*  2.5* 2.7* 1.5*   CALCIUM 7.3*  7.3* 7.4* 7.4*   MG 2.1  2.1 2.2 2.0   , CMP   Recent Labs   Lab 08/17/22  0246 08/17/22  1312 08/17/22 2120 08/18/22 0312 08/18/22  1350   *   < > 126*  126* 130* 131*   K 4.3   < > 4.3  4.3 4.4 4.4      < > 98  98 102 101   CO2 20*   < > 15*  15* 16* 20*   *   < > 141*  141* 133* 132*   BUN 26*   < > 39*  39* 41* 24*   CREATININE 1.5*   < > 2.5*  2.5* 2.7* 1.5*   CALCIUM 7.1*   < > 7.3*  7.3* 7.4* 7.4*   PROT 4.0*  --   --  4.1*  --    ALBUMIN 1.3*   < > 1.6*  1.6* 1.6* 1.6*   BILITOT 3.3*  --   --  3.7*  --    ALKPHOS 142*  --   --  148*  --    *  --   --  169*  --    ALT 9*  --   --  11  --    ANIONGAP 10   < > 13  13 12 10    < > = values in this interval not displayed.   , CBC   Recent Labs   Lab 08/17/22  0246 08/17/22 2120 08/18/22  0312   WBC 29.24* 40.63* 38.86*   HGB 8.6* 8.4* 8.3*   HCT 25.8* 25.1* 25.2*   * 93* 92*   , INR   Recent Labs   Lab 08/17/22 0246  08/18/22  0312   INR 1.3* 1.4*   , Lipid Panel No results for input(s): CHOL, HDL, LDLCALC, TRIG, CHOLHDL in the last 48 hours.,   Pathology Results  (Last 10 years)      None        , Troponin No results for input(s): TROPONINI in the last 48 hours., and All pertinent lab results from the last 24 hours have been reviewed.    Significant Imaging: Echocardiogram: Transthoracic echo (TTE) complete (Cupid Only):   Results for orders placed or performed during the hospital encounter of 08/10/22   Echo Saline Bubble? Yes   Result Value Ref Range    Ascending aorta 3.60 cm    STJ 3.66 cm    AV mean gradient 4 mmHg    Ao peak rohan 1.45 m/s    Ao VTI 16.69 cm    IVS 1.05 0.6 - 1.1 cm    LA size 6.40 cm    Left Atrium Major Axis 8.99 cm    Left Atrium Minor Axis 9.10 cm    LVIDd 3.96 3.5 - 6.0 cm    LVIDs 2.65 2.1 - 4.0 cm    LVOT diameter 2.26 cm    LVOT peak VTI 11.68 cm    Posterior Wall 0.96 0.6 - 1.1 cm    MV Peak A Rohan 1.27 m/s    E wave deceleration time 224.02 msec    MV Peak E Rohan 1.75 m/s    RA Major Axis 7.19 cm    RA Width 7.11 cm    RVDD 6.36 cm    Sinus 4.22 cm    TAPSE 0.86 cm    TR Max Rohan 3.99 m/s    TDI LATERAL 0.13 m/s    TDI SEPTAL 0.05 m/s    LA WIDTH 5.17 cm    PV PEAK VELOCITY 1.06 cm/s    MV stenosis pressure 1/2 time 64.97 ms    LV Diastolic Volume 68.52 mL    LV Systolic Volume 25.77 mL    LVOT peak rohan 0.89 m/s    RVOT peak VTI 7.52 cm    Mr max rohan 0.04 m/s    RVOT peak rohan 0.69 m/s    LA volume (mod) 157.89 cm3    MV mean gradient 9 mmHg    MV peak gradient 19 mmHg    MV VTI 41.97 cm    PV mean gradient 0.90 mmHg    LV LATERAL E/E' RATIO 13.46 m/s    LV SEPTAL E/E' RATIO 35.00 m/s    FS 33 %    LA volume 254.38 cm3    LV mass 125.96 g    Left Ventricle Relative Wall Thickness 0.48 cm    AV valve area 2.81 cm2    AV Velocity Ratio 0.61     AV index (prosthetic) 0.70     MV valve area p 1/2 method 3.39 cm2    MV valve area by continuity eq 1.12 cm2    E/A ratio 1.38     Mean e' 0.09 m/s    LVOT area  4.0 cm2    LVOT stroke volume 46.83 cm3    AV peak gradient 8 mmHg    E/E' ratio 19.44 m/s    LV Systolic Volume Index 12.7 mL/m2    LV Diastolic Volume Index 33.75 mL/m2    LA Volume Index 125.3 mL/m2    LV Mass Index 62 g/m2    Triscuspid Valve Regurgitation Peak Gradient 64 mmHg    LA Volume Index (Mod) 77.8 mL/m2    BSA 2.07 m2    Right Atrial Pressure (from IVC) 15 mmHg    EF 55 %    RA area 36.00 cm2    TV rest pulmonary artery pressure 79 mmHg    Mitral Valve Heart Rate 133 bpm    Narrative    · The estimated ejection fraction is 55%.  · There is abnormal septal wall motion. There is systolic and diastolic   flattening of the interventricular septum consistent with right ventricle   pressure and volume overload.  · The left ventricle is normal in size with concentric remodeling and   normal systolic function.  · Grade II left ventricular diastolic dysfunction.  · Normal right ventricular size with moderately to severely reduced right   ventricular systolic function.  · Severe left atrial enlargement.  · Moderate right atrial enlargement.  · Moderate mitral regurgitation.  · There is a mechanical mitral valve prosthesis. Mean gradient 9 mmHg at   HR of 133 bpm.  · Presence of VSD plug around mechanical MV anteriorly. Stability of the   device cannot be ascertained accurately as it is not seen in many views.   There is trace MR seen anetriorly that is likely paravalvular but cannot   be seen well. Based on MVVTI/LVOT VTI ratio if > 2.5 there is likely   greater MR than seen. Consider TRACY after patient diuresed and HR lowered   to 70s.  · Severe tricuspid regurgitation.  · There is pulmonary hypertension.  · The estimated PA systolic pressure is 79 mmHg.  · Elevated central venous pressure (15 mmHg).  · Presence of iatrogenic ASD with left to right shunt. Bubble study is   positive for intracardiac shunt.        Assessment and Plan:       * Shock  See septic shock          Acute blood loss anemia  Hgb trend 13  --> 11 -- 9.9 --> 7.9 since admit. Concern for chronic hemolytic process given mechanical valve as documented with haptoglobin and LDH altered since replacement in 2018 vs possible retroperitoneal bleed. Stable. Smear reviewed with some fragmented RBCs 5/hpf but PLTs stable and no further concerns for acute hemolytic or consumptive process. CT demonstrated no RP bleed.  s/p RBC 2 unit 08/13/2022, noted decrease in pressor requirements with transfusion with Lasix 120 mg IV x 1       - Type and screen q 72 hrs   - Daily cbc   - Keeping aspirin and Plavix for now considering PVL closure, heparin gtt for mechanical MV    Severe mitral regurgitation  S/p mechanical MVR 2015 and paravalvular leak repair 2018 and 2022       Gross hematuria  Hematuria continued since admission initially thought from traumatic insertion. Possible underlying coagulopathy. Imaging unrevealing so far     Will likely need urology consultation         Septic shock  70 yo M with PMH MR s/p mechanical MVR 2015 in [place]) c/b PVL s/p MV plug (Dr Tejeda 2018) on Warfarin, chronic AF s/p GEE ligation, and HTN.  He has recently extended his paravalvular leak on TRACY performed in [place]. He continues to have significant fatigue, GEORGE, and leg swelling. He presented for PLV closure with TRACY guidance.  Postprocedure patient was brought to CCU where he was found to be hypotensive and epinephrine was started.      CT demonstated no RP bleed or any fluid collections concerning for source of continued hypotension       Temp initially low and later 102.2 and persistently febrile  White count 23  CI/CO high with low SVR  CO 5.4 ci 2.6 svr 800  Epinephrine 0.04, Noreepinephrine 0.17 and vasopressin 0.04    - Concern for septic shock vs hypovolemic shock from blood loss anemia  - Blood cultures x 2 obtained and negative so far. UA unremarkable. Respiratory culture also negative  - Daptomycin, meropenem   - On epi and vaso.    - stress dose steroids and will taper  if improvement of hemodynamics with extubation  - ID consultation  - HTS consulted in setting of vasoplegia and persistent shock despite continued therapy    Atrial fibrillation with RVR  -Afib with RVR at 2 am, INR 2.0, rate 135-145, EKG obtained, will give amio iv 150x1 followed by gtt  On Amio gtt and holding home dilt and metoprolol in shock   holding digoxin for with elevated Cr       LJ (acute kidney injury)  Baseline of 1.3 prior to admission.  Trend 1.3 --> 2.5 --> 3.1 --> 3.5 and now 4.4   Likely prerenal azotemia considering BUN/Cr 20:1 and concern for acute blood loss anemia. Continued hematuria       Plan:     - nephrology consulted; appreciated recommendations  - Lasix trial; without response   - Monitor intake/output and daily standing weights    - Avoid nephrotoxic agents such as NSAIDs, gadolinium and IV radiocontrast.  - Renally dose meds to current GFR.  - Maintain MAP > 65.      S/P mitral valve replacement with metallic valve  On warfarin at home. Transitioned to heparin gtt and will bridge once stable          Chronic a-fib  Currently afib with RVR            Paravalvular leak (prosthetic valve)  Patient is a 72 yo M with chronic AF on warfarin, pHTN, HLD and mitral valve regurgitation s/p mechanical MVR 2015 s/p MAZE c/b PVL s/p MV plug in 2018 and now with extension of his paravalvular leak on TRACY. Here for PVL closure with TRACY guidance.     Patient admitted to CCU for monitoring post procedure.   TTE limited 08/13/2022     HTS recs for repeat bubble study in setting of recent perivalve leak; recs for leave in [place].         VTE Risk Mitigation (From admission, onward)         Ordered     heparin 25,000 units in dextrose 5% (100 units/ml) IV bolus from bag - ADDITIONAL PRN BOLUS - 60 units/kg  As needed (PRN)        Question:  Heparin Infusion Adjustment (DO NOT MODIFY ANSWER)  Answer:  [email or web address]\epic\Images\Pharmacy\HeparinInfusions\heparin LOW INTENSITY nomogram for [facility] [id number]     08/11/22 0737     heparin 25,000 units in dextrose 5% (100 units/ml) IV bolus from bag - ADDITIONAL PRN BOLUS - 30 units/kg  As needed (PRN)        Question:  Heparin Infusion Adjustment (DO NOT MODIFY ANSWER)  Answer:  [email or web address]\epic\Images\Pharmacy\HeparinInfusions\heparin LOW INTENSITY nomogram for [facility] [id number]    08/11/22 0737     heparin 25,000 units in dextrose 5% 250 mL (100 units/mL) infusion LOW INTENSITY nomogram - [facility]  Continuous        Question Answer Comment   Heparin Infusion Adjustment (DO NOT MODIFY ANSWER) \\[email or web address]\epic\Images\Pharmacy\HeparinInfusions\heparin LOW INTENSITY nomogram for [facility] [id number]    Begin at (in units/kg/hr) 12        08/11/22 0737     IP VTE HIGH RISK PATIENT  Once         08/10/22 1647     Place sequential compression device  Until discontinued         08/10/22 1647                Adeel Kapoor MD  Cardiology  [facility] - Cardiac Intensive Care

## 2022-08-18 NOTE — PROGRESS NOTES
08/18/22 0215   Treatment   Treatment Type SCUF   Treatment Status Daily equipment check   Dialysis Machine Number [id number]   Solutions Labeled and Current  Yes   Access IJ   Catheter Dressing Intact  Yes   Alarms Engaged Yes   CRRT Comments CRRT daily check       CRRT daily check/maintenance; access secured; orders verified; pt stable at this time; see flow sheet for details.

## 2022-08-19 NOTE — CHAPLAIN
Responded to pt death. Provided primary nurse w/DCP. Provided grief care and grief packet to family. Family tearful and appropriately grieving. Family provided FH information and left unit w/pt's belongings.     1400  collected and filed DCP. Hold for [facility]ITY.     Chaplain Greenfield, Spiritual Care  Spectra *[phone]

## 2022-08-19 NOTE — ASSESSMENT & PLAN NOTE
Hgb trend 13 --> 11 -- 9.9 --> 7.9 since admit. Concern for chronic hemolytic process given mechanical valve as documented with haptoglobin and LDH altered since replacement in 2018 vs possible retroperitoneal bleed. Stable. Smear reviewed with some fragmented RBCs 5/hpf but PLTs stable and no further concerns for acute hemolytic or consumptive process. CT demonstrated no RP bleed.  s/p RBC 2 unit 08/13/2022, noted decrease in pressor requirements with transfusion with Lasix 120 mg IV x 1

## 2022-08-19 NOTE — NURSING
0900- Family discussion with MD Scott to change code status to DNR.     0950- Comfort measure in place, pressor support turned off at family request.     1015- MD Scott bedside. Time of death called 1018

## 2022-08-19 NOTE — ASSESSMENT & PLAN NOTE
Baseline of 1.3 prior to admission.  Trend 1.3 --> 2.5 --> 3.1 --> 3.5 and now 4.4   Likely prerenal azotemia considering BUN/Cr 20:1 and concern for acute blood loss anemia. Continued hematuria       Plan:     - nephrology consulted; appreciated recommendations  - Lasix trial; without response   - Monitor intake/output and daily standing weights    - Avoid nephrotoxic agents such as NSAIDs, gadolinium and IV radiocontrast.

## 2022-08-19 NOTE — PT/OT/SLP PROGRESS
Speech Language Pathology      Yusuf JUSTIN Mcdonald .  MRN: [id number]    Patient not seen today secondary to pt intubated at this time. Please re-consult when medically appropriate. No further ST warranted at this time.   8/19/2022

## 2022-08-19 NOTE — EICU
Intervention Initiated From:  Bedside    Magan Communicated with Bedside Nurse regarding:  BP, Documentation, ECG Change, HR and Respiratory      Comments: Called to the room from bedside.  Code blue in progress.  Code charted in detail in the code documentation flowsheet as dictated by staff members at the bedside.

## 2022-08-19 NOTE — PROGRESS NOTES
08/18/22 2040   Treatment   Treatment Type SCUF   Treatment Status Order change   Dialysis Machine Number [id number]   Dialyzer Time (hours) 4.01   BVP (Liters) 35.7 L   Solutions Labeled and Current  Yes   Access Temporary Cath;Left;IJ   Alarms Engaged Yes   CRRT Comments changed to SCUF per order.   CRRT Hourly Documentation   Blood Flow (mL/min) 150   UF Rate 500 cc/hr   Arterial Pressure (mmHg) -30 mmHg   Venous Pressure (mmHg) 40 mmHg   Effluent Pressure (EP) (mmHg) 10 mmHg

## 2022-08-19 NOTE — SIGNIFICANT EVENT
Called by nurse patient SVO2 was 36, repeat dropped to 34. He also had sudden increase in pressor requirements and nurses attempted to was back his CRRT. While washing back, he developed bradycardia which appeared to be junctional. Subsequently went into PEA arrest. ROSC achieved after 1 round of ACLS (epi and amp of bicarb x1), no shocks were given. After ROSC he was noted to be apneic and was emergently intubated. Unclear precipitation of events other than his critical illness. Daughter at bedside was updated on patients condition and poor prognosis. She wishes to speak with the family to discuss goals of care. Daughter wanting to continue current management for now.

## 2022-08-19 NOTE — PLAN OF CARE
[facility] Card[facility]  Discharge Final Note    Primary Care Provider: Primary Doctor No    Expected Discharge Date: 2022    Final Discharge Note (most recent)     Final Note - 22 1226        Final Note    Assessment Type Final Discharge Note     Anticipated Discharge Disposition                Ryanne Benavides LMSW  [facility]  [id number]

## 2022-08-19 NOTE — PROGRESS NOTES
08/19/22 0304   Treatment   Treatment Type SCUF   Treatment Status Daily equipment check   Dialysis Machine Number [id number]   Dialyzer Time (hours) 10.24   BVP (Liters) 35.7 L   Solutions Labeled and Current  Yes   Access Temporary Cath;Left;IJ   Catheter Dressing Intact  Yes   Alarms Engaged Yes   CRRT Comments Daily checks.   Prescription   Time (Hours) Continuous   UF Goal Rate 500 mL/hr   CRRT Hourly Documentation   Blood Flow (mL/min) 150   UF Rate 500 cc/hr   Arterial Pressure (mmHg) -30 mmHg   Venous Pressure (mmHg) 40 mmHg   Effluent Pressure (EP) (mmHg) 10 mmHg

## 2022-08-19 NOTE — ASSESSMENT & PLAN NOTE
70 yo M with PMH MR s/p mechanical MVR 2015 in [place]) c/b PVL s/p MV plug (Dr Tejeda 2018) on Warfarin, chronic AF s/p GEE ligation, and HTN.  He has recently extended his paravalvular leak on TRACY performed in [place]. He continues to have significant fatigue, GEORGE, and leg swelling. He presented for PLV closure with TRACY guidance.  Postprocedure patient was brought to CCU where he was found to be hypotensive and epinephrine was started.      CT demonstated no RP bleed or any fluid collections concerning for source of continued hypotension       Temp initially low and later 102.2 and persistently febrile  White count 23  CI/CO high with low SVR  CO 5.4 ci 2.6 svr 800  Epinephrine 0.04, Noreepinephrine 0.17 and vasopressin 0.04    - Concern for septic shock vs hypovolemic shock from blood loss anemia  - Blood cultures x 2 obtained and negative so far. UA unremarkable. Respiratory culture also negative  - Daptomycin, meropenem   - On epi and vaso.    - stress dose steroids and will taper if improvement of hemodynamics with extubation  - ID consultation appreciated  - HTS consulted, assistance appreciated

## 2022-08-19 NOTE — PT/OT/SLP PROGRESS
Occupational Therapy      Patient Name:  Yusuf Mcdonald JrAlex   MRN:  [id number]    Patient not seen today secondary s/p code this am.   8/19/2022

## 2022-08-19 NOTE — PROGRESS NOTES
08/19/22 0430   Treatment   Treatment Type SCUF   Treatment Status Discontinued treatment   Dialysis Machine Number [id number]   CRRT Comments Patient was on CRRT when coded, per primary nurse was in the process of returning blood, most blood returned   MD Quach notified over the phone

## 2022-08-19 NOTE — DISCHARGE SUMMARY
[facility]  Cardiology  Discharge Summary      Patient Name: Yusuf Mcdonald Jr.  MRN: [id number]  Admission Date: 8/10/2022  Hospital Length of Stay: 9 days  Discharge Date and Time:  08/19/2022 2:27 PM  Attending Physician: KENNEDI Garcia    Discharging Provider: Doron Chavez MD  Primary Care Physician: Primary Doctor No    HPI:   Mr Mcdonald is a 72 yo M with PMH MR s/p mechanical MVR 2015 in [place]) c/b PVL s/p MV plug (Dr Tejeda 2018) on Warfarin, chronic AF s/p GEE ligation, and HTN.  He has recently extended his paravalvular leak on TRACY performed in [place].  An attempt was made to occlude paravalvular leak at outside hospital, but was aborted due to fear of dislodging current device. He continues to have significant fatigue, GEORGE, and leg swelling. He presented for PLV closure with TRACY guidance.  Postprocedure patient was brought to ICU where he was found to be hypotensive and epinephrine was started.  Emergent right IJ triple-lumen catheter was inserted and hemodynamics were obtained which showed high output state with low SVR.  His white count also increased to 23 which was normal in the morning.  He was initially hypothermic with temperature 92° for which active warming was done however later his bike fever 102.2 at 1 am and then again at 2 am.  His creatinine, total bilirubin also went up slightly.  He was later found to be in atrial fibrillation with rapid ventricular rate in the 130s to 140s, home rate control medications were resumed and he was started on amiodarone bolus followed by infusion.  Patient is currently being covered for vancomycin and cefepime for bed very likely looks like a septic shock.         TRACY 6/29/22 ([place])  1.  Severe perivalvular regurgitation of the mechanical mitral valve due   to partial dehiscence   2.  Severely dilated right ventricle with reduced systolic function   3.  Severe pulmonary hypertension with estimated PA pressures of greater    than 90 mmHg     TTE 6/16/22  1. Mildly dilated left ventricle. Normal left ventricular systolic function. LVEF 55 -   65%. Left ventricular diastolic function is indeterminate in this study due to the   presence of mitral valve repair or replacement. Normal wall motion.   2. Moderate to severely dilated right ventricle. Borderline right ventricular systolic   function.   3. A mechanical prosthetic valve is present in the mitral position. Moderate prosthetic   valvular regurgitation is present. Moderate paravalvular mitral regurgitation.   4. Mild to moderate aortic valve regurgitation.   5. Severe tricuspid valve regurgitation. Severe pulmonary hypertension.   6. Mildly dilated aortic root.     Anticoagulant/antiplatelets: Warfarin  ECG: Atrial fibrillation           Procedure(s) (LRB):  LEAK CLOSURE, PERIVALVULAR (N/A)  ECHOCARDIOGRAM, TRANSESOPHAGEAL (N/A)  Removal, Foreign Body     Indwelling Lines/Drains at time of discharge:  Lines/Drains/Airways     Central Venous Catheter Line  Duration           Introducer 08/15/22 0620 right internal jugular 4 days    Trialysis (Dialysis) Catheter 08/14/22 2130 left internal jugular 4 days    Pulmonary Artery Catheter Assessment  08/15/22 1820 right internal jugular 3 days          Drain  Duration                Urethral Catheter 08/10/22 1718 8 days         NG/OG Tube 08/19/22 0451 [facility] mouth <1 day          Airway  Duration                Airway - Non-Surgical 08/19/22 0445 <1 day          Arterial Line  Duration           Arterial Line 08/10/22 1334 Right Radial 9 days                Hospital Course:  Patient admitted to CCU for management of shock in the setting of MV paravalvular leak repair completed on 8/10. Patient hypotensive post procedure and CVC placed with hemodynamics concerning for septic shock. Patient was given limited IVF and started on broad spectrum antibiotics with vancomycin and cefepime which was escalated to meropenem and azithromycin  given ongoing fevers. CI/CO/SVR 4.1/8.6/553. Lactate improved from 4 down to normal. Patient sedated with propofol, fentanyl and on epinephrine and vasopressin for pressor support. Ventilated at 40% FiO2 with PEEP of 5. LJ likely ischemic ATN vs prerenal given procedure and septic shock. Patient also started on stress dose steroids. Extubated on 8/12/22 to 7 L NC.     Patient with persistent shock concerning for septic in etiology on vancomcyin and meropenem so ID consulted for evalaution. CT demosntrated no identifiable source of infection and cultures NGTD. Patient with worsening oliguria and Cr and nephrology was consulted on 8/14.  Given persistent shock and vasoplegia, will consult HTS for further eval. Leave-in swan in place. Discussion on going with nephrology to optimize fluid filtration rate. Palliative care following.      Overnight on 08/18-08/19, patient unfortunately coded for 1 minute requiring chest compressions. ROSC achieved, patient was intubated. Pressor requirements increased significantly over the course of 12 hours. During the day shift, revisited goals of care conversation with the family and patient was transitioned to comfort care. Psychosocial support provided. Remained intubated per family request.Withdrawal of drips was performed after all family members had a chance to come and see the patient and say goodbyes. Patient passed within minutes of stopping the support medications. Exam as noted in significant event. Patient was pronounced dead at 1015.       Goals of Care Treatment Preferences:  Code Status: DNR      Consults:   Consults (From admission, onward)        Status Ordering Provider     Inpatient consult to Palliative Care  Once        Provider:  (Not yet assigned)    Completed TISHA SOFIA     Inpatient consult to Heart Transplant  Once        Provider:  (Not yet assigned)    Completed TISHA SOFIA     Inpatient consult to Infectious Diseases  Once        Provider:  (Not yet  assigned)    Completed CARMEL VILLA     Inpatient consult to Nephrology  Once        Provider:  (Not yet assigned)    Completed CARMEL VILLA     Inpatient consult to Infectious Diseases  Once        Provider:  (Not yet assigned)    Completed CARMEL VILLA     Inpatient consult to Registered Dietitian/Nutritionist  Once        Provider:  (Not yet assigned)    Completed JOSE MARINO Diagnostic Studies: Labs:   BMP:   Recent Labs   Lab 08/18/22 2040 08/19/22  0231 08/19/22  0511    134* 134*   * 131* 132*   K 4.7 4.8 5.1    103 100   CO2 21* 11* 9*   BUN 17 26* 28*   CREATININE 1.0 1.7* 1.8*   CALCIUM 7.1* 7.1* 7.0*   MG 2.0 2.1 2.4   , CMP   Recent Labs   Lab 08/18/22 0312 08/18/22  1350 08/18/22 2040 08/19/22  0231 08/19/22  0511   * 131* 132* 131* 132*   K 4.4 4.4 4.7 4.8 5.1    101 102 103 100   CO2 16* 20* 21* 11* 9*   * 132* 108 134* 134*   BUN 41* 24* 17 26* 28*   CREATININE 2.7* 1.5* 1.0 1.7* 1.8*   CALCIUM 7.4* 7.4* 7.1* 7.1* 7.0*   PROT 4.1*  --   --  4.2* 4.0*   ALBUMIN 1.6* 1.6*  --  1.5* 1.4*   BILITOT 3.7*  --   --  5.1* 4.5*   ALKPHOS 148*  --   --  176* 164*   *  --   --  213* 229*   ALT 11  --   --  14 22   ANIONGAP 12 10 9 17* 23*   , CBC   Recent Labs   Lab 08/18/22 0312 08/19/22 0231 08/19/22  0511   WBC 38.86* 39.95* 43.65*   HGB 8.3* 7.9* 7.6*   HCT 25.2* 23.0* 23.5*   PLT 92* 81* 87*   , INR   Lab Results   Component Value Date    INR 1.4 (H) 08/18/2022    INR 1.3 (H) 08/17/2022    INR 1.2 08/16/2022   , Lipid Panel   Lab Results   Component Value Date    CHOL 219 (H) 12/30/2011    HDL 39 (L) 12/30/2011    LDLCALC 136.0 12/30/2011    TRIG 219 (H) 12/30/2011    CHOLHDL 17.8 (L) 12/30/2011   , Troponin No results for input(s): TROPONINI in the last 168 hours., A1C: No results for input(s): HGBA1C in the last 4320 hours. and All labs within the past 24 hours have been reviewed  Microbiology:   Blood Culture    Lab Results   Component Value Date    LABBLOO No Growth to date 08/14/2022    LABBLOO No Growth to date 08/14/2022    LABBLOO No Growth to date 08/14/2022    LABBLOO No Growth to date 08/14/2022    LABBLOO No Growth to date 08/14/2022   , Sputum Culture   Lab Results   Component Value Date    GSRESP <10 epithelial cells per low power field. 08/11/2022    GSRESP Rare WBC's 08/11/2022    GSRESP No organisms seen 08/11/2022    RESPIRATORYC Normal respiratory ronny 08/11/2022    RESPIRATORYC No S aureus or Pseudomonas isolated. 08/11/2022    and Urine Culture  No results found for: LABURIN  Radiology: X-Ray: CXR: X-Ray Chest 1 View (CXR):   Results for orders placed or performed during the hospital encounter of 08/10/22   X-Ray Chest 1 View    Narrative    EXAMINATION:  XR CHEST 1 VIEW    CLINICAL HISTORY:  ETT placement;    TECHNIQUE:  Single frontal view of the chest was performed.    COMPARISON:  08/15/2022.    FINDINGS:  Endotracheal tube terminates approximately 3.5 cm proximal to the meme.  There is a prosthetic cardiac valve.  There is a right internal jugular [place] catheter and a left internal jugular central venous catheter, both of which terminate in unchanged position from prior.  There is a nasogastric tube terminating in the stomach.  There are overlying defibrillator pads.    The bilateral lungs demonstrate significantly improved aeration from prior.  There is no new large focal consolidation.  The pleural spaces are clear.    The cardiac silhouette is enlarged, unchanged.    The visualized osseous structures are intact.  There are median sternotomy wires.      Impression    Pulmonary edema, improved from prior.      Electronically signed by: Ahsan Robison  Date:    08/19/2022  Time:    06:08    and X-Ray Chest PA and Lateral (CXR): No results found for this visit on 08/10/22. and KUB: X-Ray Abdomen AP 1 View (KUB):   Results for orders placed or performed during the hospital encounter of 08/10/22    X-Ray Abdomen AP 1 View    Narrative    EXAMINATION:  XR ABDOMEN AP 1 VIEW    CLINICAL HISTORY:  shock;    TECHNIQUE:  AP View(s) of the abdomen was performed.    COMPARISON:  Present exam interpreted after review of the abdomen and pelvis CT of August 13, 2022    FINDINGS:  Enteric tube present tip in the stomach.  Right upper quadrant calcifications relate to gallstones as was noted on the earlier CT.  Mild gaseous distension of the stomach.  There is scattered gas and less so fecal matter seen throughout the much of the colon, colon caliber does not appear dilated.  No obvious dilated small bowel loops.  No free air based on the single supine image.  Degenerative changes in the spine.      Impression    As above.      Electronically signed by: Billy Dutton  Date:    08/19/2022  Time:    08:38       Pending Diagnostic Studies:     Procedure Component Value Units Date/Time    CBC auto differential [[id number]] Collected: 08/10/22 2113    Order Status: Sent Lab Status: In process Updated: 08/10/22 2114    Specimen: Blood     Comprehensive metabolic panel [[id number]] Collected: 08/11/22 1411    Order Status: Sent Lab Status: In process Updated: 08/11/22 1412    Specimen: Blood           Final Active Diagnoses:    Diagnosis Date Noted POA    PRINCIPAL PROBLEM:  Shock [R57.9]  No    Acute blood loss anemia [D62] 08/12/2022 Yes    Septic shock [A41.9, R65.21] 08/11/2022 Yes    Atrial fibrillation with RVR [I48.91] 08/10/2022 Yes    Paravalvular leak (prosthetic valve) [T82.03XA] 07/12/2018 Yes    Chronic a-fib [I48.20] 05/24/2018 Yes    LJ (acute kidney injury) [N17.9] 08/09/2022 Yes    S/P mitral valve replacement with metallic valve [Z95.4] 05/24/2018 Not Applicable    Severe mitral regurgitation [I34.0]  Yes    Palliative care encounter [Z51.5] 08/16/2022 Not Applicable    Bruise [T14.8XXA] 08/16/2022 Yes    Hyponatremia [E87.1] 08/15/2022 Unknown    Gross hematuria [R31.0] 08/11/2022 Yes    CKD  (chronic kidney disease) stage 4, GFR 15-29 ml/min [N18.4] 08/10/2022 Yes    Pulmonary hypertension due to mitral valve disease [I27.22, I05.9] 07/18/2018 Yes    Essential hypertension [I10] 06/19/2018 Yes    Other hyperlipidemia [E78.49] 06/19/2018 Yes      Problems Resolved During this Admission:     * Shock  See septic shock          Acute blood loss anemia  Hgb trend 13 --> 11 -- 9.9 --> 7.9 since admit. Concern for chronic hemolytic process given mechanical valve as documented with haptoglobin and LDH altered since replacement in 2018 vs possible retroperitoneal bleed. Stable. Smear reviewed with some fragmented RBCs 5/hpf but PLTs stable and no further concerns for acute hemolytic or consumptive process. CT demonstrated no RP bleed.  s/p RBC 2 unit 08/13/2022, noted decrease in pressor requirements with transfusion with Lasix 120 mg IV x 1        Septic shock  70 yo M with PMH MR s/p mechanical MVR 2015 in [place]) c/b PVL s/p MV plug (Dr Tejeda 2018) on Warfarin, chronic AF s/p GEE ligation, and HTN.  He has recently extended his paravalvular leak on TRACY performed in [place]. He continues to have significant fatigue, GEORGE, and leg swelling. He presented for PLV closure with TRACY guidance.  Postprocedure patient was brought to CCU where he was found to be hypotensive and epinephrine was started.      CT demonstated no RP bleed or any fluid collections concerning for source of continued hypotension       Temp initially low and later 102.2 and persistently febrile  White count 23  CI/CO high with low SVR  CO 5.4 ci 2.6 svr 800  Epinephrine 0.04, Noreepinephrine 0.17 and vasopressin 0.04    - Concern for septic shock vs hypovolemic shock from blood loss anemia  - Blood cultures x 2 obtained and negative so far. UA unremarkable. Respiratory culture also negative  - Daptomycin, meropenem   - On epi and vaso.    - stress dose steroids and will taper if improvement of hemodynamics with extubation  - ID consultation  appreciated  - HTS consulted, assistance appreciated    LJ (acute kidney injury)  Baseline of 1.3 prior to admission.  Trend 1.3 --> 2.5 --> 3.1 --> 3.5 and now 4.4   Likely prerenal azotemia considering BUN/Cr 20:1 and concern for acute blood loss anemia. Continued hematuria       Plan:     - nephrology consulted; appreciated recommendations  - Lasix trial; without response   - Monitor intake/output and daily standing weights    - Avoid nephrotoxic agents such as NSAIDs, gadolinium and IV radiocontrast.          S/P mitral valve replacement with metallic valve  On warfarin at home.           Discharged Condition:     Disposition:  in Medical Facility    Follow Up:    Patient Instructions:   No discharge procedures on file.  Medications:  None    Time spent on the discharge of patient: 35 minutes    Discussed with staff, Dr Shannon.    Doron Chavez MD  Cardiology PGY5  [facility] - [facility]

## 2022-08-19 NOTE — SIGNIFICANT EVENT
CCU Update Note    0850  Patient continued to have increased pressor requirements. Conversation with family in room 3098 about goals of care and unfortunate lack of progression despite our efforts. Decision made to transition to comfort care was brought up; family needed some more time to think about final decision. Psychosocial support provided.    0915  Decision made to transition to comfort care. Patient was made DNR as per family wishes. Comfort care orders signed and pended as daughters requested to wait for additional family members. Request made to keep the patient ventilated, request by family honored and respected. Psychosocial support provided.    Significant event 1026  RN called to inform that patient lost a pulse. Patient was intubated and thus respirations were still being provided by ventilator. Patient was not responsive to verbal, tactile, or painful stimuli. No response to corneal reflexes. Respiratory called for pausing the ventilator to auscultate breath sounds. No spontaneous breathing noted. No pulse appreciated. Patient was cold to touch. TOD 1015. Family already present at bedside. Psychosocial support provided.  informed, house staff provided additional support and comfort. Dr Shannon aware.    LEERS: [email or web address]    Please call with questions or concerns.     Doron Chavez MD  Cardiology PGY5  [facility]

## 2022-09-20 LAB
GLUCOSE SERPL-MCNC: 196 MG/DL (ref 70–110)
GLUCOSE SERPL-MCNC: 218 MG/DL (ref 70–110)
HCO3 UR-SCNC: 23.4 MMOL/L (ref 24–28)
HCO3 UR-SCNC: 24.9 MMOL/L (ref 24–28)
HCT VFR BLD CALC: 32 %PCV (ref 36–54)
HCT VFR BLD CALC: 36 %PCV (ref 36–54)
PCO2 BLDA: 45.7 MMHG (ref 35–45)
PCO2 BLDA: 52.2 MMHG (ref 35–45)
PH SMN: 7.29 [PH] (ref 7.35–7.45)
PH SMN: 7.32 [PH] (ref 7.35–7.45)
PO2 BLDA: 225 MMHG (ref 80–100)
PO2 BLDA: 315 MMHG (ref 80–100)
POC ACTIVATED CLOTTING TIME K: 138 SEC (ref 74–137)
POC ACTIVATED CLOTTING TIME K: 242 SEC (ref 74–137)
POC ACTIVATED CLOTTING TIME K: 254 SEC (ref 74–137)
POC ACTIVATED CLOTTING TIME K: 265 SEC (ref 74–137)
POC ACTIVATED CLOTTING TIME K: 271 SEC (ref 74–137)
POC ACTIVATED CLOTTING TIME K: 277 SEC (ref 74–137)
POC ACTIVATED CLOTTING TIME K: 283 SEC (ref 74–137)
POC BE: -2 MMOL/L
POC BE: -3 MMOL/L
POC IONIZED CALCIUM: 1.21 MMOL/L (ref 1.06–1.42)
POC IONIZED CALCIUM: 1.56 MMOL/L (ref 1.06–1.42)
POC SATURATED O2: 100 % (ref 95–100)
POC SATURATED O2: 100 % (ref 95–100)
POC TCO2: 25 MMOL/L (ref 23–27)
POC TCO2: 26 MMOL/L (ref 23–27)
POTASSIUM BLD-SCNC: 3.4 MMOL/L (ref 3.5–5.1)
POTASSIUM BLD-SCNC: 3.8 MMOL/L (ref 3.5–5.1)
SAMPLE: ABNORMAL
SODIUM BLD-SCNC: 133 MMOL/L (ref 136–145)
SODIUM BLD-SCNC: 134 MMOL/L (ref 136–145)

## 2023-10-06 NOTE — Clinical Note
The sheath was removed from the right femoral vein.  Bi-Rhombic Flap Text: The defect edges were debeveled with a #15 scalpel blade. Given the location of the defect and the proximity to free margins a bi-rhombic flap was deemed most appropriate. Using a sterile surgical marker, an appropriate rhombic flap was drawn incorporating the defect. The area thus outlined was incised deep to adipose tissue with a #15 scalpel blade. The skin margins were undermined to an appropriate distance in all directions utilizing iris scissors. Following this, the designed flap was carried over into the primary defect and sutured into place.

## (undated) DEVICE — SHEATH GUIDING FLEXOR 6F 110CM

## (undated) DEVICE — CATH IMPULSE IM CRV 100CM 5FR

## (undated) DEVICE — VASCULAR PLUG II
Type: IMPLANTABLE DEVICE | Site: HEART | Status: NON-FUNCTIONAL
Brand: AMPLATZER™
Removed: 2022-08-10

## (undated) DEVICE — GUIDEWIRE SUPRA CORE 035 300CM

## (undated) DEVICE — INTRODUCER SET 12FR CHECK FLO

## (undated) DEVICE — CATH JR4 125CM 5FR

## (undated) DEVICE — IMPLANTABLE DEVICE
Type: IMPLANTABLE DEVICE | Site: HEART | Status: NON-FUNCTIONAL
Removed: 2022-08-10

## (undated) DEVICE — SHEATH INTRODUCER 7FR 11CM

## (undated) DEVICE — CATH GUIDE LAUNCH MB1 6F 100CM

## (undated) DEVICE — GUIDE LAUNCHER 5FR JR 4.0

## (undated) DEVICE — SHEATH GUIDE 5FR 55CM FLEXOR

## (undated) DEVICE — SHEATH INTRODUCER 8FR 11CM

## (undated) DEVICE — SNARE EN-SNARE 18-30MM 120CM

## (undated) DEVICE — STOPCOCK 3-WAY

## (undated) DEVICE — INTRODUCER AGILIS NXT 16.8MM

## (undated) DEVICE — GUIDEWIRE AMPLATZ .035X260

## (undated) DEVICE — GUIDEWIRE TORAY INOUE

## (undated) DEVICE — LINE 60IN PRESSURE MON.

## (undated) DEVICE — CATH DXTERITY MPASHA 110CM 6FR

## (undated) DEVICE — CATH INFINITI JUDKINS JR4

## (undated) DEVICE — Device

## (undated) DEVICE — SEE MEDLINE ITEM 156894

## (undated) DEVICE — SPIKE CONTRAST CONTROLLER

## (undated) DEVICE — GUIDEWIRE STF .035X260CM ANG

## (undated) DEVICE — SHEATH SHUTTLE 6FR 90CM

## (undated) DEVICE — CATH IMA INFINITI 4FRX100CM

## (undated) DEVICE — GUIDEWIRE STD .035X260CM ANG

## (undated) DEVICE — SHEATH SHUTTLE 6FR FLEX

## (undated) DEVICE — KIT CUSTOM MANIFOLD

## (undated) DEVICE — OMNIPAQUE 350 200ML

## (undated) DEVICE — KIT MICROINTRO 4F .018X40X7CM

## (undated) DEVICE — SNARE 10MM DEVICE

## (undated) DEVICE — GUIDE LAUNCHER 8F JR 4.0

## (undated) DEVICE — SHEATH SHUTTLE 5FR 110CM

## (undated) DEVICE — VISE RADIFOCUS MULTI TORQUE

## (undated) DEVICE — SHEATH PINNACLE INTRO 11FR

## (undated) DEVICE — NDL BROCKENBROUGH CRV 71CM

## (undated) DEVICE — GUIDE LAUNCHER IM 90

## (undated) DEVICE — GUIDEWIRE ROADRUNNER .018 270

## (undated) DEVICE — DEVICE PERCLOSE SUT CLSR 6FR

## (undated) DEVICE — PROTECTION STATION PLUS

## (undated) DEVICE — ENSNARE SYSTEM 6FR 9-15MM

## (undated) DEVICE — INTRO FAST-CATH SL1 8.5FR 63CM

## (undated) DEVICE — INTRODUCER CHECK FLO 14FR